# Patient Record
Sex: FEMALE | Race: WHITE | NOT HISPANIC OR LATINO | Employment: FULL TIME | URBAN - METROPOLITAN AREA
[De-identification: names, ages, dates, MRNs, and addresses within clinical notes are randomized per-mention and may not be internally consistent; named-entity substitution may affect disease eponyms.]

---

## 2017-04-18 ENCOUNTER — TELEPHONE (OUTPATIENT)
Dept: OTHER | Facility: CLINIC | Age: 37
End: 2017-04-18

## 2017-04-18 DIAGNOSIS — Z30.41 ENCOUNTER FOR SURVEILLANCE OF CONTRACEPTIVE PILLS: ICD-10-CM

## 2017-04-18 NOTE — TELEPHONE ENCOUNTER
norgestim-eth estrad triphasic (ORTHO TRI-CYCLEN, 28,) 0.18/0.215/0.25 MG-35 MCG per tablet 90 tablet 3 4/19/2016          Last Written Prescription Date: 04/19/2016  Last Fill Quantity: 90, # refills: 3  Last Office Visit with [facility], P or [facility] prescribing provider: 04/19/2016  Future OV:04/28/2017  Next 5 appointments (look out 90 days)     Apr 28, 2017  7:30 AM CDT   [facility]    [address] [facility] [place] [address]   [phone]                   BP Readings from Last 3 Encounters:   04/19/16 96/64   04/13/15 112/66   04/04/14 102/80     Date of last Breast Exam: Unknown    Lab Results   Component Value Date    PAP NIL 04/13/2015    PAP NIL 04/06/2012    PAP NIL 03/30/2010

## 2017-04-19 RX ORDER — NORGESTIMATE AND ETHINYL ESTRADIOL 7DAYSX3 28
KIT ORAL
Qty: 84 TABLET | Refills: 0 | Status: SHIPPED | OUTPATIENT
Start: 2017-04-19 | End: 2017-04-28

## 2017-04-19 NOTE — TELEPHONE ENCOUNTER
Prescription approved per [facility] Refill Protocol.  Tiana Hurtado RN

## 2017-04-20 DIAGNOSIS — G47.00 INSOMNIA: ICD-10-CM

## 2017-04-20 RX ORDER — TRAZODONE HYDROCHLORIDE 50 MG/1
TABLET, FILM COATED ORAL
Qty: 30 TABLET | Refills: 0 | Status: SHIPPED | OUTPATIENT
Start: 2017-04-20 | End: 2017-04-28

## 2017-04-20 NOTE — TELEPHONE ENCOUNTER
traZODone (DESYREL) 50 MG tablet         Last Written Prescription Date: 4/19/2016  Last Fill Quantity: 90; # refills: 3  Last Office Visit with [facility], [facility] or [facility] prescribing provider:  4/19/2016   Next 5 appointments (look out 90 days)     Apr 28, 2017  7:30 AM CDT   PHYSICAL with Rosetta Agudelo,    [facility]    [address] [facility] [address]   [phone]                   Last PHQ-9 score on record=   PHQ-9 SCORE 4/4/2014   Total Score 9       Lab Results   Component Value Date    AST 31 04/19/2016     Lab Results   Component Value Date    ALT 47 04/19/2016

## 2017-04-28 ENCOUNTER — OFFICE VISIT (OUTPATIENT)
Dept: FAMILY MEDICINE | Facility: CLINIC | Age: 37
End: 2017-04-28
Payer: COMMERCIAL

## 2017-04-28 VITALS
TEMPERATURE: 97.4 F | DIASTOLIC BLOOD PRESSURE: 64 MMHG | HEART RATE: 79 BPM | OXYGEN SATURATION: 95 % | WEIGHT: 149 LBS | HEIGHT: 65 IN | SYSTOLIC BLOOD PRESSURE: 97 MMHG | BODY MASS INDEX: 24.83 KG/M2

## 2017-04-28 DIAGNOSIS — Z00.01 ENCOUNTER FOR PREVENTATIVE ADULT HEALTH CARE EXAM WITH ABNORMAL FINDINGS: Primary | ICD-10-CM

## 2017-04-28 DIAGNOSIS — R94.6 ABNORMAL FINDING ON THYROID FUNCTION TEST: ICD-10-CM

## 2017-04-28 DIAGNOSIS — R79.89 ELEVATED CORTISOL LEVEL: ICD-10-CM

## 2017-04-28 DIAGNOSIS — F51.01 PRIMARY INSOMNIA: ICD-10-CM

## 2017-04-28 DIAGNOSIS — Z80.3 FAMILY HISTORY OF MALIGNANT NEOPLASM OF BREAST: Chronic | ICD-10-CM

## 2017-04-28 DIAGNOSIS — Z30.41 ENCOUNTER FOR SURVEILLANCE OF CONTRACEPTIVE PILLS: ICD-10-CM

## 2017-04-28 LAB
ALBUMIN SERPL-MCNC: 3.7 G/DL (ref 3.4–5)
ALP SERPL-CCNC: 43 U/L (ref 40–150)
ALT SERPL W P-5'-P-CCNC: 35 U/L (ref 0–50)
ANION GAP SERPL CALCULATED.3IONS-SCNC: 11 MMOL/L (ref 3–14)
AST SERPL W P-5'-P-CCNC: 24 U/L (ref 0–45)
BILIRUB SERPL-MCNC: 0.3 MG/DL (ref 0.2–1.3)
BUN SERPL-MCNC: 15 MG/DL (ref 7–30)
CALCIUM SERPL-MCNC: 9 MG/DL (ref 8.5–10.1)
CHLORIDE SERPL-SCNC: 107 MMOL/L (ref 94–109)
CHOLEST SERPL-MCNC: 196 MG/DL
CO2 SERPL-SCNC: 24 MMOL/L (ref 20–32)
CORTIS SERPL-MCNC: 15.1 UG/DL (ref 4–22)
CREAT SERPL-MCNC: 0.69 MG/DL (ref 0.52–1.04)
ERYTHROCYTE [DISTWIDTH] IN BLOOD BY AUTOMATED COUNT: 12.6 % (ref 10–15)
GFR SERPL CREATININE-BSD FRML MDRD: NORMAL ML/MIN/1.7M2
GLUCOSE SERPL-MCNC: 90 MG/DL (ref 70–99)
HCT VFR BLD AUTO: 42 % (ref 35–47)
HDLC SERPL-MCNC: 95 MG/DL
HGB BLD-MCNC: 13.8 G/DL (ref 11.7–15.7)
LDLC SERPL CALC-MCNC: 75 MG/DL
MCH RBC QN AUTO: 31.4 PG (ref 26.5–33)
MCHC RBC AUTO-ENTMCNC: 32.9 G/DL (ref 31.5–36.5)
MCV RBC AUTO: 96 FL (ref 78–100)
NONHDLC SERPL-MCNC: 101 MG/DL
PLATELET # BLD AUTO: 198 10E9/L (ref 150–450)
POTASSIUM SERPL-SCNC: 4.2 MMOL/L (ref 3.4–5.3)
PROT SERPL-MCNC: 7.2 G/DL (ref 6.8–8.8)
RBC # BLD AUTO: 4.39 10E12/L (ref 3.8–5.2)
SODIUM SERPL-SCNC: 142 MMOL/L (ref 133–144)
T4 FREE SERPL-MCNC: 0.96 NG/DL (ref 0.76–1.46)
TRIGL SERPL-MCNC: 132 MG/DL
TSH SERPL DL<=0.05 MIU/L-ACNC: 2.69 MU/L (ref 0.4–4)
WBC # BLD AUTO: 5.8 10E9/L (ref 4–11)

## 2017-04-28 PROCEDURE — 99000 SPECIMEN HANDLING OFFICE-LAB: CPT | Performed by: INTERNAL MEDICINE

## 2017-04-28 PROCEDURE — 80053 COMPREHEN METABOLIC PANEL: CPT | Performed by: INTERNAL MEDICINE

## 2017-04-28 PROCEDURE — 84439 ASSAY OF FREE THYROXINE: CPT | Performed by: INTERNAL MEDICINE

## 2017-04-28 PROCEDURE — 85027 COMPLETE CBC AUTOMATED: CPT | Performed by: INTERNAL MEDICINE

## 2017-04-28 PROCEDURE — 36415 COLL VENOUS BLD VENIPUNCTURE: CPT | Performed by: INTERNAL MEDICINE

## 2017-04-28 PROCEDURE — 99395 PREV VISIT EST AGE 18-39: CPT | Performed by: INTERNAL MEDICINE

## 2017-04-28 PROCEDURE — 80061 LIPID PANEL: CPT | Performed by: INTERNAL MEDICINE

## 2017-04-28 PROCEDURE — 84443 ASSAY THYROID STIM HORMONE: CPT | Performed by: INTERNAL MEDICINE

## 2017-04-28 PROCEDURE — 84479 ASSAY OF THYROID (T3 OR T4): CPT | Mod: 59 | Performed by: INTERNAL MEDICINE

## 2017-04-28 PROCEDURE — 82533 TOTAL CORTISOL: CPT | Performed by: INTERNAL MEDICINE

## 2017-04-28 PROCEDURE — 86376 MICROSOMAL ANTIBODY EACH: CPT | Performed by: INTERNAL MEDICINE

## 2017-04-28 RX ORDER — TRAZODONE HYDROCHLORIDE 50 MG/1
TABLET, FILM COATED ORAL
Qty: 90 TABLET | Refills: 3 | Status: SHIPPED | OUTPATIENT
Start: 2017-04-28 | End: 2018-04-15

## 2017-04-28 RX ORDER — NORGESTIMATE AND ETHINYL ESTRADIOL 7DAYSX3 28
1 KIT ORAL DAILY
Qty: 84 TABLET | Refills: 3 | Status: SHIPPED | OUTPATIENT
Start: 2017-04-28 | End: 2018-04-30

## 2017-04-28 NOTE — PROGRESS NOTES
SUBJECTIVE:     CC: Cortney Willoughby is an 37 year old woman who presents for preventive health visit.     Healthy Habits:    Do you get at least three servings of calcium containing foods daily (dairy, green leafy vegetables, etc.)? yes    Amount of exercise or daily activities, outside of work: 5-6 day(s) per week    Problems taking medications regularly No    Medication side effects: No    Have you had an eye exam in the past two years? No     Do you see a dentist twice per year? Yes     Do you have sleep apnea, excessive snoring or daytime drowsiness? yes, sometimes daytime drowsiness but overall better than she was years ago.    Cortney presents to the clinic today for an annual physical. She is fasting today.    She is no longer working at [employer]. She started a new position in the area which she is happy about.    Drowsiness- She is not drowsy all the time. She recently started acupuncture and noted that's when her extreme drowsiness happened initially. She almost fell asleep at work a few times. She correlates it to the acupuncture and possible body detoxification. She is not experiencing daytime drowsiness at this time.  She notes periodic sweating at night, but sleeps well with Trazodone which she has been on for awhile since her divorce. Mood stable.     Thyroid/Hormone Imbalance- Cortney is still unhappy with weight gain/distribution. She notes weight is around her abdomen, hips and thighs. She is upset since she works out 6 days a week. Notes that she is always hungry as well. She feels despite having the same diet and exercise plan she will suddenly go up and down in weight - reporting as much as 10 pounds. Note, weight is pretty stable on our scale over the past several years. Her mother had breast cancer so Cortney went to have labs done at an outside facility b/c she is worried that weight gain is all estrogen driven and will increase her risk of developing breast cancer as well.    She takes daily vitamins, supplements, and magnesium at night.  She started acupuncture after the results of her labs came back, since acupuncture can help with hormone imbalances. She is still undergoing treatment.   She is adamant about finding a solution.   She requested another mammogram referral d/t her family history. We discussed the option of breast MRI for her to get a better image d/t her age and to alternate with the mammogram she had last year. Her friend also recently passed d/t breast cancer at 31 y/o. Cortney's mom was dx with breast cancer at the age of 44 - negative genetic testing but was HER-2 positive. Mom is still living.  She would like to have children in the future; currently  and not dating/sexually active.   Notes stress related temporal headaches. She will treat with green tea. Denies photophobia or migraines.     Admits to minor dysphagia with post nasal drainage, but denies food getting stuck.     She had labs done through [facility]:  Thyroid - mildly elevated TSH at 2.49; slightly low T3 uptake  Testosterone - 27     Progesterone - 0.2   Leptin - 12.3  Cortisol- 24.9       Today's PHQ-2 Score:   PHQ-2 ( 1999 [employer]) 4/28/2017 4/13/2015   Q1: Little interest or pleasure in doing things 0 1   Q2: Feeling down, depressed or hopeless 0 1   PHQ-2 Score 0 2       Abuse: Current or Past(Physical, Sexual or Emotional)- No  Do you feel safe in your environment - Yes    Social History   Substance Use Topics     Smoking status: Never Smoker     Smokeless tobacco: Never Used     Alcohol use Yes      Comment: rare     The patient does not drink >3 drinks per day nor >7 drinks per week.    Recent Labs   Lab Test  04/08/16   0714  04/06/12   0732   CHOL  183  158   HDL  97  64   LDL  65  66   TRIG  106  144   CHOLHDLRATIO   --   2.5   NHDL  86   --        Reviewed orders with patient.  Reviewed health maintenance and updated orders accordingly - Yes    Mammo Decision Support:  Mammogram per  "health maintenance  History of abnormal Pap smear: NO - age 30-65 PAP every 5 years with negative HPV co-testing recommended    ROS:  Comprehensive ROS negative unless as stated above in HPI.    This document serves as a record of the services and decisions personally performed and made by Rosetta Agudelo DO. It was created on his/her behalf by Monisha Bowles, a trained medical scribe. The creation of this document is based the provider's statements to the medical scribe.  Scribe Monisha Bowles 7:36 AM, April 28, 2017   OBJECTIVE:     BP 97/64 (BP Location: Right arm, Cuff Size: Adult Regular)  Pulse 79  Temp 97.4  F (36.3  C) (Oral)  Ht 5' 4.9\" (1.648 m)  Wt 149 lb (67.6 kg)  LMP 04/11/2017 (Exact Date)  SpO2 95%  BMI 24.87 kg/m2  EXAM:  GENERAL: healthy, alert and no distress  EYES: Eyes grossly normal to inspection, PERRL and conjunctivae and sclerae normal  HENT: ear canals and TM's normal, nose and mouth without ulcers or lesions  NECK: no adenopathy, no asymmetry, masses, or scars and thyroid normal to palpation  RESP: lungs clear to auscultation - no rales, rhonchi or wheezes  BREAST: no nipple discharge and no palpable axillary masses or adenopathy, left breast probable fibrocystic tissue at 6 o'clock position, but no discrete mass; does have h/o fibrocystic changes per exam last year  CV: regular rate and rhythm, normal S1 S2, no S3 or S4, no murmur, click or rub, no peripheral edema and peripheral pulses strong  ABDOMEN: soft, nontender, no hepatosplenomegaly, no masses and bowel sounds normal  MS: no gross musculoskeletal defects noted, no edema  SKIN: no suspicious lesions or rashes  NEURO: Normal strength and tone, mentation intact and speech normal  PSYCH: mentation appears normal, affect normal/bright  Right temple slightly hyperpigmented, round and non raised area    ASSESSMENT/PLAN:     1. Encounter for preventative adult health care exam with abnormal findings  Overall healthy " "gal but she has concerns mostly about weight management  - Comprehensive metabolic panel  - CBC with platelets  - Lipid panel reflex to direct LDL    2. Abnormal finding on thyroid function test  Will recheck labs  - TSH  - T4, free  - T3 uptake  - Thyroid peroxidase antibody    3. Elevated cortisol level (H)  Per outside labs and she reports it was first thing in AM when had lab draw; was quite outside of their lab's reference range  Will repeat lab here  - Cortisol    4. Primary insomnia  Stable with medication  - traZODone (DESYREL) 50 MG tablet; TAKE ONE TABLET BY MOUTH NIGHTLY AT BEDTIME AS NEEDED FOR SLEEP  Dispense: 90 tablet; Refill: 3    5. Encounter for surveillance of contraceptive pills  Tolerating well without s/e  - norgestim-eth estrad triphasic (ORTHO TRI-CYCLEN, TRI-SPRINTEC) 0.18/0.215/0.25 MG-35 MCG per tablet; Take 1 tablet by mouth daily  Dispense: 84 tablet; Refill: 3    6. Family history of malignant neoplasm of breast  Breast MRI ordered; had normal mammo last year      Patient Instructions   Labs today- we will work on a plan when the results come back   -Consider signing up for [facility]   Referral for breast MRI - [facility] [phone]   Follow up with me annually or as needed    COUNSELING:   Reviewed preventive health counseling, as reflected in patient instructions       Regular exercise       Healthy diet/nutrition   reports that she has never smoked. She has never used smokeless tobacco.  Estimated body mass index is 24.87 kg/(m^2) as calculated from the following:    Height as of this encounter: 5' 4.9\" (1.648 m).    Weight as of this encounter: 149 lb (67.6 kg).       The information in this document, created by the medical scribe for me, accurately reflects the services I personally performed and the decisions made by me. I have reviewed and approved this document for accuracy prior to leaving the patient care area.  Rosetta" Sofia Agudelo DO  7:35 AM, 04/28/17    Rosetta Agudelo DO  [facility]

## 2017-04-28 NOTE — LETTER
[facility]  [address] [facility]  [address]  [place]  [address]  Tel: [phone]    May 3, 2017    Cortney Willoughby  [address] [address]        Cortney,    By the time you receive these labs in the mail, you will have received a call hopefully from our clinic to discuss in more detail. I'm glad your labs done here were all within normal limits - including the thyroid and cortisol levels. So, I suggest following the trend over time.    If you have any further questions or problems, please contact our office.      Sincerely,    Rosetta Agudelo DO/LISA DOE          Enclosure: Lab Results

## 2017-04-28 NOTE — PATIENT INSTRUCTIONS
Labs today- we will work on a plan when the results come back   -Consider signing up for [facility]   Referral for breast MRI - Please call to schedule your radiology study at [facility]: [phone]   Follow up with me annually or as needed    Preventive Health Recommendations  Female Ages 26 - 39  Yearly exam:   See your health care provider every year in order to    Review health changes.     Discuss preventive care.      Review your medicines if you your doctor has prescribed any.    Until age 30: Get a Pap test every three years (more often if you have had an abnormal result).    After age 30: Talk to your doctor about whether you should have a Pap test every 3 years or have a Pap test with HPV screening every 5 years.   You do not need a Pap test if your uterus was removed (hysterectomy) and you have not had cancer.  You should be tested each year for STDs (sexually transmitted diseases), if you're at risk.   Talk to your provider about how often to have your cholesterol checked.  If you are at risk for diabetes, you should have a diabetes test (fasting glucose).  Shots: Get a flu shot each year. Get a tetanus shot every 10 years.   Nutrition:     Eat at least 5 servings of fruits and vegetables each day.    Eat whole-grain bread, whole-wheat pasta and brown rice instead of white grains and rice.    Talk to your provider about Calcium and Vitamin D.     Lifestyle    Exercise at least 150 minutes a week (30 minutes a day, 5 days of the week). This will help you control your weight and prevent disease.    Limit alcohol to one drink per day.    No smoking.     Wear sunscreen to prevent skin cancer.    See your dentist every six months for an exam and cleaning.

## 2017-04-28 NOTE — NURSING NOTE
"Chief Complaint   Patient presents with     Physical     fasting       Initial BP 97/64 (BP Location: Right arm, Cuff Size: Adult Regular)  Pulse 79  Temp 97.4  F (36.3  C) (Oral)  Ht 5' 4.9\" (1.648 m)  Wt 149 lb (67.6 kg)  LMP 04/11/2017 (Exact Date)  SpO2 95%  BMI 24.87 kg/m2 Estimated body mass index is 24.87 kg/(m^2) as calculated from the following:    Height as of this encounter: 5' 4.9\" (1.648 m).    Weight as of this encounter: 149 lb (67.6 kg).  Medication Reconciliation: complete.      Tasha Moreno CMA        "

## 2017-04-28 NOTE — MR AVS SNAPSHOT
After Visit Summary   4/28/2017    Cortney Willoughby    MRN: [id number]           Patient Information     Date Of Birth          1980        Visit Information        Provider Department      4/28/2017 7:30 AM Rosetta Agudelo,  [facility]        Today's Diagnoses     Encounter for preventative adult health care exam with abnormal findings    -  1    Abnormal finding on thyroid function test        Elevated cortisol level (H)        Primary insomnia        Encounter for surveillance of contraceptive pills        Family history of malignant neoplasm of breast          Care Instructions    Labs today- we will work on a plan when the results come back   -Consider signing up for [facility]   Referral for breast MRI - Please call to schedule your radiology study at [facility]: [phone]   Follow up with me annually or as needed    Preventive Health Recommendations  Female Ages 26 - 39  Yearly exam:   See your health care provider every year in order to    Review health changes.     Discuss preventive care.      Review your medicines if you your doctor has prescribed any.    Until age 30: Get a Pap test every three years (more often if you have had an abnormal result).    After age 30: Talk to your doctor about whether you should have a Pap test every 3 years or have a Pap test with HPV screening every 5 years.   You do not need a Pap test if your uterus was removed (hysterectomy) and you have not had cancer.  You should be tested each year for STDs (sexually transmitted diseases), if you're at risk.   Talk to your provider about how often to have your cholesterol checked.  If you are at risk for diabetes, you should have a diabetes test (fasting glucose).  Shots: Get a flu shot each year. Get a tetanus shot every 10 years.   Nutrition:     Eat at least 5 servings of fruits and vegetables each day.    Eat whole-grain bread, whole-wheat pasta and brown rice instead of white grains and  "rice.    Talk to your provider about Calcium and Vitamin D.     Lifestyle    Exercise at least 150 minutes a week (30 minutes a day, 5 days of the week). This will help you control your weight and prevent disease.    Limit alcohol to one drink per day.    No smoking.     Wear sunscreen to prevent skin cancer.    See your dentist every six months for an exam and cleaning.          Follow-ups after your visit        Who to contact     If you have questions or need follow up information about today's clinic visit or your schedule please contact [facility] directly at [phone].  Normal or non-critical lab and imaging results will be communicated to you by MyChart, letter or phone within 4 business days after the clinic has received the results. If you do not hear from us within 7 days, please contact the clinic through MyChart or phone. If you have a critical or abnormal lab result, we will notify you by phone as soon as possible.  Submit refill requests through [employer] or call your pharmacy and they will forward the refill request to us. Please allow 3 business days for your refill to be completed.          Additional Information About Your Visit        Care EveryWhere ID     This is your Care EveryWhere ID. This could be used by other organizations to access your [facility] medical records  [id number]        Your Vitals Were     Pulse Temperature Height Last Period Pulse Oximetry BMI (Body Mass Index)    79 97.4  F (36.3  C) (Oral) 5' 4.9\" (1.648 m) 04/11/2017 (Exact Date) 95% 24.87 kg/m2       Blood Pressure from Last 3 Encounters:   04/28/17 97/64   04/19/16 96/64   04/13/15 112/66    Weight from Last 3 Encounters:   04/28/17 149 lb (67.6 kg)   04/19/16 147 lb (66.7 kg)   04/13/15 141 lb (64 kg)              We Performed the Following     CBC with platelets     Comprehensive metabolic panel     Cortisol     Lipid panel reflex to direct LDL     T3 uptake     T4, free     Thyroid peroxidase antibody  "    TSH          Today's Medication Changes          These changes are accurate as of: 4/28/17  8:19 AM.  If you have any questions, ask your nurse or doctor.               These medicines have changed or have updated prescriptions.        Dose/Directions    norgestim-eth estrad triphasic 0.18/0.215/0.25 MG-35 MCG per tablet   Commonly known as:  ORTHO TRI-CYCLEN, TRI-SPRINTEC   This may have changed:  See the new instructions.   Used for:  Encounter for surveillance of contraceptive pills   Changed by:  Rosetta Agudelo DO        Dose:  1 tablet   Take 1 tablet by mouth daily   Quantity:  84 tablet   Refills:  3       traZODone 50 MG tablet   Commonly known as:  DESYREL   This may have changed:  See the new instructions.   Used for:  Primary insomnia   Changed by:  Rosetta Agudelo DO        TAKE ONE TABLET BY MOUTH NIGHTLY AT BEDTIME AS NEEDED FOR SLEEP   Quantity:  90 tablet   Refills:  3            Where to get your medicines      These medications were sent to [place] [address] [place] [address] [address]     Phone:  [phone]     norgestim-eth estrad triphasic 0.18/0.215/0.25 MG-35 MCG per tablet    traZODone 50 MG tablet                Primary Care Provider Office Phone # Fax #    Rosetta Agudelo [phone]       [facility] [address] [facility] [address]  [facility] [address]        Thank you!     Thank you for choosing [facility]  for your care. Our goal is always to provide you with excellent care. Hearing back from our patients is one way we can continue to improve our services. Please take a few minutes to complete the written survey that you may receive in the mail after your visit with us. Thank you!             Your Updated Medication List - Protect others around you: Learn how to safely use, store and throw away your medicines at www.disposemymeds.org.          This list is accurate as of: 4/28/17  8:19 AM.   Always use your most recent med list.                   Brand Name Dispense Instructions for use    norgestim-eth estrad triphasic 0.18/0.215/0.25 MG-35 MCG per tablet    ORTHO TRI-CYCLEN, TRI-SPRINTEC    84 tablet    Take 1 tablet by mouth daily       traZODone 50 MG tablet    DESYREL    90 tablet    TAKE ONE TABLET BY MOUTH NIGHTLY AT BEDTIME AS NEEDED FOR SLEEP

## 2017-05-01 LAB — THYROPEROXIDASE AB SERPL-ACNC: <10 IU/ML

## 2017-05-02 ENCOUNTER — TELEPHONE (OUTPATIENT)
Dept: FAMILY MEDICINE | Facility: CLINIC | Age: 37
End: 2017-05-02

## 2017-05-02 DIAGNOSIS — R94.6 ABNORMAL FINDING ON THYROID FUNCTION TEST: Primary | ICD-10-CM

## 2017-05-02 LAB — T3RU NFR SERPL: 31 %

## 2017-05-02 NOTE — LETTER
[place] [address] [address] [facility] [address]  [phone]        November 9, 2017    Cortney Willoughby  [address] [address]              Dear Cortney Willoughby    This is to remind you that your non-fasting labs are due.    You may call our office at [phone] to schedule an appointment.    Please disregard this notice if you have already had your labs drawn or made an appointment.        Sincerely,        Rosetta Agudelo DO

## 2017-05-03 NOTE — TELEPHONE ENCOUNTER
Please call Cortney. Really nice gal that had some concerns about labs. She had been having fluctuating weight and went to an independent clinic where she had slightly abnormal thyroid labs and cortisol level. However, all labs here in our clinic were within normal limits on Friday. While this is truly good news, I wonder if she will be concerned b/c she was worried why she was gaining weight. I'm not sure why she is gaining weight. She is very stable on our scale over the past several years but she feels she fluctuates up and down without pattern or change in her diet / exercise.  My suggestion is to recheck thyroid labs in a couple month for the trend.   I had told her in the office if her numbers were way off, she could see an endocrinologist - I don't think that is necessary at this time.  Please let me know if she has further questions. Thanks!

## 2017-05-03 NOTE — TELEPHONE ENCOUNTER
Reason for call: Results   Name of test or procedure: All labs   Date of test or procedure: 4/28/17  Location of test or procedure: [facility]    Additional comments: n/a    Phone Number Pt can be reached at: Home number on file [phone] (home)  Best Time: today  Can we leave a detailed message on this number? YES

## 2017-05-03 NOTE — TELEPHONE ENCOUNTER
Spoke with pt and let her know that labs within normal limits and letter was sent. Patient is requesting a call from . She would like to know why she is having sx if labs results are normal.    Tasha Moreno CMA

## 2017-05-03 NOTE — TELEPHONE ENCOUNTER
Tried to call Cortney but her voice mailbox was full  Will try tomorrow  I'll discuss following TSH trend as outside labs were definitely slightly abnormal even though labs here were all within normal limits   May also discuss her adjusting her diet and exercise routine b/c if she changes things then maybe that will help (aka - may be caught in a rut)  Also note her weight has been stable in our clinic despite her concerns of weight gain

## 2017-05-04 NOTE — TELEPHONE ENCOUNTER
Spoke to Cortney who was appreciative of the discussion  She is thankful labs are within normal limits b/c doesn't want to have to take a medication if she doesn't have to  She does think spring is usually the time of the year where she is heaviest and tends to be 135-140 the rest of the year  She does think acupuncture is helping some and has a few more sessions of that  She will schedule future repeat thyroid labs and/or OV with me later this summer to follow the trend  She also has MRI scheduled  No further questions at this time

## 2017-05-20 DIAGNOSIS — G47.00 INSOMNIA: ICD-10-CM

## 2017-05-22 RX ORDER — TRAZODONE HYDROCHLORIDE 50 MG/1
TABLET, FILM COATED ORAL
Qty: 30 TABLET | Refills: 0 | OUTPATIENT
Start: 2017-05-22

## 2017-06-15 ENCOUNTER — HOSPITAL ENCOUNTER (OUTPATIENT)
Dept: MRI IMAGING | Facility: CLINIC | Age: 37
Discharge: HOME | End: 2017-06-15
Attending: INTERNAL MEDICINE | Admitting: INTERNAL MEDICINE
Payer: COMMERCIAL

## 2017-06-15 DIAGNOSIS — Z80.3 FAMILY HISTORY OF MALIGNANT NEOPLASM OF BREAST: Chronic | ICD-10-CM

## 2017-06-15 PROCEDURE — 0159T MR BREAST BILATERAL W/O & W CONTRAST: CPT

## 2017-06-15 PROCEDURE — 27210995 ZZH RX 272: Performed by: INTERNAL MEDICINE

## 2017-06-15 PROCEDURE — A9585 GADOBUTROL INJECTION: HCPCS | Performed by: INTERNAL MEDICINE

## 2017-06-15 PROCEDURE — 25000128 H RX IP 250 OP 636: Performed by: INTERNAL MEDICINE

## 2017-06-15 RX ORDER — ACYCLOVIR 200 MG/1
30 CAPSULE ORAL ONCE
Status: COMPLETED | OUTPATIENT
Start: 2017-06-15 | End: 2017-06-15

## 2017-06-15 RX ORDER — GADOBUTROL 604.72 MG/ML
10 INJECTION INTRAVENOUS ONCE
Status: COMPLETED | OUTPATIENT
Start: 2017-06-15 | End: 2017-06-15

## 2017-06-15 RX ADMIN — GADOBUTROL 10 ML: 604.72 INJECTION INTRAVENOUS at 19:13

## 2017-06-15 RX ADMIN — SODIUM CHLORIDE 30 ML: 9 INJECTION, SOLUTION INTRAMUSCULAR; INTRAVENOUS; SUBCUTANEOUS at 19:13

## 2018-01-10 DIAGNOSIS — E65 LOCALIZED ADIPOSITY: Primary | ICD-10-CM

## 2018-01-10 LAB — HGB BLD-MCNC: 13.4 G/DL (ref 11.7–15.7)

## 2018-01-10 PROCEDURE — 36415 COLL VENOUS BLD VENIPUNCTURE: CPT

## 2018-01-10 PROCEDURE — 85018 HEMOGLOBIN: CPT

## 2018-03-13 ENCOUNTER — TELEPHONE (OUTPATIENT)
Dept: FAMILY MEDICINE | Facility: CLINIC | Age: 38
End: 2018-03-13

## 2018-03-13 DIAGNOSIS — Z00.00 PREVENTATIVE HEALTH CARE: Primary | ICD-10-CM

## 2018-03-13 NOTE — TELEPHONE ENCOUNTER
Reason for Call: Request for an order or referral:    Order or referral being requested: fasting labs    Date needed: before my next appointment    Has the patient been seen by the PCP for this problem? YES    Additional comments: Please place fasting lab orders before 4/20    Phone number Patient can be reached at:  Home number on file [phone] (home)    Best Time:  anytime    Can we leave a detailed message on this number?  YES    Call taken on 3/13/2018 at 2:44 PM by Sanjuana Kerr

## 2018-04-15 DIAGNOSIS — F51.01 PRIMARY INSOMNIA: ICD-10-CM

## 2018-04-16 NOTE — TELEPHONE ENCOUNTER
"traZODone (DESYREL) 50 MG tablet   Last Written Prescription Date:  4/28/2017  Last Fill Quantity: 90,  # refills: 3   Last office visit: 4/28/2017 with prescribing provider:  Dr. Agudelo   Future Office Visit:   Next 5 appointments (look out 90 days)     Apr 30, 2018  9:30 AM CDT   PHYSICAL with Rosetta Agudelo, DO   [facility]    [address] [facility] [id number] [phone]                 Requested Prescriptions   Pending Prescriptions Disp Refills     traZODone (DESYREL) 50 MG tablet [Pharmacy Med Name: TRAZODONE 50 MG TABLET] 90 tablet 3     Sig: TAKE ONE TABLET BY MOUTH NIGHTLY AT BEDTIME AS NEEDED FOR SLEEP    Serotonin Modulators Passed    4/15/2018  3:14 AM       Passed - Recent (12 mo) or future (30 days) visit within the authorizing provider's specialty    Patient had office visit in the last 12 months or has a visit in the next 30 days with authorizing provider or within the authorizing provider's specialty.  See \"Patient Info\" tab in inbasket, or \"Choose Columns\" in Meds & Orders section of the refill encounter.           Passed - Patient is age 18 or older       Passed - No active pregnancy on record       Passed - No positive pregnancy test in past 12 months          "

## 2018-04-17 RX ORDER — TRAZODONE HYDROCHLORIDE 50 MG/1
TABLET, FILM COATED ORAL
Qty: 30 TABLET | Refills: 0 | Status: SHIPPED | OUTPATIENT
Start: 2018-04-17 | End: 2018-04-30

## 2018-04-17 NOTE — TELEPHONE ENCOUNTER
Medication is being filled for 1 time refill only due to:  pt has anual exam scheduled this month. Rola Mireles RN

## 2018-04-18 DIAGNOSIS — Z00.00 PREVENTATIVE HEALTH CARE: ICD-10-CM

## 2018-04-18 LAB
ERYTHROCYTE [DISTWIDTH] IN BLOOD BY AUTOMATED COUNT: 12.3 % (ref 10–15)
HCT VFR BLD AUTO: 44.9 % (ref 35–47)
HGB BLD-MCNC: 15 G/DL (ref 11.7–15.7)
MCH RBC QN AUTO: 31.3 PG (ref 26.5–33)
MCHC RBC AUTO-ENTMCNC: 33.4 G/DL (ref 31.5–36.5)
MCV RBC AUTO: 94 FL (ref 78–100)
PLATELET # BLD AUTO: 208 10E9/L (ref 150–450)
RBC # BLD AUTO: 4.79 10E12/L (ref 3.8–5.2)
WBC # BLD AUTO: 4.5 10E9/L (ref 4–11)

## 2018-04-18 PROCEDURE — 36415 COLL VENOUS BLD VENIPUNCTURE: CPT | Performed by: INTERNAL MEDICINE

## 2018-04-18 PROCEDURE — 80053 COMPREHEN METABOLIC PANEL: CPT | Performed by: INTERNAL MEDICINE

## 2018-04-18 PROCEDURE — 85027 COMPLETE CBC AUTOMATED: CPT | Performed by: INTERNAL MEDICINE

## 2018-04-18 PROCEDURE — 80061 LIPID PANEL: CPT | Performed by: INTERNAL MEDICINE

## 2018-04-19 LAB
ALBUMIN SERPL-MCNC: 3.8 G/DL (ref 3.4–5)
ALP SERPL-CCNC: 49 U/L (ref 40–150)
ALT SERPL W P-5'-P-CCNC: 64 U/L (ref 0–50)
ANION GAP SERPL CALCULATED.3IONS-SCNC: 5 MMOL/L (ref 3–14)
AST SERPL W P-5'-P-CCNC: 35 U/L (ref 0–45)
BILIRUB SERPL-MCNC: 0.4 MG/DL (ref 0.2–1.3)
BUN SERPL-MCNC: 20 MG/DL (ref 7–30)
CALCIUM SERPL-MCNC: 8.8 MG/DL (ref 8.5–10.1)
CHLORIDE SERPL-SCNC: 110 MMOL/L (ref 94–109)
CHOLEST SERPL-MCNC: 213 MG/DL
CO2 SERPL-SCNC: 25 MMOL/L (ref 20–32)
CREAT SERPL-MCNC: 0.7 MG/DL (ref 0.52–1.04)
GFR SERPL CREATININE-BSD FRML MDRD: >90 ML/MIN/1.7M2
GLUCOSE SERPL-MCNC: 84 MG/DL (ref 70–99)
HDLC SERPL-MCNC: 76 MG/DL
LDLC SERPL CALC-MCNC: 125 MG/DL
NONHDLC SERPL-MCNC: 137 MG/DL
POTASSIUM SERPL-SCNC: 4.2 MMOL/L (ref 3.4–5.3)
PROT SERPL-MCNC: 7 G/DL (ref 6.8–8.8)
SODIUM SERPL-SCNC: 140 MMOL/L (ref 133–144)
TRIGL SERPL-MCNC: 62 MG/DL

## 2018-04-30 ENCOUNTER — OFFICE VISIT (OUTPATIENT)
Dept: FAMILY MEDICINE | Facility: CLINIC | Age: 38
End: 2018-04-30
Payer: COMMERCIAL

## 2018-04-30 VITALS
WEIGHT: 156.7 LBS | TEMPERATURE: 98 F | HEIGHT: 65 IN | HEART RATE: 79 BPM | SYSTOLIC BLOOD PRESSURE: 111 MMHG | DIASTOLIC BLOOD PRESSURE: 72 MMHG | OXYGEN SATURATION: 97 % | BODY MASS INDEX: 26.11 KG/M2

## 2018-04-30 DIAGNOSIS — Z00.01 ENCOUNTER FOR PREVENTATIVE ADULT HEALTH CARE EXAM WITH ABNORMAL FINDINGS: Primary | ICD-10-CM

## 2018-04-30 DIAGNOSIS — F51.01 PRIMARY INSOMNIA: ICD-10-CM

## 2018-04-30 DIAGNOSIS — Z12.31 VISIT FOR SCREENING MAMMOGRAM: ICD-10-CM

## 2018-04-30 DIAGNOSIS — E66.3 OVERWEIGHT (BMI 25.0-29.9): ICD-10-CM

## 2018-04-30 DIAGNOSIS — L20.82 FLEXURAL ECZEMA: ICD-10-CM

## 2018-04-30 DIAGNOSIS — Z30.41 ENCOUNTER FOR SURVEILLANCE OF CONTRACEPTIVE PILLS: ICD-10-CM

## 2018-04-30 PROCEDURE — 99395 PREV VISIT EST AGE 18-39: CPT | Performed by: INTERNAL MEDICINE

## 2018-04-30 RX ORDER — NORGESTIMATE AND ETHINYL ESTRADIOL 7DAYSX3 28
1 KIT ORAL DAILY
Qty: 84 TABLET | Refills: 3 | Status: SHIPPED | OUTPATIENT
Start: 2018-04-30 | End: 2019-05-02

## 2018-04-30 RX ORDER — TRAZODONE HYDROCHLORIDE 50 MG/1
TABLET, FILM COATED ORAL
Qty: 90 TABLET | Refills: 3 | Status: SHIPPED | OUTPATIENT
Start: 2018-04-30 | End: 2019-03-02

## 2018-04-30 RX ORDER — LIOTHYRONINE SODIUM 5 UG/1
5 TABLET ORAL DAILY
COMMUNITY
End: 2019-07-23

## 2018-04-30 NOTE — MR AVS SNAPSHOT
After Visit Summary   4/30/2018    Cortney Willoughby    MRN: [id number]           Patient Information     Date Of Birth          1980        Visit Information        Provider Department      4/30/2018 9:30 AM Rosetta Agudelo,  [facility]        Today's Diagnoses     Encounter for preventative adult health care exam with abnormal findings    -  1    Encounter for surveillance of contraceptive pills        Primary insomnia        Visit for screening mammogram          Care Instructions    Try to get a little more sleep    Preventive Health Recommendations  Female Ages 26 - 39  Yearly exam:   See your health care provider every year in order to    Review health changes.     Discuss preventive care.      Review your medicines if you your doctor has prescribed any.    Until age 30: Get a Pap test every three years (more often if you have had an abnormal result).    After age 30: Talk to your doctor about whether you should have a Pap test every 3 years or have a Pap test with HPV screening every 5 years.   You do not need a Pap test if your uterus was removed (hysterectomy) and you have not had cancer.  You should be tested each year for STDs (sexually transmitted diseases), if you're at risk.   Talk to your provider about how often to have your cholesterol checked.  If you are at risk for diabetes, you should have a diabetes test (fasting glucose).  Shots: Get a flu shot each year. Get a tetanus shot every 10 years.   Nutrition:     Eat at least 5 servings of fruits and vegetables each day.    Eat whole-grain bread, whole-wheat pasta and brown rice instead of white grains and rice.    Talk to your provider about Calcium and Vitamin D.     Lifestyle    Exercise at least 150 minutes a week (30 minutes a day, 5 days of the week). This will help you control your weight and prevent disease.    Limit alcohol to one drink per day.    No smoking.     Wear sunscreen to prevent skin cancer.    See  "your dentist every six months for an exam and cleaning.    Controlling Your Cholesterol  Cholesterol is a waxy substance. It travels in your blood through the blood vessels. When you have high cholesterol, it can build up along the walls of the blood vessels. This makes the vessels narrower and decreases blood flow. You are then at greater risk of having a heart attack or a stroke.  Good and bad cholesterol  Lipids are fats, and blood is mostly water. Fat and water don't mix. So our bodies need lipoproteins (lipids inside a protein shell) to carry the lipids. The protein shell carries its lipids through the bloodstream. There are two main kinds of lipoproteins:    LDL (low-density lipoprotein) is known as \"bad cholesterol.\" It mainly carries cholesterol. It delivers this cholesterol to body cells. Excess LDL cholesterol will build up in artery walls. This increases your risk for heart disease and stroke.    HDL (high-density lipoprotein) is known as \"good cholesterol.\" This protein shell collects excess cholesterol that LDLs have left behind on blood vessel walls. That's why high levels of HDL cholesterol can decrease your risk of heart disease and stroke.  Controlling cholesterol levels  Total cholesterol includes LDL and HDL cholesterol, as well as other fats in the bloodstream. If your total cholesterol is high, follow the steps below to help lower your total cholesterol level:  Eat less unhealthy fat    Cut back on saturated fats and trans fats (also called hydrogenated) by selecting lean cuts of meat, low-fat dairy, and using oils instead of solid fats. Limit baked goods, processed meats, and fried foods. A diet that s high in these fats increases your bad cholesterol. It's not enough to just cut back on foods containing cholesterol.    Eat about 2 servings of fish per week. Most fish contain omega-3 fatty acids. These help lower blood cholesterol.    Eat more whole grains and soluble fiber (such as oat bran). " These lower overall cholesterol.  Be active    Choose an activity you enjoy. Walking, swimming, and riding a bike are some good ways to be active.    Start at a level where you feel comfortable. Increase your time and pace a little each week.    Work up to 30 to 40 minutes of moderate to high intensity physical activity at least 3 to 4 days per week.    Remember, some activity is better than none.    If you haven't been exercising regularly, start slowly. Check with your healthcare provider to make sure the exercise plan is right for you.  Quit smoking  Quitting smoking can improve your lipid levels. It also lowers your risk for heart disease and stroke.  Manage your weight  If you are overweight or obese, your healthcare provider will work with you to lose weight and lower your BMI (body mass index) to a normal or near-normal level. Making diet changes and increasing physical activity can help.  Take medicine as directed  Many people need medicine to get their LDL levels to a safe level. Medicine to lower cholesterol levels is effective and safe. Taking medicine is not a substitute for exercise or watching your diet! Your healthcare provider can tell you whether you might benefit from a cholesterol-lowering medicine.  Date Last Reviewed: 6/1/2017 2000-2017 [employer]. [address] [place] [address]. All rights reserved. This information is not intended as a substitute for professional medical care. Always follow your healthcare professional's instructions.        Lifestyle Changes to Control Cholesterol  You can control your cholesterol through diet, exercise, weight management, quitting smoking, stress management, and taking your medicines right. These things can also lower your risk for cardiovascular disease.    Eating healthy  Your healthcare provider will give you information on diet changes you may need to make. Your provider may recommend that you see a registered dietitian for  help with diet changes. Changes may include:    Cutting back on the amount of fat and cholesterol in your meals    Eating less salt (sodium). This is especially important if you have high blood pressure.    Eating more fresh vegetables and fruits    Eating lean proteins such as fish, poultry, beans, and peas    Eating less red meat and processed meats    Using low-fat dairy products    Using vegetable and nut oils in limited amounts    Limiting how many sweets and processed foods like chips, cookies, and baked goods that you eat     Limiting how many sugar-sweetened beverages you drink    Limiting how often you eat out  Getting exercise  Regular exercise is a good way to help your body control cholesterol. Regular exercise can help in many ways. It can:    Raise your good cholesterol    Help lower your bad cholesterol    Let blood flow better through your body    Give more oxygen to your muscles and tissues    Help you manage your weight    Help your heart pump better    Lower your blood pressure  Your healthcare provider may recommend that you get more physical activity if you haven't been active. Your provider may recommend that you get moderate to vigorous physical activity for at least 40 minutes each day. You should do this for at least 3 to 4 days each week. A few examples of moderate to vigorous activity are:    Walking at a brisk pace. This is about 3 to 4 miles per hour.    Jogging or running    Swimming or water aerobics    Hiking    Dancing    Martial arts    Tennis    Riding a bicycle or stationary bike    Dancing  Managing your weight  If you are overweight or obese, your healthcare provider will work with you to help you lose weight and lower your BMI (body mass index). Making diet changes and getting more physical activity can help. Changing your diet will help you lose weight more easily than adding exercise.  Quitting smoking  Smoking and other tobacco use can raise cholesterol and make it harder to  control. Quitting is tough. But millions of people have given up tobacco for good. You can quit, too! Think about some of the reasons below to quit smoking. Do any of them make you think twice about your smoking habit?  Stop smoking because it:    Keeps your cholesterol high, even if you make all the other changes you re supposed to    Damages your body. It especially harms your heart, lungs, skin, and blood vessels.    Makes you more likely to have a heart attack (acute myocardial infarction), stroke, or cancer    Stains your teeth    Makes your skin, clothes, and breath smell bad    Costs a lot of money  Controlling stress   Learn ways to control stress. This will help you deal with stress in your home and work life. Controlling stress can greatly lower your risk of getting cardiovascular disease.  Making the most of medicines  Healthy eating and exercise are a good start to keeping your cholesterol down. But you may need some extra help from medicine. If your doctor prescribes medicine, be sure to take it exactly as directed. Remember:    Tell your healthcare provider about all other medicines you take. This includes vitamins and herbs.    Tell your healthcare provider if you have any side effects after starting to take a medicine. Examples of side effects to watch for include muscle aches, weakness, blurred vision, rust-colored urine, yellowing of eyes or skin (jaundice), and headache.    Don t skip a dose or stop taking your medicine because you feel better or because your cholesterol numbers go down. Never stop taking your medicine unless your healthcare provider has told you it s OK.    Ask your healthcare provider if you have any questions about your medicines.  High risk groups  Some people may need to take medicines called statins to control their cholesterol. This is in addition to eating a healthy diet and getting regular exercise.  Statins can help you stay healthy. They can also help prevent a heart  attack or stroke. You may need to take a statin if you are in one of these groups:    Adults who have had a heart attack or stroke. Or adults who have had peripheral vascular disease, a ministroke (transient ischemic attack), or stable or unstable angina. This group also includes people who have had a procedure to restore blood flow through a blocked artery. These procedures include percutaneous coronary intervention, angioplasty, stent, and open-heart bypass surgery.    Adults who have diabetes. Or adults who are at higher risk of having a heart attack or stroke and have an LDL cholesterol level of 70 to 189 mg/dL    Adults who are 21 years old or older and have an LDL cholesterol level of 190 mg/dL or higher.  If you are in a high-risk group, talk with your healthcare provider about your treatment goals. Make sure you understand why these goals are important, based on your own health history and your family history of heart disease or high cholesterol.  Make a plan to have regular cholesterol checks. You may need to fast before getting this test. Also ask your provider about any side effects your medicines may cause. Let your provider know about any side effects you have. You may need to take more than one medicine to reach the cholesterol goals that you and your provider decide on.  Date Last Reviewed: 10/1/2016    4733-8132 [employer]. [address] [place] [address], [place] [address]. All rights reserved. This information is not intended as a substitute for professional medical care. Always follow your healthcare professional's instructions.                Follow-ups after your visit        Future tests that were ordered for you today     Open Future Orders        Priority Expected Expires Ordered    *MA Screening Digital Bilateral Routine  4/30/2019 4/30/2018            Who to contact     If you have questions or need follow up information about today's clinic visit or your schedule please contact  "[facility] directly at [phone].  Normal or non-critical lab and imaging results will be communicated to you by MyChart, letter or phone within 4 business days after the clinic has received the results. If you do not hear from us within 7 days, please contact the clinic through [employer] or phone. If you have a critical or abnormal lab result, we will notify you by phone as soon as possible.  Submit refill requests through [employer] or call your pharmacy and they will forward the refill request to us. Please allow 3 business days for your refill to be completed.          Additional Information About Your Visit        [employer] Information     [employer] gives you secure access to your electronic health record. If you see a primary care provider, you can also send messages to your care team and make appointments. If you have questions, please call your primary care clinic.  If you do not have a primary care provider, please call [phone] and they will assist you.        Care EveryWhere ID     This is your Care EveryWhere ID. This could be used by other organizations to access your [facility] medical records  [id number]        Your Vitals Were     Pulse Temperature Height Last Period Pulse Oximetry BMI (Body Mass Index)    79 98  F (36.7  C) (Oral) 5' 4.75\" (1.645 m) 04/09/2018 97% 26.28 kg/m2       Blood Pressure from Last 3 Encounters:   04/30/18 111/72   04/28/17 97/64   04/19/16 96/64    Weight from Last 3 Encounters:   04/30/18 156 lb 11.2 oz (71.1 kg)   04/28/17 149 lb (67.6 kg)   04/19/16 147 lb (66.7 kg)                 Today's Medication Changes          These changes are accurate as of 4/30/18 10:22 AM.  If you have any questions, ask your nurse or doctor.               These medicines have changed or have updated prescriptions.        Dose/Directions    traZODone 50 MG tablet   Commonly known as:  DESYREL   This may have changed:  See the new instructions.   Used for:  Primary insomnia   Changed " by:  Jammie Rosetta Black,         TAKE ONE TABLET BY MOUTH NIGHTLY AT BEDTIME AS NEEDED FOR SLEEP   Quantity:  90 tablet   Refills:  3            Where to get your medicines      These medications were sent to [place] [id number] [place] [facility] [place] [place] [facility] [address] [facility]  [address] [address], [facility] [address]     Phone:  [phone]     norgestim-eth estrad triphasic 0.18/0.215/0.25 MG-35 MCG per tablet    traZODone 50 MG tablet                Primary Care Provider Office Phone # Fax #    Rosetta Agudelo,  [phone] [address] [address]        Equal Access to Services     KARLA ROSE : Hadii vanessa vences hadasho Sogénesis, waaxda luqadaha, qaybta kaalmada adeegyada, dominic schofield . So [facility] [phone].    ATENCIÓN: Si habla español, tiene a lara disposición servicios gratuitos de asistencia lingüística. [facility] [phone].    We comply with applicable federal civil rights laws and [place] laws. We do not discriminate on the basis of race, color, national origin, age, disability, sex, sexual orientation, or gender identity.            Thank you!     Thank you for choosing [facility]  for your care. Our goal is always to provide you with excellent care. Hearing back from our patients is one way we can continue to improve our services. Please take a few minutes to complete the written survey that you may receive in the mail after your visit with us. Thank you!             Your Updated Medication List - Protect others around you: Learn how to safely use, store and throw away your medicines at www.disposemymeds.org.          This list is accurate as of 4/30/18 10:22 AM.  Always use your most recent med list.                   Brand Name Dispense Instructions for use Diagnosis    liothyronine 5 MCG tablet    CYTOMEL     Take 5 mcg by mouth daily        norgestim-eth estrad triphasic 0.18/0.215/0.25 MG-35 MCG per tablet    ORTHO TRI-CYCLEN,  TRI-SPRINTEC    84 tablet    Take 1 tablet by mouth daily    Encounter for surveillance of contraceptive pills       traZODone 50 MG tablet    DESYREL    90 tablet    TAKE ONE TABLET BY MOUTH NIGHTLY AT BEDTIME AS NEEDED FOR SLEEP    Primary insomnia       UNABLE TO FIND      MEDICATION NAME: Testosterone cream 2.75 mg, 2 clicks per day

## 2018-04-30 NOTE — PATIENT INSTRUCTIONS
Try to get a little more sleep  1% Hydrocortisone cream for eczema  Schedule mammogram  Follow up annually or as needed and feel free to update me on the hormonal specialist results    Preventive Health Recommendations  Female Ages 26 - 39  Yearly exam:   See your health care provider every year in order to    Review health changes.     Discuss preventive care.      Review your medicines if you your doctor has prescribed any.    Until age 30: Get a Pap test every three years (more often if you have had an abnormal result).    After age 30: Talk to your doctor about whether you should have a Pap test every 3 years or have a Pap test with HPV screening every 5 years.   You do not need a Pap test if your uterus was removed (hysterectomy) and you have not had cancer.  You should be tested each year for STDs (sexually transmitted diseases), if you're at risk.   Talk to your provider about how often to have your cholesterol checked.  If you are at risk for diabetes, you should have a diabetes test (fasting glucose).  Shots: Get a flu shot each year. Get a tetanus shot every 10 years.   Nutrition:     Eat at least 5 servings of fruits and vegetables each day.    Eat whole-grain bread, whole-wheat pasta and brown rice instead of white grains and rice.    Talk to your provider about Calcium and Vitamin D.     Lifestyle    Exercise at least 150 minutes a week (30 minutes a day, 5 days of the week). This will help you control your weight and prevent disease.    Limit alcohol to one drink per day.    No smoking.     Wear sunscreen to prevent skin cancer.    See your dentist every six months for an exam and cleaning.    Controlling Your Cholesterol  Cholesterol is a waxy substance. It travels in your blood through the blood vessels. When you have high cholesterol, it can build up along the walls of the blood vessels. This makes the vessels narrower and decreases blood flow. You are then at greater risk of having a heart attack  "or a stroke.  Good and bad cholesterol  Lipids are fats, and blood is mostly water. Fat and water don't mix. So our bodies need lipoproteins (lipids inside a protein shell) to carry the lipids. The protein shell carries its lipids through the bloodstream. There are two main kinds of lipoproteins:    LDL (low-density lipoprotein) is known as \"bad cholesterol.\" It mainly carries cholesterol. It delivers this cholesterol to body cells. Excess LDL cholesterol will build up in artery walls. This increases your risk for heart disease and stroke.    HDL (high-density lipoprotein) is known as \"good cholesterol.\" This protein shell collects excess cholesterol that LDLs have left behind on blood vessel walls. That's why high levels of HDL cholesterol can decrease your risk of heart disease and stroke.  Controlling cholesterol levels  Total cholesterol includes LDL and HDL cholesterol, as well as other fats in the bloodstream. If your total cholesterol is high, follow the steps below to help lower your total cholesterol level:  Eat less unhealthy fat    Cut back on saturated fats and trans fats (also called hydrogenated) by selecting lean cuts of meat, low-fat dairy, and using oils instead of solid fats. Limit baked goods, processed meats, and fried foods. A diet that s high in these fats increases your bad cholesterol. It's not enough to just cut back on foods containing cholesterol.    Eat about 2 servings of fish per week. Most fish contain omega-3 fatty acids. These help lower blood cholesterol.    Eat more whole grains and soluble fiber (such as oat bran). These lower overall cholesterol.  Be active    Choose an activity you enjoy. Walking, swimming, and riding a bike are some good ways to be active.    Start at a level where you feel comfortable. Increase your time and pace a little each week.    Work up to 30 to 40 minutes of moderate to high intensity physical activity at least 3 to 4 days per week.    Remember, some " activity is better than none.    If you haven't been exercising regularly, start slowly. Check with your healthcare provider to make sure the exercise plan is right for you.  Quit smoking  Quitting smoking can improve your lipid levels. It also lowers your risk for heart disease and stroke.  Manage your weight  If you are overweight or obese, your healthcare provider will work with you to lose weight and lower your BMI (body mass index) to a normal or near-normal level. Making diet changes and increasing physical activity can help.  Take medicine as directed  Many people need medicine to get their LDL levels to a safe level. Medicine to lower cholesterol levels is effective and safe. Taking medicine is not a substitute for exercise or watching your diet! Your healthcare provider can tell you whether you might benefit from a cholesterol-lowering medicine.  Date Last Reviewed: 6/1/2017 2000-2017 [employer]. [address] [place] [id number]. All rights reserved. This information is not intended as a substitute for professional medical care. Always follow your healthcare professional's instructions.        Lifestyle Changes to Control Cholesterol  You can control your cholesterol through diet, exercise, weight management, quitting smoking, stress management, and taking your medicines right. These things can also lower your risk for cardiovascular disease.    Eating healthy  Your healthcare provider will give you information on diet changes you may need to make. Your provider may recommend that you see a registered dietitian for help with diet changes. Changes may include:    Cutting back on the amount of fat and cholesterol in your meals    Eating less salt (sodium). This is especially important if you have high blood pressure.    Eating more fresh vegetables and fruits    Eating lean proteins such as fish, poultry, beans, and peas    Eating less red meat and processed meats    Using low-fat  dairy products    Using vegetable and nut oils in limited amounts    Limiting how many sweets and processed foods like chips, cookies, and baked goods that you eat     Limiting how many sugar-sweetened beverages you drink    Limiting how often you eat out  Getting exercise  Regular exercise is a good way to help your body control cholesterol. Regular exercise can help in many ways. It can:    Raise your good cholesterol    Help lower your bad cholesterol    Let blood flow better through your body    Give more oxygen to your muscles and tissues    Help you manage your weight    Help your heart pump better    Lower your blood pressure  Your healthcare provider may recommend that you get more physical activity if you haven't been active. Your provider may recommend that you get moderate to vigorous physical activity for at least 40 minutes each day. You should do this for at least 3 to 4 days each week. A few examples of moderate to vigorous activity are:    Walking at a brisk pace. This is about 3 to 4 miles per hour.    Jogging or running    Swimming or water aerobics    Hiking    Dancing    Martial arts    Tennis    Riding a bicycle or stationary bike    Dancing  Managing your weight  If you are overweight or obese, your healthcare provider will work with you to help you lose weight and lower your BMI (body mass index). Making diet changes and getting more physical activity can help. Changing your diet will help you lose weight more easily than adding exercise.  Quitting smoking  Smoking and other tobacco use can raise cholesterol and make it harder to control. Quitting is tough. But millions of people have given up tobacco for good. You can quit, too! Think about some of the reasons below to quit smoking. Do any of them make you think twice about your smoking habit?  Stop smoking because it:    Keeps your cholesterol high, even if you make all the other changes you re supposed to    Damages your body. It especially  harms your heart, lungs, skin, and blood vessels.    Makes you more likely to have a heart attack (acute myocardial infarction), stroke, or cancer    Stains your teeth    Makes your skin, clothes, and breath smell bad    Costs a lot of money  Controlling stress   Learn ways to control stress. This will help you deal with stress in your home and work life. Controlling stress can greatly lower your risk of getting cardiovascular disease.  Making the most of medicines  Healthy eating and exercise are a good start to keeping your cholesterol down. But you may need some extra help from medicine. If your doctor prescribes medicine, be sure to take it exactly as directed. Remember:    Tell your healthcare provider about all other medicines you take. This includes vitamins and herbs.    Tell your healthcare provider if you have any side effects after starting to take a medicine. Examples of side effects to watch for include muscle aches, weakness, blurred vision, rust-colored urine, yellowing of eyes or skin (jaundice), and headache.    Don t skip a dose or stop taking your medicine because you feel better or because your cholesterol numbers go down. Never stop taking your medicine unless your healthcare provider has told you it s OK.    Ask your healthcare provider if you have any questions about your medicines.  High risk groups  Some people may need to take medicines called statins to control their cholesterol. This is in addition to eating a healthy diet and getting regular exercise.  Statins can help you stay healthy. They can also help prevent a heart attack or stroke. You may need to take a statin if you are in one of these groups:    Adults who have had a heart attack or stroke. Or adults who have had peripheral vascular disease, a ministroke (transient ischemic attack), or stable or unstable angina. This group also includes people who have had a procedure to restore blood flow through a blocked artery. These  procedures include percutaneous coronary intervention, angioplasty, stent, and open-heart bypass surgery.    Adults who have diabetes. Or adults who are at higher risk of having a heart attack or stroke and have an LDL cholesterol level of 70 to 189 mg/dL    Adults who are 21 years old or older and have an LDL cholesterol level of 190 mg/dL or higher.  If you are in a high-risk group, talk with your healthcare provider about your treatment goals. Make sure you understand why these goals are important, based on your own health history and your family history of heart disease or high cholesterol.  Make a plan to have regular cholesterol checks. You may need to fast before getting this test. Also ask your provider about any side effects your medicines may cause. Let your provider know about any side effects you have. You may need to take more than one medicine to reach the cholesterol goals that you and your provider decide on.  Date Last Reviewed: 10/1/2016    0894-5551 [employer]. [address] [place] [address], [place] [address]. All rights reserved. This information is not intended as a substitute for professional medical care. Always follow your healthcare professional's instructions.

## 2018-04-30 NOTE — NURSING NOTE
"  Chief Complaint   Patient presents with     Physical       Initial /72 (BP Location: Right arm, Cuff Size: Adult Regular)  Pulse 79  Temp 98  F (36.7  C) (Oral)  Ht 5' 4.75\" (1.645 m)  Wt 156 lb 11.2 oz (71.1 kg)  LMP 04/09/2018  SpO2 97%  BMI 26.28 kg/m2 Estimated body mass index is 26.28 kg/(m^2) as calculated from the following:    Height as of this encounter: 5' 4.75\" (1.645 m).    Weight as of this encounter: 156 lb 11.2 oz (71.1 kg).  Medication Reconciliation: complete   Yenifer Ramirez MA  "
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
## 2018-05-08 ENCOUNTER — HOSPITAL ENCOUNTER (OUTPATIENT)
Dept: MAMMOGRAPHY | Facility: CLINIC | Age: 38
Discharge: HOME | End: 2018-05-08
Attending: INTERNAL MEDICINE | Admitting: INTERNAL MEDICINE
Payer: COMMERCIAL

## 2018-05-08 DIAGNOSIS — Z12.31 VISIT FOR SCREENING MAMMOGRAM: ICD-10-CM

## 2018-05-08 PROCEDURE — 77067 SCR MAMMO BI INCL CAD: CPT

## 2018-06-11 DIAGNOSIS — Z30.41 ENCOUNTER FOR SURVEILLANCE OF CONTRACEPTIVE PILLS: ICD-10-CM

## 2018-06-11 NOTE — TELEPHONE ENCOUNTER
"norgestim-eth estrad triphasic (ORTHO TRI-CYCLEN, TRI-SPRINTEC) 0.18/0.215/0.25 MG-35 MCG per tablet  Last Written Prescription Date:  4/30/18  Last Fill Quantity: 84,  # refills: 3   Last office visit: 4/30/2018 with prescribing provider:  Jammie   Future Office Visit:    Requested Prescriptions   Pending Prescriptions Disp Refills     TRI-ESTARYLLA 0.18/0.215/0.25 MG-35 MCG per tablet [Pharmacy Med Name: TRI-ESTARYLLA TABLET] 84 tablet 3     Sig: TAKE 1 TABLET BY MOUTH DAILY    Contraceptives Protocol Passed    6/11/2018  1:26 AM       Passed - Patient is not a current smoker if age is 35 or older       Passed - Recent (12 mo) or future (30 days) visit within the authorizing provider's specialty    Patient had office visit in the last 12 months or has a visit in the next 30 days with authorizing provider or within the authorizing provider's specialty.  See \"Patient Info\" tab in inbasket, or \"Choose Columns\" in Meds & Orders section of the refill encounter.           Passed - No active pregnancy on record       Passed - No positive pregnancy test in past 12 months          "

## 2018-06-12 RX ORDER — NORGESTIMATE AND ETHINYL ESTRADIOL 7DAYSX3 28
KIT ORAL
Start: 2018-06-12

## 2018-08-01 DIAGNOSIS — E65 LOCALIZED ADIPOSITY: Primary | ICD-10-CM

## 2018-08-01 LAB — HGB BLD-MCNC: 14.4 G/DL (ref 11.7–15.7)

## 2018-08-01 PROCEDURE — 36415 COLL VENOUS BLD VENIPUNCTURE: CPT

## 2018-08-01 PROCEDURE — 85018 HEMOGLOBIN: CPT

## 2018-12-19 ENCOUNTER — TELEPHONE (OUTPATIENT)
Dept: FAMILY MEDICINE | Facility: CLINIC | Age: 38
End: 2018-12-19

## 2019-03-19 ENCOUNTER — TELEPHONE (OUTPATIENT)
Dept: FAMILY MEDICINE | Facility: CLINIC | Age: 39
End: 2019-03-19

## 2019-03-19 DIAGNOSIS — Z13.29 SCREENING FOR THYROID DISORDER: ICD-10-CM

## 2019-03-19 DIAGNOSIS — Z79.899 MEDICATION MANAGEMENT: ICD-10-CM

## 2019-03-19 DIAGNOSIS — Z13.0 SCREENING FOR DEFICIENCY ANEMIA: ICD-10-CM

## 2019-03-19 DIAGNOSIS — E78.2 MIXED HYPERLIPIDEMIA: Primary | ICD-10-CM

## 2019-03-19 DIAGNOSIS — Z11.4 SCREENING FOR HIV (HUMAN IMMUNODEFICIENCY VIRUS): ICD-10-CM

## 2019-03-19 NOTE — TELEPHONE ENCOUNTER
Reason for Call: Request for an order or referral:    Order or referral being requested: Lab orders     Date needed: as soon as possible    Has the patient been seen by the PCP for this problem? YES    Additional comments:   Pt is coming in on 05/02/2019 for a physical.  She would like to complete labs a week prior on 04/19/2019.  Please enter fasting physical labs and any other labs suggested.        Call taken on 3/19/2019 at 11:06 AM by Carmen Boggs

## 2019-04-19 DIAGNOSIS — Z13.29 SCREENING FOR THYROID DISORDER: ICD-10-CM

## 2019-04-19 DIAGNOSIS — Z79.899 MEDICATION MANAGEMENT: ICD-10-CM

## 2019-04-19 DIAGNOSIS — E78.2 MIXED HYPERLIPIDEMIA: ICD-10-CM

## 2019-04-19 DIAGNOSIS — Z13.0 SCREENING FOR DEFICIENCY ANEMIA: ICD-10-CM

## 2019-04-19 DIAGNOSIS — Z11.4 SCREENING FOR HIV (HUMAN IMMUNODEFICIENCY VIRUS): ICD-10-CM

## 2019-04-19 LAB
ANION GAP SERPL CALCULATED.3IONS-SCNC: 9 MMOL/L (ref 3–14)
BUN SERPL-MCNC: 16 MG/DL (ref 7–30)
CALCIUM SERPL-MCNC: 8.5 MG/DL (ref 8.5–10.1)
CHLORIDE SERPL-SCNC: 109 MMOL/L (ref 94–109)
CHOLEST SERPL-MCNC: 170 MG/DL
CO2 SERPL-SCNC: 21 MMOL/L (ref 20–32)
CREAT SERPL-MCNC: 0.77 MG/DL (ref 0.52–1.04)
ERYTHROCYTE [DISTWIDTH] IN BLOOD BY AUTOMATED COUNT: 13.2 % (ref 10–15)
GFR SERPL CREATININE-BSD FRML MDRD: >90 ML/MIN/{1.73_M2}
GLUCOSE SERPL-MCNC: 93 MG/DL (ref 70–99)
HCT VFR BLD AUTO: 40.5 % (ref 35–47)
HDLC SERPL-MCNC: 102 MG/DL
HGB BLD-MCNC: 13.7 G/DL (ref 11.7–15.7)
HIV 1+2 AB+HIV1 P24 AG SERPL QL IA: NONREACTIVE
LDLC SERPL CALC-MCNC: 53 MG/DL
MCH RBC QN AUTO: 31.2 PG (ref 26.5–33)
MCHC RBC AUTO-ENTMCNC: 33.8 G/DL (ref 31.5–36.5)
MCV RBC AUTO: 92 FL (ref 78–100)
NONHDLC SERPL-MCNC: 68 MG/DL
PLATELET # BLD AUTO: 211 10E9/L (ref 150–450)
POTASSIUM SERPL-SCNC: 3.9 MMOL/L (ref 3.4–5.3)
RBC # BLD AUTO: 4.39 10E12/L (ref 3.8–5.2)
SODIUM SERPL-SCNC: 139 MMOL/L (ref 133–144)
T4 FREE SERPL-MCNC: 1.01 NG/DL (ref 0.76–1.46)
TRIGL SERPL-MCNC: 73 MG/DL
TSH SERPL DL<=0.005 MIU/L-ACNC: <0.01 MU/L (ref 0.4–4)
WBC # BLD AUTO: 5 10E9/L (ref 4–11)

## 2019-04-19 PROCEDURE — 85027 COMPLETE CBC AUTOMATED: CPT | Performed by: INTERNAL MEDICINE

## 2019-04-19 PROCEDURE — 80061 LIPID PANEL: CPT | Performed by: INTERNAL MEDICINE

## 2019-04-19 PROCEDURE — 84443 ASSAY THYROID STIM HORMONE: CPT | Performed by: INTERNAL MEDICINE

## 2019-04-19 PROCEDURE — 36415 COLL VENOUS BLD VENIPUNCTURE: CPT | Performed by: INTERNAL MEDICINE

## 2019-04-19 PROCEDURE — 80048 BASIC METABOLIC PNL TOTAL CA: CPT | Performed by: INTERNAL MEDICINE

## 2019-04-19 PROCEDURE — 84439 ASSAY OF FREE THYROXINE: CPT | Performed by: INTERNAL MEDICINE

## 2019-04-19 PROCEDURE — 87389 HIV-1 AG W/HIV-1&-2 AB AG IA: CPT | Performed by: INTERNAL MEDICINE

## 2019-04-22 NOTE — RESULT ENCOUNTER NOTE
Carlie Barr,    This is to inform you regarding your test result.    I spoke to you on phone but sending you a result note also.  CBC result which includes white count Hemoglobin and  Platelet Counts is normal.   Basic metabolic panel which includes electrolytes and kidney fucntion is normal  Your total cholesterol is normal.  HDL which is called good cholesterol is normal.  Your LDL cholesterol is normal.  This is often call bad cholesterol and high levels increase the risk for heart attacks and strokes.  Your triglycerides are normal.  HIV 1&2 Antibody is negative.  TSH which is thyroid hormone is very low  You mentioned that you are followed by independent natural medicine doctor for your thyroid.  So make sure you send those results to her.    Sincerely,      Dr.Nasima Chacha MD,FACP

## 2019-05-02 ENCOUNTER — OFFICE VISIT (OUTPATIENT)
Dept: FAMILY MEDICINE | Facility: CLINIC | Age: 39
End: 2019-05-02
Payer: COMMERCIAL

## 2019-05-02 VITALS
HEART RATE: 89 BPM | TEMPERATURE: 98.1 F | HEIGHT: 65 IN | OXYGEN SATURATION: 96 % | BODY MASS INDEX: 22.73 KG/M2 | SYSTOLIC BLOOD PRESSURE: 103 MMHG | DIASTOLIC BLOOD PRESSURE: 67 MMHG | WEIGHT: 136.4 LBS

## 2019-05-02 DIAGNOSIS — Z00.00 ROUTINE HISTORY AND PHYSICAL EXAMINATION OF ADULT: Primary | ICD-10-CM

## 2019-05-02 DIAGNOSIS — E78.2 MIXED HYPERLIPIDEMIA: ICD-10-CM

## 2019-05-02 DIAGNOSIS — R79.89 ELEVATED CORTISOL LEVEL: ICD-10-CM

## 2019-05-02 DIAGNOSIS — E03.9 HYPOTHYROIDISM, UNSPECIFIED TYPE: ICD-10-CM

## 2019-05-02 DIAGNOSIS — Z30.41 ENCOUNTER FOR SURVEILLANCE OF CONTRACEPTIVE PILLS: ICD-10-CM

## 2019-05-02 DIAGNOSIS — Z12.31 VISIT FOR SCREENING MAMMOGRAM: ICD-10-CM

## 2019-05-02 DIAGNOSIS — F51.01 PRIMARY INSOMNIA: ICD-10-CM

## 2019-05-02 DIAGNOSIS — Z80.3 FAMILY HISTORY OF MALIGNANT NEOPLASM OF BREAST: ICD-10-CM

## 2019-05-02 PROCEDURE — 99395 PREV VISIT EST AGE 18-39: CPT | Performed by: INTERNAL MEDICINE

## 2019-05-02 PROCEDURE — 99212 OFFICE O/P EST SF 10 MIN: CPT | Mod: 25 | Performed by: INTERNAL MEDICINE

## 2019-05-02 RX ORDER — FLUCONAZOLE 150 MG/1
150 TABLET ORAL WEEKLY
Qty: 1 TABLET | Refills: 0 | COMMUNITY
Start: 2019-05-02 | End: 2020-06-11

## 2019-05-02 RX ORDER — LEVOTHYROXINE SODIUM 75 UG/1
TABLET ORAL
Refills: 3 | COMMUNITY
Start: 2019-04-13 | End: 2021-06-18

## 2019-05-02 RX ORDER — NORGESTIMATE AND ETHINYL ESTRADIOL 7DAYSX3 28
1 KIT ORAL DAILY
Qty: 84 TABLET | Refills: 3 | Status: SHIPPED | OUTPATIENT
Start: 2019-05-02 | End: 2020-04-07

## 2019-05-02 RX ORDER — TRAZODONE HYDROCHLORIDE 50 MG/1
50 TABLET, FILM COATED ORAL
Qty: 90 TABLET | Refills: 3 | Status: SHIPPED | OUTPATIENT
Start: 2019-05-02 | End: 2020-05-14

## 2019-05-02 ASSESSMENT — MIFFLIN-ST. JEOR: SCORE: 1296.65

## 2019-05-02 NOTE — PATIENT INSTRUCTIONS
Preventive Health Recommendations  Female Ages 26 - 39  Yearly exam:   See your health care provider every year in order to    Review health changes.     Discuss preventive care.      Review your medicines if you your doctor has prescribed any.    Until age 30: Get a Pap test every three years (more often if you have had an abnormal result).    After age 30: Talk to your doctor about whether you should have a Pap test every 3 years or have a Pap test with HPV screening every 5 years.   You do not need a Pap test if your uterus was removed (hysterectomy) and you have not had cancer.  You should be tested each year for STDs (sexually transmitted diseases), if you're at risk.   Talk to your provider about how often to have your cholesterol checked.  If you are at risk for diabetes, you should have a diabetes test (fasting glucose).  Shots: Get a flu shot each year. Get a tetanus shot every 10 years.   Nutrition:     Eat at least 5 servings of fruits and vegetables each day.    Eat whole-grain bread, whole-wheat pasta and brown rice instead of white grains and rice.    Get adequate Calcium and Vitamin D.     Lifestyle    Exercise at least 150 minutes a week (30 minutes a day, 5 days of the week). This will help you control your weight and prevent disease.    Limit alcohol to one drink per day.    No smoking.     Wear sunscreen to prevent skin cancer.    See your dentist every six months for an exam and cleaning.      You are due for mammogram.  Please call the following number to make appointment :  [phone]  It is located in [address]    Follow up in one year for physical   Seek sooner medical attention if there is any worsening of symptoms or problems.

## 2019-05-02 NOTE — PROGRESS NOTES
SUBJECTIVE:   CC: oCrtney Willoughby is an 39 year old woman who presents for preventive health visit.     Healthy Habits:    Getting at least 3 servings of Calcium per day:  Yes    Bi-annual eye exam:  NO    Dental care twice a year:  Yes    Sleep apnea or symptoms of sleep apnea:  None    Diet:  Gluten-free/reduced    Frequency of exercise:  6-7 days/week    Duration of exercise:  Greater than 60 minutes    Taking medications regularly:  No    Barriers to taking medications:  None    Medication side effects:  Other    PHQ-2 Total Score:    Additional concerns today:  No    Natural medicine doctor manages her thyroid    Today's PHQ-2 Score:   PHQ-2 ( 1999 [employer]) 4/30/2018   Q1: Little interest or pleasure in doing things 0   Q2: Feeling down, depressed or hopeless 0   PHQ-2 Score 0   Q1: Little interest or pleasure in doing things Not at all   Q2: Feeling down, depressed or hopeless Not at all   PHQ-2 Score 0     Abuse: Current or Past(Physical, Sexual or Emotional)- No  Do you feel safe in your environment? Yes    Social History     Tobacco Use     Smoking status: Never Smoker     Smokeless tobacco: Never Used   Substance Use Topics     Alcohol use: Yes     Comment: 2 drinks per week     If you drink alcohol do you typically have >3 drinks per day or >7 drinks per week? No    No flowsheet data found.    Reviewed orders with patient.  Reviewed health maintenance and updated orders accordingly - Yes  Labs reviewed in EPIC        Pertinent mammograms are reviewed under the imaging tab.  History of abnormal Pap smear: NO - age 30-65 PAP every 5 years with negative HPV co-testing recommended  PAP / HPV Latest Ref Rng & Units 4/13/2015 4/6/2012 3/30/2010   PAP - NIL NIL NIL   HPV 16 DNA NEG Negative - -   HPV 18 DNA NEG Negative - -   OTHER HR HPV NEG Negative - -     Reviewed and updated as needed this visit by clinical staff  Tobacco  Allergies  Meds         Reviewed and updated as needed this visit by  "Provider            Review of Systems  CONSTITUTIONAL: NEGATIVE for fever, chills, change in weight  INTEGUMENTARU/SKIN: NEGATIVE for worrisome rashes, moles or lesions  EYES: NEGATIVE for vision changes or irritation  ENT: NEGATIVE for ear, mouth and throat problems  RESP: NEGATIVE for significant cough or SOB  BREAST: NEGATIVE for masses, tenderness or discharge  CV: NEGATIVE for chest pain, palpitations or peripheral edema  GI: NEGATIVE for nausea, abdominal pain, heartburn, or change in bowel habits  : NEGATIVE for unusual urinary or vaginal symptoms. Periods are regular.  MUSCULOSKELETAL: NEGATIVE for significant arthralgias or myalgia  NEURO: NEGATIVE for weakness, dizziness or paresthesias  PSYCHIATRIC: NEGATIVE for changes in mood or affect  And has chronic history of insomnia and takes medication which works  She has been taking birth control for long time but then she went off of it and it started 2 months ago  Patient has seen podiatry and they have prescribed fluconazole once a week for her fungal infection  She is followed by natural medicine doctor regarding elevated cortisol and her thyroid they manages her medications    OBJECTIVE:   /67   Pulse 89   Temp 98.1  F (36.7  C) (Oral)   Ht 1.638 m (5' 4.5\")   Wt 61.9 kg (136 lb 6.4 oz)   LMP 04/23/2019 (Exact Date)   SpO2 96%   BMI 23.05 kg/m    Physical Exam  GENERAL: healthy, alert and no distress  EYES: Eyes grossly normal to inspection, PERRL and conjunctivae and sclerae normal  HENT: ear canals and TM's normal, nose and mouth without ulcers or lesions  NECK: no adenopathy, no asymmetry, masses, or scars and thyroid normal to palpation  RESP: lungs clear to auscultation - no rales, rhonchi or wheezes  BREAST: normal without masses, tenderness or nipple discharge and no palpable axillary masses or adenopathy  Right breast is larger than the left breast  CV: regular rate and rhythm, normal S1 S2, no S3 or S4, no murmur, click or rub, no " peripheral edema and peripheral pulses strong  ABDOMEN: soft, nontender, no hepatosplenomegaly, no masses and bowel sounds normal  MS: no gross musculoskeletal defects noted, no edema  SKIN: no suspicious lesions or rashes  NEURO: Normal strength and tone, mentation intact and speech normal  PSYCH: mentation appears normal, affect normal/bright        ASSESSMENT/PLAN:   Cortney was seen today for physical.    Diagnoses and all orders for this visit:    Routine history and physical examination of adult  Performed  Her last tetanus shot was in 2010  Her last Pap smear was April 13, 2015 with HPV negative  She gets mammogram every year due to family history of breast cancer in her mother  Her last mammogram was on May 8, 2018  Will continue with yearly mammogram and self breast exam  Went over all her results  Her TSH is low  She says her natural medicine doctor is aware of that and she is sent those results to her    Visit for screening mammogram  -     MA Screen Bilateral w/Anders; Future    Primary insomnia  -     traZODone (DESYREL) 50 MG tablet; Take 1 tablet (50 mg) by mouth nightly as needed for sleep  Patient says this works well for her  She wants to continue    Encounter for surveillance of contraceptive pills  -     norgestim-eth estrad triphasic (ORTHO TRI-CYCLEN/TRI-SPRINTEC) 0.18/0.215/0.25 MG-35 MCG tablet; Take 1 tablet by mouth daily  Patient has been taking this medication without any side effects    Mixed hyperlipidemia  The numbers have improved    Family history of malignant neoplasm of breast  -     MA Screen Bilateral w/Anders; Future  Yearly mammogram due to family history of breast cancer in her mother    Elevated cortisol level (H) and hypothyroidism  Comments:  managed by natural medicine physician    Patient has hypothyroidism and her TSH was low  We discussed those results with her  Per patient her natural medicine doctor has been monitoring that  They change her dose recently  She is also  "following her regarding her elevated cortisol level  She wants to continue to follow with them      COUNSELING:  Reviewed preventive health counseling, as reflected in patient instructions       Regular exercise       Healthy diet/nutrition    BP Readings from Last 1 Encounters:   05/02/19 103/67     Estimated body mass index is 23.05 kg/m  as calculated from the following:    Height as of this encounter: 1.638 m (5' 4.5\").    Weight as of this encounter: 61.9 kg (136 lb 6.4 oz).      Disclaimer: This note consists of symbols derived from keyboarding, dictation and/or voice recognition software. As a result, there may be errors in the script that have gone undetected. Please consider this when interpreting information found in this chart.       reports that she has never smoked. She has never used smokeless tobacco.      Counseling Resources:  ATP IV Guidelines  Pooled Cohorts Equation Calculator  Breast Cancer Risk Calculator  FRAX Risk Assessment  ICSI Preventive Guidelines  Dietary Guidelines for Americans, 2010  USDA's MyPlate  ASA Prophylaxis  Lung CA Screening    Zeinab Burnham MD  [facility]  "

## 2019-05-09 ENCOUNTER — HOSPITAL ENCOUNTER (OUTPATIENT)
Dept: MAMMOGRAPHY | Facility: CLINIC | Age: 39
Discharge: HOME | End: 2019-05-09
Attending: INTERNAL MEDICINE | Admitting: INTERNAL MEDICINE
Payer: COMMERCIAL

## 2019-05-09 DIAGNOSIS — Z12.31 VISIT FOR SCREENING MAMMOGRAM: ICD-10-CM

## 2019-05-09 DIAGNOSIS — Z80.3 FAMILY HISTORY OF MALIGNANT NEOPLASM OF BREAST: ICD-10-CM

## 2019-05-09 PROCEDURE — 77063 BREAST TOMOSYNTHESIS BI: CPT

## 2019-05-10 NOTE — RESULT ENCOUNTER NOTE
Carlie Barr,    This is to inform you regarding your test result.    Mammogram result is satisfactory .  Recommendation: annual mammography      Sincerely,      Dr.Nasima Chacha MD,FACP

## 2019-09-29 ENCOUNTER — HEALTH MAINTENANCE LETTER (OUTPATIENT)
Age: 39
End: 2019-09-29

## 2020-02-21 ENCOUNTER — NURSE TRIAGE (OUTPATIENT)
Dept: FAMILY MEDICINE | Facility: CLINIC | Age: 40
End: 2020-02-21

## 2020-02-21 NOTE — TELEPHONE ENCOUNTER
Triaged per Epic Triage Protocol, gave care advice which patient plans to follow.  See Care advice tab for more information.  Patent to call back if further questions or concerns.    Additional Information    Negative: SEVERE vaginal bleeding (i.e., soaking 2 pads or tampons per hour and present 2 or more hours; 1 menstrual cup every 2 hours)    Negative: MODERATE vaginal bleeding (i.e., soaking pad or tampon per hour and present > 6 hours; 1 menstrual cup every 6 hours)    Negative: Pale skin (pallor) of new onset or worsening    Negative: Constant abdominal pain lasting > 2 hours    Negative: Patient sounds very sick or weak to the triager    Negative: Pregnant > 20 weeks (5 months or more)    Negative: Pregnant < 20 weeks (less than 5 months)    Negative: Postpartum < 1 month since delivery ('Did you recently give birth?')    Negative: Vaginal discharge is the main symptom and bleeding is slight    Negative: SEVERE abdominal pain (e.g., excruciating)    Negative: SEVERE dizziness (e.g., unable to stand, requires support to walk, feels like passing out now)    Negative: Passed tissue (e.g., gray-white)    Negative: Taking Coumadin (warfarin) or other strong blood thinner, or known bleeding disorder (e.g., thrombocytopenia)    Negative: Skin bruises or nosebleed and not caused by an injury    Negative: Bleeding/spotting after procedure (e.g., biopsy) or pelvic examination (e.g., pap smear) that persists > 3 days    Negative: Patient wants to be seen    Negative: Periods with > 6 soaked pads or tampons per day    Negative: Periods last > 7 days    Negative: Uses menstrual cups and more than 80 ml blood per menstrual period    Negative: Missed period has occurred 2 or more times in the last year and the cause is not known    Negative: Menstrual cycle < 21 days OR > 35 days, and occurs more than two cycles (2 months) this past year    Negative: Bleeding or spotting between regular periods occurs more than two cycles  "(2 months),    Negative: Bleeding or spotting between regular periods occurs more than two cycles (2 months), and using birth control medicine (e.g., pills, patch, Depo-Provera, Implanon, vaginal ring, Mirena IUD)    Negative: Bleeding or spotting occurs after hysterectomy    Negative: Age > 39 years with irregular or excessive bleeding    Negative: Bleeding or spotting occurs after sex (Exception: first intercourse)    Taking birth control pills and hasn't missed taking any pills    Answer Assessment - Initial Assessment Questions  1. AMOUNT: \"Describe the bleeding that you are having.\"     - SPOTTING: spotting, or pinkish / brownish mucous discharge; does not fill panti-liner or pad     - MILD:  less than 1 pad / hour; less than patient's usual menstrual bleeding    - MODERATE: 1-2 pads / hour; 1 menstrual cup every 6 hours; small-medium blood clots (e.g., pea, grape, small coin)    - SEVERE: soaking 2 or more pads/hour for 2 or more hours; 1 menstrual cup every 2 hours; bleeding not contained by pads or continuous red blood from vagina; large blood clots (e.g., golf ball, large coin)       Was on Bc for 20 years, hypothyroid  Dx by a private doctor, has been on pill for 10 months,   Tuesday spotting, wed spotting, red in toilet,   2. ONSET: \"When did the bleeding begin?\" \"Is it continuing now?\"        3. MENSTRUAL PERIOD: \"When was the last normal menstrual period?\" \"How is this different than your period?\"        4. REGULARITY: \"How regular are your periods?\"        5. ABDOMINAL PAIN: \"Do you have any pain?\" \"How bad is the pain?\"  (e.g., Scale 1-10; mild, moderate, or severe)    - MILD (1-3): doesn't interfere with normal activities, abdomen soft and not tender to touch     - MODERATE (4-7): interferes with normal activities or awakens from sleep, tender to touch     - SEVERE (8-10): excruciating pain, doubled over, unable to do any normal activities         6. PREGNANCY: \"Could you be pregnant?\" \"Are you " "sexually active?\" \"Did you recently give birth?\"       7. BREASTFEEDING: \"Are you breastfeeding?\"      8. HORMONES: \"Are you taking any hormone medications, prescription or OTC?\" (e.g., birth control pills, estrogen)        9. BLOOD THINNERS: \"Do you take any blood thinners?\" (e.g., Coumadin/warfarin, Pradaxa/dabigatran, aspirin)        10. CAUSE: \"What do you think is causing the bleeding?\" (e.g., recent gyn surgery, recent gyn procedure; known bleeding disorder, cervical cancer, polycystic ovarian disease, fibroids)            11. HEMODYNAMIC STATUS: \"Are you weak or feeling lightheaded?\" If so, ask: \"Can you stand and walk normally?\"           12. OTHER SYMPTOMS: \"What other symptoms are you having with the bleeding?\" (e.g., passed tissue, vaginal discharge, fever, menstrual-type cramps)    Protocols used: VAGINAL BLEEDING - [id number]    "

## 2020-02-21 NOTE — TELEPHONE ENCOUNTER
Reason for call:  Patient reporting a symptom    Symptom or request: patient got her period 1 week early    Duration (how long have symptoms been present):     Have you been treated for this before? No    Additional comments: pt has additional symptoms not willing to share with phone staff. Pt would like to speak with a nurse as this is very abnormal for her    Phone Number patient can be reached at:  Cell number on file:    Telephone Information:   Mobile [phone]       Best Time:  any    Can we leave a detailed message on this number:  YES    Call taken on 2/21/2020 at 3:27 PM by Michele Blanco

## 2020-04-05 DIAGNOSIS — Z30.41 ENCOUNTER FOR SURVEILLANCE OF CONTRACEPTIVE PILLS: ICD-10-CM

## 2020-04-06 NOTE — TELEPHONE ENCOUNTER
"norgestim-eth estrad triphasic (ORTHO TRI-CYCLEN/TRI-SPRINTEC) 0.18/0.215/0.25 MG-35 MCG tablet  84 tablet  3  5/2/2019      Last Written Prescription Date:  5/2/2019  Last Fill Quantity: 84,  # refills: 3   Last office visit: 5/2/2019 with prescribing provider: ZAKI Burnham    Future Office Visit:   Next 5 appointments (look out 90 days)    May 04, 2020  8:00 AM CDT  Office Visit with Zeinab Burnahm MD  [facility] [address] [address]  [phone]         Requested Prescriptions   Pending Prescriptions Disp Refills     TRI FEMYNOR 0.18/0.215/0.25 MG-35 MCG tablet [Pharmacy Med Name: TRI FEMYNOR 28 TABLET] 84 tablet 3     Sig: TAKE 1 TABLET BY MOUTH EVERY DAY       Contraceptives Protocol Passed - 4/5/2020  9:14 AM        Passed - Patient is not a current smoker if age is 35 or older        Passed - Recent (12 mo) or future (30 days) visit within the authorizing provider's specialty     Patient has had an office visit with the authorizing provider or a provider within the authorizing providers department within the previous 12 mos or has a future within next 30 days. See \"Patient Info\" tab in inbasket, or \"Choose Columns\" in Meds & Orders section of the refill encounter.              Passed - Medication is active on med list        Passed - No active pregnancy on record        Passed - No positive pregnancy test in past 12 months             "

## 2020-04-07 RX ORDER — NORGESTIMATE AND ETHINYL ESTRADIOL 7DAYSX3 28
KIT ORAL
Qty: 84 TABLET | Refills: 0 | Status: SHIPPED | OUTPATIENT
Start: 2020-04-07 | End: 2020-06-11

## 2020-04-07 NOTE — TELEPHONE ENCOUNTER
Filled per [facility] protocol. Patient has appointment scheduled for 4/4/2020 with provider.    LARISSA McgrathN, RN  [employer] [facility]

## 2020-05-14 ENCOUNTER — MYC MEDICAL ADVICE (OUTPATIENT)
Dept: FAMILY MEDICINE | Facility: CLINIC | Age: 40
End: 2020-05-14

## 2020-05-14 ENCOUNTER — TELEPHONE (OUTPATIENT)
Dept: FAMILY MEDICINE | Facility: CLINIC | Age: 40
End: 2020-05-14

## 2020-05-14 NOTE — TELEPHONE ENCOUNTER
Dr Burnham,     Please see 7write message and reply to patient with advise, or route advise back to triage for follow up with patient.      Patient is scheduled for labs 7-3-2020 and Annual physical 7-.     Do you want patient to schedule a Virtual Visit to review the request for RX birth control---melisa with question about possibly changing the med?      PHARMACY PENDED.      Thank you,  She ROD RN,BSN

## 2020-05-14 NOTE — TELEPHONE ENCOUNTER
Reason for Call:  Other appointment    Detailed comments:   Pt is calling in to ask if she can have her physical before June 30th.  She is scheduled in July and is due for the whole physical with pap, mammo, breast exam.  She is being furloughed from her job and her insurance will be ending June 30th.      Pt does have family hx of breast cancer.    Please return call to pt if having a F2F visit is possible.          Phone Number Patient can be reached at: Home number on file [phone] (home)    Best Time: Any     Can we leave a detailed message on this number? YES    Call taken on 5/14/2020 at 1:41 PM by Carmen Boggs

## 2020-05-18 NOTE — TELEPHONE ENCOUNTER
Dr Burnham the patient said she is very Grateful and also wants to see about getting her Mammogram done since she has a Hx of Breast CA in her family

## 2020-05-18 NOTE — TELEPHONE ENCOUNTER
Yes , schedule on the day when I am in clinic for month of June .  I will be in clinic on 6/11,15 and 6/16  Dr.Nasima Chacha MD

## 2020-05-19 NOTE — TELEPHONE ENCOUNTER
Have her contact our   Breast center to set that up.  [phone]  It is located in [address]  Dr.Nasima Chacha MD

## 2020-05-29 ENCOUNTER — TELEPHONE (OUTPATIENT)
Dept: FAMILY MEDICINE | Facility: CLINIC | Age: 40
End: 2020-05-29

## 2020-05-29 DIAGNOSIS — Z80.3 FAMILY HISTORY OF MALIGNANT NEOPLASM OF BREAST: ICD-10-CM

## 2020-05-29 DIAGNOSIS — Z12.31 VISIT FOR SCREENING MAMMOGRAM: Primary | ICD-10-CM

## 2020-05-29 NOTE — TELEPHONE ENCOUNTER
Reason for Call: Request for an order or referral:    Order or referral being requested: mammogram    Date needed: at your convenience    Has the patient been seen by the PCP for this problem? YES    Additional comments: Pt would like to be seen at [facility]    Phone number Patient can be reached at:  [phone]    Best Time:  any    Can we leave a detailed message on this number?  YES    Call taken on 5/29/2020 at 8:45 AM by Paulette Chery

## 2020-05-29 NOTE — TELEPHONE ENCOUNTER
Patient was notified with information mammogram has been placed by provider today.    LARISSA McgrathN, RN  [employer] [facility]

## 2020-06-11 ENCOUNTER — OFFICE VISIT (OUTPATIENT)
Dept: FAMILY MEDICINE | Facility: CLINIC | Age: 40
End: 2020-06-11
Payer: COMMERCIAL

## 2020-06-11 VITALS
SYSTOLIC BLOOD PRESSURE: 99 MMHG | OXYGEN SATURATION: 97 % | DIASTOLIC BLOOD PRESSURE: 65 MMHG | HEART RATE: 77 BPM | TEMPERATURE: 98.2 F | BODY MASS INDEX: 23.82 KG/M2 | HEIGHT: 65 IN | WEIGHT: 143 LBS

## 2020-06-11 DIAGNOSIS — Z30.41 ENCOUNTER FOR SURVEILLANCE OF CONTRACEPTIVE PILLS: ICD-10-CM

## 2020-06-11 DIAGNOSIS — Z23 NEED FOR VACCINATION: ICD-10-CM

## 2020-06-11 DIAGNOSIS — F51.01 PRIMARY INSOMNIA: ICD-10-CM

## 2020-06-11 DIAGNOSIS — Z12.4 SCREENING FOR MALIGNANT NEOPLASM OF CERVIX: ICD-10-CM

## 2020-06-11 DIAGNOSIS — Z13.0 SCREENING FOR DEFICIENCY ANEMIA: ICD-10-CM

## 2020-06-11 DIAGNOSIS — Z13.220 LIPID SCREENING: ICD-10-CM

## 2020-06-11 DIAGNOSIS — Z00.00 ROUTINE GENERAL MEDICAL EXAMINATION AT A HEALTH CARE FACILITY: Primary | ICD-10-CM

## 2020-06-11 DIAGNOSIS — Z79.899 MEDICATION MANAGEMENT: ICD-10-CM

## 2020-06-11 DIAGNOSIS — E03.9 HYPOTHYROIDISM, UNSPECIFIED TYPE: ICD-10-CM

## 2020-06-11 LAB
ERYTHROCYTE [DISTWIDTH] IN BLOOD BY AUTOMATED COUNT: 12.2 % (ref 10–15)
HCT VFR BLD AUTO: 38.9 % (ref 35–47)
HGB BLD-MCNC: 13.1 G/DL (ref 11.7–15.7)
MCH RBC QN AUTO: 30.8 PG (ref 26.5–33)
MCHC RBC AUTO-ENTMCNC: 33.7 G/DL (ref 31.5–36.5)
MCV RBC AUTO: 91 FL (ref 78–100)
PLATELET # BLD AUTO: 172 10E9/L (ref 150–450)
RBC # BLD AUTO: 4.26 10E12/L (ref 3.8–5.2)
WBC # BLD AUTO: 5.3 10E9/L (ref 4–11)

## 2020-06-11 PROCEDURE — G0145 SCR C/V CYTO,THINLAYER,RESCR: HCPCS | Performed by: INTERNAL MEDICINE

## 2020-06-11 PROCEDURE — 84443 ASSAY THYROID STIM HORMONE: CPT | Performed by: INTERNAL MEDICINE

## 2020-06-11 PROCEDURE — 90714 TD VACC NO PRESV 7 YRS+ IM: CPT | Performed by: INTERNAL MEDICINE

## 2020-06-11 PROCEDURE — 36415 COLL VENOUS BLD VENIPUNCTURE: CPT | Performed by: INTERNAL MEDICINE

## 2020-06-11 PROCEDURE — 87624 HPV HI-RISK TYP POOLED RSLT: CPT | Performed by: INTERNAL MEDICINE

## 2020-06-11 PROCEDURE — 84439 ASSAY OF FREE THYROXINE: CPT | Performed by: INTERNAL MEDICINE

## 2020-06-11 PROCEDURE — 80061 LIPID PANEL: CPT | Performed by: INTERNAL MEDICINE

## 2020-06-11 PROCEDURE — 90471 IMMUNIZATION ADMIN: CPT | Performed by: INTERNAL MEDICINE

## 2020-06-11 PROCEDURE — 80048 BASIC METABOLIC PNL TOTAL CA: CPT | Performed by: INTERNAL MEDICINE

## 2020-06-11 PROCEDURE — 99395 PREV VISIT EST AGE 18-39: CPT | Mod: 25 | Performed by: INTERNAL MEDICINE

## 2020-06-11 PROCEDURE — 85027 COMPLETE CBC AUTOMATED: CPT | Performed by: INTERNAL MEDICINE

## 2020-06-11 RX ORDER — NORGESTIMATE AND ETHINYL ESTRADIOL 7DAYSX3 28
1 KIT ORAL DAILY
Qty: 84 TABLET | Refills: 3 | Status: SHIPPED | OUTPATIENT
Start: 2020-06-11 | End: 2021-06-04

## 2020-06-11 RX ORDER — TRAZODONE HYDROCHLORIDE 50 MG/1
50 TABLET, FILM COATED ORAL
Qty: 90 TABLET | Refills: 3 | Status: SHIPPED | OUTPATIENT
Start: 2020-06-11 | End: 2021-06-04

## 2020-06-11 ASSESSMENT — MIFFLIN-ST. JEOR: SCORE: 1321.58

## 2020-06-11 NOTE — PATIENT INSTRUCTIONS
Labs today  Td today  Medication renewed   Follow up in one year for physical   Seek sooner medical attention if there is any worsening of symptoms or problems.        Preventive Health Recommendations  Female Ages 26 - 39  Yearly exam:   See your health care provider every year in order to    Review health changes.     Discuss preventive care.      Review your medicines if you your doctor has prescribed any.    Until age 30: Get a Pap test every three years (more often if you have had an abnormal result).    After age 30: Talk to your doctor about whether you should have a Pap test every 3 years or have a Pap test with HPV screening every 5 years.   You do not need a Pap test if your uterus was removed (hysterectomy) and you have not had cancer.  You should be tested each year for STDs (sexually transmitted diseases), if you're at risk.   Talk to your provider about how often to have your cholesterol checked.  If you are at risk for diabetes, you should have a diabetes test (fasting glucose).  Shots: Get a flu shot each year. Get a tetanus shot every 10 years.   Nutrition:     Eat at least 5 servings of fruits and vegetables each day.    Eat whole-grain bread, whole-wheat pasta and brown rice instead of white grains and rice.    Get adequate Calcium and Vitamin D.     Lifestyle    Exercise at least 150 minutes a week (30 minutes a day, 5 days of the week). This will help you control your weight and prevent disease.    Limit alcohol to one drink per day.    No smoking.     Wear sunscreen to prevent skin cancer.    See your dentist every six months for an exam and cleaning.

## 2020-06-11 NOTE — NURSING NOTE
Prior to immunization administration, verified patients identity using patient s name and date of birth. Please see Immunization Activity for additional information.     Screening Questionnaire for Adult Immunization    Are you sick today?   No   Do you have allergies to medications, food, a vaccine component or latex?   No   Have you ever had a serious reaction after receiving a vaccination?   No   Do you have a long-term health problem with heart, lung, kidney, or metabolic disease (e.g., diabetes), asthma, a blood disorder, no spleen, complement component deficiency, a cochlear implant, or a spinal fluid leak?  Are you on long-term aspirin therapy?   No   Do you have cancer, leukemia, HIV/AIDS, or any other immune system problem?   No   Do you have a parent, brother, or sister with an immune system problem?   No   In the past 3 months, have you taken medications that affect  your immune system, such as prednisone, other steroids, or anticancer drugs; drugs for the treatment of rheumatoid arthritis, Crohn s disease, or psoriasis; or have you had radiation treatments?   No   Have you had a seizure, or a brain or other nervous system problem?   No   During the past year, have you received a transfusion of blood or blood    products, or been given immune (gamma) globulin or antiviral drug?   No   For women: Are you pregnant or is there a chance you could become       pregnant during the next month?   No   Have you received any vaccinations in the past 4 weeks?   No     Immunization questionnaire answers were all negative.        Per orders of Dr. Burnham, injection of TD  given by Keke Lou CMA. Patient instructed to remain in clinic for 15 minutes afterwards, and to report any adverse reaction to me immediately.       Screening performed by Keke Lou CMA on 6/11/2020 at 11:28 AM.

## 2020-06-11 NOTE — PROGRESS NOTES
SUBJECTIVE:   CC: Cortney Willoughby is an 40 year old woman who presents for preventive health visit.     Healthy Habits:    Do you get at least three servings of calcium containing foods daily (dairy, green leafy vegetables, etc.)? yes    Amount of exercise or daily activities, outside of work: 5-6 days a week for one hour     Problems taking medications regularly No    Medication side effects: No    Have you had an eye exam in the past two years? no    Do you see a dentist twice per year? yes    Do you have sleep apnea, excessive snoring or daytime drowsiness?no      No history of abnormal pap    Today's PHQ-2 Score:   PHQ-2 (  [employer]) 2020   Q1: Little interest or pleasure in doing things 0 0   Q2: Feeling down, depressed or hopeless 0 0   PHQ-2 Score 0 0   Q1: Little interest or pleasure in doing things - Not at all   Q2: Feeling down, depressed or hopeless - Not at all   PHQ-2 Score - 0       Abuse: Current or Past(Physical, Sexual or Emotional)- No  Do you feel safe in your environment? Yes    No other concerns  Cycle regular   Follows natural medicine doctor   Does not want any STD screening  Her insurance will  soon so she wants to take care of all those things  She is a scheduled for mammogram next week.    Social History     Tobacco Use     Smoking status: Never Smoker     Smokeless tobacco: Never Used   Substance Use Topics     Alcohol use: Yes     Comment: 2 drinks per week     If you drink alcohol do you typically have >3 drinks per day or >7 drinks per week? No                     Reviewed orders with patient.  Reviewed health maintenance and updated orders accordingly - Yes  Labs reviewed in EPIC        Pertinent mammograms are reviewed under the imaging tab.  History of abnormal Pap smear: NO - age 30-65 PAP every 5 years with negative HPV co-testing recommended  PAP / HPV Latest Ref Rng & Units 2015 2012 3/30/2010   PAP - NIL NIL NIL   HPV 16 DNA NEG Negative - -  "  HPV 18 DNA NEG Negative - -   OTHER HR HPV NEG Negative - -     Reviewed and updated as needed this visit by clinical staff  Tobacco  Allergies  Meds  Soc Hx        Reviewed and updated as needed this visit by Provider        Past Medical History:   Diagnosis Date     Closed fracture of unspecified part of ulna (alone) 2006     Family history of malignant neoplasm of breast      Hypothyroidism, unspecified type 5/2/2019     Insomnia      Scoliosis (and kyphoscoliosis), idiopathic       Past Surgical History:   Procedure Laterality Date     COSMETIC SURGERY  June 24 2018    Liposuction     LIPOSUCTION (LOCATION)         ROS:  CONSTITUTIONAL: NEGATIVE for fever, chills, change in weight  INTEGUMENTARU/SKIN: NEGATIVE for worrisome rashes, moles or lesions  EYES: NEGATIVE for vision changes or irritation  ENT: NEGATIVE for ear, mouth and throat problems  RESP: NEGATIVE for significant cough or SOB  BREAST: NEGATIVE for masses, tenderness or discharge  CV: NEGATIVE for chest pain, palpitations or peripheral edema  GI: NEGATIVE for nausea, abdominal pain, heartburn, or change in bowel habits  : NEGATIVE for unusual urinary or vaginal symptoms. Periods are regular.  MUSCULOSKELETAL: NEGATIVE for significant arthralgias or myalgia  NEURO: NEGATIVE for weakness, dizziness or paresthesias  PSYCHIATRIC: NEGATIVE for changes in mood or affect    OBJECTIVE:   BP 99/65 (BP Location: Right arm, Patient Position: Chair, Cuff Size: Adult Regular)   Pulse 77   Temp 98.2  F (36.8  C) (Tympanic)   Ht 1.638 m (5' 4.5\")   Wt 64.9 kg (143 lb)   LMP 05/19/2020   SpO2 97%   BMI 24.17 kg/m    EXAM:  GENERAL: healthy, alert and no distress  EYES: Eyes grossly normal to inspection, PERRL and conjunctivae and sclerae normal  HENT: ear canals and TM's normal, nose and mouth without ulcers or lesions  NECK: no adenopathy, no asymmetry, masses, or scars and thyroid normal to palpation  RESP: lungs clear to auscultation - no rales, " rhonchi or wheezes  BREAST: normal without masses, tenderness or nipple discharge and no palpable axillary masses or adenopathy  CV: regular rate and rhythm, normal S1 S2, no S3 or S4, no murmur, click or rub, no peripheral edema and peripheral pulses strong  ABDOMEN: soft, nontender, no hepatosplenomegaly, no masses and bowel sounds normal   (female): normal female external genitalia, normal urethral meatus, vaginal mucosa pink, moist, well rugated, and normal cervix/adnexa/uterus without masses or discharge  Pap test was performed  MS: no gross musculoskeletal defects noted, no edema  SKIN: no suspicious lesions or rashes  NEURO: Normal strength and tone, mentation intact and speech normal  PSYCH: mentation appears normal, affect normal/bright        leelee scheduled   ASSESSMENT/PLAN:   Cortney was seen today for physical.    Diagnoses and all orders for this visit:    Routine general medical examination at a health care facility  Preventive health counseling was also done.  She will get the tetanus booster.    Screening for malignant neoplasm of cervix  -     Pap imaged thin layer screen with HPV - recommended age 30 - 65 years (select HPV order below)    Encounter for surveillance of contraceptive pills  -     norgestim-eth estrad triphasic (TRI FEMYNOR) 0.18/0.215/0.25 MG-35 MCG tablet; Take 1 tablet by mouth daily    Primary insomnia  -     traZODone (DESYREL) 50 MG tablet; Take 1 tablet (50 mg) by mouth nightly as needed for sleep  Patient states this works for her    Hypothyroidism, unspecified type  -     TSH with free T4 reflex  She is followed by natural medicine doctor but wanted me to do her TSH test  They manages her medications.    Lipid screening  -     Lipid panel reflex to direct LDL Fasting    Medication management  -     Basic metabolic panel    Screening for deficiency anemia  -     CBC with platelets    Need for vaccination  -     TD PRSERV FREE >=7 YRS ADS IM [41327]  -     1st   "Administration  [[id number]]        COUNSELING:   Reviewed preventive health counseling, as reflected in patient instructions       Regular exercise       Healthy diet/nutrition    Estimated body mass index is 24.17 kg/m  as calculated from the following:    Height as of this encounter: 1.638 m (5' 4.5\").    Weight as of this encounter: 64.9 kg (143 lb).         reports that she has never smoked. She has never used smokeless tobacco.      Counseling Resources:  ATP IV Guidelines  Pooled Cohorts Equation Calculator  Breast Cancer Risk Calculator  FRAX Risk Assessment  ICSI Preventive Guidelines  Dietary Guidelines for Americans, 2010  USDA's MyPlate  ASA Prophylaxis  Lung CA Screening    Zeinab Burnham MD  [facility]  "

## 2020-06-12 LAB
ANION GAP SERPL CALCULATED.3IONS-SCNC: 4 MMOL/L (ref 3–14)
BUN SERPL-MCNC: 9 MG/DL (ref 7–30)
CALCIUM SERPL-MCNC: 8.4 MG/DL (ref 8.5–10.1)
CHLORIDE SERPL-SCNC: 107 MMOL/L (ref 94–109)
CHOLEST SERPL-MCNC: 163 MG/DL
CO2 SERPL-SCNC: 26 MMOL/L (ref 20–32)
CREAT SERPL-MCNC: 0.59 MG/DL (ref 0.52–1.04)
GFR SERPL CREATININE-BSD FRML MDRD: >90 ML/MIN/{1.73_M2}
GLUCOSE SERPL-MCNC: 87 MG/DL (ref 70–99)
HDLC SERPL-MCNC: 75 MG/DL
LDLC SERPL CALC-MCNC: 60 MG/DL
NONHDLC SERPL-MCNC: 88 MG/DL
POTASSIUM SERPL-SCNC: 3.7 MMOL/L (ref 3.4–5.3)
SODIUM SERPL-SCNC: 137 MMOL/L (ref 133–144)
T4 FREE SERPL-MCNC: 0.8 NG/DL (ref 0.76–1.46)
TRIGL SERPL-MCNC: 141 MG/DL
TSH SERPL DL<=0.005 MIU/L-ACNC: <0.01 MU/L (ref 0.4–4)

## 2020-06-12 NOTE — RESULT ENCOUNTER NOTE
Carlie Barr,    This is to inform you regarding your test result.    I spoke to you on phone but sending you a result note also.  Your TSH level is very low .  You need adjustment of your thyroid medication   Please contact your endocrinologist and inform them about your test result   Your calcium level is slightly low .  Increase your dietary calcium intake   CBC result which includes white count Hemoglobin and  Platelet Counts is normal.   Your total cholesterol is normal.  HDL which is called good cholesterol is normal.  Your LDL cholesterol is normal.  This is often call bad cholesterol and high levels increase the risk for heart attacks and strokes.  Your triglycerides are normal.  Basic metabolic panel which includes electrolytes and kidney fucntion is satisfactory .      Sincerely,      Dr.Nasima Chacha MD,FACP

## 2020-06-15 LAB
COPATH REPORT: NORMAL
PAP: NORMAL

## 2020-06-16 LAB
FINAL DIAGNOSIS: NORMAL
HPV HR 12 DNA CVX QL NAA+PROBE: NEGATIVE
HPV16 DNA SPEC QL NAA+PROBE: NEGATIVE
HPV18 DNA SPEC QL NAA+PROBE: NEGATIVE
SPECIMEN DESCRIPTION: NORMAL
SPECIMEN SOURCE CVX/VAG CYTO: NORMAL

## 2020-06-17 ENCOUNTER — HOSPITAL ENCOUNTER (OUTPATIENT)
Dept: MAMMOGRAPHY | Facility: CLINIC | Age: 40
Discharge: HOME | End: 2020-06-17
Attending: INTERNAL MEDICINE | Admitting: INTERNAL MEDICINE
Payer: COMMERCIAL

## 2020-06-17 DIAGNOSIS — Z12.31 VISIT FOR SCREENING MAMMOGRAM: ICD-10-CM

## 2020-06-17 DIAGNOSIS — Z80.3 FAMILY HISTORY OF MALIGNANT NEOPLASM OF BREAST: ICD-10-CM

## 2020-06-17 PROCEDURE — 77063 BREAST TOMOSYNTHESIS BI: CPT

## 2021-01-14 ENCOUNTER — HEALTH MAINTENANCE LETTER (OUTPATIENT)
Age: 41
End: 2021-01-14

## 2021-06-02 ENCOUNTER — TELEPHONE (OUTPATIENT)
Dept: FAMILY MEDICINE | Facility: CLINIC | Age: 41
End: 2021-06-02

## 2021-06-02 DIAGNOSIS — Z80.3 FAMILY HISTORY OF MALIGNANT NEOPLASM OF BREAST: ICD-10-CM

## 2021-06-02 DIAGNOSIS — Z12.31 VISIT FOR SCREENING MAMMOGRAM: Primary | ICD-10-CM

## 2021-06-02 NOTE — TELEPHONE ENCOUNTER
Order is placed   Please call the following number to make appointment :  [phone]  It is located in [address]  Dr.Nasima Chacha MD

## 2021-06-02 NOTE — TELEPHONE ENCOUNTER
Pt called     Next 5 appointments (look out 90 days)    Jun 18, 2021  8:00 AM  PHYSICAL with Zeinab Burnham MD  [facility] ([facility] ) [address], [address]  [facility] [address] [phone]        Has upcoming visit     Significant family hx of breast cancer    Doing annual mammograms     Has 6/18 off and states she's needed orders due to <40 years old     Does mammograms annually    Requesting that PCP put in orders now    Call back: [phone] - detailed message is okay     *pt also asking about pre-visit labs - should she do pre-visit labs/or just wait until her visit/same day?     Leah DRAKE RN

## 2021-06-04 NOTE — TELEPHONE ENCOUNTER
Placed call.  Information given to patient from provider.  Patient has already scheduled the Mammo.   Advised patient to have labs done same day as fasting physical.  States understood and agreed with advise.  She Solitario RN

## 2021-06-18 ENCOUNTER — OFFICE VISIT (OUTPATIENT)
Dept: FAMILY MEDICINE | Facility: CLINIC | Age: 41
End: 2021-06-18
Payer: COMMERCIAL

## 2021-06-18 ENCOUNTER — HOSPITAL ENCOUNTER (OUTPATIENT)
Dept: MAMMOGRAPHY | Facility: CLINIC | Age: 41
Discharge: HOME | End: 2021-06-18
Attending: INTERNAL MEDICINE | Admitting: INTERNAL MEDICINE
Payer: COMMERCIAL

## 2021-06-18 VITALS
HEIGHT: 65 IN | TEMPERATURE: 97.7 F | OXYGEN SATURATION: 97 % | HEART RATE: 97 BPM | SYSTOLIC BLOOD PRESSURE: 109 MMHG | DIASTOLIC BLOOD PRESSURE: 78 MMHG | WEIGHT: 152 LBS | BODY MASS INDEX: 25.33 KG/M2

## 2021-06-18 DIAGNOSIS — Z11.59 NEED FOR HEPATITIS C SCREENING TEST: ICD-10-CM

## 2021-06-18 DIAGNOSIS — Z30.41 ENCOUNTER FOR SURVEILLANCE OF CONTRACEPTIVE PILLS: ICD-10-CM

## 2021-06-18 DIAGNOSIS — E56.9 VITAMIN DEFICIENCY: ICD-10-CM

## 2021-06-18 DIAGNOSIS — E27.0 OTHER ADRENOCORTICAL OVERACTIVITY (H): ICD-10-CM

## 2021-06-18 DIAGNOSIS — R73.09 ELEVATED GLUCOSE: ICD-10-CM

## 2021-06-18 DIAGNOSIS — N92.6 IRREGULAR MENSTRUAL BLEEDING: ICD-10-CM

## 2021-06-18 DIAGNOSIS — E03.9 HYPOTHYROIDISM, UNSPECIFIED TYPE: ICD-10-CM

## 2021-06-18 DIAGNOSIS — Z00.00 ROUTINE GENERAL MEDICAL EXAMINATION AT A HEALTH CARE FACILITY: Primary | ICD-10-CM

## 2021-06-18 DIAGNOSIS — Z13.220 SCREENING FOR LIPID DISORDERS: ICD-10-CM

## 2021-06-18 DIAGNOSIS — Z79.899 MEDICATION MANAGEMENT: ICD-10-CM

## 2021-06-18 DIAGNOSIS — Z12.31 VISIT FOR SCREENING MAMMOGRAM: ICD-10-CM

## 2021-06-18 DIAGNOSIS — Z80.3 FAMILY HISTORY OF MALIGNANT NEOPLASM OF BREAST: ICD-10-CM

## 2021-06-18 DIAGNOSIS — Z13.0 SCREENING FOR DEFICIENCY ANEMIA: ICD-10-CM

## 2021-06-18 LAB
ALBUMIN SERPL-MCNC: 3.2 G/DL (ref 3.4–5)
ALP SERPL-CCNC: 50 U/L (ref 40–150)
ALT SERPL W P-5'-P-CCNC: 42 U/L (ref 0–50)
ANION GAP SERPL CALCULATED.3IONS-SCNC: 3 MMOL/L (ref 3–14)
AST SERPL W P-5'-P-CCNC: 33 U/L (ref 0–45)
BILIRUB SERPL-MCNC: 0.4 MG/DL (ref 0.2–1.3)
BUN SERPL-MCNC: 12 MG/DL (ref 7–30)
CALCIUM SERPL-MCNC: 8.8 MG/DL (ref 8.5–10.1)
CHLORIDE SERPL-SCNC: 113 MMOL/L (ref 94–109)
CHOLEST SERPL-MCNC: 194 MG/DL
CO2 SERPL-SCNC: 26 MMOL/L (ref 20–32)
CREAT SERPL-MCNC: 0.69 MG/DL (ref 0.52–1.04)
DEPRECATED CALCIDIOL+CALCIFEROL SERPL-MC: 106 UG/L (ref 20–75)
ERYTHROCYTE [DISTWIDTH] IN BLOOD BY AUTOMATED COUNT: 12.5 % (ref 10–15)
FERRITIN SERPL-MCNC: 73 NG/ML (ref 12–150)
GFR SERPL CREATININE-BSD FRML MDRD: >90 ML/MIN/{1.73_M2}
GLUCOSE SERPL-MCNC: 90 MG/DL (ref 70–99)
HBA1C MFR BLD: 5 % (ref 0–5.6)
HCT VFR BLD AUTO: 41.2 % (ref 35–47)
HCV AB SERPL QL IA: NONREACTIVE
HDLC SERPL-MCNC: 75 MG/DL
HGB BLD-MCNC: 14.5 G/DL (ref 11.7–15.7)
LDLC SERPL CALC-MCNC: 95 MG/DL
MCH RBC QN AUTO: 31 PG (ref 26.5–33)
MCHC RBC AUTO-ENTMCNC: 35.2 G/DL (ref 31.5–36.5)
MCV RBC AUTO: 88 FL (ref 78–100)
NONHDLC SERPL-MCNC: 119 MG/DL
PLATELET # BLD AUTO: 227 10E9/L (ref 150–450)
POTASSIUM SERPL-SCNC: 4.2 MMOL/L (ref 3.4–5.3)
PROT SERPL-MCNC: 7.2 G/DL (ref 6.8–8.8)
RBC # BLD AUTO: 4.67 10E12/L (ref 3.8–5.2)
SODIUM SERPL-SCNC: 142 MMOL/L (ref 133–144)
T3FREE SERPL-MCNC: 12.7 PG/ML (ref 2.3–4.2)
TRIGL SERPL-MCNC: 119 MG/DL
TSH SERPL DL<=0.005 MIU/L-ACNC: <0.01 MU/L (ref 0.4–4)
WBC # BLD AUTO: 4.5 10E9/L (ref 4–11)

## 2021-06-18 PROCEDURE — 80053 COMPREHEN METABOLIC PANEL: CPT | Performed by: INTERNAL MEDICINE

## 2021-06-18 PROCEDURE — 86803 HEPATITIS C AB TEST: CPT | Performed by: INTERNAL MEDICINE

## 2021-06-18 PROCEDURE — 82306 VITAMIN D 25 HYDROXY: CPT | Performed by: INTERNAL MEDICINE

## 2021-06-18 PROCEDURE — 80061 LIPID PANEL: CPT | Performed by: INTERNAL MEDICINE

## 2021-06-18 PROCEDURE — 99214 OFFICE O/P EST MOD 30 MIN: CPT | Mod: 25 | Performed by: INTERNAL MEDICINE

## 2021-06-18 PROCEDURE — 84443 ASSAY THYROID STIM HORMONE: CPT | Performed by: INTERNAL MEDICINE

## 2021-06-18 PROCEDURE — 77063 BREAST TOMOSYNTHESIS BI: CPT

## 2021-06-18 PROCEDURE — 82728 ASSAY OF FERRITIN: CPT | Performed by: INTERNAL MEDICINE

## 2021-06-18 PROCEDURE — 84439 ASSAY OF FREE THYROXINE: CPT | Performed by: INTERNAL MEDICINE

## 2021-06-18 PROCEDURE — 83036 HEMOGLOBIN GLYCOSYLATED A1C: CPT | Performed by: INTERNAL MEDICINE

## 2021-06-18 PROCEDURE — 84481 FREE ASSAY (FT-3): CPT | Performed by: INTERNAL MEDICINE

## 2021-06-18 PROCEDURE — 85027 COMPLETE CBC AUTOMATED: CPT | Performed by: INTERNAL MEDICINE

## 2021-06-18 PROCEDURE — 36415 COLL VENOUS BLD VENIPUNCTURE: CPT | Performed by: INTERNAL MEDICINE

## 2021-06-18 PROCEDURE — 99395 PREV VISIT EST AGE 18-39: CPT | Performed by: INTERNAL MEDICINE

## 2021-06-18 RX ORDER — NORGESTIMATE AND ETHINYL ESTRADIOL 0.25-0.035
1 KIT ORAL DAILY
Qty: 84 TABLET | Refills: 3 | Status: SHIPPED | OUTPATIENT
Start: 2021-06-18 | End: 2021-12-09

## 2021-06-18 RX ORDER — LEVOTHYROXINE SODIUM 100 UG/1
100 TABLET ORAL DAILY
COMMUNITY
Start: 2021-06-18 | End: 2024-05-20

## 2021-06-18 RX ORDER — LIOTHYRONINE SODIUM 50 UG/1
TABLET ORAL
COMMUNITY
End: 2021-10-14 | Stop reason: DRUGHIGH

## 2021-06-18 ASSESSMENT — MIFFLIN-ST. JEOR: SCORE: 1357.41

## 2021-06-18 NOTE — PROGRESS NOTES
SUBJECTIVE:   CC: Cortney Willoughby is an 41 year old woman who presents for preventive health visit.       Patient has been advised of split billing requirements and indicates understanding: Yes  Healthy Habits:     Getting at least 3 servings of Calcium per day:  Yes    Bi-annual eye exam:  NO    Dental care twice a year:  Yes    Sleep apnea or symptoms of sleep apnea:  Daytime drowsiness    Diet:  Gluten-free/reduced    Frequency of exercise:  4-5 days/week    Duration of exercise:  Greater than 60 minutes    Taking medications regularly:  Yes    Medication side effects:  Not applicable and None    PHQ-2 Total Score: 0    Additional concerns today:  Yes    Thick clots every other month  For last one year  Before that it was regular   BC is for birth control and to regulate the cycle  athelet's foot history  History of recurrent yeast infection  Took fluconazole for toe fungus  Friend is doctor  She is now taking fluconazole prescribed   She is taking fluconazole 200 mg weekly              Today's PHQ-2 Score:   PHQ-2 ( 1999 [employer]) 6/15/2021   Q1: Little interest or pleasure in doing things 0   Q2: Feeling down, depressed or hopeless 0   PHQ-2 Score 0   Q1: Little interest or pleasure in doing things Not at all   Q2: Feeling down, depressed or hopeless Not at all   PHQ-2 Score 0       Abuse: Current or Past (Physical, Sexual or Emotional) - No  Do you feel safe in your environment? Yes    Have you ever done Advance Care Planning? (For example, a Health Directive, POLST, or a discussion with a medical provider or your loved ones about your wishes): No, advance care planning information given to patient to review.  Patient plans to discuss their wishes with loved ones or provider.      Social History     Tobacco Use     Smoking status: Never Smoker     Smokeless tobacco: Never Used   Substance Use Topics     Alcohol use: Yes     Comment: 2 drinks per week     If you drink alcohol do you typically have >3 drinks  per day or >7 drinks per week? No    Alcohol Use 6/18/2021   Prescreen: >3 drinks/day or >7 drinks/week? -   Prescreen: >3 drinks/day or >7 drinks/week? No       Reviewed orders with patient.  Reviewed health maintenance and updated orders accordingly - Yes  Lab work is in process    Breast Cancer Screening:    FSH-7:   Breast CA Risk Assessment (FHS-7) 6/15/2021   Did any of your first-degree relatives have breast or ovarian cancer? Yes   Did any of your relatives have bilateral breast cancer? Yes   Did any man in your family have breast cancer? No   Did any woman in your family have breast and ovarian cancer? Yes   Did any woman in your family have breast cancer before age 50 y? Yes   Do you have 2 or more relatives with breast and/or ovarian cancer? Yes   Do you have 2 or more relatives with breast and/or bowel cancer? Yes         Pertinent mammograms are reviewed under the imaging tab.    History of abnormal Pap smear: NO - age 30- 65 PAP every 3 years recommended  PAP / HPV Latest Ref Rng & Units 6/11/2020 4/13/2015 4/6/2012   PAP - NIL NIL NIL   HPV 16 DNA NEG:Negative Negative Negative -   HPV 18 DNA NEG:Negative Negative Negative -   OTHER HR HPV NEG:Negative Negative Negative -     Reviewed and updated as needed this visit by clinical staff  Tobacco  Allergies  Meds  Problems  Med Hx  Surg Hx  Fam Hx  Soc Hx          Reviewed and updated as needed this visit by Provider                    Review of Systems  CONSTITUTIONAL: NEGATIVE for fever, chills, change in weight  INTEGUMENTARU/SKIN: NEGATIVE for worrisome rashes, moles or lesions  EYES: NEGATIVE for vision changes or irritation  ENT: NEGATIVE for ear, mouth and throat problems  RESP: NEGATIVE for significant cough or SOB  BREAST: NEGATIVE for masses, tenderness or discharge  CV: NEGATIVE for chest pain, palpitations or peripheral edema  GI: NEGATIVE for nausea, abdominal pain, heartburn, or change in bowel habits  : NEGATIVE for unusual  "urinary or vaginal symptoms. Periods are irregular.  MUSCULOSKELETAL: NEGATIVE for significant arthralgias or myalgia  NEURO: NEGATIVE for weakness, dizziness or paresthesias  PSYCHIATRIC: NEGATIVE for changes in mood or affect   fungus toe nail  Recurrent yeast  OBJECTIVE:   /78 (BP Location: Right arm, Cuff Size: Adult Regular)   Pulse 97   Temp 97.7  F (36.5  C) (Oral)   Ht 1.638 m (5' 4.5\")   Wt 68.9 kg (152 lb)   SpO2 97%   BMI 25.69 kg/m    Physical Exam  GENERAL: healthy, alert and no distress  EYES: Eyes grossly normal to inspection, PERRL and conjunctivae and sclerae normal  HENT: ear canals and TM's normal, nose and mouth without ulcers or lesions  NECK: no adenopathy, no asymmetry, masses, or scars and thyroid normal to palpation  RESP: lungs clear to auscultation - no rales, rhonchi or wheezes  BREAST: normal without masses, tenderness or nipple discharge and no palpable axillary masses or adenopathy  CV: regular rate and rhythm, normal S1 S2, no S3 or S4, no murmur, click or rub, no peripheral edema and peripheral pulses strong  ABDOMEN: soft, nontender, no hepatosplenomegaly, no masses and bowel sounds normal  MS: no gross musculoskeletal defects noted, no edema  SKIN: no suspicious lesions or rashes  NEURO: Normal strength and tone, mentation intact and speech normal  PSYCH: mentation appears normal, affect normal/bright  Ganglion cyst on left foot near ankle        ASSESSMENT/PLAN:   Cortney was seen today for physical.    Diagnoses and all orders for this visit:    Routine general medical examination at a health care facility  Preventive health counseling was also done.  She had Pap smear done last year    Need for hepatitis C screening test  -     Hepatitis C Screen Reflex to HCV RNA Quant and Genotype    Screening for deficiency anemia  -     CBC with platelets  -     Ferritin    Encounter for surveillance of contraceptive pills  -     norgestimate-ethinyl estradiol (ORTHO-CYCLEN) " "0.25-35 MG-MCG tablet; Take 1 tablet by mouth daily  I switch her to Ortho-Cyclen  She was having issues with triphasic  She is having irregular bleeding with some clots    Irregular menstrual bleeding  -     US Pelvic Complete with Transvaginal; Future  For the last few months patient is having blood clots during menstrual cycle  It can happen on every other cycle we need to do work-up to rule out any other etiology like  like fibroid  Irregular bleeding could be related to thyroid    Hypothyroidism, unspecified type  -     TSH with free T4 reflex  -     T3, Free  -     T4 free  Patient would like to get the lab work done but she is following natural medicine doctor    Screening for lipid disorders  -     Lipid panel reflex to direct LDL Fasting    Medication management  -     Comprehensive metabolic panel    Vitamin deficiency  -     Vitamin D Deficiency    Elevated glucose  -     Hemoglobin A1c    Other adrenocortical overactivity (H)  Follows natural medicine doctor    Patient has onychomycosis of toenail  She is following with podiatrist who is her friend  She is taking fluconazole weekly  She would like to try Lamisil  I told her I can help her with monitoring blood levels  She would need liver enzyme testing monthly  She will get the prescription from her friend and then let me know        Other orders  -     REVIEW OF HEALTH MAINTENANCE PROTOCOL ORDERS        Patient has been advised of split billing requirements and indicates understanding: Yes  COUNSELING:  Reviewed preventive health counseling, as reflected in patient instructions       Regular exercise       Healthy diet/nutrition    Estimated body mass index is 25.69 kg/m  as calculated from the following:    Height as of this encounter: 1.638 m (5' 4.5\").    Weight as of this encounter: 68.9 kg (152 lb).        She reports that she has never smoked. She has never used smokeless tobacco.      Counseling Resources:  ATP IV Guidelines  Pooled Cohorts " Equation Calculator  Breast Cancer Risk Calculator  BRCA-Related Cancer Risk Assessment: FHS-7 Tool  FRAX Risk Assessment  ICSI Preventive Guidelines  Dietary Guidelines for Americans, 2010  USDA's MyPlate  ASA Prophylaxis  Lung CA Screening    Zeinab Burnham MD  [facility]

## 2021-06-18 NOTE — PATIENT INSTRUCTIONS
Labs today  Please call radiology by dialing  [phone] to schedule your  Pelvic ultrasound   I am changing the birth control pill to orthocyclen  Script is sent to pharmacy  Follow up in one year for physical   Seek sooner medical attention if there is any worsening of symptoms or problems.          Preventive Health Recommendations  Female Ages 26 - 39  Yearly exam:   See your health care provider every year in order to    Review health changes.     Discuss preventive care.      Review your medicines if you your doctor has prescribed any.    Until age 30: Get a Pap test every three years (more often if you have had an abnormal result).    After age 30: Talk to your doctor about whether you should have a Pap test every 3 years or have a Pap test with HPV screening every 5 years.   You do not need a Pap test if your uterus was removed (hysterectomy) and you have not had cancer.  You should be tested each year for STDs (sexually transmitted diseases), if you're at risk.   Talk to your provider about how often to have your cholesterol checked.  If you are at risk for diabetes, you should have a diabetes test (fasting glucose).  Shots: Get a flu shot each year. Get a tetanus shot every 10 years.   Nutrition:     Eat at least 5 servings of fruits and vegetables each day.    Eat whole-grain bread, whole-wheat pasta and brown rice instead of white grains and rice.    Get adequate Calcium and Vitamin D.     Lifestyle    Exercise at least 150 minutes a week (30 minutes a day, 5 days of the week). This will help you control your weight and prevent disease.    Limit alcohol to one drink per day.    No smoking.     Wear sunscreen to prevent skin cancer.    See your dentist every six months for an exam and cleaning.

## 2021-06-19 LAB — T4 FREE SERPL-MCNC: 1.09 NG/DL (ref 0.76–1.46)

## 2021-06-19 NOTE — RESULT ENCOUNTER NOTE
Carlie Barr,    This is to inform you regarding your test result.    I tried to contact you but your mail box is full so could not leave a message for you  Your TSH is very low and free T3 and free T4 are elevated   I noticed low TSH for the last 2-3 years  This is not good  Please discuss with your endocrinologist   You dose of thyroid medication needs to be lowered  If you want to see our endocrinologist then let me know  Your vit D level is higher than normal  How much vit D are you taking ?  Cut down on your vit D  Recheck in 3 months  Hepatitis C Antibody is negative.  Your total cholesterol is normal.  HDL which is called good cholesterol is normal.  Your LDL cholesterol is normal.  This is often call bad cholesterol and high levels increase the risk for heart attacks and strokes.  Your triglycerides are normal.  Liver and kidney function is fine   You protein level is low  HbA1c which is average glucose during last 3 months is normal.  CBC result which includes white count Hemoglobin and  Platelet Counts is normal.   Please respond to this message.  I will be back to clinic on Monday      Sincerely,      Dr.Nasima Chacha MD,FACP

## 2021-06-23 NOTE — RESULT ENCOUNTER NOTE
I spoke to patient   Discussed my concerns about elevated T 3 and low TSH.  TSH has been low for long time   She follows natural medicine endocrinologist   She has read message and will reach out to her endocrinology.  I told her that we have  in our [place] office   She can see him if she wants to   She can call [phone] and make the appointment

## 2021-07-02 ENCOUNTER — HOSPITAL ENCOUNTER (OUTPATIENT)
Dept: ULTRASOUND IMAGING | Facility: CLINIC | Age: 41
Discharge: HOME | End: 2021-07-02
Attending: INTERNAL MEDICINE | Admitting: INTERNAL MEDICINE
Payer: COMMERCIAL

## 2021-07-02 DIAGNOSIS — N92.6 IRREGULAR MENSTRUAL BLEEDING: ICD-10-CM

## 2021-07-02 PROCEDURE — 76830 TRANSVAGINAL US NON-OB: CPT

## 2021-07-02 PROCEDURE — 76856 US EXAM PELVIC COMPLETE: CPT

## 2021-07-05 NOTE — RESULT ENCOUNTER NOTE
Carlie Barr,    This is to inform you regarding your test result.    You have uterine fibroids which are benign.  But fibroids can cause heavy bleeding   You have simple left ovarian cyst   It is simple cyst so nothing to worry about .  Please make appointment with gynecology for uterine fibroid by dialing the following number:   [facility] [phone]       Sincerely,      Dr.Nasima Chacha MD,FACP

## 2021-08-04 NOTE — PROGRESS NOTES
SUBJECTIVE:                                                   Cortney Willoughby is a 41 year old female who presents to clinic today for the following health issue(s):  Patient presents with:  Consult: discuss fibroids and cyst found on U/S 7/2-was having excessive bleeding and cramping, +clots x 6 months prior to U/S    HPI:  New patient --seen in referral from Dr. Burnham.  Here today to discuss new diagnosis of uterine fibroids in the setting of heavy periods.  Cortney had been on OCPs from age [age]yo.  Took a 1-2yr break due to divorce and lack of need for contraception during that time.  Periods were unremarkable per patient report.  Restarted tri-femynor with new relationship and believes she has been on the pill again since ~2019.  In the last 6-8 months, has noted much heavier periods with passage of clots.  +cramping often in the week prior to her period.  Was changed to monophasic ortho cyclen per Dr. Burnham.  Had ultrasound which showed fibroids.  US (7/2/2021) uterus 11.8x8.3x6.8cm.  Several fibroids.  Largest 7.5x5.9x4.7 in right upper lateral uterus abutting endometrial lining.  Also has L intramural 1.2x1.3x1.4cm fibroid and 2.4x2.4x2.5cm subserosal fibroid at left fundus.  +simple left ovarian cyst measuring 3.4x2.2x2.3cm.    Since changing pills, has continued to have monthly menses x 5-6d.  +heavy bleeding on 2nd-4th day.  +clots.  +cramping.  Had one episode of irregular spotting after accupuncture which she tried as she read it can help shrink fibroids.  Has also started herbal supplements    +SA --no issues.  Does have urinary frequency.  Up several times per night.  Using OCPs but partner plans to have vasectomy and she plans to stop her OCP.  Has been told in the past that her OCP affects her thyroid and that she should be off OCPs.  Also concerned about weight gain with OCP.    No hx abdominal or pelvic surgeries  Does not plan/desire family; has grieved that decision in the past few  years  No fam hx uterine or ovarian cancer   No hx abnormal pap smear --last done in 6/2020.    Normal hgb at 14.5 in June 2021      Patient's last menstrual period was 07/10/2021 (exact date)..     Patient is sexually active, No obstetric history on file..  Using oral contraceptives for contraception.    reports that she has never smoked. She has never used smokeless tobacco.    STD testing offered?  Declined    Health maintenance updated:  yes    Today's PHQ-2 Score:   PHQ-2 ( 1999 [employer]) 6/15/2021   Q1: Little interest or pleasure in doing things 0   Q2: Feeling down, depressed or hopeless 0   PHQ-2 Score 0   Q1: Little interest or pleasure in doing things Not at all   Q2: Feeling down, depressed or hopeless Not at all   PHQ-2 Score 0     Problem list and histories reviewed & adjusted, as indicated.  Additional history: as documented.    Patient Active Problem List   Diagnosis     Insomnia, unspecified insomnia     Encounter for surveillance of contraceptive pills     Family history of malignant neoplasm of breast     Elevated cortisol level     Hypothyroidism, unspecified type     Mixed hyperlipidemia     Other adrenocortical overactivity (H)     Past Surgical History:   Procedure Laterality Date     COSMETIC SURGERY  June 24 2018    Liposuction     LIPOSUCTION (LOCATION)        Social History     Tobacco Use     Smoking status: Never Smoker     Smokeless tobacco: Never Used   Substance Use Topics     Alcohol use: Yes     Comment: 2 drinks per week      Problem (# of Occurrences) Relation (Name,Age of Onset)    Breast Cancer (5) Mother (Lynsey griffith): 44, negative for gene; was HER-2 positive, Maternal Grandmother (Mery Griffith): Kidney cancer, Maternal Aunt, Other (Dustin Haq): Breast Cancer, Maternal Great-Grandmother    Cancer (1) Paternal Grandmother    Depression (3) Maternal Uncle, Other (Lior Griffith): PTSD maternal uncle, Other (Rolf Griffith): Maternal Uncle    Diabetes (2) Maternal  Grandmother (Mery Arndt), Paternal Grandmother    Esophageal Cancer (1) Paternal Grandfather (Vinh Willoughby)    Hyperlipidemia (1) Paternal Grandfather (Vinh Willoughby)    Hypertension (1) Paternal Grandfather (Vinh Willoughby)    Kidney Cancer (1) Maternal Grandmother (Mery Arndt)    Lipids (1) Paternal Grandfather (Vinh Willoughby)    No Known Problems (2) Sister (half), Brother (half)    Other Cancer (2) Maternal Grandmother (Mery Arndt): Kidney cancer, Paternal Grandfather (Vinh Willoughby): Esophageal cancer    Prostate Cancer (1) Maternal Grandfather (Av Arndt)    Substance Abuse (1) Father (Jovany Willoughby): Alcohol and drugs    Thyroid Disease (2) Maternal Grandmother (Mery Arndt): On medications, Paternal Grandmother            Current Outpatient Medications   Medication Sig     fluconazole (DIFLUCAN) 200 MG tablet Take 200 mg by mouth     levothyroxine (SYNTHROID/LEVOTHROID) 100 MCG tablet Take 1 tablet (100 mcg) by mouth daily     liothyronine (CYTOMEL) 50 MCG tablet      norgestimate-ethinyl estradiol (ORTHO-CYCLEN) 0.25-35 MG-MCG tablet Take 1 tablet by mouth daily     traZODone (DESYREL) 50 MG tablet Take 1 tablet (50 mg) by mouth nightly as needed for sleep     No current facility-administered medications for this visit.     Allergies   Allergen Reactions     No Known Drug Allergies        ROS:  12 point review of systems negative other than symptoms noted below or in the HPI.  No urinary frequency or dysuria, bladder or kidney problems      OBJECTIVE:     /80   Wt 70.8 kg (156 lb)   LMP 07/10/2021 (Exact Date)   Breastfeeding No   BMI 26.36 kg/m    Body mass index is 26.36 kg/m .    Exam:  Constitutional:  Appearance: Well nourished, well developed alert, in no acute distress  Neurologic:  Mental Status:  Oriented X3.  Normal strength and tone, sensory exam grossly normal, mentation intact and speech normal.    Psychiatric:  Mentation appears normal and affect normal/bright.      In-Clinic Test Results:  No results found for this or any previous visit (from the past 24 hour(s)).    ASSESSMENT/PLAN:                                                        ICD-10-CM    1. Intramural and subserous leiomyoma of uterus  D25.1     D25.2    2. Menorrhagia with regular cycle  N92.0    3. Hypothyroidism, unspecified type  E03.9        Patient Instructions   Long discussion today about fibroids.  Discussed incidence of fibroids, locations and common symptoms of fibroid depending on size and location, treatment options, etc.  Discussed medication options both hormonal and non hormonal options, discussed procedural options with either UAE, myomectomy or hysterectomy.  Given Cortney is not interested in child bearing, I would recommend supracervical hysterectomy and bilateral salpingectomy if interested in definitive management.     Discussed that if Cortney's goal is to discontinue her hormonal medications/OCPs, that her bleeding and cramping is quite likely to worsen and would warrant more aggressive treatment/management of her fibroids.  Will discuss with her family and other providers and contact us with her decisions.  Will continue on current OCP --would expect reguarity of her cycles to improve in the next 1-2 months.  If heavy bleeding/cramping were to continue, may consider continuous OCPs to avoid menses altogehter.      50min spent in discussion as noted above on day of visit; additional 10 min spent in reviewing outside records, imaging and workup as well as documentation/plan.    Tiffanie Oliveira MD  [facility] [place]

## 2021-08-05 ENCOUNTER — OFFICE VISIT (OUTPATIENT)
Dept: OBGYN | Facility: CLINIC | Age: 41
End: 2021-08-05
Payer: COMMERCIAL

## 2021-08-05 VITALS — DIASTOLIC BLOOD PRESSURE: 80 MMHG | WEIGHT: 156 LBS | BODY MASS INDEX: 26.36 KG/M2 | SYSTOLIC BLOOD PRESSURE: 124 MMHG

## 2021-08-05 DIAGNOSIS — D25.1 INTRAMURAL AND SUBSEROUS LEIOMYOMA OF UTERUS: Primary | ICD-10-CM

## 2021-08-05 DIAGNOSIS — D25.2 INTRAMURAL AND SUBSEROUS LEIOMYOMA OF UTERUS: Primary | ICD-10-CM

## 2021-08-05 DIAGNOSIS — N92.0 MENORRHAGIA WITH REGULAR CYCLE: ICD-10-CM

## 2021-08-05 DIAGNOSIS — E03.9 HYPOTHYROIDISM, UNSPECIFIED TYPE: ICD-10-CM

## 2021-08-05 PROCEDURE — 99205 OFFICE O/P NEW HI 60 MIN: CPT | Performed by: OBSTETRICS & GYNECOLOGY

## 2021-08-05 RX ORDER — FLUCONAZOLE 200 MG/1
200 TABLET ORAL WEEKLY
COMMUNITY

## 2021-08-05 ASSESSMENT — ANXIETY QUESTIONNAIRES
2. NOT BEING ABLE TO STOP OR CONTROL WORRYING: NOT AT ALL
IF YOU CHECKED OFF ANY PROBLEMS ON THIS QUESTIONNAIRE, HOW DIFFICULT HAVE THESE PROBLEMS MADE IT FOR YOU TO DO YOUR WORK, TAKE CARE OF THINGS AT HOME, OR GET ALONG WITH OTHER PEOPLE: NOT DIFFICULT AT ALL
3. WORRYING TOO MUCH ABOUT DIFFERENT THINGS: NOT AT ALL
6. BECOMING EASILY ANNOYED OR IRRITABLE: NOT AT ALL
7. FEELING AFRAID AS IF SOMETHING AWFUL MIGHT HAPPEN: NOT AT ALL
GAD7 TOTAL SCORE: 2
1. FEELING NERVOUS, ANXIOUS, OR ON EDGE: SEVERAL DAYS
5. BEING SO RESTLESS THAT IT IS HARD TO SIT STILL: NOT AT ALL

## 2021-08-05 ASSESSMENT — PATIENT HEALTH QUESTIONNAIRE - PHQ9
5. POOR APPETITE OR OVEREATING: SEVERAL DAYS
SUM OF ALL RESPONSES TO PHQ QUESTIONS 1-9: 5

## 2021-08-05 NOTE — PATIENT INSTRUCTIONS
Long discussion today about fibroids.  Discussed incidence of fibroids, locations and common symptoms of fibroid depending on size and location, treatment options, etc.  Discussed medication options both hormonal and non hormonal options, discussed procedural options with either UAE, myomectomy or hysterectomy.  Given Cortnye is not interested in child bearing, I would recommend supracervical hysterectomy and bilateral salpingectomy if interested in definitive management.     Discussed that if Cortney's goal is to discontinue her hormonal medications/OCPs, that her bleeding and cramping is quite likely to worsen and would warrant more aggressive treatment/management of her fibroids.  Will discuss with her family and other providers and contact us with her decisions.  Will continue on current OCP --would expect reguarity of her cycles to improve in the next 1-2 months.  If heavy bleeding/cramping were to continue, may consider continuous OCPs to avoid menses altogehter.

## 2021-08-06 ASSESSMENT — ANXIETY QUESTIONNAIRES: GAD7 TOTAL SCORE: 2

## 2021-08-28 DIAGNOSIS — F51.01 PRIMARY INSOMNIA: ICD-10-CM

## 2021-08-30 RX ORDER — TRAZODONE HYDROCHLORIDE 50 MG/1
TABLET, FILM COATED ORAL
Qty: 90 TABLET | Refills: 2 | Status: SHIPPED | OUTPATIENT
Start: 2021-08-30 | End: 2022-06-13

## 2021-08-31 ENCOUNTER — TELEPHONE (OUTPATIENT)
Dept: OBGYN | Facility: CLINIC | Age: 41
End: 2021-08-31

## 2021-08-31 ENCOUNTER — PREP FOR PROCEDURE (OUTPATIENT)
Dept: OBGYN | Facility: CLINIC | Age: 41
End: 2021-08-31

## 2021-08-31 DIAGNOSIS — D25.0 SUBMUCOUS AND SUBSEROUS LEIOMYOMA OF UTERUS: Primary | ICD-10-CM

## 2021-08-31 DIAGNOSIS — D25.2 SUBMUCOUS AND SUBSEROUS LEIOMYOMA OF UTERUS: Primary | ICD-10-CM

## 2021-08-31 NOTE — TELEPHONE ENCOUNTER
ERAS BAG GIVEN No  ERAS INSTRUCTIONS EXPLAINED No    ASSIST: Kyara    H&P TO BE COMPLETED BY:   PCP  FOR H&P TO BE DONE BY SURGEON   Per PCP  SAME DAY/OBSERVATION/INPATIENT: STRAIGHT SAME DAY  EQUIPMENT: pneumoliner, Abby morcellator  VENDOR NEEDED AT CASE: NA  IF IUD WHAT BRAND NA  LABS/SPECIAL INSTRUCTIONS: NA  TIME OFF WORK: 2 weeks off; 2 additional weeks light duty if possible  ESTIMATED DOCTOR TIME NEEDED IN MINUTES 120

## 2021-09-01 PROBLEM — D25.0 SUBMUCOUS AND SUBSEROUS LEIOMYOMA OF UTERUS: Status: ACTIVE | Noted: 2021-09-01

## 2021-09-01 PROBLEM — D25.2 SUBMUCOUS AND SUBSEROUS LEIOMYOMA OF UTERUS: Status: ACTIVE | Noted: 2021-09-01

## 2021-09-01 NOTE — TELEPHONE ENCOUNTER
This patient would like to be seen in September due to some work conflicts but will take the below date if she has to.  Will call every Wednesday to find out if something is available within the blocks that release.    Type of surgery: LASH BS CYSTO  Location of surgery: [facility]  Date and time of surgery: 10/20/2021 12p  Surgeon: RIGOBERTO  Pre-Op Appt Date: PRIMARY  Post-Op Appt Date: TBD   Packet sent out: MAILED 9/8/2021  Pre-cert/Authorization completed:  TBD  Date: 9/1/2021 Erica hensley TimeFinandre Portillo  Surgery Scheduler      CPT 10923

## 2021-09-08 NOTE — TELEPHONE ENCOUNTER
Per pt request this case has been moved as follows  Confirmation from Lidia at [facility] of the date/time change    9/22/2021 11:30a arrival time is 9:30a    COVID TEST 9/19/2021 11a [facility]    Pt aware to do preop with PCP    Gloria Portillo  Surgery Scheduler

## 2021-09-09 DIAGNOSIS — Z11.59 ENCOUNTER FOR SCREENING FOR OTHER VIRAL DISEASES: ICD-10-CM

## 2021-09-16 ENCOUNTER — MYC MEDICAL ADVICE (OUTPATIENT)
Dept: FAMILY MEDICINE | Facility: CLINIC | Age: 41
End: 2021-09-16

## 2021-09-16 ENCOUNTER — OFFICE VISIT (OUTPATIENT)
Dept: FAMILY MEDICINE | Facility: CLINIC | Age: 41
End: 2021-09-16
Payer: COMMERCIAL

## 2021-09-16 VITALS
DIASTOLIC BLOOD PRESSURE: 75 MMHG | WEIGHT: 156 LBS | SYSTOLIC BLOOD PRESSURE: 121 MMHG | RESPIRATION RATE: 16 BRPM | HEIGHT: 65 IN | HEART RATE: 93 BPM | BODY MASS INDEX: 25.99 KG/M2 | OXYGEN SATURATION: 98 % | TEMPERATURE: 97 F

## 2021-09-16 DIAGNOSIS — N92.6 IRREGULAR MENSTRUAL BLEEDING: ICD-10-CM

## 2021-09-16 DIAGNOSIS — Z13.0 SCREENING FOR DEFICIENCY ANEMIA: ICD-10-CM

## 2021-09-16 DIAGNOSIS — D25.0 SUBMUCOUS AND SUBSEROUS LEIOMYOMA OF UTERUS: ICD-10-CM

## 2021-09-16 DIAGNOSIS — E03.9 HYPOTHYROIDISM, UNSPECIFIED TYPE: ICD-10-CM

## 2021-09-16 DIAGNOSIS — Z01.818 PREOP GENERAL PHYSICAL EXAM: Primary | ICD-10-CM

## 2021-09-16 DIAGNOSIS — E03.9 HYPOTHYROIDISM, UNSPECIFIED TYPE: Primary | ICD-10-CM

## 2021-09-16 DIAGNOSIS — D25.2 SUBMUCOUS AND SUBSEROUS LEIOMYOMA OF UTERUS: ICD-10-CM

## 2021-09-16 DIAGNOSIS — Z23 NEED FOR PROPHYLACTIC VACCINATION AND INOCULATION AGAINST INFLUENZA: ICD-10-CM

## 2021-09-16 DIAGNOSIS — E67.3 HYPERVITAMINOSIS D: ICD-10-CM

## 2021-09-16 LAB
ERYTHROCYTE [DISTWIDTH] IN BLOOD BY AUTOMATED COUNT: 11.9 % (ref 10–15)
FERRITIN SERPL-MCNC: 84 NG/ML (ref 12–150)
HCT VFR BLD AUTO: 40.1 % (ref 35–47)
HGB BLD-MCNC: 14 G/DL (ref 11.7–15.7)
MCH RBC QN AUTO: 30.9 PG (ref 26.5–33)
MCHC RBC AUTO-ENTMCNC: 34.9 G/DL (ref 31.5–36.5)
MCV RBC AUTO: 89 FL (ref 78–100)
PLATELET # BLD AUTO: 215 10E3/UL (ref 150–450)
RBC # BLD AUTO: 4.53 10E6/UL (ref 3.8–5.2)
T4 FREE SERPL-MCNC: 1.07 NG/DL (ref 0.76–1.46)
TSH SERPL DL<=0.005 MIU/L-ACNC: <0.01 MU/L (ref 0.4–4)
WBC # BLD AUTO: 5.5 10E3/UL (ref 4–11)

## 2021-09-16 PROCEDURE — 90686 IIV4 VACC NO PRSV 0.5 ML IM: CPT | Performed by: INTERNAL MEDICINE

## 2021-09-16 PROCEDURE — 84443 ASSAY THYROID STIM HORMONE: CPT | Performed by: INTERNAL MEDICINE

## 2021-09-16 PROCEDURE — 84439 ASSAY OF FREE THYROXINE: CPT | Performed by: INTERNAL MEDICINE

## 2021-09-16 PROCEDURE — 82728 ASSAY OF FERRITIN: CPT | Performed by: INTERNAL MEDICINE

## 2021-09-16 PROCEDURE — 85027 COMPLETE CBC AUTOMATED: CPT | Performed by: INTERNAL MEDICINE

## 2021-09-16 PROCEDURE — 93000 ELECTROCARDIOGRAM COMPLETE: CPT | Performed by: INTERNAL MEDICINE

## 2021-09-16 PROCEDURE — 36415 COLL VENOUS BLD VENIPUNCTURE: CPT | Performed by: INTERNAL MEDICINE

## 2021-09-16 PROCEDURE — 82306 VITAMIN D 25 HYDROXY: CPT | Performed by: INTERNAL MEDICINE

## 2021-09-16 PROCEDURE — 99215 OFFICE O/P EST HI 40 MIN: CPT | Mod: 25 | Performed by: INTERNAL MEDICINE

## 2021-09-16 PROCEDURE — 84481 FREE ASSAY (FT-3): CPT | Performed by: INTERNAL MEDICINE

## 2021-09-16 PROCEDURE — 90471 IMMUNIZATION ADMIN: CPT | Performed by: INTERNAL MEDICINE

## 2021-09-16 ASSESSMENT — MIFFLIN-ST. JEOR: SCORE: 1375.55

## 2021-09-16 NOTE — TELEPHONE ENCOUNTER
Dr. Burnham,    Please review MyChart message from patient.    Renetta RODRIGUEZ MA on 9/16/2021 at 1:09 PM

## 2021-09-16 NOTE — PROGRESS NOTES
[facility]  [address] [place] [address], [address]  [facility] [address]  Phone: [phone]  Primary Provider: Krysten Burnham  Pre-op Performing Provider: KRYSTEN BURNHAM      PREOPERATIVE EVALUATION:  Today's date: 9/16/2021    Cortney Willoughby is a 41 year old female who presents for a preoperative evaluation.    Surgical Information:  Surgery/Procedure: laparascopic supracervical hysterectomy & bilateral salpingectomy & diagnostic cystoscopy  Surgery Location: [facility]  Surgeon:   Tiffanie Oliveira MD Primary   &  Katherine Sparrow MD Assisting     Surgery Date: 09/22/2021  Time of Surgery: 11:30 AM  Where patient plans to recover: At home with family  Fax number for surgical facility: Note does not need to be faxed, will be available electronically in Epic.    Type of Anesthesia Anticipated: General    Assessment & Plan     The proposed surgical procedure is considered INTERMEDIATE risk.    Cortney was seen today for pre-op exam and imm/inj.    Diagnoses and all orders for this visit:    Preop general physical exam  -     CBC with platelets; Future  -     EKG 12-lead complete w/read - Clinics  -     CBC with platelets  Performed    Submucous and subserous leiomyoma of uterus  Patient has a irregular heavy vaginal bleeding  She also has a pain she is a scheduled for supracervical hysterectomy    Hypothyroidism, unspecified type but levels are in hyperthyroid range( she follows natural medicine doctor)  -     TSH with free T4 reflex  -     T4 free  -     T3, Free; Future  -     T3, Free  Patient has been following natural medicine doctor  Her TSH has been staying low low side and she is in hyperthyroid state. per patient her natural medicine doctor who is  Expert in endocrina disorder did not change her dose.  Patient she has been on thyroid medication and Cytomel for more than a year  She states that she feels so good with this medication  Previously she did  not feel good at all  As her levels are in the hyperthyroid phase I do not feel comfortable clearing her for surgery until TSH is normal  I discussed with endocrinologist   He is in agreement that it is elective surgery  We should wait until TSH is normalized  Discussed with the patient once I got the result  She is on very upset   she started crying  Stating that whom should she believe?  She states she discussed with her natural medicine doctor in the past about her blood test results and she kept her on the same dose  She is upset that she is going for these appointment and paying out-of-pocket  I explained to her that I refer my patient to  endocrinologist and not to natural medicine physician  Seeing the natural medicine physician is her own decision  At this point I am recommending what is medically appropriate from my side   There is a risk of arrhythmias due to hyperthyroid phase as last T3 was very high.  She said she spoke to her natural medicine doctor and was told to stop medication for 1 week and this will bring her level to normal value  She did take it today but now going to stop she wants to do her TSH on Monday  She does not want to postpone her surgery  I explained to her that it takes time for the level to go down but she was crying and was insistent  I will discuss with her gynecologist  She wants to do her TSH on Monday    Irregular menstrual bleeding  Due to fibroids she had heavy irregular bleeding  She is a scheduled for supracervical hysterectomy    Hypervitaminosis D  -     Vitamin D Deficiency; Future  -     Vitamin D Deficiency    Screening for deficiency anemia  -     Ferritin; Future  -     Ferritin    Need for prophylactic vaccination and inoculation against influenza  -     INFLUENZA VACCINE IM > 6 MONTHS VALENT IIV4 (AFLURIA/FLUZONE)               Risks and Recommendations:  The patient has the following additional risks and recommendations for perioperative  complications:   - hyperthyroid state    Medication Instructions:  Patient is to take all scheduled medications on the day of surgery EXCEPT for modifications listed below:   Avoid aspirin 7 days before the surgery. Avoid nonsteroidal anti-inflammatory pain medication like ibuprofen, Motrin, or Aleve 7 days before the surgery.  Tylenol is okay to use for pain.  Avoid any OTC multivitamins or herbal supplement 7 days before surgery   Resume after surgery      RECOMMENDATION:  Awaiting TSH to normalize    Addendum: 10/14/21  She is optimal for her surgery  Discussed with Diana Akins  Her TSH has gone up and she is hypothyroid state now   Ok to resume levothyroxine  As her surgery on 10/20  She will be ok to have her surgery as planned   She will have another level check in 6 weeks  Per patient she will go back to her endocrinologist after surgery for management of her thyroid.    Subjective     HPI related to upcoming procedure: Patient is a scheduled for supracervical hysterectomy due to uterine fibroids which are causing pain and heavy  irregular bleeding    Preop Questions 9/16/2021   1. Have you ever had a heart attack or stroke? No   2. Have you ever had surgery on your heart or blood vessels, such as a stent placement, a coronary artery bypass, or surgery on an artery in your head, neck, heart, or legs? No   3. Do you have chest pain with activity? No   4. Do you have a history of  heart failure? No   5. Do you currently have a cold, bronchitis or symptoms of other infection? No   6. Do you have a cough, shortness of breath, or wheezing? No   7. Do you or anyone in your family have previous history of blood clots? UNKNOWN -    8. Do you or does anyone in your family have a serious bleeding problem such as prolonged bleeding following surgeries or cuts? No   9. Have you ever had problems with anemia or been told to take iron pills? YES    10. Have you had any abnormal blood loss such as black, tarry or bloody  stools, or abnormal vaginal bleeding? UNKNOWN - heavy bleeding   11. Have you ever had a blood transfusion? No   12. Are you willing to have a blood transfusion if it is medically needed before, during, or after your surgery? Yes   13. Have you or any of your relatives ever had problems with anesthesia? No   14. Do you have sleep apnea, excessive snoring or daytime drowsiness? No   15. Do you have any artifical heart valves or other implanted medical devices like a pacemaker, defibrillator, or continuous glucose monitor? No   16. Do you have artificial joints? No   17. Are you allergic to latex? No   18. Is there any chance that you may be pregnant? No       Health Care Directive:  Patient does not have a Health Care Directive or Living Will: patient does not have advanced directives    Preoperative Review of :   reviewed - no record of controlled substances prescribed.      Status of Chronic Conditions:  See problem list for active medical problems.  Problems all longstanding and stable, except as noted/documented.  See ROS for pertinent symptoms related to these conditions.      Review of Systems  Constitutional, neuro, ENT, endocrine, pulmonary, cardiac, gastrointestinal, genitourinary, musculoskeletal, integument and psychiatric systems are negative, except as otherwise noted.    Patient Active Problem List    Diagnosis Date Noted     Submucous and subserous leiomyoma of uterus 09/01/2021     Priority: Medium     Added automatically from request for surgery [id number]       Other adrenocortical overactivity (H) 06/18/2021     Priority: Medium     Elevated cortisol level 05/02/2019     Priority: Medium     Hypothyroidism, unspecified type 05/02/2019     Priority: Medium     Managed by natural medicine specialist       Mixed hyperlipidemia 05/02/2019     Priority: Medium     Insomnia, unspecified insomnia 04/24/2016     Priority: Medium     Encounter for surveillance of contraceptive pills 04/24/2016      "Priority: Medium     Family history of malignant neoplasm of breast 04/24/2016     Priority: Medium      Past Medical History:   Diagnosis Date     Anxiety      Closed fracture of unspecified part of ulna (alone) 2006     Depression      Family history of malignant neoplasm of breast      Fibroid uterus      Hyperlipidemia      Hypothyroidism, unspecified type 05/02/2019     Insomnia      Ovarian cyst      Scoliosis (and kyphoscoliosis), idiopathic      Past Surgical History:   Procedure Laterality Date     COSMETIC SURGERY  June 24 2018    Liposuction     LIPOSUCTION (LOCATION)       Current Outpatient Medications   Medication Sig Dispense Refill     fluconazole (DIFLUCAN) 200 MG tablet Take 200 mg by mouth       levothyroxine (SYNTHROID/LEVOTHROID) 100 MCG tablet Take 1 tablet (100 mcg) by mouth daily       liothyronine (CYTOMEL) 50 MCG tablet        norgestimate-ethinyl estradiol (ORTHO-CYCLEN) 0.25-35 MG-MCG tablet Take 1 tablet by mouth daily 84 tablet 3     traZODone (DESYREL) 50 MG tablet TAKE 1 TABLET(50 MG) BY MOUTH EVERY NIGHT AS NEEDED FOR SLEEP 90 tablet 2       Allergies   Allergen Reactions     No Known Drug Allergies         Social History     Tobacco Use     Smoking status: Never Smoker     Smokeless tobacco: Never Used   Substance Use Topics     Alcohol use: Yes     Comment: 2 drinks per week     No history of any anesthesia complications   No family history of any anesthesia complications.    History   Drug Use No         Objective     /75 (BP Location: Right arm, Patient Position: Chair, Cuff Size: Adult Regular)   Pulse 93   Temp 97  F (36.1  C) (Temporal)   Resp 16   Ht 1.638 m (5' 4.5\")   Wt 70.8 kg (156 lb)   SpO2 98%   BMI 26.36 kg/m      Physical Exam    GENERAL APPEARANCE: healthy, alert and no distress     EYES: EOMI, PERRL     HENT: ear canals and TM's normal and nose and mouth without ulcers or lesions     NECK: no adenopathy, no  masses, or scars and thyroid normal to " palpation     RESP: lungs clear to auscultation - no rales, rhonchi or wheezes     CV: regular rates and rhythm, normal S1 S2, no S3 or S4 and no murmur, click or rub     ABDOMEN:  soft, nontender, no HSM or masses and bowel sounds normal     MS: extremities normal- no gross deformities noted, no evidence of inflammation in joints, FROM in all extremities.     SKIN: no suspicious lesions or rashes     NEURO: Normal strength and tone, sensory exam grossly normal, mentation intact and speech normal     PSYCH: mentation appears normal. and affect normal/bright     LYMPHATICS: No cervical adenopathy    Recent Labs   Lab Test 06/18/21  0857 06/11/20  1130   HGB 14.5 13.1    172    137   POTASSIUM 4.2 3.7   CR 0.69 0.59   A1C 5.0  --         Diagnostics:  Recent Results (from the past 24 hour(s))   TSH with free T4 reflex    Collection Time: 09/16/21  9:03 AM   Result Value Ref Range    TSH <0.01 (L) 0.40 - 4.00 mU/L   CBC with platelets    Collection Time: 09/16/21  9:03 AM   Result Value Ref Range    WBC Count 5.5 4.0 - 11.0 10e3/uL    RBC Count 4.53 3.80 - 5.20 10e6/uL    Hemoglobin 14.0 11.7 - 15.7 g/dL    Hematocrit 40.1 35.0 - 47.0 %    MCV 89 78 - 100 fL    MCH 30.9 26.5 - 33.0 pg    MCHC 34.9 31.5 - 36.5 g/dL    RDW 11.9 10.0 - 15.0 %    Platelet Count 215 150 - 450 10e3/uL   Ferritin    Collection Time: 09/16/21  9:03 AM   Result Value Ref Range    Ferritin 84 12 - 150 ng/mL   T4 free    Collection Time: 09/16/21  9:03 AM   Result Value Ref Range    Free T4 1.07 0.76 - 1.46 ng/dL      EKG: appears normal, NSR, normal axis, normal intervals, no acute ST/T changes c/w ischemia, no LVH by voltage criteria   EKG was ordered as she had hyperthyroid phase    Revised Cardiac Risk Index (RCRI):  The patient has the following serious cardiovascular risks for perioperative complications:   - No serious cardiac risks = 0 points     Disclaimer: This note consists of symbols derived from keyboarding, dictation  and/or voice recognition software. As a result, there may be errors in the script that have gone undetected. Please consider this when interpreting information found in this chart.        60 minutes spent on the date of the encounter doing chart review, history and exam, documentation and further activities as noted above      Signed Electronically by: Zeinab Burnham MD  Copy of this evaluation report is provided to requesting physician.

## 2021-09-16 NOTE — PATIENT INSTRUCTIONS
Avoid aspirin 7 days before the surgery. Avoid nonsteroidal anti-inflammatory pain medication like ibuprofen, Motrin, or Aleve 7 days before the surgery.  Tylenol is okay to use for pain.  Avoid any OTC multivitamins or herbal supplement 7 days before surgery   Resume after surgery

## 2021-09-16 NOTE — Clinical Note
Carlie Rodriguez,    This patient follows [facility] medicine doctor for her thyroid and she is taking very high dose   Her levels are in hyperthyroid phase and it is not safe for elective surgery  I discussed with patient for postponing surgery but she is freaking out and very upset   Started crying and she wants to stop her medication and check level on Monday   I would like to discuss this with you .  I will try to give you a call tomorrow.    Thanks     Dr.Nasima Chacha MD

## 2021-09-16 NOTE — RESULT ENCOUNTER NOTE
Carlie Barr,    This is to inform you regarding your test result.    CBC result which includes white count Hemoglobin and  Platelet Counts is normal.   Other test results are pending.          Sincerely,      Dr.Nasima Chacha MD,FACP

## 2021-09-17 ENCOUNTER — MYC MEDICAL ADVICE (OUTPATIENT)
Dept: FAMILY MEDICINE | Facility: CLINIC | Age: 41
End: 2021-09-17

## 2021-09-17 LAB — T3FREE SERPL-MCNC: 9.8 PG/ML (ref 2.3–4.2)

## 2021-09-17 ASSESSMENT — PATIENT HEALTH QUESTIONNAIRE - PHQ9: SUM OF ALL RESPONSES TO PHQ QUESTIONS 1-9: 0

## 2021-09-17 NOTE — TELEPHONE ENCOUNTER
I spoke to the patient  Answered her questions  She was very appreciative that we called her  As her surgery is a scheduled for October 20 so we decided to do her labs week before her surgery  In that way there will be enough time for the medication to get out of the system.  Once level is normal we will clear her for surgery  Dr.Nasima Chacha MD

## 2021-09-17 NOTE — TELEPHONE ENCOUNTER
Discussed with patient that her levels are in hyperthyroid   Discussed that I have discussed this with our endocrinologist  Due to hyperthyroid phase she is at risk of arrhythmias  T 3 level was high in 6/2021  This time it is normal  Patient got very angry and started crying as she has made all arrangements   Explained to her why this is important to postponed surgery  But she was insistent that she did not have any complications during previous surgery  She was asking why her natural medicine doctor did not lower the dose of her medication   She says that  she is stuck between two doctors    Wants to do labs on Monday  Will stop the medication tomorrow  Explained it takes time for level to improve  I will discuss this with her gynecologist tomorrow  Dr.Nasima Chacha MD

## 2021-09-17 NOTE — TELEPHONE ENCOUNTER
PCP, please advise patient's mychart message.       Daniel Murphy, JOSR on 9/17/2021 at 10:41 AM

## 2021-09-17 NOTE — TELEPHONE ENCOUNTER
Discussed with   She agreed for postponing surgery  Recheck in one week and then in 2 weeks if numbers does not go down  Order for lab is placed   Dr.Nasima Chacha MD

## 2021-09-17 NOTE — TELEPHONE ENCOUNTER
Per huddle with doctor Burnham, TSH lab only appt recheck, was rescheduled to 10/12/2021. My chart message was sent to patient with appt date. Triage tried calling pt, but voicemail was full.

## 2021-09-17 NOTE — TELEPHONE ENCOUNTER
RS as follows due to non clearance at [re-op based on thyroid levels.    10/20/2021 8:40a with a 6:40am arrival time  COVID TEST 10/17 11a [facility]    Will mail new packet to patient when I return to clinic on 9/22    Gloria Portillo  Surgery Scheduler

## 2021-09-17 NOTE — TELEPHONE ENCOUNTER
Pt huddle with doctor Burnham, pt was called with plan below. Pt reports frustration. States she has a demanding job and needs to have a date on the calendar when she would have surgery. Pt questions why she has to have two rechecks before getting clearance. She wants to avoid waiting for surgery since she doesn't want to get her menses back before surgery. Triage explained she cannot get clearance for surgery until thyroid levels are stable for her safety. Pt agreed to call doctor Tee tello to reschedule her surgery. Pt is aware this may need to be rescheduled pending on results.  Future lab appts were scheduled. PCP was informed of plan.

## 2021-09-17 NOTE — TELEPHONE ENCOUNTER
Doctor Chacha, pt has hysterectomy surgery scheduled 10/20/2021. She asks if anything else is needed for clearance of surgery. Pt only needs TSH level normalized correct?

## 2021-09-18 LAB — DEPRECATED CALCIDIOL+CALCIFEROL SERPL-MC: 79 UG/L (ref 20–75)

## 2021-09-18 NOTE — RESULT ENCOUNTER NOTE
Carlie Barr,    This is to inform you regarding your test result.    Free T3 is elevated and TSH is low  You have overactive thyroid  We discussed this on phone   Ferritin which is iron stores in the body is normal.      Sincerely,      Dr.Nasima Chacha MD,FACP

## 2021-09-19 NOTE — RESULT ENCOUNTER NOTE
Carlie Barr,    This is to inform you regarding your test result.    Vit D level has gone down compared to before.  It is still slightly higher than normal    Sincerely,      Dr.Nasima Chacha MD,FACP

## 2021-10-17 ENCOUNTER — LAB (OUTPATIENT)
Dept: URGENT CARE | Facility: URGENT CARE | Age: 41
End: 2021-10-17
Attending: OBSTETRICS & GYNECOLOGY
Payer: COMMERCIAL

## 2021-10-17 DIAGNOSIS — Z11.59 ENCOUNTER FOR SCREENING FOR OTHER VIRAL DISEASES: ICD-10-CM

## 2021-10-17 LAB — SARS-COV-2 RNA RESP QL NAA+PROBE: NEGATIVE

## 2021-10-17 PROCEDURE — U0005 INFEC AGEN DETEC AMPLI PROBE: HCPCS

## 2021-10-17 PROCEDURE — U0003 INFECTIOUS AGENT DETECTION BY NUCLEIC ACID (DNA OR RNA); SEVERE ACUTE RESPIRATORY SYNDROME CORONAVIRUS 2 (SARS-COV-2) (CORONAVIRUS DISEASE [COVID-19]), AMPLIFIED PROBE TECHNIQUE, MAKING USE OF HIGH THROUGHPUT TECHNOLOGIES AS DESCRIBED BY CMS-2020-01-R: HCPCS

## 2021-10-19 ENCOUNTER — ANESTHESIA EVENT (OUTPATIENT)
Dept: SURGERY | Facility: CLINIC | Age: 41
End: 2021-10-19
Payer: COMMERCIAL

## 2021-10-19 RX ORDER — UBIDECARENONE 200 MG
200 CAPSULE ORAL EVERY OTHER DAY
COMMUNITY
End: 2024-05-20

## 2021-10-19 RX ORDER — LIOTHYRONINE SODIUM 50 UG/1
50 TABLET ORAL DAILY
COMMUNITY
End: 2024-05-20 | Stop reason: DRUGHIGH

## 2021-10-19 RX ORDER — NIACINAMIDE 500 MG
1 TABLET, EXTENDED RELEASE ORAL 2 TIMES DAILY
COMMUNITY

## 2021-10-19 RX ORDER — AMOXICILLIN 500 MG
2 CAPSULE ORAL 2 TIMES DAILY
COMMUNITY

## 2021-10-19 RX ORDER — MULTIVIT WITH MINERALS/LUTEIN
1000 TABLET ORAL DAILY
COMMUNITY

## 2021-10-19 ASSESSMENT — LIFESTYLE VARIABLES: TOBACCO_USE: 0

## 2021-10-19 NOTE — ANESTHESIA PREPROCEDURE EVALUATION
Anesthesia Pre-Procedure Evaluation    Patient: Cortney Willoughby   MRN: [id number] : 1980        Preoperative Diagnosis: Submucous and subserous leiomyoma of uterus [D25.0, D25.2]    Procedure : Procedure(s):  laparascopic supracervical hysterectomy  bilateral salpingectomy  diagnostic cystoscopy          Past Medical History:   Diagnosis Date     Anxiety      Closed fracture of unspecified part of ulna (alone) 2006     Depression      Family history of malignant neoplasm of breast      Fibroid uterus      Hyperlipidemia      Hypothyroidism, unspecified type 2019     Insomnia      Ovarian cyst      Scoliosis (and kyphoscoliosis), idiopathic       Past Surgical History:   Procedure Laterality Date     COSMETIC SURGERY  2018    Liposuction     LIPOSUCTION (LOCATION)        Allergies   Allergen Reactions     No Known Drug Allergies       Social History     Tobacco Use     Smoking status: Never Smoker     Smokeless tobacco: Never Used   Substance Use Topics     Alcohol use: Yes     Comment: 2 drinks per week      Wt Readings from Last 1 Encounters:   21 70.8 kg (156 lb)        Anesthesia Evaluation            ROS/MED HX  ENT/Pulmonary:    (-) tobacco use and sleep apnea   Neurologic:       Cardiovascular:     (+) Dyslipidemia -----    METS/Exercise Tolerance:     Hematologic:       Musculoskeletal: Comment: Scoliosis       GI/Hepatic:       Renal/Genitourinary: Comment: Ovarian cyst, irregular bleeding/fibroid uterus      Endo:     (+) thyroid problem,     Psychiatric/Substance Use:     (+) psychiatric history depression and anxiety     Infectious Disease:       Malignancy:       Other:            Physical Exam    Airway        Mallampati: II   TM distance: > 3 FB   Neck ROM: full   Mouth opening: > 3 cm    Respiratory Devices and Support         Dental  no notable dental history         Cardiovascular   cardiovascular exam normal          Pulmonary   pulmonary exam normal                 OUTSIDE LABS:  CBC:   Lab Results   Component Value Date    WBC 5.5 09/16/2021    WBC 4.5 06/18/2021    HGB 14.0 09/16/2021    HGB 14.5 06/18/2021    HCT 40.1 09/16/2021    HCT 41.2 06/18/2021     09/16/2021     06/18/2021     BMP:   Lab Results   Component Value Date     06/18/2021     06/11/2020    POTASSIUM 4.2 06/18/2021    POTASSIUM 3.7 06/11/2020    CHLORIDE 113 (H) 06/18/2021    CHLORIDE 107 06/11/2020    CO2 26 06/18/2021    CO2 26 06/11/2020    BUN 12 06/18/2021    BUN 9 06/11/2020    CR 0.69 06/18/2021    CR 0.59 06/11/2020    GLC 90 06/18/2021    GLC 87 06/11/2020     COAGS: No results found for: PTT, INR, FIBR  POC: No results found for: BGM, HCG, HCGS  HEPATIC:   Lab Results   Component Value Date    ALBUMIN 3.2 (L) 06/18/2021    PROTTOTAL 7.2 06/18/2021    ALT 42 06/18/2021    AST 33 06/18/2021    ALKPHOS 50 06/18/2021    BILITOTAL 0.4 06/18/2021     OTHER:   Lab Results   Component Value Date    A1C 5.0 06/18/2021    BAYLEE 8.8 06/18/2021    TSH 13.27 (H) 10/12/2021    T4 0.47 (L) 10/12/2021       Anesthesia Plan    ASA Status:  2   NPO Status:  NPO Appropriate    Anesthesia Type: General.     - Airway: ETT   Induction: Intravenous.   Maintenance: TIVA.        Consents    Anesthesia Plan(s) and associated risks, benefits, and realistic alternatives discussed. Questions answered and patient/representative(s) expressed understanding.     - Discussed with:  Patient         Postoperative Care    Pain management: IV analgesics, Oral pain medications.   PONV prophylaxis: Ondansetron (or other 5HT-3), Dexamethasone or Solumedrol, Background Propofol Infusion     Comments:         H&P reviewed: Unable to attach H&P to encounter due to EHR limitations. H&P Update: appropriate H&P reviewed, patient examined. No interval changes since H&P (within 30 days).         Jame Jones MD

## 2021-10-20 ENCOUNTER — HOSPITAL ENCOUNTER (OUTPATIENT)
Facility: CLINIC | Age: 41
Discharge: HOME | End: 2021-10-20
Attending: OBSTETRICS & GYNECOLOGY | Admitting: OBSTETRICS & GYNECOLOGY
Payer: COMMERCIAL

## 2021-10-20 ENCOUNTER — ANESTHESIA (OUTPATIENT)
Dept: SURGERY | Facility: CLINIC | Age: 41
End: 2021-10-20
Payer: COMMERCIAL

## 2021-10-20 VITALS
HEART RATE: 86 BPM | TEMPERATURE: 97.7 F | WEIGHT: 164.4 LBS | DIASTOLIC BLOOD PRESSURE: 76 MMHG | HEIGHT: 64 IN | OXYGEN SATURATION: 97 % | SYSTOLIC BLOOD PRESSURE: 98 MMHG | RESPIRATION RATE: 16 BRPM | BODY MASS INDEX: 28.07 KG/M2

## 2021-10-20 DIAGNOSIS — D25.2 INTRAMURAL, SUBMUCOUS, AND SUBSEROUS LEIOMYOMA OF UTERUS: Primary | ICD-10-CM

## 2021-10-20 DIAGNOSIS — D25.1 INTRAMURAL, SUBMUCOUS, AND SUBSEROUS LEIOMYOMA OF UTERUS: Primary | ICD-10-CM

## 2021-10-20 DIAGNOSIS — N92.0 MENORRHAGIA WITH REGULAR CYCLE: ICD-10-CM

## 2021-10-20 DIAGNOSIS — D25.2 SUBMUCOUS AND SUBSEROUS LEIOMYOMA OF UTERUS: ICD-10-CM

## 2021-10-20 DIAGNOSIS — D25.0 INTRAMURAL, SUBMUCOUS, AND SUBSEROUS LEIOMYOMA OF UTERUS: Primary | ICD-10-CM

## 2021-10-20 DIAGNOSIS — D25.0 SUBMUCOUS AND SUBSEROUS LEIOMYOMA OF UTERUS: ICD-10-CM

## 2021-10-20 DIAGNOSIS — N94.6 DYSMENORRHEA: ICD-10-CM

## 2021-10-20 LAB
ABO/RH(D): NORMAL
ANTIBODY SCREEN: NEGATIVE
B-HCG SERPL-ACNC: <1 IU/L (ref 0–5)
ERYTHROCYTE [DISTWIDTH] IN BLOOD BY AUTOMATED COUNT: 13.4 % (ref 10–15)
HCT VFR BLD AUTO: 38.4 % (ref 35–47)
HGB BLD-MCNC: 12.8 G/DL (ref 11.7–15.7)
MCH RBC QN AUTO: 30.1 PG (ref 26.5–33)
MCHC RBC AUTO-ENTMCNC: 33.3 G/DL (ref 31.5–36.5)
MCV RBC AUTO: 90 FL (ref 78–100)
PLATELET # BLD AUTO: 240 10E3/UL (ref 150–450)
RBC # BLD AUTO: 4.25 10E6/UL (ref 3.8–5.2)
SPECIMEN EXPIRATION DATE: NORMAL
WBC # BLD AUTO: 6.1 10E3/UL (ref 4–11)

## 2021-10-20 PROCEDURE — 58544 LSH W/T/O UTERUS ABOVE 250 G: CPT | Mod: 80 | Performed by: OBSTETRICS & GYNECOLOGY

## 2021-10-20 PROCEDURE — 258N000003 HC RX IP 258 OP 636: Performed by: NURSE ANESTHETIST, CERTIFIED REGISTERED

## 2021-10-20 PROCEDURE — 250N000011 HC RX IP 250 OP 636: Performed by: OBSTETRICS & GYNECOLOGY

## 2021-10-20 PROCEDURE — 250N000025 HC SEVOFLURANE, PER MIN: Performed by: OBSTETRICS & GYNECOLOGY

## 2021-10-20 PROCEDURE — 36415 COLL VENOUS BLD VENIPUNCTURE: CPT | Performed by: OBSTETRICS & GYNECOLOGY

## 2021-10-20 PROCEDURE — 250N000013 HC RX MED GY IP 250 OP 250 PS 637: Performed by: ANESTHESIOLOGY

## 2021-10-20 PROCEDURE — 250N000009 HC RX 250: Performed by: OBSTETRICS & GYNECOLOGY

## 2021-10-20 PROCEDURE — 258N000003 HC RX IP 258 OP 636: Performed by: OBSTETRICS & GYNECOLOGY

## 2021-10-20 PROCEDURE — 710N000012 HC RECOVERY PHASE 2, PER MINUTE: Performed by: OBSTETRICS & GYNECOLOGY

## 2021-10-20 PROCEDURE — 370N000017 HC ANESTHESIA TECHNICAL FEE, PER MIN: Performed by: OBSTETRICS & GYNECOLOGY

## 2021-10-20 PROCEDURE — 250N000011 HC RX IP 250 OP 636: Performed by: NURSE ANESTHETIST, CERTIFIED REGISTERED

## 2021-10-20 PROCEDURE — 86900 BLOOD TYPING SEROLOGIC ABO: CPT | Performed by: OBSTETRICS & GYNECOLOGY

## 2021-10-20 PROCEDURE — 85027 COMPLETE CBC AUTOMATED: CPT | Performed by: OBSTETRICS & GYNECOLOGY

## 2021-10-20 PROCEDURE — 250N000011 HC RX IP 250 OP 636: Performed by: ANESTHESIOLOGY

## 2021-10-20 PROCEDURE — 84702 CHORIONIC GONADOTROPIN TEST: CPT | Performed by: OBSTETRICS & GYNECOLOGY

## 2021-10-20 PROCEDURE — 999N000141 HC STATISTIC PRE-PROCEDURE NURSING ASSESSMENT: Performed by: OBSTETRICS & GYNECOLOGY

## 2021-10-20 PROCEDURE — 360N000077 HC SURGERY LEVEL 4, PER MIN: Performed by: OBSTETRICS & GYNECOLOGY

## 2021-10-20 PROCEDURE — 250N000009 HC RX 250: Performed by: ANESTHESIOLOGY

## 2021-10-20 PROCEDURE — 250N000013 HC RX MED GY IP 250 OP 250 PS 637: Performed by: OBSTETRICS & GYNECOLOGY

## 2021-10-20 PROCEDURE — 88307 TISSUE EXAM BY PATHOLOGIST: CPT | Mod: TC | Performed by: OBSTETRICS & GYNECOLOGY

## 2021-10-20 PROCEDURE — 272N000001 HC OR GENERAL SUPPLY STERILE: Performed by: OBSTETRICS & GYNECOLOGY

## 2021-10-20 PROCEDURE — 58544 LSH W/T/O UTERUS ABOVE 250 G: CPT | Performed by: OBSTETRICS & GYNECOLOGY

## 2021-10-20 PROCEDURE — 250N000009 HC RX 250: Performed by: NURSE ANESTHETIST, CERTIFIED REGISTERED

## 2021-10-20 PROCEDURE — 710N000009 HC RECOVERY PHASE 1, LEVEL 1, PER MIN: Performed by: OBSTETRICS & GYNECOLOGY

## 2021-10-20 RX ORDER — LIDOCAINE HYDROCHLORIDE 20 MG/ML
INJECTION, SOLUTION INFILTRATION; PERINEURAL PRN
Status: DISCONTINUED | OUTPATIENT
Start: 2021-10-20 | End: 2021-10-20

## 2021-10-20 RX ORDER — ACETAMINOPHEN 325 MG/1
975 TABLET ORAL ONCE
Status: DISCONTINUED | OUTPATIENT
Start: 2021-10-20 | End: 2021-10-20 | Stop reason: HOSPADM

## 2021-10-20 RX ORDER — ACETAMINOPHEN 325 MG/1
975 TABLET ORAL EVERY 6 HOURS PRN
Qty: 50 TABLET | Refills: 0 | COMMUNITY
Start: 2021-10-20 | End: 2024-05-20

## 2021-10-20 RX ORDER — ONDANSETRON 2 MG/ML
4 INJECTION INTRAMUSCULAR; INTRAVENOUS EVERY 30 MIN PRN
Status: DISCONTINUED | OUTPATIENT
Start: 2021-10-20 | End: 2021-10-20 | Stop reason: HOSPADM

## 2021-10-20 RX ORDER — ONDANSETRON 2 MG/ML
INJECTION INTRAMUSCULAR; INTRAVENOUS PRN
Status: DISCONTINUED | OUTPATIENT
Start: 2021-10-20 | End: 2021-10-20

## 2021-10-20 RX ORDER — OXYCODONE HYDROCHLORIDE 5 MG/1
5 TABLET ORAL EVERY 4 HOURS PRN
Status: DISCONTINUED | OUTPATIENT
Start: 2021-10-20 | End: 2021-10-20 | Stop reason: HOSPADM

## 2021-10-20 RX ORDER — DEXAMETHASONE SODIUM PHOSPHATE 4 MG/ML
INJECTION, SOLUTION INTRA-ARTICULAR; INTRALESIONAL; INTRAMUSCULAR; INTRAVENOUS; SOFT TISSUE PRN
Status: DISCONTINUED | OUTPATIENT
Start: 2021-10-20 | End: 2021-10-20

## 2021-10-20 RX ORDER — PROPOFOL 10 MG/ML
INJECTION, EMULSION INTRAVENOUS PRN
Status: DISCONTINUED | OUTPATIENT
Start: 2021-10-20 | End: 2021-10-20

## 2021-10-20 RX ORDER — SCOLOPAMINE TRANSDERMAL SYSTEM 1 MG/1
1 PATCH, EXTENDED RELEASE TRANSDERMAL
Status: DISCONTINUED | OUTPATIENT
Start: 2021-10-20 | End: 2021-10-20 | Stop reason: HOSPADM

## 2021-10-20 RX ORDER — SODIUM CHLORIDE, SODIUM LACTATE, POTASSIUM CHLORIDE, CALCIUM CHLORIDE 600; 310; 30; 20 MG/100ML; MG/100ML; MG/100ML; MG/100ML
INJECTION, SOLUTION INTRAVENOUS CONTINUOUS
Status: DISCONTINUED | OUTPATIENT
Start: 2021-10-20 | End: 2021-10-20 | Stop reason: HOSPADM

## 2021-10-20 RX ORDER — FENTANYL CITRATE 50 UG/ML
INJECTION, SOLUTION INTRAMUSCULAR; INTRAVENOUS PRN
Status: DISCONTINUED | OUTPATIENT
Start: 2021-10-20 | End: 2021-10-20

## 2021-10-20 RX ORDER — GLYCOPYRROLATE 0.2 MG/ML
INJECTION, SOLUTION INTRAMUSCULAR; INTRAVENOUS PRN
Status: DISCONTINUED | OUTPATIENT
Start: 2021-10-20 | End: 2021-10-20

## 2021-10-20 RX ORDER — CEFAZOLIN SODIUM 2 G/100ML
2 INJECTION, SOLUTION INTRAVENOUS SEE ADMIN INSTRUCTIONS
Status: DISCONTINUED | OUTPATIENT
Start: 2021-10-20 | End: 2021-10-20 | Stop reason: HOSPADM

## 2021-10-20 RX ORDER — PHENAZOPYRIDINE HYDROCHLORIDE 200 MG/1
200 TABLET, FILM COATED ORAL ONCE
Status: COMPLETED | OUTPATIENT
Start: 2021-10-20 | End: 2021-10-20

## 2021-10-20 RX ORDER — CEFAZOLIN SODIUM 2 G/100ML
2 INJECTION, SOLUTION INTRAVENOUS
Status: COMPLETED | OUTPATIENT
Start: 2021-10-20 | End: 2021-10-20

## 2021-10-20 RX ORDER — IBUPROFEN 800 MG/1
800 TABLET, FILM COATED ORAL EVERY 6 HOURS PRN
Qty: 30 TABLET | Refills: 0 | COMMUNITY
Start: 2021-10-20 | End: 2023-01-20

## 2021-10-20 RX ORDER — SODIUM CHLORIDE, SODIUM LACTATE, POTASSIUM CHLORIDE, CALCIUM CHLORIDE 600; 310; 30; 20 MG/100ML; MG/100ML; MG/100ML; MG/100ML
INJECTION, SOLUTION INTRAVENOUS CONTINUOUS PRN
Status: DISCONTINUED | OUTPATIENT
Start: 2021-10-20 | End: 2021-10-20

## 2021-10-20 RX ORDER — NEOSTIGMINE METHYLSULFATE 1 MG/ML
VIAL (ML) INJECTION PRN
Status: DISCONTINUED | OUTPATIENT
Start: 2021-10-20 | End: 2021-10-20

## 2021-10-20 RX ORDER — PROPOFOL 10 MG/ML
INJECTION, EMULSION INTRAVENOUS CONTINUOUS PRN
Status: DISCONTINUED | OUTPATIENT
Start: 2021-10-20 | End: 2021-10-20

## 2021-10-20 RX ORDER — KETOROLAC TROMETHAMINE 30 MG/ML
INJECTION, SOLUTION INTRAMUSCULAR; INTRAVENOUS PRN
Status: DISCONTINUED | OUTPATIENT
Start: 2021-10-20 | End: 2021-10-20

## 2021-10-20 RX ORDER — IBUPROFEN 400 MG/1
800 TABLET, FILM COATED ORAL ONCE
Status: DISCONTINUED | OUTPATIENT
Start: 2021-10-20 | End: 2021-10-20 | Stop reason: HOSPADM

## 2021-10-20 RX ORDER — HYDROMORPHONE HCL IN WATER/PF 6 MG/30 ML
0.2 PATIENT CONTROLLED ANALGESIA SYRINGE INTRAVENOUS EVERY 5 MIN PRN
Status: DISCONTINUED | OUTPATIENT
Start: 2021-10-20 | End: 2021-10-20 | Stop reason: HOSPADM

## 2021-10-20 RX ORDER — ONDANSETRON 4 MG/1
4 TABLET, ORALLY DISINTEGRATING ORAL EVERY 30 MIN PRN
Status: DISCONTINUED | OUTPATIENT
Start: 2021-10-20 | End: 2021-10-20 | Stop reason: HOSPADM

## 2021-10-20 RX ORDER — FENTANYL CITRATE 0.05 MG/ML
25 INJECTION, SOLUTION INTRAMUSCULAR; INTRAVENOUS EVERY 5 MIN PRN
Status: DISCONTINUED | OUTPATIENT
Start: 2021-10-20 | End: 2021-10-20 | Stop reason: HOSPADM

## 2021-10-20 RX ORDER — ACETAMINOPHEN 325 MG/1
975 TABLET ORAL ONCE
Status: COMPLETED | OUTPATIENT
Start: 2021-10-20 | End: 2021-10-20

## 2021-10-20 RX ORDER — OXYCODONE HYDROCHLORIDE 5 MG/1
5-10 TABLET ORAL EVERY 4 HOURS PRN
Qty: 12 TABLET | Refills: 0 | Status: SHIPPED | OUTPATIENT
Start: 2021-10-20 | End: 2021-12-09

## 2021-10-20 RX ADMIN — PROPOFOL 100 MCG/KG/MIN: 10 INJECTION, EMULSION INTRAVENOUS at 09:01

## 2021-10-20 RX ADMIN — FENTANYL CITRATE 50 MCG: 50 INJECTION, SOLUTION INTRAMUSCULAR; INTRAVENOUS at 09:02

## 2021-10-20 RX ADMIN — KETOROLAC TROMETHAMINE 30 MG: 30 INJECTION, SOLUTION INTRAMUSCULAR at 10:49

## 2021-10-20 RX ADMIN — OXYCODONE HYDROCHLORIDE 5 MG: 5 TABLET ORAL at 12:08

## 2021-10-20 RX ADMIN — SCOPALAMINE 1 PATCH: 1 PATCH, EXTENDED RELEASE TRANSDERMAL at 07:41

## 2021-10-20 RX ADMIN — CEFAZOLIN SODIUM 2 G: 2 INJECTION, SOLUTION INTRAVENOUS at 08:55

## 2021-10-20 RX ADMIN — FENTANYL CITRATE 25 MCG: 50 INJECTION, SOLUTION INTRAMUSCULAR; INTRAVENOUS at 11:30

## 2021-10-20 RX ADMIN — LIDOCAINE HYDROCHLORIDE 100 MG: 20 INJECTION, SOLUTION INFILTRATION; PERINEURAL at 09:02

## 2021-10-20 RX ADMIN — FENTANYL CITRATE 25 MCG: 50 INJECTION, SOLUTION INTRAMUSCULAR; INTRAVENOUS at 11:20

## 2021-10-20 RX ADMIN — DEXAMETHASONE SODIUM PHOSPHATE 4 MG: 4 INJECTION, SOLUTION INTRA-ARTICULAR; INTRALESIONAL; INTRAMUSCULAR; INTRAVENOUS; SOFT TISSUE at 09:08

## 2021-10-20 RX ADMIN — ROCURONIUM BROMIDE 10 MG: 50 INJECTION, SOLUTION INTRAVENOUS at 10:05

## 2021-10-20 RX ADMIN — HYDROMORPHONE HYDROCHLORIDE 0.5 MG: 1 INJECTION, SOLUTION INTRAMUSCULAR; INTRAVENOUS; SUBCUTANEOUS at 11:00

## 2021-10-20 RX ADMIN — SODIUM CHLORIDE, POTASSIUM CHLORIDE, SODIUM LACTATE AND CALCIUM CHLORIDE: 600; 310; 30; 20 INJECTION, SOLUTION INTRAVENOUS at 08:55

## 2021-10-20 RX ADMIN — FENTANYL CITRATE 25 MCG: 50 INJECTION, SOLUTION INTRAMUSCULAR; INTRAVENOUS at 12:14

## 2021-10-20 RX ADMIN — ROCURONIUM BROMIDE 40 MG: 50 INJECTION, SOLUTION INTRAVENOUS at 09:02

## 2021-10-20 RX ADMIN — PHENAZOPYRIDINE HYDROCHLORIDE 200 MG: 200 TABLET ORAL at 07:39

## 2021-10-20 RX ADMIN — ONDANSETRON 4 MG: 2 INJECTION INTRAMUSCULAR; INTRAVENOUS at 10:36

## 2021-10-20 RX ADMIN — FENTANYL CITRATE 50 MCG: 50 INJECTION, SOLUTION INTRAMUSCULAR; INTRAVENOUS at 08:59

## 2021-10-20 RX ADMIN — NEOSTIGMINE METHYLSULFATE 5 MG: 1 INJECTION, SOLUTION INTRAVENOUS at 10:50

## 2021-10-20 RX ADMIN — GLYCOPYRROLATE 0.8 MG: 0.2 INJECTION, SOLUTION INTRAMUSCULAR; INTRAVENOUS at 10:50

## 2021-10-20 RX ADMIN — ACETAMINOPHEN 975 MG: 325 TABLET, FILM COATED ORAL at 07:39

## 2021-10-20 RX ADMIN — PROPOFOL 180 MG: 10 INJECTION, EMULSION INTRAVENOUS at 09:02

## 2021-10-20 RX ADMIN — MIDAZOLAM 2 MG: 1 INJECTION INTRAMUSCULAR; INTRAVENOUS at 08:55

## 2021-10-20 ASSESSMENT — MIFFLIN-ST. JEOR: SCORE: 1405.71

## 2021-10-20 NOTE — ANESTHESIA CARE TRANSFER NOTE
Patient: Cortney Willoughby    Procedure: Procedure(s):  laparascopic supracervical hysterectomy  bilateral salpingectomy  diagnostic cystoscopy       Diagnosis: Submucous and subserous leiomyoma of uterus [D25.0, D25.2]  Diagnosis Additional Information: No value filed.    Anesthesia Type:   General     Note:    Oropharynx: oropharynx clear of all foreign objects  Level of Consciousness: awake  Oxygen Supplementation: face mask    Independent Airway: airway patency satisfactory and stable    Vital Signs Stable: post-procedure vital signs reviewed and stable  Report to RN Given: handoff report given  Patient transferred to: PACU    Handoff Report: Identifed the Patient, Identified the Reponsible Provider, Reviewed the pertinent medical history, Discussed the surgical course, Reviewed Intra-OP anesthesia mangement and issues during anesthesia, Set expectations for post-procedure period and Allowed opportunity for questions and acknowledgement of understanding      Vitals:  Vitals Value Taken Time   /68    Temp     Pulse 106 10/20/21 1105   Resp 10 10/20/21 1105   SpO2 99 % 10/20/21 1105   Vitals shown include unvalidated device data.    Electronically Signed By: KEYSHA Larose CRNA  October 20, 2021  11:05 AM

## 2021-10-20 NOTE — ANESTHESIA POSTPROCEDURE EVALUATION
Patient: Cortney Willoughby    Procedure: Procedure(s):  laparascopic supracervical hysterectomy  bilateral salpingectomy  diagnostic cystoscopy       Diagnosis:Submucous and subserous leiomyoma of uterus [D25.0, D25.2]  Diagnosis Additional Information: No value filed.    Anesthesia Type:  General    Note:  Disposition: Outpatient   Postop Pain Control: Uneventful            Sign Out: Well controlled pain   PONV: No   Neuro/Psych: Uneventful            Sign Out: Acceptable/Baseline neuro status   Airway/Respiratory: Uneventful            Sign Out: Acceptable/Baseline resp. status   CV/Hemodynamics: Uneventful            Sign Out: Acceptable CV status   Other NRE: NONE   DID A NON-ROUTINE EVENT OCCUR? No           Last vitals:  Vitals Value Taken Time   /71 10/20/21 1215   Temp 36.3  C (97.4  F) 10/20/21 1130   Pulse 74 10/20/21 1216   Resp 16 10/20/21 1214   SpO2 99 % 10/20/21 1216   Vitals shown include unvalidated device data.    Electronically Signed By: Jame Jones MD  October 20, 2021  12:17 PM

## 2021-10-20 NOTE — ANESTHESIA PROCEDURE NOTES
Airway       Patient location during procedure: OR       Procedure Start/Stop Times: 10/20/2021 9:04 AM  Staff -        CRNA: Reynold Ash APRN CRNA       Performed By: CRNA  Consent for Airway        Urgency: elective  Indications and Patient Condition       Indications for airway management: jonas-procedural       Induction type:intravenous       Mask difficulty assessment: 1 - vent by mask    Final Airway Details       Final airway type: endotracheal airway       Successful airway: ETT - single  Endotracheal Airway Details        ETT size (mm): 7.0       Cuffed: yes       Successful intubation technique: direct laryngoscopy       DL Blade Type: MAC 3       Grade View of Cords: 1       Adjucts: stylet       Position: Right       Bite block used: None    Post intubation assessment        Placement verified by: capnometry, equal breath sounds and chest rise        Number of attempts at approach: 1       Secured with: pink tape       Ease of procedure: easy       Dentition: Intact and Unchanged

## 2021-10-20 NOTE — BRIEF OP NOTE
[facility]    Brief Operative Note    Pre-operative diagnosis: Submucous and subserous leiomyoma of uterus [D25.0, D25.2]  Post-operative diagnosis uterine fibroid    Procedure: Procedure(s):  laparascopic supracervical hysterectomy  bilateral salpingectomy  diagnostic cystoscopy  Surgeon: Surgeon(s) and Role:     * Tiffanie Oliveira MD - Primary     * Katherine Sparrow MD - Assisting  Anesthesia: General   Estimated Blood Loss: 20mL    Drains: None  Specimens:   ID Type Source Tests Collected by Time Destination   1 : uterus, bilateral fallopian tubes Tissue Uterus, Bilateral Fallopian Tubes SURGICAL PATHOLOGY EXAM Tiffanie Oliveira MD 10/20/2021 10:39 AM      Findings:   Enlarged globular myomatous uterus with bulk of fibroid in right fundus.  Smaller subserosal 4cm fibroid noted from right lower uterine segment.  Normal bilateral fallopian tubes and right ovary.  2-3cm simple left ovarian cyst noted with otherwise normal ovary.  Normal appendix..  Complications: None.  Implants: * No implants in log *

## 2021-10-20 NOTE — OP NOTE
Procedure Date: 10/20/2021    PREOPERATIVE DIAGNOSES:  1.  Intramural and subserosal fibroids.  2.  Menorrhagia.  3.  Dysmenorrhea.    POSTOPERATIVE DIAGNOSES:    1.  Intramural and subserosal fibroids.  2.  Menorrhagia.  3.  Dysmenorrhea.    PROCEDURES:    1.  Laparoscopic-assisted supracervical hysterectomy with bilateral salpingectomy.  2.  Diagnostic cystoscopy.    SURGEON:  Tiffanie Oliveira MD    ASSISTANT:  Dr. Katherine Sparrow's assistance was needed for visualization and retraction, given the complicated nature of this case.    ANESTHESIA:  General.    COMPLICATIONS:  None.    ESTIMATED BLOOD LOSS:  20 mL.    FINDINGS:  Enlarged globular uterus with majority of the fibroid noted at the right uterine fundus.  Uterus approximately 12-week size with a width of approximately 9 cm.  A smaller, approximately 4 cm, subserosal fibroid noted in the right lower uterine segment.  Normal-appearing uterus, tubes, and bilateral ovaries.  Simple left ovarian cyst.    INDICATIONS:  Cortney is a 41-year-old woman who was seen in our office in consultation by Dr. Burnham for newly diagnosed fibroids based on July ultrasound.  Ultrasound was done due to gradually worsening heavy periods, as well as dysmenorrhea.  Cortney is not interested in preserving fertility and opted for definitive surgical management with supracervical hysterectomy and bilateral salpingectomy.  Cortney has no history of abnormal Pap smears and is up to date with last Pap smear done in 06/2020.  Risks, benefits and alternatives were discussed in detail.    DESCRIPTION OF PROCEDURE:  Cortney was taken to the operating room where general anesthesia was administered without difficulty.  She was then prepared and draped in normal sterile fashion in the dorsal lithotomy position.  Timeout was performed to identify correct patient and procedure.  Open-sided speculum was placed into the vagina and anterior lip of the cervix was grasped using a  single-tooth tenaculum.  Dilute Marcaine with epinephrine, as well as dilute vasopressin, was injected at 12, 4, and 8 o'clock for a total of 10 mL of each.  Hulka uterine manipulator was then placed, as well as Hernandez catheter.  I then turned my attention to the abdomen where the infraumbilical incision was injected with local anesthetic.  A 1.5 cm incision was made using the scalpel and using an open technique, we were able to work through fascia and peritoneum for open entry without complication.  Due to a delay in our usual port, a 10 mm trocar was placed and the abdomen was insufflated with an opening pressure of 4 mmHg.  Upper abdomen was inspected and found to be within normal limits.  Trendelenburg position was then assumed with findings as noted above.  5 mm bilateral lower quadrant ports were then placed after local anesthetic under direct visualization.  The uterus was then elevated out of the pelvis with some difficulty due to heaviness of the uterus at the fundus.  The uterus was retracted to the patient's right, and the left fimbriated end of the fallopian tube was elevated.  Mesosalpinx was cauterized and cut to the level of the cornua.  Utero-ovarian ligament was then doubly cauterized and cut.  Round ligament was doubly cauterized and cut.  Anterior leaf was opened to the level of the bladder flap.  Bladder flap was undermined easily as I dissected to the patient's right side.  The posterior leaf of the left broad ligament was also skeletonized to the level of the cardinal ligament and uterine artery.  Uterine artery was doubly cauterized.     At this time, attention was turned to the patient's right side.  Uterus was retracted to the left and the right fallopian tube was undermined at the level of the mesosalpinx to the cornua.  Right fallopian tube was transected and removed through our accessory port to allow better visualization.  Right utero-ovarian ligament and then round ligament were doubly  cauterized and cut.  Anterior leaf was opened to the level of the previously dissected bladder flap created.  Posterior sheath was then also skeletonized to the level of the uterine artery.  Uterine artery was doubly cauterized and cut with excellent hemostasis obtained.  One small incision was made at the intended site for uterine amputation at the level of the cervical uterine isthmus.  I then came back to the left side where the uterine manipulator and single-tooth tenaculum were removed.  Uterus was then amputated using the drill feature of the Thunderbeat handpiece.  The uterus was easily transected and placed into the right upper quadrant.  Remaining cervical canal was cauterized and pedicles were all inspected and found to be dry.  At this time, the 10 mm infraumbilical trocar was removed and our PneumoLiner port was placed without difficulty.  The PneumoLiner bag was then inserted and deployed at the level of the cervix.  The bag was gently opened to allow placement of our amputated uterus.  Uterus was placed into the PneumoLiner bag.  The bag was brought to the umbilical port and opened.  Insufflation was reintroduced with uterus now contained within the bag.  The camera was placed as well as the Fermín morcellator and uterus was removed in piecemeal fashion without any injury to the bag.  Upon completion of morcellation, the bag was removed and found to be intact.    One final look was used in the pelvis with excellent hemostasis noted.  Ports were then removed without complication.  Fascia was reapproximated using an 0 Vicryl on a UR needle.  Skin incisions were reapproximated with 4-0 Monocryl in subcuticular stitches.  Bandages were placed.  Procedure was completed using this as diagnostic cystoscopy.  The Hernandez catheter was removed and cystoscope introduced into the bladder with orange urine noted from previously taken Pyridium.  Bladder dome and sidewalls were inspected and found to be within normal  limits.  Bilateral ureteral orifices were then identified and strong jets of Pyridium-stained urine were noted from both.  Procedure was complete and Hernandez catheter was replaced.  Cortney was taken to the recovery room in stable condition.    Tiffanie Oliveira MD        D: 10/20/2021   T: 10/20/2021   MT: SYDNEY    Name:     CORTNEY LUGO  MRN:      [id number]        Account:        [id number]   :      1980           Procedure Date: 10/20/2021     Document: [id number]    cc:  Tiffanie Oliveira MD

## 2021-10-20 NOTE — DISCHARGE INSTRUCTIONS
Today you received Toradol, an antiinflammatory medication similar to Ibuprofen.  You should not take other antiinflammatory medication, such as Ibuprofen, Motrin, Advil, Aleve, Naprosyn, etc until 4:45 pm today.       Today you were given 975 mg of Tylenol at 0745. The recommended daily maximum dose is 4000 mg.     While you were at the hospital today you were given a medication called Pyridium.  This is used to treat pain, burning, increased urination, and increased urge to urinate.  Pyridium will most likely darken the color of your urine to an orange or red color. Darkened urine may also cause stains to your underwear, which may or may not be removed by laundering.      Discharge Instructions for Laparoscopic or Davinci Hysterectomy    Incisional Care  A small amount of drainage from your incisions is normal. You may wear Band Aids until the drainage stops. The day after surgery, if your incisions are dry, remove the Band Aids. Leave the Steri-Strips (small pieces of tape) in place. Let them fall off on their own, or gently remove them after 7 days.  Once your have removed your Band Aids, you may shower as usual. Wash your incisions with mild soap and water. Pat dry.  Don't use oils, powders, or lotions on your incisions.  General Care  Take your medications exactly as directed by your doctor.    You may have vaginal bleeding that can last for several weeks. Use pads only. Don't put anything in your vagina (tampons, douches or have sexual intercourse) until your doctor says it's safe to do so.  Avoid constipation.  Eat fruits, vegetables, and whole grains.  Drink 6-8 glasses of water a day, unless told to do otherwise.  Use a laxative or a mild stool softener if your doctor says it's okay.  Activity  Don't drive while you are still taking narcotic pain medications.  Don t do strenuous activities until the doctor says it's okay.  Walk as often as you feel able.    Pain  Your incisions may be tender or sore. You  may also have pain in your upper back or shoulders. This is from the gas used to enlarge your abdomen to allow your doctor to see inside your pelvis and perform the procedure. This pain usually goes away in a day or two.   When to Call Your Doctor  Call your doctor right away if you have any of the following:  Fever above 100.4 F (38 C) or chills  Vaginal bleeding that soaks more than one sanitary pad per hour  A foul smelling discharge from the vagina  Difficulty urinating  Severe pain   Redness, swelling, or drainage at your incision sites  Follow-Up  Make a follow-up appointment as directed by your surgeon.    Cystoscopy Discharge Instructions      Diet:    Return to the diet that you were on before the procedure, unless you are given specific diet instructions.    It is important to drink 6-8 glasses of fluids per day at home - at least 3-4 glasses should be water.    Activity:    Walk short distances and increase as your strength allows.    You may climb stairs.    Do not do strenuous exercise or heavy lifting until approved by surgeon.    Do not drive while taking narcotic pain medications.    Bathing:    You may take a shower.    Call your physician if these signs/symptoms are present:    Pain that is not relieved by a short rest or ordered pain medications.    Temperature at or above 101.0 F or chills.    Inability or difficulty urinating.  Excessive blood in urine.    Any questions or concerns.                 Same Day Surgery Discharge Instructions for  Sedation and General Anesthesia       It's not unusual to feel dizzy, light-headed or faint for up to 24 hours after surgery or while taking pain medication.  If you have these symptoms: sit for a few minutes before standing and have someone assist you when you get up to walk or use the bathroom.      You should rest and relax for the next 24 hours. We recommend you make arrangements to have an adult stay with you for at least 24 hours after your discharge.   Avoid hazardous and strenuous activity.      DO NOT DRIVE any vehicle or operate mechanical equipment for 24 hours following the end of your surgery.  Even though you may feel normal, your reactions may be affected by the medication you have received.      Do not drink alcoholic beverages for 24 hours following surgery.       Slowly progress to your regular diet as you feel able. It's not unusual to feel nauseated and/or vomit after receiving anesthesia.  If you develop these symptoms, drink clear liquids (apple juice, ginger ale, broth, 7-up, etc. ) until you feel better.  If your nausea and vomiting persists for 24 hours, please notify your surgeon.        All narcotic pain medications, along with inactivity and anesthesia, can cause constipation. Drinking plenty of liquids and increasing fiber intake will help.      For any questions of a medical nature, call your surgeon.      Do not make important decisions for 24 hours.      If you had general anesthesia, you may have a sore throat for a couple of days related to the breathing tube used during surgery.  You may use Cepacol lozenges to help with this discomfort.  If it worsens or if you develop a fever, contact your surgeon.       If you feel your pain is not well managed with the pain medications prescribed by your surgeon, please contact your surgeon's office to let them know so they can address your concerns.       CoVid 19 Information    We want to give you information regarding Covid. Please consult your primary care provider with any questions you might have.     Patient who have symptoms (cough, fever, or shortness of breath), need to isolate for 7 days from when symptoms started OR 72 hours after fever resolves (without fever reducing medications) AND improvement of respiratory symptoms (whichever is longer).      Isolate yourself at home (in own room/own bathroom if possible)    Do Not allow any visitors    Do Not go to work or school    Do Not go to  Spiritism,  centers, shopping, or other public places.    Do Not shake hands.    Avoid close and intimate contact with others (hugging, kissing).    Follow CDC recommendations for household cleaning of frequently touched services.     After the initial 7 days, continue to isolate yourself from household members as much as possible. To continue decrease the risk of community spread and exposure, you and any members of your household should limit activities in public for 14 days after starting home isolation.     You can reference the following CDC link for helpful home isolation/care tips:  https://www.cdc.gov/coronavirus/2019-ncov/downloads/10Things.pdf    Protect Others:    Cover Your Mouth and Nose with a mask, disposable tissue or wash cloth to avoid spreading germs to others.    Wash your hands and face frequently with soap and water    Call Your Primary Doctor If: Breathing difficulty develops or you become worse.    For more information about COVID19 and options for caring for yourself at home, please visit the CDC website at https://www.cdc.gov/coronavirus/2019-ncov/about/steps-when-sick.html  For more options for care at [facility], please visit our website at [email or web address]        Information for Patients Discharging with a Transderm Scopolamine Patch       Dry mouth is a common side effect.    Drowsiness is another common side effect especially when combined with pain medication.  Please avoid activities that require mental alertness such as driving a car or making important legal decisions.    Since Scopolamine can cause temporary dilation of the pupils and blurred vision if it comes in contact with the eyes; be sure to wash your hands thoroughly with soap and water immediately after handling the patch.   When you remove your patch, please stick it to a tissue or paper towel for disposal.      Remove the patch immediately and contact a physician in the  unlikely event that you experience symptoms of acute glaucoma (pain and reddening of the eyes, accompanied by dilated pupils).    Remove the patch if you develop any difficulties urinating.  If you cannot urinate after removing your patch, please notify your surgeon.    Remove the patch 24 hours after surgery.          ** If you have questions or concerns about your procedure,   call Dr. Oliveira at [phone] **

## 2021-10-21 LAB
PATH REPORT.COMMENTS IMP SPEC: NORMAL
PATH REPORT.COMMENTS IMP SPEC: NORMAL
PATH REPORT.FINAL DX SPEC: NORMAL
PATH REPORT.GROSS SPEC: NORMAL
PATH REPORT.MICROSCOPIC SPEC OTHER STN: NORMAL
PATH REPORT.RELEVANT HX SPEC: NORMAL
PHOTO IMAGE: NORMAL

## 2021-10-21 PROCEDURE — 88307 TISSUE EXAM BY PATHOLOGIST: CPT | Mod: 26 | Performed by: PATHOLOGY

## 2021-12-09 ENCOUNTER — OFFICE VISIT (OUTPATIENT)
Dept: OBGYN | Facility: CLINIC | Age: 41
End: 2021-12-09
Payer: COMMERCIAL

## 2021-12-09 VITALS — WEIGHT: 160 LBS | BODY MASS INDEX: 27.46 KG/M2 | SYSTOLIC BLOOD PRESSURE: 116 MMHG | DIASTOLIC BLOOD PRESSURE: 68 MMHG

## 2021-12-09 DIAGNOSIS — D25.2 SUBMUCOUS AND SUBSEROUS LEIOMYOMA OF UTERUS: ICD-10-CM

## 2021-12-09 DIAGNOSIS — D25.0 SUBMUCOUS AND SUBSEROUS LEIOMYOMA OF UTERUS: ICD-10-CM

## 2021-12-09 DIAGNOSIS — Z48.89 POSTOPERATIVE VISIT: Primary | ICD-10-CM

## 2021-12-09 PROCEDURE — 99024 POSTOP FOLLOW-UP VISIT: CPT | Performed by: OBSTETRICS & GYNECOLOGY

## 2021-12-09 NOTE — PROGRESS NOTES
SUBJECTIVE:                                                   Cortney Willoughby is a 41 year old female who presents to clinic today for the following health issue(s):  Patient presents with:  Post-op Visit: 10/20 laparascopic supracervical hysterectomy      HPI:  Here today for postoperative visit --s/p LASH and bilateral salpingectomy with me on 10/20 for uterine fibroids and heavy menses.  Recovery has gone well.  Was back to most of her normal activities by the next week.  No pain issues.  Incisions healed well.  Did have 2d with very light spotting last week which is when she likely would have had a period.  Still so incredibly much better than with her periods.  +SA --no issues.  No bowel/bladder issues  Has been noticing some tailbone discomfort and is wondering if it is surgery related?  Inconsistent and intermittent.  No triggers    So happy she did the surgery.  Feels like her mind is clearer and just generally feels much better    No LMP recorded..     Patient is sexually active, .  Using Hysterectomy for contraception.    reports that she has never smoked. She has never used smokeless tobacco.    STD testing offered?  Declined    Health maintenance updated:  yes    Today's PHQ-2 Score:   PHQ-2 (  [employer]) 2021   Q1: Little interest or pleasure in doing things 0   Q2: Feeling down, depressed or hopeless 0   PHQ-2 Score 0   PHQ-2 Total Score (12-17 Years)- Positive if 3 or more points; Administer PHQ-A if positive 0   Q1: Little interest or pleasure in doing things -   Q2: Feeling down, depressed or hopeless -   PHQ-2 Score -     Problem list and histories reviewed & adjusted, as indicated.  Additional history: as documented.    Patient Active Problem List   Diagnosis     Insomnia, unspecified insomnia     Encounter for surveillance of contraceptive pills     Family history of malignant neoplasm of breast     Elevated cortisol level     Hypothyroidism, unspecified type     Mixed  hyperlipidemia     Other adrenocortical overactivity (H)     Submucous and subserous leiomyoma of uterus     Past Surgical History:   Procedure Laterality Date     COSMETIC SURGERY  June 24 2018    Liposuction     CYSTOSCOPY N/A 10/20/2021    Procedure: diagnostic cystoscopy;  Surgeon: Tiffanie Oliveira MD;  Location: [facility] OR     LAPAROSCOPIC HYSTERECTOMY SUPRACERVICAL N/A 10/20/2021    Procedure: laparascopic supracervical hysterectomy;  Surgeon: Tiffanie Oliveira MD;  Location: [facility] OR     LAPAROSCOPIC SALPINGECTOMY Bilateral 10/20/2021    Procedure: bilateral salpingectomy;  Surgeon: Tiffanie Oliveira MD;  Location: [facility] OR     LIPOSUCTION (LOCATION)        Social History     Tobacco Use     Smoking status: Never Smoker     Smokeless tobacco: Never Used   Substance Use Topics     Alcohol use: Yes     Comment: 2 drinks per week      Problem (# of Occurrences) Relation (Name,Age of Onset)    Breast Cancer (5) Mother (Lynsey griffith): 44, negative for gene; was HER-2 positive, Maternal Grandmother (Mery Griffith): Kidney cancer, Maternal Aunt, Other (Dustin Haq): Breast Cancer, Maternal Great-Grandmother    Cancer (1) Paternal Grandmother    Depression (3) Maternal Uncle, Other (Lior Griffith): PTSD maternal uncle, Other (Rolf Swensonerson): Maternal Uncle    Diabetes (2) Maternal Grandmother (Mery Griffith), Paternal Grandmother    Esophageal Cancer (1) Paternal Grandfather (Vinh Willoughby)    Hyperlipidemia (1) Paternal Grandfather (Vinh Long)    Hypertension (1) Paternal Grandfather (Vinh Willoughby)    Kidney Cancer (1) Maternal Grandmother (Mery Griffith)    Lipids (1) Paternal Grandfather (Vinh Long)    No Known Problems (2) Sister (half), Brother (half)    Other Cancer (2) Maternal Grandmother (Mery Griffith): Kidney cancer, Paternal Grandfather (Vinh Long): Esophageal cancer    Prostate Cancer (1) Maternal Grandfather ([place])    Substance Abuse (1) Father (Jovany Willoughby): Alcohol and  "drugs    Thyroid Disease (2) Maternal Grandmother (Mery Arndt): On medications, Paternal Grandmother            Current Outpatient Medications   Medication Sig     acetaminophen (TYLENOL) 325 MG tablet Take 3 tablets (975 mg) by mouth every 6 hours as needed for mild pain     coenzyme Q-10 200 MG CAPS capsule Take 200 mg by mouth every other day     DHEA 10 MG CAPS Take 1 capsule by mouth daily     Digestive Enzymes CAPS Take 1 capsule by mouth 2 times daily     ferrous fumarate 65 mg, [place]. FE,-Vitamin C 125 mg (VITRON C)  MG TABS tablet Take 1 tablet by mouth every other day     fluconazole (DIFLUCAN) 200 MG tablet Take 200 mg by mouth once a week (Sundays)     ibuprofen (ADVIL/MOTRIN) 800 MG tablet Take 1 tablet (800 mg) by mouth every 6 hours as needed for other (mild and/or inflammatory pain)     levothyroxine (SYNTHROID/LEVOTHROID) 100 MCG tablet Take 1 tablet (100 mcg) by mouth daily     liothyronine (CYTOMEL) 50 MCG tablet Take 50 mcg by mouth daily     MAGNESIUM PO Take 4 capsules by mouth every evening     Multiple Vitamin (DAILY-VITAMIN PO) Take 1 tablet by mouth daily     Omega-3 Fatty Acids (FISH OIL) 1200 MG capsule Take 2 capsules by mouth 2 times daily     OVER-THE-COUNTER Take 1 capsule by mouth daily \"EstroDIM\"     Pregnenolone Micronized (PREGNENOLONE PO) Take 1 capsule by mouth daily     Probiotic Product (PROBIOTIC PO) Take 1 tablet by mouth daily     traZODone (DESYREL) 50 MG tablet TAKE 1 TABLET(50 MG) BY MOUTH EVERY NIGHT AS NEEDED FOR SLEEP     vitamin C (ASCORBIC ACID) 1000 MG TABS Take 1,000 mg by mouth daily     Vitamin D3 (CHOLECALCIFEROL) 125 MCG (5000 UT) tablet Take 1 tablet by mouth every other day     No current facility-administered medications for this visit.     Allergies   Allergen Reactions     No Known Drug Allergies        ROS:  12 point review of systems negative other than symptoms noted below or in the HPI.  No urinary frequency or dysuria, bladder or kidney " problems      OBJECTIVE:     /68   Wt 72.6 kg (160 lb)   Breastfeeding No   BMI 27.46 kg/m    Body mass index is 27.46 kg/m .    Exam:  Constitutional:  Appearance: Well nourished, well developed alert, in no acute distress  Gastrointestinal:  Abdominal Examination:  Abdomen nontender to palpation, tone normal without rigidity or guarding, no masses present, umbilicus without lesions; Liver/Spleen:  No hepatomegaly present, liver nontender to palpation; Hernias:  No hernias present  Lymphatic: Lymph Nodes:  No other lymphadenopathy present  Skin: General Inspection:  No rashes present, no lesions present, no areas of discoloration.  INCISIONS X 3 WELL HEALED  Neurologic:  Mental Status:  Oriented X3.  Normal strength and tone, sensory exam grossly normal, mentation intact and speech normal.    Psychiatric:  Mentation appears normal and affect normal/bright.  Pelvic Exam:  External Genitalia:     Normal appearance for age, no discharge present, no tenderness present, no inflammatory lesions present, color normal  Vagina:     Normal vaginal vault without central or paravaginal defects, no discharge present, no inflammatory lesions present, no masses present  Bladder:     Nontender to palpation  Urethra:   Urethral Body:  Urethra palpation normal, urethra structural support normal   Urethral Meatus:  No erythema or lesions present  Cervix:     Appearance healthy, no lesions present, nontender to palpation, no bleeding present  Uterus:     Surgically absent  Adnexa:     No adnexal tenderness present, no adnexal masses present  Perineum:     Perineum within normal limits, no evidence of trauma, no rashes or skin lesions present  Anus:     Anus within normal limits, no hemorrhoids present  Inguinal Lymph Nodes:     No lymphadenopathy present  Pubic Hair:     Normal pubic hair distribution for age  Genitalia and Groin:     No rashes present, no lesions present, no areas of discoloration, no masses present        In-Clinic Test Results:  No results found for this or any previous visit (from the past 24 hour(s)).    ASSESSMENT/PLAN:                                                        ICD-10-CM    1. Postoperative visit  Z48.89    2. Submucous and subserous leiomyoma of uterus  D25.0     D25.2        Patient Instructions   May resume all normal activities.   Return for yearly exam when due.      Tiffanie Oliveira MD  [facility] [place]

## 2022-02-15 ENCOUNTER — ANCILLARY PROCEDURE (OUTPATIENT)
Dept: GENERAL RADIOLOGY | Facility: CLINIC | Age: 42
End: 2022-02-15
Attending: FAMILY MEDICINE
Payer: COMMERCIAL

## 2022-02-15 ENCOUNTER — OFFICE VISIT (OUTPATIENT)
Dept: FAMILY MEDICINE | Facility: CLINIC | Age: 42
End: 2022-02-15
Payer: COMMERCIAL

## 2022-02-15 VITALS
DIASTOLIC BLOOD PRESSURE: 80 MMHG | TEMPERATURE: 98.1 F | HEART RATE: 88 BPM | BODY MASS INDEX: 26.98 KG/M2 | RESPIRATION RATE: 16 BRPM | SYSTOLIC BLOOD PRESSURE: 124 MMHG | WEIGHT: 158 LBS | HEIGHT: 64 IN | OXYGEN SATURATION: 96 %

## 2022-02-15 DIAGNOSIS — M53.3 PAIN IN THE COCCYX: Primary | ICD-10-CM

## 2022-02-15 DIAGNOSIS — M99.04 SOMATIC DYSFUNCTION OF SPINE, SACRAL: ICD-10-CM

## 2022-02-15 DIAGNOSIS — M53.3 PAIN IN THE COCCYX: ICD-10-CM

## 2022-02-15 PROCEDURE — 98925 OSTEOPATH MANJ 1-2 REGIONS: CPT | Performed by: FAMILY MEDICINE

## 2022-02-15 PROCEDURE — 72220 X-RAY EXAM SACRUM TAILBONE: CPT | Performed by: RADIOLOGY

## 2022-02-15 PROCEDURE — 99213 OFFICE O/P EST LOW 20 MIN: CPT | Mod: 25 | Performed by: FAMILY MEDICINE

## 2022-02-15 ASSESSMENT — MIFFLIN-ST. JEOR: SCORE: 1376.93

## 2022-02-15 NOTE — PROGRESS NOTES
"  Assessment & Plan     Pain in the coccyx  - XR SACRUM AND COCCYX 2 VW; Future  Point tenderness over distal sacrum/coccyx. Minimal improvement with OMT. Will check XR.    Somatic dysfunction of spine, sacral  Given that this has been interfering with the patient's quality of life and ADLs, after discussion, informed consent, and medical assessment for safety, we have together decided to address this concern with Osteopathic Manipulative Treatment.    Please see OMT Procedure Note below for the specifics of treatment.    No follow-ups on file.    Inge Brody DO  [facility] PRINCESS Barr is a 41 year old who presents for the following health issues:    HPI     Pain since hysterectomy in Oct  Sees chiropractor weekly for scoliosis and has had some treatment to sacrum there, which has intermittently helped  Pain located in R buttock near midline, worse with movement, bad at night, intermittent sharp pain, no radiation down leg  Was treated by a  over christmas with some improvement  Had cranio-sacral massage with chiropractor on Friday, pain didn't go away but shifted  Had treatment with chiropractor yesterday, pain was worse after.   Pain is worse with sitting for long periods, makes walking/transferring painful    Review of Systems   Constitutional, HEENT, cardiovascular, pulmonary, gi and gu systems are negative, except as otherwise noted.      Objective    /80   Pulse 88   Temp 98.1  F (36.7  C) (Oral)   Resp 16   Ht 1.626 m (5' 4.02\")   Wt 71.7 kg (158 lb)   SpO2 96%   BMI 27.11 kg/m    Body mass index is 27.11 kg/m .  Physical Exam   See OMT procedure note       OMT PROCEDURE NOTE    Body Region:  L-spine, Sacrum, and Pelvis/ Innominates  Somatic Dysfunction: R/R sacral torsion, tender point over R side of coccyx  Treatment: Counterstrain and Articulation Techniques.   Outcome: Stable    The patient actively participated in OMT and was able to communicate " both positive and negative feedback throughout. OMT completed without incident. Patient tolerated treatment well. Patient reported that ROM, function, and/or pain level were improved. Advised that pain is occasionally worse during the first 24 hours after treatment and that drinking more water and taking Tylenol or Ibuprofen often help. Patient to return in 2 week/s or as needed for repeat osteopathic evaluation.       Inge Brody, DO

## 2022-03-07 ENCOUNTER — OFFICE VISIT (OUTPATIENT)
Dept: FAMILY MEDICINE | Facility: CLINIC | Age: 42
End: 2022-03-07
Payer: COMMERCIAL

## 2022-03-07 VITALS
RESPIRATION RATE: 16 BRPM | BODY MASS INDEX: 26.98 KG/M2 | OXYGEN SATURATION: 98 % | DIASTOLIC BLOOD PRESSURE: 77 MMHG | TEMPERATURE: 98.2 F | WEIGHT: 158 LBS | HEIGHT: 64 IN | SYSTOLIC BLOOD PRESSURE: 113 MMHG | HEART RATE: 79 BPM

## 2022-03-07 DIAGNOSIS — M53.3 PAIN IN THE COCCYX: Primary | ICD-10-CM

## 2022-03-07 PROCEDURE — 99213 OFFICE O/P EST LOW 20 MIN: CPT | Performed by: FAMILY MEDICINE

## 2022-03-07 NOTE — PROGRESS NOTES
"  Assessment & Plan     1. Pain in the coccyx  Sacral/coccygeal pain improved since OMT session a few weeks ago  XR was negative for fracture  Structural exam today without concerning findings  Pt to continue with chiropractic treatment and cranio-sacral massage, will return for OMT with me as needed      No follow-ups on file.    DO ILYA Melendez [facility] PRINCESS Barr is a 41 year old who presents for the following health issues:    HPI     Sacrum pain  - after last OMT session, pain improved  - has been going to chiropractor regularly  - slight pain after chiro, which improved quickly  - today not feeling any pain in sacrum    Review of Systems   Constitutional, HEENT, cardiovascular, pulmonary, gi and gu systems are negative, except as otherwise noted.      Objective    /77   Pulse 79   Temp 98.2  F (36.8  C) (Oral)   Resp 16   Ht 1.626 m (5' 4.02\")   Wt 71.7 kg (158 lb)   SpO2 98%   BMI 27.11 kg/m    Body mass index is 27.11 kg/m .  Physical Exam   GENERAL: healthy, alert and no distress  MS: no gross musculoskeletal defects noted, no edema    Osteopathic Structural Exam:  Bilateral sacral sulcus even, good movement around both oblique sacral axis  No sacral tender points  Innominates even    "

## 2022-04-11 ENCOUNTER — E-VISIT (OUTPATIENT)
Dept: FAMILY MEDICINE | Facility: CLINIC | Age: 42
End: 2022-04-11
Payer: COMMERCIAL

## 2022-04-11 DIAGNOSIS — J20.9 BRONCHITIS WITH BRONCHOSPASM: ICD-10-CM

## 2022-04-11 DIAGNOSIS — J06.9 UPPER RESPIRATORY TRACT INFECTION, UNSPECIFIED TYPE: Primary | ICD-10-CM

## 2022-04-11 PROCEDURE — 99421 OL DIG E/M SVC 5-10 MIN: CPT | Performed by: INTERNAL MEDICINE

## 2022-04-11 RX ORDER — ALBUTEROL SULFATE 90 UG/1
2 AEROSOL, METERED RESPIRATORY (INHALATION) EVERY 4 HOURS PRN
Qty: 18 G | Refills: 0 | Status: SHIPPED | OUTPATIENT
Start: 2022-04-11 | End: 2023-01-20

## 2022-04-11 NOTE — PATIENT INSTRUCTIONS
"    Deafrance Willoughby    After reviewing your responses, I've been able to diagnose you with \"Bronchitis\" which is a common infection of your lungs. While this is most commonly caused by a virus, the symptoms you have given suggest you should be treated with antibiotics.     I have sent augmentin to your pharmacy to treat this infection.     It is important that you take all of your prescribed medication even if your symptoms are improving after a few doses. Taking all of your medicine helps prevent the symptoms from returning.     If your symptoms worsen, you develop chest pain or shortness of breath, fevers over 101, or are not improving in 5 days, please contact your primary care provider for an appointment or visit any of our convenient Walk-in Care or Urgent Care Centers to be seen which can be found on our website here.    Thanks again for choosing us as your health care partner,    Zeinab Burnham MD    "

## 2022-04-11 NOTE — TELEPHONE ENCOUNTER
Provider E-Visit time total (minutes): 9 minutes    I spoke to the patient  Her partner had COVID  She has symptoms going on for few weeks  She tested negative for Covid  She tested more than 1 time  At this point she has some chest tightness and wheezing also  I prescribed Augmentin  Wanted to prescribe Z-Sergey but it has interaction with Diflucan  It appears that she has upper respiratory infection  Complicated with bronchitis and sinusitis  I have prescribed Augmentin and albuterol inhaler  Answered her questions  Drink plenty of fluids.  Take extra rest.  Use saline nasal spray.  Take Tylenol as needed for pain  If symptoms does not improve or get worse then seek medical attention.  Dr.Nasima Chacha MD

## 2022-06-16 ENCOUNTER — MYC MEDICAL ADVICE (OUTPATIENT)
Dept: FAMILY MEDICINE | Facility: CLINIC | Age: 42
End: 2022-06-16
Payer: COMMERCIAL

## 2022-06-16 DIAGNOSIS — Z12.31 VISIT FOR SCREENING MAMMOGRAM: Primary | ICD-10-CM

## 2022-06-30 ENCOUNTER — ANCILLARY PROCEDURE (OUTPATIENT)
Dept: MAMMOGRAPHY | Facility: CLINIC | Age: 42
End: 2022-06-30
Attending: INTERNAL MEDICINE
Payer: COMMERCIAL

## 2022-06-30 DIAGNOSIS — Z12.31 VISIT FOR SCREENING MAMMOGRAM: ICD-10-CM

## 2022-06-30 PROCEDURE — 77067 SCR MAMMO BI INCL CAD: CPT | Mod: TC | Performed by: RADIOLOGY

## 2022-06-30 PROCEDURE — 77063 BREAST TOMOSYNTHESIS BI: CPT | Mod: TC | Performed by: RADIOLOGY

## 2022-07-30 ENCOUNTER — HEALTH MAINTENANCE LETTER (OUTPATIENT)
Age: 42
End: 2022-07-30

## 2022-09-09 DIAGNOSIS — F51.01 PRIMARY INSOMNIA: ICD-10-CM

## 2022-09-12 RX ORDER — TRAZODONE HYDROCHLORIDE 50 MG/1
TABLET, FILM COATED ORAL
Qty: 90 TABLET | Refills: 0 | Status: SHIPPED | OUTPATIENT
Start: 2022-09-12 | End: 2022-11-22

## 2022-10-10 ENCOUNTER — HEALTH MAINTENANCE LETTER (OUTPATIENT)
Age: 42
End: 2022-10-10

## 2022-12-10 DIAGNOSIS — F51.01 PRIMARY INSOMNIA: ICD-10-CM

## 2022-12-12 RX ORDER — TRAZODONE HYDROCHLORIDE 50 MG/1
TABLET, FILM COATED ORAL
Qty: 90 TABLET | Refills: 0 | Status: SHIPPED | OUTPATIENT
Start: 2022-12-12 | End: 2023-01-20

## 2022-12-12 NOTE — TELEPHONE ENCOUNTER
Prescription approved per [facility] Refill Protocol.  Sylwia Da Silva RN

## 2023-01-19 ASSESSMENT — ENCOUNTER SYMPTOMS
DIARRHEA: 0
MYALGIAS: 0
HEADACHES: 1
HEMATOCHEZIA: 0
COUGH: 0
NAUSEA: 0
BREAST MASS: 0
PALPITATIONS: 0
NERVOUS/ANXIOUS: 1
FEVER: 0
ARTHRALGIAS: 0
HEARTBURN: 0
PARESTHESIAS: 0
CHILLS: 0
DIZZINESS: 0
CONSTIPATION: 0
HEMATURIA: 0
SORE THROAT: 0
WEAKNESS: 0
SHORTNESS OF BREATH: 0
ABDOMINAL PAIN: 0
FREQUENCY: 1
EYE PAIN: 0
DYSURIA: 0
JOINT SWELLING: 0

## 2023-01-20 ENCOUNTER — OFFICE VISIT (OUTPATIENT)
Dept: FAMILY MEDICINE | Facility: CLINIC | Age: 43
End: 2023-01-20
Payer: COMMERCIAL

## 2023-01-20 VITALS
DIASTOLIC BLOOD PRESSURE: 76 MMHG | WEIGHT: 160.8 LBS | HEIGHT: 65 IN | SYSTOLIC BLOOD PRESSURE: 116 MMHG | HEART RATE: 94 BPM | BODY MASS INDEX: 26.79 KG/M2 | RESPIRATION RATE: 18 BRPM | OXYGEN SATURATION: 98 % | TEMPERATURE: 98.3 F

## 2023-01-20 DIAGNOSIS — E67.3 HYPERVITAMINOSIS D: ICD-10-CM

## 2023-01-20 DIAGNOSIS — E03.9 HYPOTHYROIDISM, UNSPECIFIED TYPE: ICD-10-CM

## 2023-01-20 DIAGNOSIS — Z90.711 STATUS POST LAPAROSCOPIC SUPRACERVICAL HYSTERECTOMY: ICD-10-CM

## 2023-01-20 DIAGNOSIS — Z23 NEED FOR HEPATITIS A AND B VACCINATION: ICD-10-CM

## 2023-01-20 DIAGNOSIS — Z00.00 ROUTINE GENERAL MEDICAL EXAMINATION AT A HEALTH CARE FACILITY: Primary | ICD-10-CM

## 2023-01-20 DIAGNOSIS — F51.01 PRIMARY INSOMNIA: ICD-10-CM

## 2023-01-20 DIAGNOSIS — Z79.899 MEDICATION MANAGEMENT: ICD-10-CM

## 2023-01-20 DIAGNOSIS — E27.0 OTHER ADRENOCORTICAL OVERACTIVITY (H): ICD-10-CM

## 2023-01-20 DIAGNOSIS — R61 NIGHT SWEATS: ICD-10-CM

## 2023-01-20 DIAGNOSIS — Z13.220 SCREENING FOR LIPID DISORDERS: ICD-10-CM

## 2023-01-20 LAB
ALBUMIN SERPL BCG-MCNC: 4.4 G/DL (ref 3.5–5.2)
ALP SERPL-CCNC: 66 U/L (ref 35–104)
ALT SERPL W P-5'-P-CCNC: 25 U/L (ref 10–35)
ANION GAP SERPL CALCULATED.3IONS-SCNC: 14 MMOL/L (ref 7–15)
AST SERPL W P-5'-P-CCNC: 25 U/L (ref 10–35)
BILIRUB SERPL-MCNC: 0.3 MG/DL
BUN SERPL-MCNC: 13.4 MG/DL (ref 6–20)
CALCIUM SERPL-MCNC: 9.8 MG/DL (ref 8.6–10)
CHLORIDE SERPL-SCNC: 104 MMOL/L (ref 98–107)
CHOLEST SERPL-MCNC: 200 MG/DL
CREAT SERPL-MCNC: 0.75 MG/DL (ref 0.51–0.95)
DEPRECATED CALCIDIOL+CALCIFEROL SERPL-MC: 65 UG/L (ref 20–75)
DEPRECATED HCO3 PLAS-SCNC: 19 MMOL/L (ref 22–29)
ESTRADIOL SERPL-MCNC: 294 PG/ML
FSH SERPL IRP2-ACNC: 2.8 MIU/ML
GFR SERPL CREATININE-BSD FRML MDRD: >90 ML/MIN/1.73M2
GLUCOSE SERPL-MCNC: 90 MG/DL (ref 70–99)
HDLC SERPL-MCNC: 66 MG/DL
LDLC SERPL CALC-MCNC: 119 MG/DL
LH SERPL-ACNC: 6 MIU/ML
NONHDLC SERPL-MCNC: 134 MG/DL
POTASSIUM SERPL-SCNC: 4.5 MMOL/L (ref 3.4–5.3)
PROGEST SERPL-MCNC: 24.7 NG/ML
PROT SERPL-MCNC: 7.3 G/DL (ref 6.4–8.3)
SODIUM SERPL-SCNC: 137 MMOL/L (ref 136–145)
TRIGL SERPL-MCNC: 73 MG/DL

## 2023-01-20 PROCEDURE — 80061 LIPID PANEL: CPT | Performed by: INTERNAL MEDICINE

## 2023-01-20 PROCEDURE — 84144 ASSAY OF PROGESTERONE: CPT | Performed by: INTERNAL MEDICINE

## 2023-01-20 PROCEDURE — 83002 ASSAY OF GONADOTROPIN (LH): CPT | Performed by: INTERNAL MEDICINE

## 2023-01-20 PROCEDURE — 36415 COLL VENOUS BLD VENIPUNCTURE: CPT | Performed by: INTERNAL MEDICINE

## 2023-01-20 PROCEDURE — 99396 PREV VISIT EST AGE 40-64: CPT | Mod: 25 | Performed by: INTERNAL MEDICINE

## 2023-01-20 PROCEDURE — 80053 COMPREHEN METABOLIC PANEL: CPT | Performed by: INTERNAL MEDICINE

## 2023-01-20 PROCEDURE — 90636 HEP A/HEP B VACC ADULT IM: CPT | Performed by: INTERNAL MEDICINE

## 2023-01-20 PROCEDURE — 82306 VITAMIN D 25 HYDROXY: CPT | Performed by: INTERNAL MEDICINE

## 2023-01-20 PROCEDURE — 82670 ASSAY OF TOTAL ESTRADIOL: CPT | Performed by: INTERNAL MEDICINE

## 2023-01-20 PROCEDURE — 99214 OFFICE O/P EST MOD 30 MIN: CPT | Mod: 25 | Performed by: INTERNAL MEDICINE

## 2023-01-20 PROCEDURE — 90471 IMMUNIZATION ADMIN: CPT | Performed by: INTERNAL MEDICINE

## 2023-01-20 PROCEDURE — 83001 ASSAY OF GONADOTROPIN (FSH): CPT | Performed by: INTERNAL MEDICINE

## 2023-01-20 RX ORDER — TRAZODONE HYDROCHLORIDE 50 MG/1
50 TABLET, FILM COATED ORAL
Qty: 90 TABLET | Refills: 3 | Status: SHIPPED | OUTPATIENT
Start: 2023-01-20 | End: 2024-01-08

## 2023-01-20 ASSESSMENT — ENCOUNTER SYMPTOMS
COUGH: 0
NAUSEA: 0
JOINT SWELLING: 0
DIARRHEA: 0
CHILLS: 0
ARTHRALGIAS: 0
WEAKNESS: 0
FREQUENCY: 1
HEMATURIA: 0
BREAST MASS: 0
ABDOMINAL PAIN: 0
HEADACHES: 1
CONSTIPATION: 0
PARESTHESIAS: 0
SORE THROAT: 0
HEMATOCHEZIA: 0
HEARTBURN: 0
DYSURIA: 0
MYALGIAS: 0
SHORTNESS OF BREATH: 0
PALPITATIONS: 0
EYE PAIN: 0
DIZZINESS: 0
NERVOUS/ANXIOUS: 1
FEVER: 0

## 2023-01-20 ASSESSMENT — PAIN SCALES - GENERAL: PAINLEVEL: NO PAIN (0)

## 2023-01-20 NOTE — NURSING NOTE
Prior to immunization administration, verified patients identity using patient s name and date of birth. Please see Immunization Activity for additional information.     Screening Questionnaire for Adult Immunization    Are you sick today?   No   Do you have allergies to medications, food, a vaccine component or latex?   No   Have you ever had a serious reaction after receiving a vaccination?   No   Do you have a long-term health problem with heart, lung, kidney, or metabolic disease (e.g., diabetes), asthma, a blood disorder, no spleen, complement component deficiency, a cochlear implant, or a spinal fluid leak?  Are you on long-term aspirin therapy?   No   Do you have cancer, leukemia, HIV/AIDS, or any other immune system problem?   No   Do you have a parent, brother, or sister with an immune system problem?   No   In the past 3 months, have you taken medications that affect  your immune system, such as prednisone, other steroids, or anticancer drugs; drugs for the treatment of rheumatoid arthritis, Crohn s disease, or psoriasis; or have you had radiation treatments?   No   Have you had a seizure, or a brain or other nervous system problem?   No   During the past year, have you received a transfusion of blood or blood    products, or been given immune (gamma) globulin or antiviral drug?   No   For women: Are you pregnant or is there a chance you could become       pregnant during the next month?   No   Have you received any vaccinations in the past 4 weeks?   No     Immunization questionnaire answers were all negative.        Per orders of Dr. Burnham, injection of TWINRIX (HEP A/ B)  given by Maribell Amaya MA. Patient instructed to remain in clinic for 15 minutes afterwards, and to report any adverse reaction to me immediately.       Screening performed by Maribell Amaya MA on 1/20/2023 at 9:25 AM.

## 2023-01-20 NOTE — PROGRESS NOTES
SUBJECTIVE:   CC: Cortney is an 42 year old who presents for preventive health visit.   Patient has been advised of split billing requirements and indicates understanding: Yes  Healthy Habits:     Getting at least 3 servings of Calcium per day:  Yes    Bi-annual eye exam:  NO    Dental care twice a year:  Yes    Sleep apnea or symptoms of sleep apnea:  Daytime drowsiness    Diet:  Carbohydrate counting    Frequency of exercise:  4-5 days/week    Duration of exercise:  45-60 minutes    Taking medications regularly:  No    Barriers to taking medications:  None and Not applicable    Medication side effects:  None    PHQ-2 Total Score: 0    Additional concerns today:  No              Today's PHQ-2 Score:   PHQ-2 ( 1999 [employer]) 1/19/2023   Q1: Little interest or pleasure in doing things 0   Q2: Feeling down, depressed or hopeless 0   PHQ-2 Score 0   PHQ-2 Total Score (12-17 Years)- Positive if 3 or more points; Administer PHQ-A if positive -   Q1: Little interest or pleasure in doing things Not at all   Q2: Feeling down, depressed or hopeless Not at all   PHQ-2 Score 0           Social History     Tobacco Use     Smoking status: Never     Smokeless tobacco: Never   Substance Use Topics     Alcohol use: Yes     Comment: 2 drinks per week     If you drink alcohol do you typically have >3 drinks per day or >7 drinks per week? No    Alcohol Use 1/20/2023   Prescreen: >3 drinks/day or >7 drinks/week? -   Prescreen: >3 drinks/day or >7 drinks/week? No       Reviewed orders with patient.  Reviewed health maintenance and updated orders accordingly - Yes  Lab work is in process    Breast Cancer Screening:    FHS-7:   Breast CA Risk Assessment (FHS-7) 6/15/2021 6/30/2022 1/19/2023   Did any of your first-degree relatives have breast or ovarian cancer? Yes Yes Yes   Did any of your relatives have bilateral breast cancer? Yes No Yes   Did any man in your family have breast cancer? No No No   Did any woman in your family have  breast and ovarian cancer? Yes No Yes   Did any woman in your family have breast cancer before age 50 y? Yes Yes Yes   Do you have 2 or more relatives with breast and/or ovarian cancer? Yes Yes Yes   Do you have 2 or more relatives with breast and/or bowel cancer? Yes No Yes         Pertinent mammograms are reviewed under the imaging tab.    History of abnormal Pap smear: NO - age 30-65 PAP every 5 years with negative HPV co-testing recommended  PAP / HPV Latest Ref Rng & Units 6/11/2020 4/13/2015 4/6/2012   PAP (Historical) - NIL NIL NIL   HPV16 NEG:Negative Negative Negative -   HPV18 NEG:Negative Negative Negative -   HRHPV NEG:Negative Negative Negative -     Reviewed and updated as needed this visit by clinical staff   Tobacco  Allergies  Meds              Reviewed and updated as needed this visit by Provider                     Review of Systems   Constitutional: Negative for chills and fever.   HENT: Negative for congestion, ear pain, hearing loss and sore throat.    Eyes: Negative for pain and visual disturbance.   Respiratory: Negative for cough and shortness of breath.    Cardiovascular: Negative for chest pain, palpitations and peripheral edema.   Gastrointestinal: Negative for abdominal pain, constipation, diarrhea, heartburn, hematochezia and nausea.   Breasts:  Positive for tenderness. Negative for breast mass and discharge.   Genitourinary: Positive for frequency. Negative for dysuria, genital sores, hematuria, pelvic pain, urgency, vaginal bleeding and vaginal discharge.   Musculoskeletal: Negative for arthralgias, joint swelling and myalgias.   Skin: Negative for rash.   Neurological: Positive for headaches. Negative for dizziness, weakness and paresthesias.   Psychiatric/Behavioral: Negative for mood changes. The patient is nervous/anxious.    no major complaints   She is doing well having headaches but will see the eye doctor  Slight anxiousness  and nervousness due to stress of work  She  "continue to follow her natural medicine doctor who manages her thyroid she has freckles and moles   estradiol ,progesterone FSH, LH were requested by patient 's natural medicine doctor  OBJECTIVE:   /76 (BP Location: Right arm, Patient Position: Sitting, Cuff Size: Adult Regular)   Pulse 94   Temp 98.3  F (36.8  C) (Oral)   Resp 18   Ht 1.64 m (5' 4.57\")   Wt 72.9 kg (160 lb 12.8 oz)   LMP 07/10/2021 (Exact Date)   SpO2 98%   BMI 27.12 kg/m    Physical Exam  GENERAL: healthy, alert and no distress  EYES: Eyes grossly normal to inspection, PERRL and conjunctivae and sclerae normal  HENT: ear canals and TM's normal, nose and mouth without ulcers or lesions  NECK: no adenopathy, no asymmetry, masses, or scars and thyroid normal to palpation  RESP: lungs clear to auscultation - no rales, rhonchi or wheezes  BREAST: normal without masses, tenderness or nipple discharge and no palpable axillary masses or adenopathy  CV: regular rate and rhythm, normal S1 S2, no S3 or S4, no murmur, click or rub, no peripheral edema and peripheral pulses strong  ABDOMEN: soft, nontender, no hepatosplenomegaly, no masses and bowel sounds normal  MS: no gross musculoskeletal defects noted, no edema  SKIN: no suspicious lesions or rashes  NEURO: Normal strength and tone, mentation intact and speech normal  PSYCH: mentation appears normal, affect normal/bright        ASSESSMENT/PLAN:   Cortney was seen today for physical.    Diagnoses and all orders for this visit:    Routine general medical examination at a health care facility  Preventive health counseling was also done.  Discussed the importance of yearly mammogram especially due to family history.  Her last mamogram was on 6/2022  uptodate on pap emil t  Will get twinrix as going to [place]    Status post laparoscopic supracervical hysterectomy with bilateral salpingectomy on 10/20/2022 for fibroids  Due to uterine fibroids   Doing well      Hypothyroidism, unspecified " "type  Comments:  follows natural medicine doctor     Hypervitaminosis D  -     Vitamin D Deficiency; Future  -     Vitamin D Deficiency  She is followed by natural medicine doctor    Night sweats  -     Follicle stimulating hormone; Future  -     Luteinizing Hormone; Future  -     Estradiol; Future  -     Progesterone; Future  -     Follicle stimulating hormone  -     Luteinizing Hormone  -     Estradiol  -     Progesterone  Patient requested these labs as her natural medicine doctor would like those to be done   She will manage the results     Medication management  -     Comprehensive metabolic panel; Future  -     Comprehensive metabolic panel    Other adrenocortical overactivity (H)  Follows natural medicine doctor    Screening for lipid disorders  -     Lipid panel reflex to direct LDL Fasting; Future  -     Lipid panel reflex to direct LDL Fasting    Primary insomnia  -     traZODone (DESYREL) 50 MG tablet; Take 1 tablet (50 mg) by mouth every evening as needed for sleep  Working well    Other orders  -     REVIEW OF HEALTH MAINTENANCE PROTOCOL ORDERS        Patient has been advised of split billing requirements and indicates understanding: Yes      COUNSELING:  Reviewed preventive health counseling, as reflected in patient instructions       Regular exercise       Healthy diet/nutrition      BMI:   Estimated body mass index is 27.12 kg/m  as calculated from the following:    Height as of this encounter: 1.64 m (5' 4.57\").    Weight as of this encounter: 72.9 kg (160 lb 12.8 oz).   Weight management plan: Discussed healthy diet and exercise guidelines      She reports that she has never smoked. She has never used smokeless tobacco.      Zeinab Burnham MD  [facility]  "

## 2023-01-20 NOTE — RESULT ENCOUNTER NOTE
Carlie Barr    This is to inform you regarding your test result.    Vitamin D level is normal.  Your total cholesterol is elevated  HDL which is called good cholesterol is normal.  Your LDL cholesterol is elevated  Your triglycerides are normal.  Eat low cholesterol low fat  diet and do regular physical activity.  The testing of your kidney function, liver function and electrolytes was satisfactory   Slightly low carbon dioxide is due to wearing a mask  FSH, LH, progesterone level and estradiol level were done as requested by your natural medicine doctor  Please discuss those results with her  Looks satisfactory to me    Sincerely,      Dr.Nasima Chacha MD,FACP

## 2023-02-20 ENCOUNTER — ALLIED HEALTH/NURSE VISIT (OUTPATIENT)
Dept: FAMILY MEDICINE | Facility: CLINIC | Age: 43
End: 2023-02-20
Payer: COMMERCIAL

## 2023-02-20 DIAGNOSIS — Z23 NEED FOR VACCINATION: Primary | ICD-10-CM

## 2023-02-20 PROCEDURE — 90471 IMMUNIZATION ADMIN: CPT

## 2023-02-20 PROCEDURE — 90636 HEP A/HEP B VACC ADULT IM: CPT

## 2023-02-20 NOTE — PROGRESS NOTES
Prior to immunization administration, verified patients identity using patient s name and date of birth. Please see Immunization Activity for additional information.     Screening Questionnaire for Adult Immunization    Are you sick today?   No   Do you have allergies to medications, food, a vaccine component or latex?   No   Have you ever had a serious reaction after receiving a vaccination?   No   Do you have a long-term health problem with heart, lung, kidney, or metabolic disease (e.g., diabetes), asthma, a blood disorder, no spleen, complement component deficiency, a cochlear implant, or a spinal fluid leak?  Are you on long-term aspirin therapy?   No   Do you have cancer, leukemia, HIV/AIDS, or any other immune system problem?   No   Do you have a parent, brother, or sister with an immune system problem?   No   In the past 3 months, have you taken medications that affect  your immune system, such as prednisone, other steroids, or anticancer drugs; drugs for the treatment of rheumatoid arthritis, Crohn s disease, or psoriasis; or have you had radiation treatments?   No   Have you had a seizure, or a brain or other nervous system problem?   No   During the past year, have you received a transfusion of blood or blood    products, or been given immune (gamma) globulin or antiviral drug?   No   For women: Are you pregnant or is there a chance you could become       pregnant during the next month?   No   Have you received any vaccinations in the past 4 weeks?   No     Immunization questionnaire answers were all negative.        Per orders of Dr. Burnham, injection of Twinrix given by Yenifer Ramirez MA. Patient instructed to remain in clinic for 15 minutes afterwards, and to report any adverse reaction to me immediately.  Patient verified     Screening performed by Yenifer Ramirez MA on 2/20/2023 at 9:54 AM.

## 2023-02-28 ENCOUNTER — E-VISIT (OUTPATIENT)
Dept: FAMILY MEDICINE | Facility: CLINIC | Age: 43
End: 2023-02-28
Payer: COMMERCIAL

## 2023-02-28 DIAGNOSIS — B96.89 ACUTE BACTERIAL SINUSITIS: ICD-10-CM

## 2023-02-28 DIAGNOSIS — J01.91 ACUTE RECURRENT SINUSITIS, UNSPECIFIED LOCATION: Primary | ICD-10-CM

## 2023-02-28 DIAGNOSIS — J01.90 ACUTE BACTERIAL SINUSITIS: ICD-10-CM

## 2023-02-28 PROCEDURE — 99421 OL DIG E/M SVC 5-10 MIN: CPT | Performed by: INTERNAL MEDICINE

## 2023-02-28 NOTE — PATIENT INSTRUCTIONS
Sinusitis (Antibiotic Treatment)    The sinuses are air-filled spaces within the bones of the face. They connect to the inside of the nose. Sinusitis is an inflammation of the tissue that lines the sinuses. Sinusitis can occur during a cold. It can also happen due to allergies to pollens and other particles in the air. Sinusitis can cause symptoms of sinus congestion and a feeling of fullness. A sinus infection causes fever, headache, and facial pain. There is often green or yellow fluid draining from the nose or into the back of the throat (post-nasal drip). You have been given antibiotics to treat this condition.   Home care    Take the full course of antibiotics as instructed. Don't stop taking them, even when you feel better.    Drink plenty of water, hot tea, and other liquids as directed by the healthcare provider. This may help thin nasal mucus. It also may help your sinuses drain fluids.    Heat may help soothe painful areas of your face. Use a towel soaked in hot water. Or,  the shower and direct the warm spray onto your face. Using a vaporizer along with a menthol rub at night may also help soothe symptoms.     An expectorant with guaifenesin may help thin nasal mucus and help your sinuses drain fluids. Talk with your provider or pharmacists before taking an over-the-counter (OTC) medicine if you have any questions about it or its side effects..    You can use an OTC decongestant, unless a similar medicine was prescribed to you. Nasal sprays work the fastest. Use one that contains phenylephrine or oxymetazoline. First blow your nose gently. Then use the spray. Don't use these medicines more often than directed on the label. If you do, your symptoms may get worse. You may also take pills that contain pseudoephedrine. Don t use products that combine multiple medicines. This is because side effects may be increased. Read labels. You can also ask the pharmacist for help. (People with high blood  pressure should not use decongestants. They can raise blood pressure.) Talk with your provider or pharmacist if you have any questions about the medicine..    OTC antihistamines may help if allergies contributed to your sinusitis. Talk with your provider or pharmacist if you have any questions about the medicine..    Don't use nasal rinses or irrigation during an acute sinus infection, unless your healthcare provider tells you to. Rinsing may spread the infection to other areas in your sinuses.    Use acetaminophen or ibuprofen to control pain, unless another pain medicine was prescribed to you. If you have chronic liver or kidney disease or ever had a stomach ulcer, talk with your healthcare provider before using these medicines. Never give aspirin to anyone under age 18 who is ill with a fever. It may cause severe liver damage.    Don't smoke. This can make symptoms worse.    Follow-up care  Follow up with your healthcare provider, or as advised.   When to seek medical advice  Call your healthcare provider if any of these occur:     Facial pain or headache that gets worse    Stiff neck    Unusual drowsiness or confusion    Swelling of your forehead or eyelids    Symptoms don't go away in 10 days    Vision problems, such as blurred or double vision    Fever of 100.4 F (38 C) or higher, or as directed by your healthcare provider  Call 911  Call 911 if any of these occur:     Seizure    Trouble breathing    Feeling dizzy or faint    Fingernails, skin or lips look blue, purple , or gray  Prevention  Here are steps you can take to help prevent an infection:     Keep good hand washing habits.    Don t have close contact with people who have sore throats, colds, or other upper respiratory infections.    Don t smoke, and stay away from secondhand smoke.    Stay up to date with of your vaccines.  [employer] last reviewed this educational content on 12/1/2019 2000-2021 [employer]. All rights reserved. This  information is not intended as a substitute for professional medical care. Always follow your healthcare professional's instructions.        Dear Cortney Willoughby    After reviewing your responses, I've been able to diagnose you with    Acute recurrent sinusitis, unspecified location  Acute bacterial sinusitis.      Based on your responses and diagnosis, I have prescribed   Orders Placed This Encounter     amoxicillin-clavulanate (AUGMENTIN) 875-125 MG tablet    to treat your symptoms. I have sent this to your pharmacy.?     It is also important to stay well hydrated, get lots of rest and take over-the-counter decongestants,?tylenol?or ibuprofen if you?are able to?take those medications per your primary care provider to help relieve discomfort.?     It is important that you take?all of?your prescribed medication even if your symptoms are improving after a few doses.? Taking?all of?your medicine helps prevent the symptoms from returning.?     If your symptoms worsen, you develop severe headache, vomiting, high fever (>102), or are not improving in 7 days, please contact your primary care provider for an appointment or visit any of our convenient Walk-in Care or Urgent Care Centers to be seen which can be found on our website?here.?     Thanks again for choosing?us?as your health care partner,?   ?  Zeinab Burnham MD?

## 2023-03-24 ENCOUNTER — OFFICE VISIT (OUTPATIENT)
Dept: PEDIATRICS | Facility: CLINIC | Age: 43
End: 2023-03-24
Payer: COMMERCIAL

## 2023-03-24 ENCOUNTER — TRANSFERRED RECORDS (OUTPATIENT)
Dept: HEALTH INFORMATION MANAGEMENT | Facility: CLINIC | Age: 43
End: 2023-03-24

## 2023-03-24 VITALS
DIASTOLIC BLOOD PRESSURE: 70 MMHG | HEART RATE: 90 BPM | BODY MASS INDEX: 26.22 KG/M2 | TEMPERATURE: 97.5 F | WEIGHT: 155.5 LBS | SYSTOLIC BLOOD PRESSURE: 92 MMHG | OXYGEN SATURATION: 100 % | RESPIRATION RATE: 16 BRPM

## 2023-03-24 DIAGNOSIS — L98.9 SKIN LESION: Primary | ICD-10-CM

## 2023-03-24 DIAGNOSIS — Z80.3 FAMILY HISTORY OF MALIGNANT NEOPLASM OF BREAST: ICD-10-CM

## 2023-03-24 DIAGNOSIS — B35.3 TINEA PEDIS OF BOTH FEET: ICD-10-CM

## 2023-03-24 PROCEDURE — 99213 OFFICE O/P EST LOW 20 MIN: CPT | Performed by: NURSE PRACTITIONER

## 2023-03-24 RX ORDER — LIOTHYRONINE SODIUM 50 UG/1
TABLET ORAL
COMMUNITY
End: 2024-05-20 | Stop reason: DRUGHIGH

## 2023-03-24 ASSESSMENT — PAIN SCALES - GENERAL: PAINLEVEL: NO PAIN (0)

## 2023-03-24 NOTE — PROGRESS NOTES
Assessment & Plan     Skin lesion  Family history of malignant neoplasm of breast  Pt with one month history of skin lesion to right breast. Clinical breast exam is unremarkable bilaterally without dominant masses or nipple drainage. Mammogram is up to date. Recommend referral to dermatology. Should the lesion change in any way or should she develop additional symptoms such as breast mass, she is to follow-up sooner at which time we will consider a diagnostic mammogram/US.   - Adult Dermatology Referral; Future    Tinea pedis of both feet  Recommend keeping feet open to air as much as possible. Apply clotrimazole 1% cream twice daily for 14 days. Follow-up over mychart if no improvement.     20 minutes spent on the date of the encounter doing chart review, patient visit and documentation        CONSULTATION/REFERRAL to Dermatology    KEYSHA Kwan CNP [facility] DIAMOND Barr is a 43 year old, presenting for the following health issues:  Derm Problem (/)      Presents for concern about skin lesion.     Reports a one month history of skin lesion to right breast. Was taking a shower one day and scratched the area and then noticed it was bleeding. Found a localized singular lesion that was red and raised. Over the past couple of weeks it has changed to more of a light pink flesh color and is flat. There is no pain, dry skin, drainage, scabbing, redness, or swelling to the lesion. She went on vacation to [place] last week and noticed the lesion did not change. She does have a family history of breast cancer (mother), which motivated her to schedule the appt today.      Additionally, she thinks she may have athlete's food. Moist cracking in between toes, burning.     Patient Active Problem List   Diagnosis     Insomnia, unspecified insomnia     Encounter for surveillance of contraceptive pills     Family history of malignant neoplasm of breast     Elevated cortisol level      Hypothyroidism, unspecified type     Mixed hyperlipidemia     Other adrenocortical overactivity (H)     Submucous and subserous leiomyoma of uterus     Status post laparoscopic supracervical hysterectomy with bilateral salpingectomy on 10/20/2022 for fibroids     Past Medical History:   Diagnosis Date     Anxiety      Closed fracture of unspecified part of ulna (alone) 2006     Depression      Family history of malignant neoplasm of breast      Fibroid uterus      Hyperlipidemia      Hypothyroidism, unspecified type 05/02/2019     Insomnia      Ovarian cyst      Scoliosis (and kyphoscoliosis), idiopathic      Current Outpatient Medications   Medication     coenzyme Q-10 200 MG CAPS capsule     DHEA 10 MG CAPS     Digestive Enzymes CAPS     ferrous fumarate 65 mg, [place]. FE,-Vitamin C 125 mg (VITRON C)  MG TABS tablet     fluconazole (DIFLUCAN) 200 MG tablet     levothyroxine (SYNTHROID/LEVOTHROID) 100 MCG tablet     liothyronine (CYTOMEL) 50 MCG tablet     liothyronine (CYTOMEL) 50 MCG tablet     MAGNESIUM PO     Multiple Vitamin (DAILY-VITAMIN PO)     Omega-3 Fatty Acids (FISH OIL) 1200 MG capsule     OVER-THE-COUNTER     Pregnenolone Micronized (PREGNENOLONE PO)     Probiotic Product (PROBIOTIC PO)     traZODone (DESYREL) 50 MG tablet     vitamin C (ASCORBIC ACID) 1000 MG TABS     Vitamin D3 (CHOLECALCIFEROL) 125 MCG (5000 UT) tablet     acetaminophen (TYLENOL) 325 MG tablet     amoxicillin-clavulanate (AUGMENTIN) 875-125 MG tablet     No current facility-administered medications for this visit.        Allergies   Allergen Reactions     No Known Drug Allergies        Review of Systems    ROS: 10 point ROS neg other than the symptoms noted above in the HPI.        Objective    BP 92/70 (BP Location: Right arm, Patient Position: Sitting, Cuff Size: Adult Regular)   Pulse 90   Temp 97.5  F (36.4  C) (Tympanic)   Resp 16   Wt 70.5 kg (155 lb 8 oz)   LMP 07/10/2021 (Exact Date)   SpO2 100%   BMI 26.22 kg/m     Body mass index is 26.22 kg/m .  Physical Exam   Constitutional: appears to be in no acute distress, comfortable, pleasant.   Eyes: anicteric, conjunctiva clear without drainage or erythema.  Breast: No nipple abnormality or discharge, no dominant masses, tenderness to palpation, axillary, supraclavicular, or infraclavicular adenopathy. Small circular flat light pink lesion to 7 o'clock position on right breast as pictured below.  Cardiovascular: regular rate.  Respiratory: Breathing pattern unlabored without the use of accessory muscles.   Skin: pink, turgor smooth and elastic. Negative for lesions or dryness.  Neurological: normal gait, no tremor.   Psychological: appropriate mood and affect.

## 2023-07-05 ENCOUNTER — HOSPITAL ENCOUNTER (OUTPATIENT)
Dept: MAMMOGRAPHY | Facility: CLINIC | Age: 43
Discharge: HOME | End: 2023-07-05
Attending: INTERNAL MEDICINE | Admitting: INTERNAL MEDICINE
Payer: COMMERCIAL

## 2023-07-05 DIAGNOSIS — Z12.31 VISIT FOR SCREENING MAMMOGRAM: ICD-10-CM

## 2023-07-05 PROCEDURE — 77067 SCR MAMMO BI INCL CAD: CPT

## 2023-08-21 ENCOUNTER — ALLIED HEALTH/NURSE VISIT (OUTPATIENT)
Dept: FAMILY MEDICINE | Facility: CLINIC | Age: 43
End: 2023-08-21
Payer: COMMERCIAL

## 2023-08-21 DIAGNOSIS — Z23 NEED FOR VACCINATION: Primary | ICD-10-CM

## 2023-08-21 PROCEDURE — 90471 IMMUNIZATION ADMIN: CPT

## 2023-08-21 PROCEDURE — 90636 HEP A/HEP B VACC ADULT IM: CPT

## 2023-08-21 PROCEDURE — 99207 PR NO CHARGE NURSE ONLY: CPT

## 2023-08-21 NOTE — PROGRESS NOTES
Prior to immunization administration, verified patients identity using patient s name and date of birth. Please see Immunization Activity for additional information.     Screening Questionnaire for Adult Immunization    Are you sick today?   No   Do you have allergies to medications, food, a vaccine component or latex?   No   Have you ever had a serious reaction after receiving a vaccination?   No   Do you have a long-term health problem with heart, lung, kidney, or metabolic disease (e.g., diabetes), asthma, a blood disorder, no spleen, complement component deficiency, a cochlear implant, or a spinal fluid leak?  Are you on long-term aspirin therapy?   No   Do you have cancer, leukemia, HIV/AIDS, or any other immune system problem?   No   Do you have a parent, brother, or sister with an immune system problem?   No   In the past 3 months, have you taken medications that affect  your immune system, such as prednisone, other steroids, or anticancer drugs; drugs for the treatment of rheumatoid arthritis, Crohn s disease, or psoriasis; or have you had radiation treatments?   No   Have you had a seizure, or a brain or other nervous system problem?   No   During the past year, have you received a transfusion of blood or blood    products, or been given immune (gamma) globulin or antiviral drug?   No   For women: Are you pregnant or is there a chance you could become       pregnant during the next month?   No   Have you received any vaccinations in the past 4 weeks?   No     Immunization questionnaire answers were all negative.    I have reviewed the following standing orders:     This patient is due and qualifies for the Hepatitis A & B vaccine.    Click here for HEP A & B STANDING ORDER    I have reviewed the vaccines inclusion and exclusion criteria; No concerns regarding eligibility.     Patient instructed to remain in clinic for 15 minutes afterwards, and to report any adverse reactions.     Screening performed by  Jez Sequeira, CMA on 8/21/2023 at 9:30 AM.

## 2024-01-08 ENCOUNTER — MYC REFILL (OUTPATIENT)
Dept: FAMILY MEDICINE | Facility: CLINIC | Age: 44
End: 2024-01-08
Payer: COMMERCIAL

## 2024-01-08 DIAGNOSIS — F51.01 PRIMARY INSOMNIA: ICD-10-CM

## 2024-01-09 RX ORDER — TRAZODONE HYDROCHLORIDE 50 MG/1
50 TABLET, FILM COATED ORAL
Qty: 90 TABLET | Refills: 0 | Status: SHIPPED | OUTPATIENT
Start: 2024-01-09 | End: 2024-05-20

## 2024-05-13 SDOH — HEALTH STABILITY: PHYSICAL HEALTH: ON AVERAGE, HOW MANY MINUTES DO YOU ENGAGE IN EXERCISE AT THIS LEVEL?: 50 MIN

## 2024-05-13 SDOH — HEALTH STABILITY: PHYSICAL HEALTH: ON AVERAGE, HOW MANY DAYS PER WEEK DO YOU ENGAGE IN MODERATE TO STRENUOUS EXERCISE (LIKE A BRISK WALK)?: 3 DAYS

## 2024-05-13 ASSESSMENT — SOCIAL DETERMINANTS OF HEALTH (SDOH): HOW OFTEN DO YOU GET TOGETHER WITH FRIENDS OR RELATIVES?: THREE TIMES A WEEK

## 2024-05-20 ENCOUNTER — OFFICE VISIT (OUTPATIENT)
Dept: FAMILY MEDICINE | Facility: CLINIC | Age: 44
End: 2024-05-20
Payer: COMMERCIAL

## 2024-05-20 VITALS
OXYGEN SATURATION: 97 % | SYSTOLIC BLOOD PRESSURE: 104 MMHG | DIASTOLIC BLOOD PRESSURE: 73 MMHG | BODY MASS INDEX: 22.67 KG/M2 | HEART RATE: 86 BPM | HEIGHT: 65 IN | TEMPERATURE: 97.2 F | WEIGHT: 136.1 LBS

## 2024-05-20 DIAGNOSIS — Z79.899 MEDICATION MANAGEMENT: ICD-10-CM

## 2024-05-20 DIAGNOSIS — Z00.00 ROUTINE GENERAL MEDICAL EXAMINATION AT A HEALTH CARE FACILITY: Primary | ICD-10-CM

## 2024-05-20 DIAGNOSIS — Z13.0 SCREENING FOR DEFICIENCY ANEMIA: ICD-10-CM

## 2024-05-20 DIAGNOSIS — Z90.711 STATUS POST LAPAROSCOPIC SUPRACERVICAL HYSTERECTOMY: ICD-10-CM

## 2024-05-20 DIAGNOSIS — E03.9 HYPOTHYROIDISM, UNSPECIFIED TYPE: ICD-10-CM

## 2024-05-20 DIAGNOSIS — F51.01 PRIMARY INSOMNIA: ICD-10-CM

## 2024-05-20 DIAGNOSIS — E78.5 HYPERLIPIDEMIA, UNSPECIFIED HYPERLIPIDEMIA TYPE: ICD-10-CM

## 2024-05-20 DIAGNOSIS — Z80.3 FAMILY HISTORY OF MALIGNANT NEOPLASM OF BREAST: ICD-10-CM

## 2024-05-20 LAB
ANION GAP SERPL CALCULATED.3IONS-SCNC: 9 MMOL/L (ref 7–15)
BUN SERPL-MCNC: 11.2 MG/DL (ref 6–20)
CALCIUM SERPL-MCNC: 8.7 MG/DL (ref 8.6–10)
CHLORIDE SERPL-SCNC: 107 MMOL/L (ref 98–107)
CHOLEST SERPL-MCNC: 177 MG/DL
CREAT SERPL-MCNC: 0.85 MG/DL (ref 0.51–0.95)
DEPRECATED HCO3 PLAS-SCNC: 23 MMOL/L (ref 22–29)
EGFRCR SERPLBLD CKD-EPI 2021: 87 ML/MIN/1.73M2
ERYTHROCYTE [DISTWIDTH] IN BLOOD BY AUTOMATED COUNT: 11.5 % (ref 10–15)
FASTING STATUS PATIENT QL REPORTED: YES
FASTING STATUS PATIENT QL REPORTED: YES
GLUCOSE SERPL-MCNC: 80 MG/DL (ref 70–99)
HCT VFR BLD AUTO: 41.2 % (ref 35–47)
HDLC SERPL-MCNC: 71 MG/DL
HGB BLD-MCNC: 13.5 G/DL (ref 11.7–15.7)
LDLC SERPL CALC-MCNC: 91 MG/DL
MCH RBC QN AUTO: 30.5 PG (ref 26.5–33)
MCHC RBC AUTO-ENTMCNC: 32.8 G/DL (ref 31.5–36.5)
MCV RBC AUTO: 93 FL (ref 78–100)
NONHDLC SERPL-MCNC: 106 MG/DL
PLATELET # BLD AUTO: 184 10E3/UL (ref 150–450)
POTASSIUM SERPL-SCNC: 4.1 MMOL/L (ref 3.4–5.3)
RBC # BLD AUTO: 4.43 10E6/UL (ref 3.8–5.2)
SODIUM SERPL-SCNC: 139 MMOL/L (ref 135–145)
TRIGL SERPL-MCNC: 73 MG/DL
WBC # BLD AUTO: 4.9 10E3/UL (ref 4–11)

## 2024-05-20 PROCEDURE — 80061 LIPID PANEL: CPT | Performed by: INTERNAL MEDICINE

## 2024-05-20 PROCEDURE — 36415 COLL VENOUS BLD VENIPUNCTURE: CPT | Performed by: INTERNAL MEDICINE

## 2024-05-20 PROCEDURE — 85027 COMPLETE CBC AUTOMATED: CPT | Performed by: INTERNAL MEDICINE

## 2024-05-20 PROCEDURE — 99396 PREV VISIT EST AGE 40-64: CPT | Performed by: INTERNAL MEDICINE

## 2024-05-20 PROCEDURE — 80048 BASIC METABOLIC PNL TOTAL CA: CPT | Performed by: INTERNAL MEDICINE

## 2024-05-20 PROCEDURE — 99213 OFFICE O/P EST LOW 20 MIN: CPT | Mod: 25 | Performed by: INTERNAL MEDICINE

## 2024-05-20 RX ORDER — TRAZODONE HYDROCHLORIDE 50 MG/1
50 TABLET, FILM COATED ORAL
Qty: 90 TABLET | Refills: 3 | Status: SHIPPED | OUTPATIENT
Start: 2024-05-20

## 2024-05-20 RX ORDER — LIOTHYRONINE SODIUM 25 UG/1
12.5 TABLET ORAL DAILY
COMMUNITY
Start: 2024-05-20

## 2024-05-20 RX ORDER — LEVOTHYROXINE SODIUM 75 UG/1
75 TABLET ORAL DAILY
COMMUNITY
Start: 2024-05-20

## 2024-05-20 ASSESSMENT — PAIN SCALES - GENERAL: PAINLEVEL: NO PAIN (0)

## 2024-05-20 NOTE — RESULT ENCOUNTER NOTE
Carlie Barr    This is to inform you regarding your test result.    CBC result which includes white count Hemoglobin and  Platelet Counts is normal.   Other test results are pending.          Sincerely,      Dr.Nasima Chacha MD,FACP

## 2024-05-20 NOTE — PATIENT INSTRUCTIONS
"  Preventive Care Advice   This is general advice we often give to help people stay healthy. Your care team may have specific advice just for you. Please talk to your care team about your own preventive care needs.  Lifestyle  Exercise at least 150 minutes each week (30 minutes a day, 5 days a week).  Do muscle strengthening activities 2 days a week. These help control your weight and prevent disease.  No smoking.  Wear sunscreen to prevent skin cancer.  Have your home tested for radon every 2 to 5 years. Radon is a colorless, odorless gas that can harm your lungs. To learn more, go to www.health.[facility].mn. and search for \"Radon in Homes.\"  Keep guns unloaded and locked up in a safe place like a safe or gun vault, or, use a gun lock and hide the keys. Always lock away bullets separately. To learn more, visit [employer].mn.gov and search for \"safe gun storage.\"  Nutrition  Eat 5 or more servings of fruits and vegetables each day.  Try wheat bread, brown rice and whole grain pasta (instead of white bread, rice, and pasta).  Get enough calcium and vitamin D. Check the label on foods and aim for 100% of the RDA (recommended daily allowance).  Regular exams  Have a dental exam and cleaning every 6 months.  See your health care team every year to talk about:  Any changes in your health.  Any medicines your care team has prescribed.  Preventive care, family planning, and ways to prevent chronic diseases.  Shots (vaccines)   HPV shots (up to age 26), if you've never had them before.  Hepatitis B shots (up to age 59), if you've never had them before.  COVID-19 shot: Get this shot when it's due.  Flu shot: Get a flu shot every year.  Tetanus shot: Get a tetanus shot every 10 years.  Pneumococcal, hepatitis A, and RSV shots: Ask your care team if you need these based on your risk.  Shingles shot (for age 50 and up).  General health tests  Diabetes screening:  Starting at age 35, Get screened for diabetes at least every 3 years.  If " you are younger than age 35, ask your care team if you should be screened for diabetes.  Cholesterol test: At age 39, start having a cholesterol test every 5 years, or more often if advised.  Bone density scan (DEXA): At age 50, ask your care team if you should have this scan for osteoporosis (brittle bones).  Hepatitis C: Get tested at least once in your life.  Abdominal aortic aneurysm screening: Talk to your doctor about having this screening if you:  Have ever smoked; and  Are biologically male; and  Are between the ages of 65 and 75.  STIs (sexually transmitted infections)  Before age 24: Ask your care team if you should be screened for STIs.  After age 24: Get screened for STIs if you're at risk. You are at risk for STIs (including HIV) if:  You are sexually active with more than one person.  You don't use condoms every time.  You or a partner was diagnosed with a sexually transmitted infection.  If you are at risk for HIV, ask about PrEP medicine to prevent HIV.  Get tested for HIV at least once in your life, whether you are at risk for HIV or not.  Cancer screening tests  Cervical cancer screening: If you have a cervix, begin getting regular cervical cancer screening tests at age 21. Most people who have regular screenings with normal results can stop after age 65. Talk about this with your provider.  Breast cancer scan (mammogram): If you've ever had breasts, begin having regular mammograms starting at age 40. This is a scan to check for breast cancer.  Colon cancer screening: It is important to start screening for colon cancer at age 45.  Have a colonoscopy test every 10 years (or more often if you're at risk) Or, ask your provider about stool tests like a FIT test every year or Cologuard test every 3 years.  To learn more about your testing options, visit: www.[employer]/935308.pdf.  For help making a decision, visit: alex/ow34519.  Prostate cancer screening test: If you have a prostate and are age 55  to 69, ask your provider if you would benefit from a yearly prostate cancer screening test.  Lung cancer screening: If you are a current or former smoker age 50 to 80, ask your care team if ongoing lung cancer screenings are right for you.  For informational purposes only. Not to replace the advice of your health care provider. Copyright   2023 [place] [employer]. All rights reserved. Clinically reviewed by the [facility]. [employer] [id number] - REV 04/24.    Learning About Stress  What is stress?     Stress is your body's response to a hard situation. Your body can have a physical, emotional, or mental response. Stress is a fact of life for most people, and it affects everyone differently. What causes stress for you may not be stressful for someone else.  A lot of things can cause stress. You may feel stress when you go on a job interview, take a test, or run a race. This kind of short-term stress is normal and even useful. It can help you if you need to work hard or react quickly. For example, stress can help you finish an important job on time.  Long-term stress is caused by ongoing stressful situations or events. Examples of long-term stress include long-term health problems, ongoing problems at work, or conflicts in your family. Long-term stress can harm your health.  How does stress affect your health?  When you are stressed, your body responds as though you are in danger. It makes hormones that speed up your heart, make you breathe faster, and give you a burst of energy. This is called the fight-or-flight stress response. If the stress is over quickly, your body goes back to normal and no harm is done.  But if stress happens too often or lasts too long, it can have bad effects. Long-term stress can make you more likely to get sick, and it can make symptoms of some diseases worse. If you tense up when you are stressed, you may develop neck, shoulder, or low back pain. Stress is  linked to high blood pressure and heart disease.  Stress also harms your emotional health. It can make you omalley, tense, or depressed. Your relationships may suffer, and you may not do well at work or school.  What can you do to manage stress?  You can try these things to help manage stress:   Do something active. Exercise or activity can help reduce stress. Walking is a great way to get started. Even everyday activities such as housecleaning or yard work can help.  Try yoga or jordan chi. These techniques combine exercise and meditation. You may need some training at first to learn them.  Do something you enjoy. For example, listen to music or go to a movie. Practice your hobby or do volunteer work.  Meditate. This can help you relax, because you are not worrying about what happened before or what may happen in the future.  Do guided imagery. Imagine yourself in any setting that helps you feel calm. You can use online videos, books, or a teacher to guide you.  Do breathing exercises. For example:  From a standing position, bend forward from the waist with your knees slightly bent. Let your arms dangle close to the floor.  Breathe in slowly and deeply as you return to a standing position. Roll up slowly and lift your head last.  Hold your breath for just a few seconds in the standing position.  Breathe out slowly and bend forward from the waist.  Let your feelings out. Talk, laugh, cry, and express anger when you need to. Talking with supportive friends or family, a counselor, or a lisa leader about your feelings is a healthy way to relieve stress. Avoid discussing your feelings with people who make you feel worse.  Write. It may help to write about things that are bothering you. This helps you find out how much stress you feel and what is causing it. When you know this, you can find better ways to cope.  What can you do to prevent stress?  You might try some of these things to help prevent stress:  Manage your time.  "This helps you find time to do the things you want and need to do.  Get enough sleep. Your body recovers from the stresses of the day while you are sleeping.  Get support. Your family, friends, and community can make a difference in how you experience stress.  Limit your news feed. Avoid or limit time on social media or news that may make you feel stressed.  Do something active. Exercise or activity can help reduce stress. Walking is a great way to get started.  Where can you learn more?  Go to https://www.[employer].net/patiented  Enter N032 in the search box to learn more about \"Learning About Stress.\"  Current as of: October 24, 2023               Content Version: 14.0    9403-5650 [employer].   Care instructions adapted under license by your healthcare professional. If you have questions about a medical condition or this instruction, always ask your healthcare professional. [employer] disclaims any warranty or liability for your use of this information.      "

## 2024-05-20 NOTE — PROGRESS NOTES
Preventive Care Visit  [facility]  Zeinab Burnham MD, Internal Medicine  May 20, 2024      Cortney was seen today for physical.    Diagnoses and all orders for this visit:    Routine general medical examination at a health care facility  Preventive health counseling was also done.  Mammogram scheduled on 07/09/2024   Last Pap on 06/11/2020     Hypothyroidism, unspecified type  On levothyroxine 75 mcg and liothyronine 12.5 mcg. She continues to follow natural medicine doctor and gets labs through them.    Mixed hyperlipidemia  -     Lipid panel reflex to direct LDL Fasting; Future    Screening for deficiency anemia  -     CBC with platelets; Future    Medication management  -     Basic metabolic panel; Future    Status post laparoscopic supracervical hysterectomy with bilateral salpingectomy on 10/20/2021 for fibroids  She is not on any medication and is doing well.     Primary insomnia  -     traZODone (DESYREL) 50 MG tablet; Take 1 tablet (50 mg) by mouth every evening as needed for sleep  Working well     Family history of malignant neoplasm of breast  Comments:  mother and aunt great grand mother  Mother was BRCA negative.   Discussed genetic counseling and Galleri testing - patient plans to do it next year.     Other orders  -     REVIEW OF HEALTH MAINTENANCE PROTOCOL ORDERS  -     PRIMARY CARE FOLLOW-UP SCHEDULING; Future    Other  She is on compounded tirzepatide and has lost 24 lbs within past 1.5 year. She is now tapering off the medication but c/o slight weight gain since then.     Patient c/o recent irregularity in her BMs. She usually works three days per week and during those days, she develops constipation. She tried increasing fiber intakes, epsom salts, but her symptom continues to persist. I explained her that tirzepatide can cause constipation. Suggested metamucil/MiraLax.       Subjective   Cortney is a 44 year old, presenting for the following:  Physical         No data to  display                 Health Care Directive  Patient does not have a Health Care Directive or Living Will: Discussed advance care planning with patient; however, patient declined at this time.    DON Barr is a 44 year old, presenting for the following:  Physical        5/13/2024   General Health   How would you rate your overall physical health? Excellent   Feel stress (tense, anxious, or unable to sleep) To some extent   (!) STRESS CONCERN      5/13/2024   Nutrition   Three or more servings of calcium each day? Yes   Diet: Regular (no restrictions)   How many servings of fruit and vegetables per day? (!) 2-3   How many sweetened beverages each day? 0-1         5/13/2024   Exercise   Days per week of moderate/strenous exercise 3 days   Average minutes spent exercising at this level 50 min         5/13/2024   Social Factors   Frequency of gathering with friends or relatives Three times a week   Worry food won't last until get money to buy more No   Food not last or not have enough money for food? No   Do you have housing?  Yes   Are you worried about losing your housing? No   Lack of transportation? No   Unable to get utilities (heat,electricity)? No         5/13/2024   Dental   Dentist two times every year? Yes         5/13/2024   TB Screening   Were you born outside of the [place]? No         Today's PHQ-2 Score:       5/19/2024     3:59 PM   PHQ-2 ( 1999 [employer])   Q1: Little interest or pleasure in doing things 0   Q2: Feeling down, depressed or hopeless 0   PHQ-2 Score 0   Q1: Little interest or pleasure in doing things Not at all   Q2: Feeling down, depressed or hopeless Not at all   PHQ-2 Score 0           5/13/2024   Substance Use   Alcohol more than 3/day or more than 7/wk No   Do you use any other substances recreationally? No     Social History     Tobacco Use    Smoking status: Never    Smokeless tobacco: Never   Vaping Use    Vaping status: Never Used   Substance Use Topics    Alcohol use: Yes      "Comment: 2 drinks per week    Drug use: No           7/5/2023   LAST FHS-7 RESULTS   1st degree relative breast or ovarian cancer Yes   Any relative bilateral breast cancer No   Any male have breast cancer No   Any ONE woman have BOTH breast AND ovarian cancer No   Any woman with breast cancer before 50yrs Yes   2 or more relatives with breast AND/OR ovarian cancer Yes   2 or more relatives with breast AND/OR bowel cancer No        Mammogram Screening - Mammogram every 1-2 years updated in Health Maintenance based on mutual decision making        5/13/2024   STI Screening   New sexual partner(s) since last STI/HIV test? No     History of abnormal Pap smear: No - age 30- 64 PAP with HPV every 5 years recommended        Latest Ref Rng & Units 6/11/2020    11:33 AM 6/11/2020    10:56 AM 4/13/2015     9:58 AM   PAP / HPV   PAP (Historical)   NIL     HPV 16 DNA NEG^Negative Negative   Negative    HPV 18 DNA NEG^Negative Negative   Negative    Other HR HPV NEG^Negative Negative   Negative      ASCVD Risk   The 10-year ASCVD risk score (Rocco PEREZ, et al., 2019) is: 0.3%    Values used to calculate the score:      Age: 44 years      Sex: Female      Is Non- : No      Diabetic: No      Tobacco smoker: No      Systolic Blood Pressure: 104 mmHg      Is BP treated: No      HDL Cholesterol: 66 mg/dL      Total Cholesterol: 200 mg/dL       Reviewed and updated as needed this visit by Provider                          Review of Systems  Constitutional, HEENT, cardiovascular, pulmonary, GI, , musculoskeletal, neuro, skin, endocrine and psych systems are negative, except as otherwise noted.     Objective    Exam  /73 (BP Location: Right arm, Patient Position: Sitting, Cuff Size: Adult Regular)   Pulse 86   Temp 97.2  F (36.2  C) (Temporal)   Ht 1.645 m (5' 4.76\")   Wt 61.7 kg (136 lb 1.6 oz)   LMP 07/10/2021 (Exact Date)   SpO2 97%   BMI 22.81 kg/m     Estimated body mass index is " "22.81 kg/m  as calculated from the following:    Height as of this encounter: 1.645 m (5' 4.76\").    Weight as of this encounter: 61.7 kg (136 lb 1.6 oz).    Physical Exam  GENERAL: alert and no distress  EYES: Eyes grossly normal to inspection, PERRL and conjunctivae and sclerae normal  HENT: ear canals and TM's normal, nose and mouth without ulcers or lesions  NECK: no adenopathy, no asymmetry, masses, or scars  RESP: lungs clear to auscultation - no rales, rhonchi or wheezes  BREAST: normal without masses, tenderness or nipple discharge and no palpable axillary masses or adenopathy  CV: regular rate and rhythm, normal S1 S2, no S3 or S4, no murmur, click or rub, no peripheral edema  ABDOMEN: soft, nontender, no hepatosplenomegaly, no masses and bowel sounds normal  MS: no gross musculoskeletal defects noted, no edema  SKIN: no suspicious lesions or rashes  NEURO: Normal strength and tone, mentation intact and speech normal  PSYCH: mentation appears normal, affect normal/bright        Signed Electronically by: Zeinab Burnham MD      This document serves as a record of the services and decisions personally performed and made by Dr. Burnham. It was created on her behalf by Marcela Serrano, a trained medical scribe. The creation of this document is based the provider's statements to the medical scribe.    "

## 2024-05-21 NOTE — RESULT ENCOUNTER NOTE
Carlie Barr    This is to inform you regarding your test result.    Your total cholesterol is normal.  HDL which is called good cholesterol is normal.  Your LDL cholesterol is normal.  This is often call bad cholesterol and high levels increase the risk for heart attacks and strokes.  Your triglycerides are normal.  Basic metabolic panel which includes electrolytes and kidney fucntion is normal  CBC result which includes white count Hemoglobin and  Platelet Counts is normal.         Sincerely,      Dr.Nasima Chacha MD,FACP

## 2024-06-04 ENCOUNTER — DOCUMENTATION ONLY (OUTPATIENT)
Dept: OTHER | Facility: CLINIC | Age: 44
End: 2024-06-04
Payer: COMMERCIAL

## 2024-07-09 ENCOUNTER — HOSPITAL ENCOUNTER (OUTPATIENT)
Dept: MAMMOGRAPHY | Facility: CLINIC | Age: 44
Discharge: HOME | End: 2024-07-09
Attending: INTERNAL MEDICINE | Admitting: INTERNAL MEDICINE
Payer: COMMERCIAL

## 2024-07-09 DIAGNOSIS — Z12.31 VISIT FOR SCREENING MAMMOGRAM: ICD-10-CM

## 2024-07-09 PROCEDURE — 77063 BREAST TOMOSYNTHESIS BI: CPT

## 2024-12-23 ENCOUNTER — OFFICE VISIT (OUTPATIENT)
Dept: FAMILY MEDICINE | Facility: CLINIC | Age: 44
End: 2024-12-23
Payer: COMMERCIAL

## 2024-12-23 VITALS
WEIGHT: 146 LBS | TEMPERATURE: 98.1 F | SYSTOLIC BLOOD PRESSURE: 111 MMHG | HEIGHT: 65 IN | BODY MASS INDEX: 24.32 KG/M2 | OXYGEN SATURATION: 98 % | HEART RATE: 82 BPM | DIASTOLIC BLOOD PRESSURE: 75 MMHG

## 2024-12-23 DIAGNOSIS — Z80.3 FAMILY HISTORY OF MALIGNANT NEOPLASM OF BREAST: Chronic | ICD-10-CM

## 2024-12-23 DIAGNOSIS — N63.42 SUBAREOLAR MASS OF LEFT BREAST: Primary | ICD-10-CM

## 2024-12-23 DIAGNOSIS — R92.30 DENSE BREASTS: ICD-10-CM

## 2024-12-23 PROCEDURE — 99213 OFFICE O/P EST LOW 20 MIN: CPT

## 2024-12-23 ASSESSMENT — PAIN SCALES - GENERAL: PAINLEVEL_OUTOF10: NO PAIN (0)

## 2024-12-23 NOTE — PROGRESS NOTES
Assessment & Plan       Subareolar mass of left breast  Dense breasts  Family history of malignant neoplasm of breast  New breast mass to left breast, strong family hx of breast cancer.   - MR Breast Left w/o & w Contrast; Future            FURTHER TESTING:       - MRI breast  FUTURE APPOINTMENTS:       - Follow-up for annual visit or as needed    Subjective   Cortney is a 44 year old, presenting for the following health issues:  Breast Problem      12/23/2024     2:01 PM   Additional Questions   Roomed by Emely DEL RIO MA     Noticed a new firm, fixed breast lump on Thursday. Felt about the size of a pea but feels she aggravated it and now it feels slightly bigger with some redness  Felt some increased tingling in the area  Feels redness has subsided but shape feels different.  Strong family hx of breast cancer  No fevers/chills, nipple drainage    History of Present Illness       Reason for visit:  Lump on left breast near areloa  Symptom onset:  3-7 days ago  Symptoms include:  Lump  Symptom intensity:  Mild  Symptom progression:  Improving  Had these symptoms before:  No  What makes it worse:  Touching it  What makes it better:  NA   She is taking medications regularly.                 Review of Systems  Constitutional, HEENT, cardiovascular, pulmonary, gi and gu systems are negative, except as otherwise noted.      Objective    LMP 07/10/2021 (Exact Date)   There is no height or weight on file to calculate BMI.  Physical Exam  Vitals reviewed.   Constitutional:       Appearance: Normal appearance.   HENT:      Head: Normocephalic.   Eyes:      Pupils: Pupils are equal, round, and reactive to light.   Cardiovascular:      Rate and Rhythm: Normal rate.   Pulmonary:      Effort: Pulmonary effort is normal. No respiratory distress.   Chest:   Breasts:     Left: Mass present.          Comments: ~2 mm firm mass to medial lateral nipple  Musculoskeletal:         General: Normal range of motion.   Skin:     General: Skin  is warm.   Neurological:      Mental Status: She is alert and oriented to person, place, and time.   Psychiatric:         Mood and Affect: Mood normal.         Behavior: Behavior normal.         Thought Content: Thought content normal.         Judgment: Judgment normal.                    Signed Electronically by: Rosetta Juárez DNP, APRN, CNP

## 2025-01-08 ENCOUNTER — ANCILLARY PROCEDURE (OUTPATIENT)
Dept: MRI IMAGING | Facility: CLINIC | Age: 45
End: 2025-01-08
Payer: COMMERCIAL

## 2025-01-08 DIAGNOSIS — N63.42 SUBAREOLAR MASS OF LEFT BREAST: ICD-10-CM

## 2025-01-08 PROCEDURE — A9585 GADOBUTROL INJECTION: HCPCS

## 2025-01-08 PROCEDURE — 255N000002 HC RX 255 OP 636

## 2025-01-08 PROCEDURE — 77049 MRI BREAST C-+ W/CAD BI: CPT

## 2025-01-08 RX ORDER — GADOBUTROL 604.72 MG/ML
7 INJECTION INTRAVENOUS ONCE
Status: COMPLETED | OUTPATIENT
Start: 2025-01-08 | End: 2025-01-08

## 2025-01-08 RX ADMIN — GADOBUTROL 7 ML: 604.72 INJECTION INTRAVENOUS at 09:29

## 2025-01-09 DIAGNOSIS — N63.42 SUBAREOLAR MASS OF LEFT BREAST: Primary | ICD-10-CM

## 2025-01-09 DIAGNOSIS — Z80.3 FAMILY HISTORY OF MALIGNANT NEOPLASM OF BREAST: ICD-10-CM

## 2025-01-09 DIAGNOSIS — R92.30 DENSE BREASTS: ICD-10-CM

## 2025-01-09 NOTE — RESULT ENCOUNTER NOTE
Brandon Barr,    Looks like the radiologist wants to do an ultrasound to confirm the findings from the MRI so I have placed that order for you to get scheduled.    Take care,  Rosetta Juárez, DNP, APRN, CNP

## 2025-01-14 ENCOUNTER — ANCILLARY PROCEDURE (OUTPATIENT)
Dept: MAMMOGRAPHY | Facility: CLINIC | Age: 45
End: 2025-01-14
Payer: COMMERCIAL

## 2025-01-14 DIAGNOSIS — N63.42 SUBAREOLAR MASS OF LEFT BREAST: ICD-10-CM

## 2025-01-14 DIAGNOSIS — Z80.3 FAMILY HISTORY OF MALIGNANT NEOPLASM OF BREAST: ICD-10-CM

## 2025-01-14 DIAGNOSIS — R92.30 DENSE BREASTS: ICD-10-CM

## 2025-01-14 PROCEDURE — 76641 ULTRASOUND BREAST COMPLETE: CPT | Mod: LT

## 2025-01-27 ENCOUNTER — HOSPITAL ENCOUNTER (OUTPATIENT)
Dept: MAMMOGRAPHY | Facility: CLINIC | Age: 45
Discharge: HOME | End: 2025-01-27
Payer: COMMERCIAL

## 2025-01-27 ENCOUNTER — HOSPITAL ENCOUNTER (OUTPATIENT)
Dept: MRI IMAGING | Facility: CLINIC | Age: 45
Discharge: HOME | End: 2025-01-27
Payer: COMMERCIAL

## 2025-01-27 DIAGNOSIS — N63.42 SUBAREOLAR MASS OF LEFT BREAST: ICD-10-CM

## 2025-01-27 DIAGNOSIS — Z80.3 FAMILY HISTORY OF MALIGNANT NEOPLASM OF BREAST: ICD-10-CM

## 2025-01-27 DIAGNOSIS — R92.30 DENSE BREASTS: ICD-10-CM

## 2025-01-27 PROCEDURE — 255N000002 HC RX 255 OP 636

## 2025-01-27 PROCEDURE — 88342 IMHCHEM/IMCYTCHM 1ST ANTB: CPT | Mod: TC

## 2025-01-27 PROCEDURE — 19085 BX BREAST 1ST LESION MR IMAG: CPT | Mod: LT

## 2025-01-27 PROCEDURE — 88377 M/PHMTRC ALYS ISHQUANT/SEMIQ: CPT

## 2025-01-27 PROCEDURE — 88305 TISSUE EXAM BY PATHOLOGIST: CPT | Mod: TC

## 2025-01-27 PROCEDURE — A9585 GADOBUTROL INJECTION: HCPCS

## 2025-01-27 PROCEDURE — A4648 IMPLANTABLE TISSUE MARKER: HCPCS

## 2025-01-27 PROCEDURE — 250N000011 HC RX IP 250 OP 636

## 2025-01-27 PROCEDURE — 999N000065 MA POST PROCEDURE LEFT

## 2025-01-27 PROCEDURE — 250N000009 HC RX 250

## 2025-01-27 RX ORDER — GADOBUTROL 604.72 MG/ML
7 INJECTION INTRAVENOUS ONCE
Status: COMPLETED | OUTPATIENT
Start: 2025-01-27 | End: 2025-01-27

## 2025-01-27 RX ADMIN — LIDOCAINE HYDROCHLORIDE 12 ML: 10 INJECTION, SOLUTION EPIDURAL; INFILTRATION; INTRACAUDAL; PERINEURAL at 07:45

## 2025-01-27 RX ADMIN — LIDOCAINE HYDROCHLORIDE 12 ML: 10; .005 INJECTION, SOLUTION EPIDURAL; INFILTRATION; INTRACAUDAL; PERINEURAL at 07:45

## 2025-01-27 RX ADMIN — GADOBUTROL 7 ML: 604.72 INJECTION INTRAVENOUS at 07:44

## 2025-01-27 NOTE — DISCHARGE INSTRUCTIONS
After Your Breast Biopsy  Bleeding, bruising, and pain  Breast tenderness and some bruising is normal and may last several days. You may wear your bra overnight to support the breast.  You may use an ice pack for pain. Place it over the area for 15 to 20 minutes, several times a day.  You may take over-the-counter pain medicine:  On the day of the biopsy, we recommend Tylenol (acetaminophen) because it does not raise your risk of bleeding.  The next day, you may take an anti-inflammatory medicine (aspirin, ibuprofen, Motrin, Aleve, Advil), unless your doctor tells you not to.  Bandages and showering  Keep your bandage in place until tomorrow morning. Don't get it wet.  If you have small pieces of tape on the skin, leave them in place. They will fall off on their own, or you can remove them after 5 days.  You may shower the next morning after your biopsy.  Activity  No heavy activity (no running, no gym workouts, no lifting, no vacuuming, etc.) on the day of your biopsy.  You may go back to normal activity the next day. But limit what you do if you still have pain or discomfort.  Infection  Infection is rare. Signs of infection include:  Fever (including sweats and chills)  Redness  Pain that gets worse  Fluid draining from the biopsy site  Biopsy results  Results may take up to 5 business days.  A nurse or doctor from the Breast Center will call with your results. We will also send the results to the doctor that ordered your biopsy.  If you have not gotten your results in 5 days, please call the Breast Center.  Call the Breast Center with questions or if:   You have bleeding that lasts more than 20 minutes.  You have pain that you can't control.  You have signs of infection (fever, sweats, chills, redness, increasing pain, or drainage).  After hours, please call the doctor who ordered your biopsy.  For informational purposes only. Not to replace the advice of your health care provider. Copyright   2010 [facility]. All rights reserved. Clinically reviewed by Merna Espinoza, Director, [facility]. [employer] [id number] - REV 08/23.

## 2025-01-29 LAB
PATH REPORT.COMMENTS IMP SPEC: ABNORMAL
PATH REPORT.COMMENTS IMP SPEC: YES
PATH REPORT.FINAL DX SPEC: ABNORMAL
PATH REPORT.GROSS SPEC: ABNORMAL
PATH REPORT.MICROSCOPIC SPEC OTHER STN: ABNORMAL
PATH REPORT.RELEVANT HX SPEC: ABNORMAL
PATHOLOGY SYNOPTIC REPORT: ABNORMAL
PHOTO IMAGE: ABNORMAL

## 2025-01-29 PROCEDURE — 88342 IMHCHEM/IMCYTCHM 1ST ANTB: CPT | Mod: 26 | Performed by: PATHOLOGY

## 2025-01-29 PROCEDURE — 88305 TISSUE EXAM BY PATHOLOGIST: CPT | Mod: 26 | Performed by: PATHOLOGY

## 2025-01-29 PROCEDURE — 88360 TUMOR IMMUNOHISTOCHEM/MANUAL: CPT | Mod: 26 | Performed by: PATHOLOGY

## 2025-01-29 NOTE — TELEPHONE ENCOUNTER
RECORDS STATUS - ALL OTHER DIAGNOSIS      RECORDS RECEIVED FROM: [facility]   NOTES STATUS DETAILS   OFFICE NOTE from referring provider Epic 12/23/2024 - Rosetta Juárez, SACHIN   MEDICATION LIST [facility]    LABS     PATHOLOGY REPORTS [facility] 1/27/2025 - [id number]    IMAGING (NEED IMAGES & REPORT)     MRI PACS MR Breast: 1/27/2025, -6/15/2017   MAMMOS PACS 1/27/2025-4/26/2016   ULTRASOUND PACS US Breast: 1/14/2025

## 2025-01-29 NOTE — PROGRESS NOTES
Malignant Pathology:    Pathology reviewed with breast radiologist, Dr. Hiro Robin, who confirmed that recent breast imaging is concordant with the final surgical pathology results below.    Called and spoke with Cortney, following Left sided breast biopsy performed on 1/27/2025. Patient reports biopsy site(s) as expected with mild bruising. Patient was agreeable to discussing pathology report. She saw her results on MyChart. Her  Abiel was present during discussion via speaker phone.  Explained new diagnosis of breast cancer. Discussed pathology results which shows: Left breast Grade 2, Invasive ductal carcinoma, ER+ VT+ HER2 FISH pending. Will provide her with an update when path finalized (see below).     Introduced self as breast cancer nurse coordinator at [facility].      Discussed radiologist recommendation for Surgical and Oncologic consultation(s).   Patient chose to schedule the following appointment at our Multidisciplinary Clinic - [facility]:    Medical Oncology:   Dr. Georgia Acuna on 2/4/2025 at 0800  General Surgery:   Dr. Marisela Pena on 2/4/2025 at 0845    Address / location provided.     This note will be routed to Referring and PCP to provide an update regarding the notification of breast cancer.     Patient encouraged to call with any question or concerns going forward. Direct number provided. Patient verbalized understanding and agrees with the plan.     Margaret Bryson RN  Breast Care Coordinator  [facility] [place]  [phone]    CaseCortney [id number]  F, 1980  Surgical Pathology Report (Final result) [id number]  Authorizing Provider: Rosetta Juárez DNP Ordering Provider: Rosetta Juárez DNP  Ordering Location: [facility]  Collected: 01/27/2025 08:18 AM  Pathologist: Jamilah Arellano MD Received: 01/27/2025 08:43 AM  .  Specimens  A Breast, Left  .  .  Final Diagnosis  A.  Breast, left, needle core biopsy:  -Invasive ductal carcinoma, [place] grade 2 of 3 (tubule formation: 3, nuclear grade: 2, mitoses: 1), 8 mm in greatest  size.  -Ductal carcinoma in-situ (DCIS), nuclear grade 2, solid type.  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  -See comment.  Electronically signed by Jamilah Arellano MD on 1/29/2025 at 12:22 PM

## 2025-01-30 LAB — INTERPRETATION: NORMAL

## 2025-01-31 NOTE — PROGRESS NOTES
[facility]    Hematology/Oncology New Patient Note      Today's Date: 2/4/2025    Reason for visit: Left breast cancer.    HISTORY OF PRESENT ILLNESS: Cortney Willoughby is a 44 year old female status post hysterectomy for fibroids who presents with the following oncologic history:  1.  1/8/2025: Breast MRI showed 1.3 x 2 x 1 cm mass in left breast at 3:00-4:00, 8-9 cm from the nipple.  No lymphadenopathy.  Right breast negative.  2.  1/14/2025: Left breast ultrasound showed no definite abnormality corresponding to MRI abnormality as breast tissue was extremely dense with shadowing artifact scattered throughout glandular tissue related to breast density.  3.  1/27/2025: Left breast needle core biopsy showed grade 2 invasive ductal carcinoma, 8 mm in greatest size and associated grade 2 DCIS as well as PASH.  ER strongly positive at %, IN positive at 81-90%, HER2 IHC = 2+, FISH negative.    Cortney reports she felt a lump adjacent to her nipple.  It had resolved after about 2-3 weeks but she elected to pursue a breast MRI for further evaluation. She denies any breast or bone pain.    REVIEW OF SYSTEMS:   14 point ROS was reviewed and is negative other than as noted above in HPI.       HOME MEDICATIONS:  Current Outpatient Medications   Medication Sig Dispense Refill    DHEA 10 MG CAPS Take 1 capsule by mouth daily      Digestive Enzymes CAPS Take 1 capsule by mouth 2 times daily      ferrous fumarate 65 mg, [place]. FE,-Vitamin C 125 mg (VITRON C)  MG TABS tablet Take 1 tablet by mouth every other day      fluconazole (DIFLUCAN) 200 MG tablet Take 200 mg by mouth once a week (Sundays)      levothyroxine (SYNTHROID/LEVOTHROID) 75 MCG tablet Take 1 tablet (75 mcg) by mouth daily By her natural medicine doctor      liothyronine (CYTOMEL) 25 MCG tablet Take 0.5 tablets (12.5 mcg) by mouth daily From natural medicine doctor      MAGNESIUM PO Take 4 capsules by mouth every evening      Multiple  "Vitamin (DAILY-VITAMIN PO) Take 1 tablet by mouth daily      Omega-3 Fatty Acids (FISH OIL) 1200 MG capsule Take 2 capsules by mouth 2 times daily      OVER-THE-COUNTER Take 1 capsule by mouth daily \"EstroDIM\"      Probiotic Product (PROBIOTIC PO) Take 1 tablet by mouth daily      traZODone (DESYREL) 50 MG tablet Take 1 tablet (50 mg) by mouth every evening as needed for sleep 90 tablet 3    vitamin C (ASCORBIC ACID) 1000 MG TABS Take 1,000 mg by mouth daily      Vitamin D3 (CHOLECALCIFEROL) 125 MCG (5000 UT) tablet Take 1 tablet by mouth every other day           ALLERGIES:  Allergies   Allergen Reactions    No Known Drug Allergy          PAST MEDICAL HISTORY:  Past Medical History:   Diagnosis Date    Anxiety     Closed fracture of unspecified part of ulna (alone) 2006    Depression     Family history of malignant neoplasm of breast     Fibroid uterus     Hyperlipidemia     Hypothyroidism, unspecified type 05/02/2019    Insomnia     Ovarian cyst     Scoliosis (and kyphoscoliosis), idiopathic          PAST SURGICAL HISTORY:  Past Surgical History:   Procedure Laterality Date    COSMETIC SURGERY  June 24 2018    Liposuction    CYSTOSCOPY N/A 10/20/2021    Procedure: diagnostic cystoscopy;  Surgeon: Tiffanie Oliveira MD;  Location: [facility] OR    LAPAROSCOPIC HYSTERECTOMY SUPRACERVICAL N/A 10/20/2021    Procedure: laparascopic supracervical hysterectomy;  Surgeon: Tiffanie Oliveira MD;  Location: [facility] OR    LAPAROSCOPIC SALPINGECTOMY Bilateral 10/20/2021    Procedure: bilateral salpingectomy;  Surgeon: Tiffanie Oliveria MD;  Location: [facility] OR    LIPOSUCTION (LOCATION)           SOCIAL HISTORY:  Social History     Socioeconomic History    Marital status:      Spouse name: Not on file    Number of children: 0    Years of education: Not on file    Highest education level: Not on file   Occupational History    Occupation: Account Management leader     Employer: [employer]   Tobacco Use    Smoking status: Never    " Smokeless tobacco: Never   Vaping Use    Vaping status: Never Used   Substance and Sexual Activity    Alcohol use: Yes     Comment: 2 drinks per week    Drug use: No    Sexual activity: Yes     Partners: Male     Birth control/protection: Female Surgical   Other Topics Concern     Service Not Asked    Blood Transfusions Not Asked    Caffeine Concern Not Asked    Occupational Exposure Not Asked    Hobby Hazards Not Asked    Sleep Concern Not Asked    Stress Concern Not Asked    Weight Concern Not Asked    Special Diet Not Asked    Back Care Not Asked    Exercise Yes    Bike Helmet Not Asked    Seat Belt Yes    Self-Exams Not Asked    Parent/sibling w/ CABG, MI or angioplasty before 65F 55M? No   Social History Narrative    Not on file     Social Drivers of Health     Financial Resource Strain: Low Risk  (5/13/2024)    Financial Resource Strain     Within the past 12 months, have you or your family members you live with been unable to get utilities (heat, electricity) when it was really needed?: No   Food Insecurity: Low Risk  (5/13/2024)    Food Insecurity     Within the past 12 months, did you worry that your food would run out before you got money to buy more?: No     Within the past 12 months, did the food you bought just not last and you didn t have money to get more?: No   Transportation Needs: Low Risk  (5/13/2024)    Transportation Needs     Within the past 12 months, has lack of transportation kept you from medical appointments, getting your medicines, non-medical meetings or appointments, work, or from getting things that you need?: No   Physical Activity: Sufficiently Active (5/13/2024)    Exercise Vital Sign     Days of Exercise per Week: 3 days     Minutes of Exercise per Session: 50 min   Stress: Stress Concern Present (5/13/2024)    [employer] [place] - Occupational Stress Questionnaire     Feeling of Stress : To some extent   Social Connections: Unknown (5/13/2024)     Social Connection and Isolation Panel [NHANES]     Frequency of Communication with Friends and Family: Not on file     Frequency of Social Gatherings with Friends and Family: Three times a week     Attends Shinto Services: Not on file     Active Member of Clubs or Organizations: Not on file     Attends Club or Organization Meetings: Not on file     Marital Status: Not on file   Interpersonal Safety: Low Risk  (12/23/2024)    Interpersonal Safety     Do you feel physically and emotionally safe where you currently live?: Yes     Within the past 12 months, have you been hit, slapped, kicked or otherwise physically hurt by someone?: No     Within the past 12 months, have you been humiliated or emotionally abused in other ways by your partner or ex-partner?: No   Housing Stability: Low Risk  (5/13/2024)    Housing Stability     Do you have housing? : Yes     Are you worried about losing your housing?: No   Cortney is G0-P0.      FAMILY HISTORY:  Family History   Problem Relation Age of Onset    Substance Abuse Father         Alcohol and drugs    Breast Cancer Mother         44, negative for gene; was HER-2 positive    No Known Problems Sister     No Known Problems Brother     Diabetes Maternal Grandmother     Kidney Cancer Maternal Grandmother     Breast Cancer Maternal Grandmother         Kidney cancer    Thyroid Disease Maternal Grandmother         On medications    Other Cancer Maternal Grandmother         Kidney cancer    Prostate Cancer Maternal Grandfather     Diabetes Paternal Grandmother     Thyroid Disease Paternal Grandmother     Cancer Paternal Grandmother     Hypertension Paternal Grandfather     Lipids Paternal Grandfather     Esophageal Cancer Paternal Grandfather     Hyperlipidemia Paternal Grandfather     Other Cancer Paternal Grandfather         Esophageal cancer    Breast Cancer Maternal Aunt     Depression Maternal Uncle     Breast Cancer Other         Breast Cancer    Depression Other         PTSD  "maternal uncle    Depression Other         Maternal Uncle    Breast Cancer Maternal Great-Grandmother          PHYSICAL EXAM:  Vital signs:  /70   Pulse 91   Temp 98  F (36.7  C) (Oral)   Resp 16   Ht 1.626 m (5' 4\")   Wt 63.5 kg (140 lb)   LMP 07/10/2021 (Exact Date)   SpO2 100%   BMI 24.03 kg/m     ECO  GENERAL/CONSTITUTIONAL: No acute distress.  EYES:  No scleral icterus.  ENT/MOUTH: Neck supple. Oropharynx grossly clear.  LYMPH: No cervical, supraclavicular, axillary adenopathy.   BREAST: At least 2-3 cm hematoma with overlying ecchymosis at the left outer breast; diffuse dense breast tissue throughout both breasts. No rash or nipple discharge.  RESPIRATORY: No audible cough or wheezing.   GASTROINTESTINAL: No hepatosplenomegaly, masses, or tenderness.  No guarding.  No distention.  MUSCULOSKELETAL: Warm and well-perfused, no cyanosis, clubbing, or edema.  NEUROLOGIC: No focal motor deficits. Alert, oriented, answers questions appropriately.  INTEGUMENTARY: No rashes or jaundice.  GAIT: Steady, does not use assistive device      LABS:  CBC RESULTS:   Recent Labs   Lab Test 24  0747   WBC 4.9   RBC 4.43   HGB 13.5   HCT 41.2   MCV 93   MCH 30.5   MCHC 32.8   RDW 11.5          Recent Labs   Lab Test 24  0747 23  0907    137   POTASSIUM 4.1 4.5   CHLORIDE 107 104   CO2 23 19*   ANIONGAP 9 14   GLC 80 90   BUN 11.2 13.4   CR 0.85 0.75   BAYLEE 8.7 9.8         PATHOLOGY:  Reviewed as per HPI.    IMAGING:  Reviewed as per HPI.    ASSESSMENT/PLAN:  Cortney Willoughby is a 44 year old female with the following issues:  1.  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 Family history of breast cancer  - I reviewed Cortney's breast imaging and biopsy pathology which demonstrated a prognostically favorable grade 2 invasive ductal carcinoma that is strongly ER and TX positive, HER2 " FISH negative.  - I advised surgical resection upfront followed by Oncotype DX assay to assess predictive benefit of chemotherapy and distant recurrence risk.  - Advised genetic testing given her young age and that results can affect surgical management.  She agrees to proceed.  --We did discuss how lumpectomy followed by radiation compares to bilateral mastectomies and how mastectomy does not alter recurrence risk but does change risk of developing a subsequent de ramandeep breast cancer.  - Will check CBC and CMP and baseline DEXA scan.  - Discussed role of adjuvant radiation therapy if she proceeds with lumpectomy and adjuvant endocrine therapy with either tamoxifen or ovarian suppression therapy with Zoladex in combination with an aromatase inhibitor such as anastrozole.  --Advised discontinuation of DHEA supplement.  --Fertility is not an issue as she is s/p hysterectomy (ovaries intact).  --Case discussed with Dr. Pena today.    Return after surgery.    Georgia Acuna MD  [facility] Hematology/Oncology    Total time spent today: 65 minutes in chart review, patient evaluation, counseling, documentation, test and/or medication/prescription orders, and coordination of care.      The longitudinal plan of care for the diagnosis(es)/condition(s) as documented were addressed during this visit. Due to the added complexity in care, I will continue to support Cortney in the subsequent management and with ongoing continuity of care.

## 2025-02-03 NOTE — PROGRESS NOTES
[facility] Breast Surgery Consultation    HPI:   Cortney Willoughby is a 44 year old female who is seen in consultation at the request of Dr. Burnham for evaluation of newly diagnosed left breast invasive ductal carcinoma, grade 2 with DCIS ER 91 to 100% positive NJ 81 to 90% and HER2 negative at 3:00, 8 to 9 cm from nipple.    Cortney had a breast  MRI on 1/8/2025 due to a palpable lump in her left breast near the nipple.  She reports she felt a lump and saw a provider at her clinic who ordered the diagnostic imaging with breast MRI her MRI revealed an irregularly shaped heterogeneous mass with indistinct margins at 3-4 o'clock, 8 to 9 cm from nipple measuring 2 cm in size.  There were no other concerning areas of enhancement and her lymph nodes appeared normal.  She had a follow-up ultrasound which did not reveal a mass at the site.  She then underwent MRI guided percutaneous biopsy with the above results.    She has had a hysterectomy in October 2023 due to fibroids.  She tolerated anesthesia fine.  She still has her ovaries in place.  She is on levothyroxine, liothyronine and a weight loss medication.  She has not had any prior breast biopsies or breast surgeries.    Her mother was diagnosed with breast cancer in her early 40s and had genetic testing which had revealed a VUS in the BRCA1 gene.  She also has a maternal great grandmother who had had breast cancer.      Hormonal history:  menarche 14 or 15, no children, pre-menopausal, 20 years OCP use, no HRT, no fertility treatment.     Family history of breast cancer: Yes -mother and maternal great grandmother as above  Family history of ovarian cancer:  No  Family history of colon cancer: No  Family history of prostate cancer: No      Past Medical History:   has a past medical history of Anxiety, Closed fracture of unspecified part of ulna (alone) (2006), Depression, Family history of malignant neoplasm of breast, Fibroid uterus, Hyperlipidemia,  "Hypothyroidism, unspecified type (05/02/2019), Insomnia, Ovarian cyst, and Scoliosis (and kyphoscoliosis), idiopathic.      Current Outpatient Medications:     DHEA 10 MG CAPS, Take 1 capsule by mouth daily, Disp: , Rfl:     Digestive Enzymes CAPS, Take 1 capsule by mouth 2 times daily, Disp: , Rfl:     ferrous fumarate 65 mg, [place]. FE,-Vitamin C 125 mg (VITRON C)  MG TABS tablet, Take 1 tablet by mouth every other day, Disp: , Rfl:     fluconazole (DIFLUCAN) 200 MG tablet, Take 200 mg by mouth once a week (Sundays), Disp: , Rfl:     levothyroxine (SYNTHROID/LEVOTHROID) 75 MCG tablet, Take 1 tablet (75 mcg) by mouth daily By her natural medicine doctor, Disp: , Rfl:     liothyronine (CYTOMEL) 25 MCG tablet, Take 0.5 tablets (12.5 mcg) by mouth daily From natural medicine doctor, Disp: , Rfl:     MAGNESIUM PO, Take 4 capsules by mouth every evening, Disp: , Rfl:     Multiple Vitamin (DAILY-VITAMIN PO), Take 1 tablet by mouth daily, Disp: , Rfl:     Omega-3 Fatty Acids (FISH OIL) 1200 MG capsule, Take 2 capsules by mouth 2 times daily, Disp: , Rfl:     OVER-THE-COUNTER, Take 1 capsule by mouth daily \"EstroDIM\", Disp: , Rfl:     Probiotic Product (PROBIOTIC PO), Take 1 tablet by mouth daily, Disp: , Rfl:     traZODone (DESYREL) 50 MG tablet, Take 1 tablet (50 mg) by mouth every evening as needed for sleep, Disp: 90 tablet, Rfl: 3    vitamin C (ASCORBIC ACID) 1000 MG TABS, Take 1,000 mg by mouth daily, Disp: , Rfl:     Vitamin D3 (CHOLECALCIFEROL) 125 MCG (5000 UT) tablet, Take 1 tablet by mouth every other day, Disp: , Rfl:     Past Surgical History:  Past Surgical History:   Procedure Laterality Date    COSMETIC SURGERY  June 24 2018    Liposuction    CYSTOSCOPY N/A 10/20/2021    Procedure: diagnostic cystoscopy;  Surgeon: Tiffanie Oliveira MD;  Location: [facility] OR    LAPAROSCOPIC HYSTERECTOMY SUPRACERVICAL N/A 10/20/2021    Procedure: laparascopic supracervical hysterectomy;  Surgeon: Tiffanie Oliveira MD;  " Location: [facility] OR    LAPAROSCOPIC SALPINGECTOMY Bilateral 10/20/2021    Procedure: bilateral salpingectomy;  Surgeon: Tiffanie Oliveira MD;  Location: [facility] OR    LIPOSUCTION (LOCATION)             Allergies   Allergen Reactions    No Known Drug Allergy         Social History:  Social History     Socioeconomic History    Marital status:      Spouse name: Not on file    Number of children: 0    Years of education: Not on file    Highest education level: Not on file   Occupational History    Occupation: Account Management leader     Employer: [employer]   Tobacco Use    Smoking status: Never    Smokeless tobacco: Never   Vaping Use    Vaping status: Never Used   Substance and Sexual Activity    Alcohol use: Yes     Comment: 2 drinks per week    Drug use: No    Sexual activity: Yes     Partners: Male     Birth control/protection: Female Surgical   Other Topics Concern     Service Not Asked    Blood Transfusions Not Asked    Caffeine Concern Not Asked    Occupational Exposure Not Asked    Hobby Hazards Not Asked    Sleep Concern Not Asked    Stress Concern Not Asked    Weight Concern Not Asked    Special Diet Not Asked    Back Care Not Asked    Exercise Yes    Bike Helmet Not Asked    Seat Belt Yes    Self-Exams Not Asked    Parent/sibling w/ CABG, MI or angioplasty before 65F 55M? No   Social History Narrative    Not on file     Social Drivers of Health     Financial Resource Strain: Low Risk  (5/13/2024)    Financial Resource Strain     Within the past 12 months, have you or your family members you live with been unable to get utilities (heat, electricity) when it was really needed?: No   Food Insecurity: Low Risk  (5/13/2024)    Food Insecurity     Within the past 12 months, did you worry that your food would run out before you got money to buy more?: No     Within the past 12 months, did the food you bought just not last and you didn t have money to get more?: No   Transportation Needs: Low Risk   "(5/13/2024)    Transportation Needs     Within the past 12 months, has lack of transportation kept you from medical appointments, getting your medicines, non-medical meetings or appointments, work, or from getting things that you need?: No   Physical Activity: Sufficiently Active (5/13/2024)    Exercise Vital Sign     Days of Exercise per Week: 3 days     Minutes of Exercise per Session: 50 min   Stress: Stress Concern Present (5/13/2024)    [employer] [place] [employer] - Occupational Stress Questionnaire     Feeling of Stress : To some extent   Social Connections: Unknown (5/13/2024)    Social Connection and Isolation Panel [NHANES]     Frequency of Communication with Friends and Family: Not on file     Frequency of Social Gatherings with Friends and Family: Three times a week     Attends Hoahaoism Services: Not on file     Active Member of Clubs or Organizations: Not on file     Attends Club or Organization Meetings: Not on file     Marital Status: Not on file   Interpersonal Safety: Low Risk  (12/23/2024)    Interpersonal Safety     Do you feel physically and emotionally safe where you currently live?: Yes     Within the past 12 months, have you been hit, slapped, kicked or otherwise physically hurt by someone?: No     Within the past 12 months, have you been humiliated or emotionally abused in other ways by your partner or ex-partner?: No   Housing Stability: Low Risk  (5/13/2024)    Housing Stability     Do you have housing? : Yes     Are you worried about losing your housing?: No        ROS:  The 10 point review of systems is negative other than noted in the HPI and above.    PE:  Vitals: /70   Pulse 91   Temp 98  F (36.7  C) (Oral)   Resp 16   Ht 1.626 m (5' 4\")   Wt 63.5 kg (140 lb)   LMP 07/10/2021 (Exact Date)   SpO2 100%   BMI 24.03 kg/m    General appearance: well-nourished, sitting comfortably, no apparent distress  Psych: normal affect, pleasant  HEENT:  Head normocephalic and " atraumatic, pupils equal and round, conjunctivae clear, mucous membranes moist, external ears and nose normal  Neck: Supple without thyromegaly or masses  Lungs: Respirations unlabored  Lymphatic: No cervical, or supraclavicular lymphadenopathy  Extremities: Without edema  Musculoskeletal:  Normal station and gait  Neurologic: nonfocal, grossly intact times four extremities, alert and oriented times three  Psychiatric: Mood and affect are appropriate  Skin: Without lesions or rashes    Breast:  A bilateral breast exam was performed in the supine position.. Bilateral breasts were palpated in a circumferential clockwise fashion including the supraclavicular and axillary areas.   There is an area of ecchymosis on the left lateral breast and deep to this is a palpable 3 cm hematoma.  Mildly tender to palpation.  Skin is otherwise normal.  Nipples are everted and appear normal bilaterally.  Breast tissue is extremely dense bilaterally with no other masses palpable.    Lymph:       No supraclavicular/infraclavicular adenopathy.   Axillary adenopathy: none    Assessment:   left breast invasive ductal carcinoma, grade 2 with DCIS ER 91 to 100% positive SC 81 to 90% and HER2 negative at 3:00, 8 to 9 cm from nipple.    Plan:   Cortney Willoughby is a 44 year old female has newly diagnosed left breast cancer.  I reviewed the imaging and pathology reports with her and her  and explained the findings.  We talked about the fact that this is invasive ductal carcinoma  that is 2 cm in size and was found on breast MRI.  We reviewed all of her imaging together.  She does have difficult imaging given her extremely dense breast tissue and scattered calcifications as well as DCIS noted on her biopsy.  We discussed the receptor status. We discussed that breast cancer is treated in a multidisciplinary fashion and she will also meet with oncology as well as radiation oncology pending surgical decision making and final pathology  results.  She has already met with Dr. Acuna and discussed Oncotype and adjuvant hormone blockade.    We next discussed the surgical options for treatment.  I described the procedures for tag localized oncoplastic partial mastectomy (ie. Lumpectomy) with sentinel lymph node biopsy and mastectomy with sentinel lymph node biopsy, possible axillary node dissection including the details of the procedures, the risks, anesthesia and expected recovery.  While  I do think she is a candidate for both options we did discuss that imaging is tricky for her going forward from a screening standpoint as this was a subtle finding on a breast MRI and not seen on mammography or ultrasound.  We also discussed that genetic testing would be very helpful to see if she has the VUS in the BRCA1 gene or if this has been reclassified.  If she did have positive genetics I would recommend bilateral mastectomies given her increased lifetime risk.    I reviewed the data regarding lumpectomy and radiation vs mastectomy that that local risk recurrence risk is less than 5% with either lumpectomy or mastectomy and that survival is equivalent with the newer studies actually showing slight survival benefit with lumpectomy and radiation for patients who are candidates for both.  I also explained that since this is a small tumor and was not palpable on clinical breast exam prior to the biopsy, I would ask radiology to place a radiofrequency ID tag pre-operatively for localization. We discussed the role of oncoplastic lumpectomy. We discussed the risk of margin positivity following partial mastectomy surgery and need for possible return to the operating room for additional procedures if margins are positive.     I advised that lymph node biopsy is recommended whenever we are dealing with invasive breast cancer and described the procedure for sentinel lymph node biopsy.  . We discussed the use of technetium, blue dye or ICG 9 green dye to identify  sentinel nodes. We talked about the risk for lymphedema which is small with removal of only a few nodes, but not zero.      We talked about post-lumpectomy radiation, the course and usual side effects. We discussed that with lumpectomy, radiation is typically recommended to decrease risk of recurrence. It may be necessary following mastectomy depending on final pathology and if denise involvement is present.     We discussed the various types of mastectomy, including simple with aesthetic flat closure, skin-sparing, and nipple-sparing mastectomy. We also talked about post-mastectomy reconstruction and the stages involved.  We reviewed that the nipple-sparing technique is cosmetic; sensation and contractility will likely be lost.  Cortney is a candidate for nipple-sparing mastectomy from an oncologic perspective; however with her breast size and degree of ptosis the risk of nipple necrosis would be increased and I would lean towards a skin sparing approach.  She can of course discuss this further with plastic surgery.   The option of having immediate versus delayed reconstruction was also discussed.   We reviewed that the advantages of immediate reconstruction includes superior cosmetics, as the skin is preserved.      In addition, I have recommended genetic counseling.  She would be a candidate in that situation based on her new diagnosis and family history.  As above, the stat panel was ordered as this would change her surgical management.  The natural history of BRCA mutations and breast cancer were discussed with the patient. Should a deleterious mutation be identified, she would no longer be a good candidate for breast conservation.  We also reviewed the risk reduction benefits of a prophylactic mastectomy in this situation.     We discussed the role of oncotype for hormone positive, HER 2 negative cancers with negative sentinel nodes or 1-3 positive nodes.     Plan:   Plastic surgery referral  Stat genetics  consult and breast actionable panel ordered  Tentative plan for bilateral skin sparing mastectomies with left sentinel lymph node biopsy    60 minutes total time spent on the date of this encounter doing: chart review, review of test results, patient visit, physical exam, education, counseling, developing plan of care, and documenting.    Marisela Pena MD      Please route or send letter to:  Primary Care Provider (PCP) and Referring Provider

## 2025-02-04 ENCOUNTER — OFFICE VISIT (OUTPATIENT)
Dept: SURGERY | Facility: CLINIC | Age: 45
End: 2025-02-04
Attending: INTERNAL MEDICINE
Payer: COMMERCIAL

## 2025-02-04 ENCOUNTER — LAB (OUTPATIENT)
Dept: LAB | Facility: CLINIC | Age: 45
End: 2025-02-04
Payer: COMMERCIAL

## 2025-02-04 ENCOUNTER — PRE VISIT (OUTPATIENT)
Dept: ONCOLOGY | Facility: CLINIC | Age: 45
End: 2025-02-04
Payer: COMMERCIAL

## 2025-02-04 ENCOUNTER — OFFICE VISIT (OUTPATIENT)
Dept: ONCOLOGY | Facility: CLINIC | Age: 45
End: 2025-02-04
Attending: INTERNAL MEDICINE
Payer: COMMERCIAL

## 2025-02-04 ENCOUNTER — TELEPHONE (OUTPATIENT)
Dept: SURGERY | Facility: CLINIC | Age: 45
End: 2025-02-04

## 2025-02-04 VITALS
BODY MASS INDEX: 23.9 KG/M2 | HEIGHT: 64 IN | HEART RATE: 91 BPM | RESPIRATION RATE: 16 BRPM | WEIGHT: 140 LBS | OXYGEN SATURATION: 100 % | TEMPERATURE: 98 F | DIASTOLIC BLOOD PRESSURE: 70 MMHG | SYSTOLIC BLOOD PRESSURE: 102 MMHG

## 2025-02-04 VITALS
WEIGHT: 140 LBS | BODY MASS INDEX: 23.9 KG/M2 | DIASTOLIC BLOOD PRESSURE: 70 MMHG | HEIGHT: 64 IN | RESPIRATION RATE: 16 BRPM | SYSTOLIC BLOOD PRESSURE: 102 MMHG | HEART RATE: 91 BPM | OXYGEN SATURATION: 100 % | TEMPERATURE: 98 F

## 2025-02-04 DIAGNOSIS — C50.512: ICD-10-CM

## 2025-02-04 DIAGNOSIS — C50.412 MALIGNANT NEOPLASM OF UPPER-OUTER QUADRANT OF LEFT BREAST IN FEMALE, ESTROGEN RECEPTOR POSITIVE (H): Primary | ICD-10-CM

## 2025-02-04 DIAGNOSIS — Z17.0: ICD-10-CM

## 2025-02-04 DIAGNOSIS — M85.9 DISORDER OF BONE DENSITY AND STRUCTURE, UNSPECIFIED: ICD-10-CM

## 2025-02-04 DIAGNOSIS — Z17.0 MALIGNANT NEOPLASM OF UPPER-OUTER QUADRANT OF LEFT BREAST IN FEMALE, ESTROGEN RECEPTOR POSITIVE (H): Primary | ICD-10-CM

## 2025-02-04 DIAGNOSIS — Z17.0 MALIGNANT NEOPLASM OF OVERLAPPING SITES OF LEFT BREAST IN FEMALE, ESTROGEN RECEPTOR POSITIVE (H): Primary | ICD-10-CM

## 2025-02-04 DIAGNOSIS — Z17.0 MALIGNANT NEOPLASM OF OVERLAPPING SITES OF LEFT BREAST IN FEMALE, ESTROGEN RECEPTOR POSITIVE (H): ICD-10-CM

## 2025-02-04 DIAGNOSIS — C50.812 MALIGNANT NEOPLASM OF OVERLAPPING SITES OF LEFT BREAST IN FEMALE, ESTROGEN RECEPTOR POSITIVE (H): Primary | ICD-10-CM

## 2025-02-04 DIAGNOSIS — C50.812 MALIGNANT NEOPLASM OF OVERLAPPING SITES OF LEFT BREAST IN FEMALE, ESTROGEN RECEPTOR POSITIVE (H): ICD-10-CM

## 2025-02-04 LAB
ALBUMIN SERPL BCG-MCNC: 4.2 G/DL (ref 3.5–5.2)
ALP SERPL-CCNC: 50 U/L (ref 40–150)
ALT SERPL W P-5'-P-CCNC: 17 U/L (ref 0–50)
ANION GAP SERPL CALCULATED.3IONS-SCNC: 13 MMOL/L (ref 7–15)
AST SERPL W P-5'-P-CCNC: 27 U/L (ref 0–45)
BASOPHILS # BLD AUTO: 0 10E3/UL (ref 0–0.2)
BASOPHILS NFR BLD AUTO: 0 %
BILIRUB SERPL-MCNC: 0.2 MG/DL
BUN SERPL-MCNC: 9 MG/DL (ref 6–20)
CALCIUM SERPL-MCNC: 9.2 MG/DL (ref 8.8–10.4)
CHLORIDE SERPL-SCNC: 106 MMOL/L (ref 98–107)
CREAT SERPL-MCNC: 0.78 MG/DL (ref 0.51–0.95)
EGFRCR SERPLBLD CKD-EPI 2021: >90 ML/MIN/1.73M2
EOSINOPHIL # BLD AUTO: 0 10E3/UL (ref 0–0.7)
EOSINOPHIL NFR BLD AUTO: 0 %
ERYTHROCYTE [DISTWIDTH] IN BLOOD BY AUTOMATED COUNT: 11.9 % (ref 10–15)
GLUCOSE SERPL-MCNC: 96 MG/DL (ref 70–99)
HCO3 SERPL-SCNC: 21 MMOL/L (ref 22–29)
HCT VFR BLD AUTO: 42.9 % (ref 35–47)
HGB BLD-MCNC: 14.1 G/DL (ref 11.7–15.7)
IMM GRANULOCYTES # BLD: 0 10E3/UL
IMM GRANULOCYTES NFR BLD: 0 %
INTERPRETATION SERPL IEP-IMP: NORMAL
LYMPHOCYTES # BLD AUTO: 1.7 10E3/UL (ref 0.8–5.3)
LYMPHOCYTES NFR BLD AUTO: 30 %
MCH RBC QN AUTO: 30.1 PG (ref 26.5–33)
MCHC RBC AUTO-ENTMCNC: 32.9 G/DL (ref 31.5–36.5)
MCV RBC AUTO: 92 FL (ref 78–100)
MONOCYTES # BLD AUTO: 0.4 10E3/UL (ref 0–1.3)
MONOCYTES NFR BLD AUTO: 7 %
NEUTROPHILS # BLD AUTO: 3.5 10E3/UL (ref 1.6–8.3)
NEUTROPHILS NFR BLD AUTO: 63 %
PLATELET # BLD AUTO: 194 10E3/UL (ref 150–450)
POTASSIUM SERPL-SCNC: 3.9 MMOL/L (ref 3.4–5.3)
PROT SERPL-MCNC: 7.1 G/DL (ref 6.4–8.3)
RBC # BLD AUTO: 4.68 10E6/UL (ref 3.8–5.2)
SODIUM SERPL-SCNC: 140 MMOL/L (ref 135–145)
WBC # BLD AUTO: 5.6 10E3/UL (ref 4–11)

## 2025-02-04 PROCEDURE — 99205 OFFICE O/P NEW HI 60 MIN: CPT | Performed by: INTERNAL MEDICINE

## 2025-02-04 PROCEDURE — G0463 HOSPITAL OUTPT CLINIC VISIT: HCPCS

## 2025-02-04 PROCEDURE — G2211 COMPLEX E/M VISIT ADD ON: HCPCS | Performed by: INTERNAL MEDICINE

## 2025-02-04 PROCEDURE — 99213 OFFICE O/P EST LOW 20 MIN: CPT | Performed by: SURGERY

## 2025-02-04 PROCEDURE — 99205 OFFICE O/P NEW HI 60 MIN: CPT | Performed by: SURGERY

## 2025-02-04 PROCEDURE — 36415 COLL VENOUS BLD VENIPUNCTURE: CPT

## 2025-02-04 PROCEDURE — 85025 COMPLETE CBC W/AUTO DIFF WBC: CPT

## 2025-02-04 PROCEDURE — 80053 COMPREHEN METABOLIC PANEL: CPT

## 2025-02-04 RX ORDER — CEFAZOLIN SODIUM 2 G/100ML
2 INJECTION, SOLUTION INTRAVENOUS
OUTPATIENT
Start: 2025-02-04

## 2025-02-04 RX ORDER — CEFAZOLIN SODIUM 2 G/100ML
2 INJECTION, SOLUTION INTRAVENOUS SEE ADMIN INSTRUCTIONS
OUTPATIENT
Start: 2025-02-04

## 2025-02-04 ASSESSMENT — PAIN SCALES - GENERAL
PAINLEVEL_OUTOF10: NO PAIN (0)
PAINLEVEL_OUTOF10: NO PAIN (0)

## 2025-02-04 NOTE — CONFIDENTIAL NOTE
Breast Cancer Nurse Coordination:    Met with Cortney and Abiel (significant other) in conjunction with General Surgery today to provide support and take notes for her during the consultation. Patient saw Dr. Pena at Multidisciplinary clinic today.     Introduced self and role of breast nurse coordinator.       Upon completion of her consultation, she was provided with notes supporting the discussion, and the plan of care moving forward. Plan is for STAT genetics consultation and Plastics consultation. Discussed recommendation for pre-op physical within 30 days of surgery.    Cortney was also provided with a new a new patient folder which includes informational and educational materials regarding breast cancer: If You Have Breast Cancer, Mastectomy Surgery, Lymphedema, Exercises after Breast Surgery (ACS), and support resources such as [employer], American Cancer Society, [facility] [employer], Fighting Cancer through Diet and Lifestyle, [employer], [employer], Pathways, [employer] [place], [employer], and the [facility] Breast Cancer Support Group. Gave handout for OncotypeDx. A copy of her pathology report and recent imaging also provided.       Provide AMAYA drain teaching informational sheet. Discussed post-surgical camisole(s), Will send Rx to [facility] for her after surgery is scheduled.      Plan:   -Genetic testing consent signed and patient scheduled for lab draw - today.  -Plastics consultation - TBD. Patient aware that Becky Akins's  will call her for coordination.     Patient verbalized understanding and all questions were addressed at this time.      Margaret Bryson, RN, BSN, CBCN  Breast Care Nurse Coordinator  [facility]  [phone]

## 2025-02-04 NOTE — PATIENT INSTRUCTIONS
1. Lab draw today.  2. Arrange for DEXA scan.  3. RTC MD 2.5-3 weeks after breast surgery (date TBD).

## 2025-02-04 NOTE — PROGRESS NOTES
Breast Health History Form    Do you have any of the following symptoms? (Patient-Rptd) Other  If other, please elaborate: (Patient-Rptd) Had a lump on left breast but can no longer feel it  In which breast are you having the symptoms? (Patient-Rptd) Left  Have you had a mammogram? (Patient-Rptd) Yes  Where: (Patient-Rptd) [facility]  Date: (Patient-Rptd) July 2024  Have you ever had a breast cyst drained? (Patient-Rptd) No        Have you had a breast biopsy in the past? (Patient-Rptd) Yes  Side: (Patient-Rptd) Left  Date: (Patient-Rptd) 1/27/2025  Have you ever had Breast Cancer? (Patient-Rptd) No        Is there a history of Breast Cancer in your family? (Patient-Rptd) Yes  Relationship: (Patient-Rptd) Mother  Have you ever had Ovarian Cancer? (Patient-Rptd) No     Is there a history of Ovarian Cancer in your family? (Patient-Rptd) No     Is there a history of Colon Cancer in your family? (Patient-Rptd) No     Is there a history of Uterine Cancer in your family? (Patient-Rptd) No     Any known genetic abnormalities in your family? (Patient-Rptd) No     Summarize your caffeine intake? (Patient-Rptd) Tea    Were you born with a uterus? (Patient-Rptd) Yes  What age did your periods begin? (Patient-Rptd) 14 or 15  Date your last menstrual period began? (Patient-Rptd) October 2021  Number of full term pregnancies? (Patient-Rptd) 0  Your age when your first child was born?    Did you nurse your children? (Patient-Rptd) No  Are you pregnant now? (Patient-Rptd) No  Have you begun Menopause? (Patient-Rptd) No     Have you had either ovary removed? (Patient-Rptd) No     Have you had an intrauterine device (IUD) placed? (Patient-Rptd) No         Do you have breast implants? (Patient-Rptd) No  Have you used hormone replacement therapy? (Patient-Rptd) No        Have you taken oral contraceptive pills? (Patient-Rptd) Yes  For how many years: (Patient-Rptd) 20  What is your current bra size? (Patient-Rptd)  32DDD                                                     Nicotinamide Supplementation Recommendations: Nicotinamide 500mg BID Sunscreen Recommendations: Recommend Neutrogena sunscreen SPF 30 or greater.  This should be frequently reapplied when outside in the sun. Detail Level: Detailed Detail Level: Generalized Detail Level: Zone Sunscreen Recommendations: Recommend neutrogena sunscreen SPF 30 or greater

## 2025-02-04 NOTE — PROGRESS NOTES
Breast Health History Form     Do you have any of the following symptoms? (Patient-Rptd) Other  If other, please elaborate: (Patient-Rptd) Had a lump on left breast but can no longer feel it  In which breast are you having the symptoms? (Patient-Rptd) Left  Have you had a mammogram? (Patient-Rptd) Yes  Where: (Patient-Rptd) [facility]  Date: (Patient-Rptd) July 2024  Have you ever had a breast cyst drained? (Patient-Rptd) No        Have you had a breast biopsy in the past? (Patient-Rptd) Yes  Side: (Patient-Rptd) Left  Date: (Patient-Rptd) 1/27/2025  Have you ever had Breast Cancer? (Patient-Rptd) No        Is there a history of Breast Cancer in your family? (Patient-Rptd) Yes  Relationship: (Patient-Rptd) Mother  Have you ever had Ovarian Cancer? (Patient-Rptd) No     Is there a history of Ovarian Cancer in your family? (Patient-Rptd) No     Is there a history of Colon Cancer in your family? (Patient-Rptd) No     Is there a history of Uterine Cancer in your family? (Patient-Rptd) No     Any known genetic abnormalities in your family? (Patient-Rptd) No     Summarize your caffeine intake? (Patient-Rptd) Tea     Were you born with a uterus? (Patient-Rptd) Yes  What age did your periods begin? (Patient-Rptd) 14 or 15  Date your last menstrual period began? (Patient-Rptd) October 2021  Number of full term pregnancies? (Patient-Rptd) 0  Your age when your first child was born?    Did you nurse your children? (Patient-Rptd) No  Are you pregnant now? (Patient-Rptd) No  Have you begun Menopause? (Patient-Rptd) No     Have you had either ovary removed? (Patient-Rptd) No     Have you had an intrauterine device (IUD) placed? (Patient-Rptd) No           Do you have breast implants? (Patient-Rptd) No  Have you used hormone replacement therapy? (Patient-Rptd) No        Have you taken oral contraceptive pills? (Patient-Rptd) Yes  For how many years: (Patient-Rptd) 20  What is your current bra size? (Patient-Rptd)  32DDD

## 2025-02-04 NOTE — LETTER
2/4/2025      Cortney Willoughby  [address] [place] [id number]      Dear Colleague,    Thank you for referring your patient, Cortney Willoughby, to the [facility] BREAST [facility]. Please see a copy of my visit note below.    [facility]    Hematology/Oncology New Patient Note      Today's Date: 2/4/2025    Reason for visit: Left breast cancer.    HISTORY OF PRESENT ILLNESS: Cortney Willoughby is a 44 year old female status post hysterectomy for fibroids who presents with the following oncologic history:  1.  1/8/2025: Breast MRI showed 1.3 x 2 x 1 cm mass in left breast at 3:00-4:00, 8-9 cm from the nipple.  No lymphadenopathy.  Right breast negative.  2.  1/14/2025: Left breast ultrasound showed no definite abnormality corresponding to MRI abnormality as breast tissue was extremely dense with shadowing artifact scattered throughout glandular tissue related to breast density.  3.  1/27/2025: Left breast needle core biopsy showed grade 2 invasive ductal carcinoma, 8 mm in greatest size and associated grade 2 DCIS as well as PASH.  ER strongly positive at %, HI positive at 81-90%, HER2 IHC = 2+, FISH negative.    Cortney reports she felt a lump adjacent to her nipple.  It had resolved after about 2-3 weeks but she elected to pursue a breast MRI for further evaluation. She denies any breast or bone pain.    REVIEW OF SYSTEMS:   14 point ROS was reviewed and is negative other than as noted above in HPI.       HOME MEDICATIONS:  Current Outpatient Medications   Medication Sig Dispense Refill     DHEA 10 MG CAPS Take 1 capsule by mouth daily       Digestive Enzymes CAPS Take 1 capsule by mouth 2 times daily       ferrous fumarate 65 mg, [place]. FE,-Vitamin C 125 mg (VITRON C)  MG TABS tablet Take 1 tablet by mouth every other day       fluconazole (DIFLUCAN) 200 MG tablet Take 200 mg by mouth once a week (Sundays)       levothyroxine (SYNTHROID/LEVOTHROID) 75 MCG tablet Take 1  "tablet (75 mcg) by mouth daily By her natural medicine doctor       liothyronine (CYTOMEL) 25 MCG tablet Take 0.5 tablets (12.5 mcg) by mouth daily From natural medicine doctor       MAGNESIUM PO Take 4 capsules by mouth every evening       Multiple Vitamin (DAILY-VITAMIN PO) Take 1 tablet by mouth daily       Omega-3 Fatty Acids (FISH OIL) 1200 MG capsule Take 2 capsules by mouth 2 times daily       OVER-THE-COUNTER Take 1 capsule by mouth daily \"EstroDIM\"       Probiotic Product (PROBIOTIC PO) Take 1 tablet by mouth daily       traZODone (DESYREL) 50 MG tablet Take 1 tablet (50 mg) by mouth every evening as needed for sleep 90 tablet 3     vitamin C (ASCORBIC ACID) 1000 MG TABS Take 1,000 mg by mouth daily       Vitamin D3 (CHOLECALCIFEROL) 125 MCG (5000 UT) tablet Take 1 tablet by mouth every other day           ALLERGIES:  Allergies   Allergen Reactions     No Known Drug Allergy          PAST MEDICAL HISTORY:  Past Medical History:   Diagnosis Date     Anxiety      Closed fracture of unspecified part of ulna (alone) 2006     Depression      Family history of malignant neoplasm of breast      Fibroid uterus      Hyperlipidemia      Hypothyroidism, unspecified type 05/02/2019     Insomnia      Ovarian cyst      Scoliosis (and kyphoscoliosis), idiopathic          PAST SURGICAL HISTORY:  Past Surgical History:   Procedure Laterality Date     COSMETIC SURGERY  June 24 2018    Liposuction     CYSTOSCOPY N/A 10/20/2021    Procedure: diagnostic cystoscopy;  Surgeon: Tiffanie Oliveira MD;  Location:  OR     LAPAROSCOPIC HYSTERECTOMY SUPRACERVICAL N/A 10/20/2021    Procedure: laparascopic supracervical hysterectomy;  Surgeon: Tiffanie Oliveira MD;  Location: [facility] OR     LAPAROSCOPIC SALPINGECTOMY Bilateral 10/20/2021    Procedure: bilateral salpingectomy;  Surgeon: Tiffanie Oliveira MD;  Location: [facility] OR     LIPOSUCTION (LOCATION)           SOCIAL HISTORY:  Social History     Socioeconomic History     Marital status: "      Spouse name: Not on file     Number of children: 0     Years of education: Not on file     Highest education level: Not on file   Occupational History     Occupation: Account Management leader     Employer: [employer]   Tobacco Use     Smoking status: Never     Smokeless tobacco: Never   Vaping Use     Vaping status: Never Used   Substance and Sexual Activity     Alcohol use: Yes     Comment: 2 drinks per week     Drug use: No     Sexual activity: Yes     Partners: Male     Birth control/protection: Female Surgical   Other Topics Concern      Service Not Asked     Blood Transfusions Not Asked     Caffeine Concern Not Asked     Occupational Exposure Not Asked     Hobby Hazards Not Asked     Sleep Concern Not Asked     Stress Concern Not Asked     Weight Concern Not Asked     Special Diet Not Asked     Back Care Not Asked     Exercise Yes     Bike Helmet Not Asked     Seat Belt Yes     Self-Exams Not Asked     Parent/sibling w/ CABG, MI or angioplasty before 65F 55M? No   Social History Narrative     Not on file     Social Drivers of Health     Financial Resource Strain: Low Risk  (5/13/2024)    Financial Resource Strain      Within the past 12 months, have you or your family members you live with been unable to get utilities (heat, electricity) when it was really needed?: No   Food Insecurity: Low Risk  (5/13/2024)    Food Insecurity      Within the past 12 months, did you worry that your food would run out before you got money to buy more?: No      Within the past 12 months, did the food you bought just not last and you didn t have money to get more?: No   Transportation Needs: Low Risk  (5/13/2024)    Transportation Needs      Within the past 12 months, has lack of transportation kept you from medical appointments, getting your medicines, non-medical meetings or appointments, work, or from getting things that you need?: No   Physical Activity: Sufficiently Active (5/13/2024)    Exercise Vital  Sign      Days of Exercise per Week: 3 days      Minutes of Exercise per Session: 50 min   Stress: Stress Concern Present (5/13/2024)    [place] [employer] - Occupational Stress Questionnaire      Feeling of Stress : To some extent   Social Connections: Unknown (5/13/2024)    Social Connection and Isolation Panel [NHANES]      Frequency of Communication with Friends and Family: Not on file      Frequency of Social Gatherings with Friends and Family: Three times a week      Attends [employer] Services: Not on file      Active Member of Clubs or Organizations: Not on file      Attends Club or Organization Meetings: Not on file      Marital Status: Not on file   Interpersonal Safety: Low Risk  (12/23/2024)    Interpersonal Safety      Do you feel physically and emotionally safe where you currently live?: Yes      Within the past 12 months, have you been hit, slapped, kicked or otherwise physically hurt by someone?: No      Within the past 12 months, have you been humiliated or emotionally abused in other ways by your partner or ex-partner?: No   Housing Stability: Low Risk  (5/13/2024)    Housing Stability      Do you have housing? : Yes      Are you worried about losing your housing?: No   Cortney is G0-P0.      FAMILY HISTORY:  Family History   Problem Relation Age of Onset     Substance Abuse Father         Alcohol and drugs     Breast Cancer Mother         44, negative for gene; was HER-2 positive     No Known Problems Sister      No Known Problems Brother      Diabetes Maternal Grandmother      Kidney Cancer Maternal Grandmother      Breast Cancer Maternal Grandmother         Kidney cancer     Thyroid Disease Maternal Grandmother         On medications     Other Cancer Maternal Grandmother         Kidney cancer     Prostate Cancer Maternal Grandfather      Diabetes Paternal Grandmother      Thyroid Disease Paternal Grandmother      Cancer Paternal Grandmother      Hypertension Paternal  "Grandfather      Lipids Paternal Grandfather      Esophageal Cancer Paternal Grandfather      Hyperlipidemia Paternal Grandfather      Other Cancer Paternal Grandfather         Esophageal cancer     Breast Cancer Maternal Aunt      Depression Maternal Uncle      Breast Cancer Other         Breast Cancer     Depression Other         PTSD maternal uncle     Depression Other         Maternal Uncle     Breast Cancer Maternal Great-Grandmother          PHYSICAL EXAM:  Vital signs:  /70   Pulse 91   Temp 98  F (36.7  C) (Oral)   Resp 16   Ht 1.626 m (5' 4\")   Wt 63.5 kg (140 lb)   LMP 07/10/2021 (Exact Date)   SpO2 100%   BMI 24.03 kg/m     ECO  GENERAL/CONSTITUTIONAL: No acute distress.  EYES:  No scleral icterus.  ENT/MOUTH: Neck supple. Oropharynx grossly clear.  LYMPH: No cervical, supraclavicular, axillary adenopathy.   BREAST: At least 2-3 cm hematoma with overlying ecchymosis at the left outer breast; diffuse dense breast tissue throughout both breasts. No rash or nipple discharge.  RESPIRATORY: No audible cough or wheezing.   GASTROINTESTINAL: No hepatosplenomegaly, masses, or tenderness.  No guarding.  No distention.  MUSCULOSKELETAL: Warm and well-perfused, no cyanosis, clubbing, or edema.  NEUROLOGIC: No focal motor deficits. Alert, oriented, answers questions appropriately.  INTEGUMENTARY: No rashes or jaundice.  GAIT: Steady, does not use assistive device      LABS:  CBC RESULTS:   Recent Labs   Lab Test 24  0747   WBC 4.9   RBC 4.43   HGB 13.5   HCT 41.2   MCV 93   MCH 30.5   MCHC 32.8   RDW 11.5          Recent Labs   Lab Test 24  0747 23  0907    137   POTASSIUM 4.1 4.5   CHLORIDE 107 104   CO2 23 19*   ANIONGAP 9 14   GLC 80 90   BUN 11.2 13.4   CR 0.85 0.75   BAYLEE 8.7 9.8         PATHOLOGY:  Reviewed as per HPI.    IMAGING:  Reviewed as per HPI.    ASSESSMENT/PLAN:  Cortney Willoughby is a 44 year old female with the following issues:  1.  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 Family history of breast cancer  - I reviewed Cortney's breast imaging and biopsy pathology which demonstrated a prognostically favorable grade 2 invasive ductal carcinoma that is strongly ER and MD positive, HER2 FISH negative.  - I advised surgical resection upfront followed by Oncotype DX assay to assess predictive benefit of chemotherapy and distant recurrence risk.  - Advised genetic testing given her young age and that results can affect surgical management.  She agrees to proceed.  --We did discuss how lumpectomy followed by radiation compares to bilateral mastectomies and how mastectomy does not alter recurrence risk but does change risk of developing a subsequent de ramandeep breast cancer.  - Will check CBC and CMP and baseline DEXA scan.  - Discussed role of adjuvant radiation therapy if she proceeds with lumpectomy and adjuvant endocrine therapy with either tamoxifen or ovarian suppression therapy with Zoladex in combination with an aromatase inhibitor such as anastrozole.  --Advised discontinuation of DHEA supplement.  --Fertility is not an issue as she is s/p hysterectomy (ovaries intact).  --Case discussed with Dr. Pena today.    Return after surgery.    Georgia Acuna MD  [facility] Hematology/Oncology    Total time spent today: 65 minutes in chart review, patient evaluation, counseling, documentation, test and/or medication/prescription orders, and coordination of care.      The longitudinal plan of care for the diagnosis(es)/condition(s) as documented were addressed during this visit. Due to the added complexity in care, I will continue to support Cortney in the subsequent management and with ongoing continuity of care.    Oncology Rooming Note    February 4, 2025 7:59 AM   Cortney Willoughby is a 44 year old female who presents for:    Chief Complaint   Patient  "presents with     Oncology Clinic Visit     Initial Vitals: LMP 07/10/2021 (Exact Date)  Estimated body mass index is 24.48 kg/m  as calculated from the following:    Height as of 12/23/24: 1.645 m (5' 4.76\").    Weight as of 12/23/24: 66.2 kg (146 lb). There is no height or weight on file to calculate BSA.  Data Unavailable Comment: Data Unavailable   Patient's last menstrual period was 07/10/2021 (exact date).  Allergies reviewed: Yes  Medications reviewed: Yes    Medications: Medication refills not needed today.  Pharmacy name entered into [employer]: "[facility] #[id number] [address] [facility]      Shari J. Schoenberger, [facility]                Breast Health History Form    Do you have any of the following symptoms? (Patient-Rptd) Other  If other, please elaborate: (Patient-Rptd) Had a lump on left breast but can no longer feel it  In which breast are you having the symptoms? (Patient-Rptd) Left  Have you had a mammogram? (Patient-Rptd) Yes  Where: (Patient-Rptd) [facility]  Date: (Patient-Rptd) July 2024  Have you ever had a breast cyst drained? (Patient-Rptd) No        Have you had a breast biopsy in the past? (Patient-Rptd) Yes  Side: (Patient-Rptd) Left  Date: (Patient-Rptd) 1/27/2025  Have you ever had Breast Cancer? (Patient-Rptd) No        Is there a history of Breast Cancer in your family? (Patient-Rptd) Yes  Relationship: (Patient-Rptd) Mother  Have you ever had Ovarian Cancer? (Patient-Rptd) No     Is there a history of Ovarian Cancer in your family? (Patient-Rptd) No     Is there a history of Colon Cancer in your family? (Patient-Rptd) No     Is there a history of Uterine Cancer in your family? (Patient-Rptd) No     Any known genetic abnormalities in your family? (Patient-Rptd) No     Summarize your caffeine intake? (Patient-Rptd) Tea    Were you born with a uterus? (Patient-Rptd) Yes  What age did your periods begin? (Patient-Rptd) 14 or 15  Date " your last menstrual period began? (Patient-Rptd) October 2021  Number of full term pregnancies? (Patient-Rptd) 0  Your age when your first child was born?    Did you nurse your children? (Patient-Rptd) No  Are you pregnant now? (Patient-Rptd) No  Have you begun Menopause? (Patient-Rptd) No     Have you had either ovary removed? (Patient-Rptd) No     Have you had an intrauterine device (IUD) placed? (Patient-Rptd) No         Do you have breast implants? (Patient-Rptd) No  Have you used hormone replacement therapy? (Patient-Rptd) No        Have you taken oral contraceptive pills? (Patient-Rptd) Yes  For how many years: (Patient-Rptd) 20  What is your current bra size? (Patient-Rptd) 32DDD                                                      Again, thank you for allowing me to participate in the care of your patient.        Sincerely,        Georgia Acuna MD    Electronically signed

## 2025-02-04 NOTE — PROGRESS NOTES
"Oncology Rooming Note    February 4, 2025 7:59 AM   Cortney Willoughby is a 44 year old female who presents for:    Chief Complaint   Patient presents with    Oncology Clinic Visit     Initial Vitals: LMP 07/10/2021 (Exact Date)  Estimated body mass index is 24.48 kg/m  as calculated from the following:    Height as of 12/23/24: 1.645 m (5' 4.76\").    Weight as of 12/23/24: 66.2 kg (146 lb). There is no height or weight on file to calculate BSA.  Data Unavailable Comment: Data Unavailable   Patient's last menstrual period was 07/10/2021 (exact date).  Allergies reviewed: Yes  Medications reviewed: Yes    Medications: Medication refills not needed today.  Pharmacy name entered into [facility]: [facility] #[id number] [place], [place] - [address] AT [facility]      Shari J. Schoenberger, CMA              "

## 2025-02-07 ENCOUNTER — LAB (OUTPATIENT)
Dept: LAB | Facility: CLINIC | Age: 45
End: 2025-02-07
Payer: COMMERCIAL

## 2025-02-07 DIAGNOSIS — C50.512: Primary | ICD-10-CM

## 2025-02-07 DIAGNOSIS — Z17.0: Primary | ICD-10-CM

## 2025-02-07 PROCEDURE — G0452 MOLECULAR PATHOLOGY INTERPR: HCPCS | Mod: 26 | Performed by: PATHOLOGY

## 2025-02-07 PROCEDURE — 81162 BRCA1&2 GEN FULL SEQ DUP/DEL: CPT | Performed by: SURGERY

## 2025-02-13 PROBLEM — Z17.0 MALIGNANT NEOPLASM OF OVERLAPPING SITES OF LEFT BREAST IN FEMALE, ESTROGEN RECEPTOR POSITIVE (H): Status: ACTIVE | Noted: 2025-02-13

## 2025-02-13 PROBLEM — C50.812 MALIGNANT NEOPLASM OF OVERLAPPING SITES OF LEFT BREAST IN FEMALE, ESTROGEN RECEPTOR POSITIVE (H): Status: ACTIVE | Noted: 2025-02-13

## 2025-02-14 ENCOUNTER — OFFICE VISIT (OUTPATIENT)
Dept: FAMILY MEDICINE | Facility: CLINIC | Age: 45
End: 2025-02-14
Payer: COMMERCIAL

## 2025-02-14 VITALS
TEMPERATURE: 98 F | SYSTOLIC BLOOD PRESSURE: 113 MMHG | BODY MASS INDEX: 24.59 KG/M2 | WEIGHT: 144 LBS | OXYGEN SATURATION: 98 % | HEIGHT: 64 IN | DIASTOLIC BLOOD PRESSURE: 75 MMHG | HEART RATE: 77 BPM | RESPIRATION RATE: 16 BRPM

## 2025-02-14 DIAGNOSIS — T88.7XXA MEDICATION SIDE EFFECTS: ICD-10-CM

## 2025-02-14 DIAGNOSIS — Z01.818 PRE-OPERATIVE GENERAL PHYSICAL EXAMINATION: Primary | ICD-10-CM

## 2025-02-14 DIAGNOSIS — E87.20 METABOLIC ACIDOSIS: ICD-10-CM

## 2025-02-14 DIAGNOSIS — C50.812 MALIGNANT NEOPLASM OF OVERLAPPING SITES OF LEFT BREAST IN FEMALE, ESTROGEN RECEPTOR POSITIVE (H): ICD-10-CM

## 2025-02-14 DIAGNOSIS — Z17.0 MALIGNANT NEOPLASM OF OVERLAPPING SITES OF LEFT BREAST IN FEMALE, ESTROGEN RECEPTOR POSITIVE (H): ICD-10-CM

## 2025-02-14 LAB — HCO3 SERPL-SCNC: 23 MMOL/L (ref 22–29)

## 2025-02-14 PROCEDURE — 36415 COLL VENOUS BLD VENIPUNCTURE: CPT | Performed by: INTERNAL MEDICINE

## 2025-02-14 PROCEDURE — 82374 ASSAY BLOOD CARBON DIOXIDE: CPT | Performed by: INTERNAL MEDICINE

## 2025-02-14 PROCEDURE — 93000 ELECTROCARDIOGRAM COMPLETE: CPT | Performed by: INTERNAL MEDICINE

## 2025-02-14 PROCEDURE — 99214 OFFICE O/P EST MOD 30 MIN: CPT | Performed by: INTERNAL MEDICINE

## 2025-02-14 ASSESSMENT — PAIN SCALES - GENERAL: PAINLEVEL_OUTOF10: NO PAIN (0)

## 2025-02-14 NOTE — PROGRESS NOTES
Preoperative Evaluation  [facility]  [address] [facility], [address]  [facility] [address]  Phone: [phone]  Primary Provider: Zeinab Burnham MD  Pre-op Performing Provider: Bonnie Cardenas MD  Feb 14, 2025 2/13/2025   Surgical Information   What procedure is being done? Double mastectomy   Facility or Hospital where procedure/surgery will be performed: [facility]   Who is doing the procedure / surgery? Becky   Date of surgery / procedure: 03/05/2025   Time of surgery / procedure: 11:00am   Where do you plan to recover after surgery? at home with family     Fax number for surgical facility: Note does not need to be faxed, will be available electronically in Epic.    Assessment & Plan     The proposed surgical procedure is considered INTERMEDIATE risk.    Problem List Items Addressed This Visit       Malignant neoplasm of overlapping sites of left breast in female, estrogen receptor positive (H)     Other Visit Diagnoses       Pre-operative general physical examination    -  Primary    Relevant Orders    EKG 12-lead complete w/read - Clinics (Completed)    Medication side effects        Relevant Orders    EKG 12-lead complete w/read - Clinics (Completed)    Metabolic acidosis        Relevant Orders    CO2, Total (Completed)            Risks and Recommendations  The patient has the following additional risks and recommendations for perioperative complications:   - No identified additional risk factors other than previously addressed    Antiplatelet or Anticoagulation Medication Instructions   - We reviewed the medication list and the patient is not on an antiplatelet or anticoagulation medications.    Additional Medication Instructions  We reviewed the medication list and there are no chronic medications that need to be adjusted for this procedure.    Recommendation  Approval given to proceed with proposed procedure, without further diagnostic evaluation.  Patient  can continue trazodone in the perioperative period.  Patient on thyroid hormone replacement TSH within normal ~0.5 [dose was recently decreased].  Labs CO2 repeat was normal, EKG is normal.  Normal sinus rhythm normal QT interval.    Patient is cleared for surgery and general anesthesia.      Rich Barr is a 44 year old, presenting for the following:  Pre-Op Exam          12/23/2024     2:01 PM   Additional Questions   Roomed by Emely DEL RIO MA     HPI related to upcoming procedure: Preop clearance for bilateral mastectomy.  Patient has good exercise tolerance.  Denies any cough shortness breath chest pain dyspnea palpitations presyncope or syncope.  No GI or  symptoms.  No history of leg swelling.  No history of blood clots or VTE.  No history of bleeding disorder.  No family history of such.  Patient had tolerated anesthesia without any complications in the past.  Denies any history of snoring or sleep apnea.        2/13/2025   Pre-Op Questionnaire   Have you ever had a heart attack or stroke? No   Have you ever had surgery on your heart or blood vessels, such as a stent placement, a coronary artery bypass, or surgery on an artery in your head, neck, heart, or legs? No   Do you have chest pain with activity? No   Do you have a history of heart failure? No   Do you currently have a cold, bronchitis or symptoms of other infection? No   Do you have a cough, shortness of breath, or wheezing? No   Do you or anyone in your family have previous history of blood clots? No   Do you or does anyone in your family have a serious bleeding problem such as prolonged bleeding following surgeries or cuts? No   Have you ever had problems with anemia or been told to take iron pills? No   Have you had any abnormal blood loss such as black, tarry or bloody stools, or abnormal vaginal bleeding? No   Have you ever had a blood transfusion? No   Are you willing to have a blood transfusion if it is medically needed before, during,  or after your surgery? Yes   Have you or any of your relatives ever had problems with anesthesia? No   Do you have sleep apnea, excessive snoring or daytime drowsiness? No   Do you have any artifical heart valves or other implanted medical devices like a pacemaker, defibrillator, or continuous glucose monitor? No   Do you have artificial joints? No   Are you allergic to latex? No     Health Care Directive  Patient has a Health Care Directive on file      Preoperative Review of    reviewed - no record of controlled substances prescribed.      Status of Chronic Conditions:  See problem list for active medical problems.  Problems all longstanding and stable, except as noted/documented.  See ROS for pertinent symptoms related to these conditions.    Patient Active Problem List    Diagnosis Date Noted    Malignant neoplasm of overlapping sites of left breast in female, estrogen receptor positive (H) 02/13/2025     Priority: Medium    Status post laparoscopic supracervical hysterectomy with bilateral salpingectomy on 10/20/2022 for fibroids 01/20/2023     Priority: Medium    Submucous and subserous leiomyoma of uterus 09/01/2021     Priority: Medium     Added automatically from request for surgery [id number]      Other adrenocortical overactivity 06/18/2021     Priority: Medium    Elevated cortisol level 05/02/2019     Priority: Medium    Hypothyroidism, unspecified type 05/02/2019     Priority: Medium     Managed by natural medicine specialist      Mixed hyperlipidemia 05/02/2019     Priority: Medium    Insomnia, unspecified insomnia 04/24/2016     Priority: Medium    Encounter for surveillance of contraceptive pills 04/24/2016     Priority: Medium    Family history of malignant neoplasm of breast 04/24/2016     Priority: Medium      Past Medical History:   Diagnosis Date    Anxiety     Closed fracture of unspecified part of ulna (alone) 2006    Depression     Family history of malignant neoplasm of breast     Fibroid  "uterus     Hyperlipidemia     Hypothyroidism, unspecified type 05/02/2019    Insomnia     Ovarian cyst     Scoliosis (and kyphoscoliosis), idiopathic      Past Surgical History:   Procedure Laterality Date    COSMETIC SURGERY  June 24 2018    Liposuction    CYSTOSCOPY N/A 10/20/2021    Procedure: diagnostic cystoscopy;  Surgeon: Tiffanie Oliveira MD;  Location: [facility] OR    LAPAROSCOPIC HYSTERECTOMY SUPRACERVICAL N/A 10/20/2021    Procedure: laparascopic supracervical hysterectomy;  Surgeon: Tiffanie Olievira MD;  Location: [facility] OR    LAPAROSCOPIC SALPINGECTOMY Bilateral 10/20/2021    Procedure: bilateral salpingectomy;  Surgeon: Tiffanie Oliveira MD;  Location: [facility] OR    LIPOSUCTION (LOCATION)       Current Outpatient Medications   Medication Sig Dispense Refill    DHEA 10 MG CAPS Take 1 capsule by mouth daily      Digestive Enzymes CAPS Take 1 capsule by mouth 2 times daily      ferrous fumarate 65 mg, [place]. FE,-Vitamin C 125 mg (VITRON C)  MG TABS tablet Take 1 tablet by mouth every other day      fluconazole (DIFLUCAN) 200 MG tablet Take 200 mg by mouth once a week (Sundays)      levothyroxine (SYNTHROID/LEVOTHROID) 75 MCG tablet Take 1 tablet (75 mcg) by mouth daily By her natural medicine doctor      liothyronine (CYTOMEL) 25 MCG tablet Take 0.5 tablets (12.5 mcg) by mouth daily From natural medicine doctor      MAGNESIUM PO Take 4 capsules by mouth every evening      Multiple Vitamin (DAILY-VITAMIN PO) Take 1 tablet by mouth daily      Omega-3 Fatty Acids (FISH OIL) 1200 MG capsule Take 2 capsules by mouth 2 times daily      OVER-THE-COUNTER Take 1 capsule by mouth daily \"EstroDIM\"      Probiotic Product (PROBIOTIC PO) Take 1 tablet by mouth daily      traZODone (DESYREL) 50 MG tablet Take 1 tablet (50 mg) by mouth every evening as needed for sleep 90 tablet 3    vitamin C (ASCORBIC ACID) 1000 MG TABS Take 1,000 mg by mouth daily      Vitamin D3 (CHOLECALCIFEROL) 125 MCG (5000 UT) tablet Take 1 tablet by " "mouth every other day         Allergies   Allergen Reactions    No Known Drug Allergy         Social History     Tobacco Use    Smoking status: Never    Smokeless tobacco: Never   Substance Use Topics    Alcohol use: Yes     Comment: 2 drinks per week     Family History   Problem Relation Age of Onset    Substance Abuse Father         Alcohol and drugs    Breast Cancer Mother         44, negative for gene; was HER-2 positive    No Known Problems Sister     No Known Problems Brother     Diabetes Maternal Grandmother     Kidney Cancer Maternal Grandmother     Breast Cancer Maternal Grandmother         Kidney cancer    Thyroid Disease Maternal Grandmother         On medications    Other Cancer Maternal Grandmother         Kidney cancer    Prostate Cancer Maternal Grandfather     Diabetes Paternal Grandmother     Thyroid Disease Paternal Grandmother     Cancer Paternal Grandmother     Hypertension Paternal Grandfather     Lipids Paternal Grandfather     Esophageal Cancer Paternal Grandfather     Hyperlipidemia Paternal Grandfather     Other Cancer Paternal Grandfather         Esophageal cancer    Breast Cancer Maternal Aunt     Depression Maternal Uncle     Breast Cancer Other         Breast Cancer    Depression Other         PTSD maternal uncle    Depression Other         Maternal Uncle    Breast Cancer Maternal Great-Grandmother      History   Drug Use No             Review of Systems  Constitutional, HEENT, cardiovascular, pulmonary, gi and gu systems are negative, except as otherwise noted.    Objective    /75 (BP Location: Left arm, Patient Position: Sitting, Cuff Size: Adult Regular)   Pulse 77   Temp 98  F (36.7  C) (Temporal)   Resp 16   Ht 1.626 m (5' 4\")   Wt 65.3 kg (144 lb)   LMP 07/10/2021 (Exact Date)   SpO2 98%   BMI 24.72 kg/m     Estimated body mass index is 24.72 kg/m  as calculated from the following:    Height as of this encounter: 1.626 m (5' 4\").    Weight as of this encounter: 65.3 " kg (144 lb).  Physical Exam  GENERAL: alert and no distress  EYES: Eyes grossly normal to inspection, PERRL and conjunctivae and sclerae normal  HENT: ear canals and TM's normal, nose and mouth without ulcers or lesions  NECK: no adenopathy, no asymmetry, masses, or scars  RESP: lungs clear to auscultation - no rales, rhonchi or wheezes  CV: regular rate and rhythm, normal S1 S2, no S3 or S4, no murmur, click or rub, no peripheral edema  ABDOMEN: soft, nontender, no hepatosplenomegaly, no masses and bowel sounds normal  MS: no gross musculoskeletal defects noted, no edema  SKIN: no suspicious lesions or rashes  NEURO: Normal strength and tone, mentation intact and speech normal  PSYCH: mentation appears normal, affect normal/bright    Recent Labs   Lab Test 02/04/25  1018 05/20/24  0747   HGB 14.1 13.5    184    139   POTASSIUM 3.9 4.1   CR 0.78 0.85        Diagnostics  No labs were ordered during this visit.   No EKG required for low risk surgery (cataract, skin procedure, breast biopsy, etc).    Revised Cardiac Risk Index (RCRI)  The patient has the following serious cardiovascular risks for perioperative complications:   - No serious cardiac risks = 0 points     RCRI Interpretation: 0 points: Class I (very low risk - 0.4% complication rate)         Signed Electronically by: Bonnie Cardenas MD  A copy of this evaluation report is provided to the requesting physician.

## 2025-02-17 ENCOUNTER — TELEPHONE (OUTPATIENT)
Dept: SURGERY | Facility: CLINIC | Age: 45
End: 2025-02-17
Payer: COMMERCIAL

## 2025-02-17 DIAGNOSIS — C50.412 MALIGNANT NEOPLASM OF UPPER-OUTER QUADRANT OF LEFT BREAST IN FEMALE, ESTROGEN RECEPTOR POSITIVE (H): Primary | ICD-10-CM

## 2025-02-17 DIAGNOSIS — Z17.0 MALIGNANT NEOPLASM OF UPPER-OUTER QUADRANT OF LEFT BREAST IN FEMALE, ESTROGEN RECEPTOR POSITIVE (H): Primary | ICD-10-CM

## 2025-02-17 NOTE — TELEPHONE ENCOUNTER
Type of surgery: Bilateral skin sparing mastectomy with left sentinel lymph node biopsy  Location of surgery: [facility]  Date and time of surgery: 3/5/25 at 11am  Surgeon: Dr. Marisela Pena   Pre-Op Appt Date: patient to schedule  Post-Op Appt Date: patient to schedule   Packet sent out: Yes  Pre-cert/Authorization completed:  Not Applicable  Date: 2/17/25

## 2025-02-17 NOTE — TELEPHONE ENCOUNTER
Type of surgery: Bilateral skin sparing mastectomy with left sentinel lymph node biopsy  Location of surgery: [facility]  Date and time of surgery: 3/5/25 at 11am  Surgeon: Dr. Marisela Pena  Pre-Op Appt Date: patient to schedule  Post-Op Appt Date: patient to schedule   Packet sent out: Yes  Pre-cert/Authorization completed:  Not Applicable  Date: 2/17/25

## 2025-02-20 ENCOUNTER — HOSPITAL ENCOUNTER (OUTPATIENT)
Dept: BONE DENSITY | Facility: CLINIC | Age: 45
Discharge: HOME | End: 2025-02-20
Attending: INTERNAL MEDICINE
Payer: COMMERCIAL

## 2025-02-20 DIAGNOSIS — M85.9 DISORDER OF BONE DENSITY AND STRUCTURE, UNSPECIFIED: ICD-10-CM

## 2025-02-20 PROCEDURE — 77080 DXA BONE DENSITY AXIAL: CPT

## 2025-02-27 ENCOUNTER — TELEPHONE (OUTPATIENT)
Dept: MAMMOGRAPHY | Facility: CLINIC | Age: 45
End: 2025-02-27
Payer: COMMERCIAL

## 2025-02-27 ENCOUNTER — PATIENT OUTREACH (OUTPATIENT)
Dept: ONCOLOGY | Facility: CLINIC | Age: 45
End: 2025-02-27
Payer: COMMERCIAL

## 2025-02-27 DIAGNOSIS — C50.812 MALIGNANT NEOPLASM OF OVERLAPPING SITES OF LEFT BREAST IN FEMALE, ESTROGEN RECEPTOR POSITIVE (H): ICD-10-CM

## 2025-02-27 DIAGNOSIS — Z15.09 BRCA2 GENE MUTATION POSITIVE IN FEMALE: Primary | ICD-10-CM

## 2025-02-27 DIAGNOSIS — Z15.02 BRCA2 GENE MUTATION POSITIVE IN FEMALE: Primary | ICD-10-CM

## 2025-02-27 DIAGNOSIS — Z15.01 BRCA2 GENE MUTATION POSITIVE IN FEMALE: Primary | ICD-10-CM

## 2025-02-27 DIAGNOSIS — Z17.0 MALIGNANT NEOPLASM OF OVERLAPPING SITES OF LEFT BREAST IN FEMALE, ESTROGEN RECEPTOR POSITIVE (H): ICD-10-CM

## 2025-02-27 NOTE — PROGRESS NOTES
Writer received referral to Cancer Risk Management/Genetic Counseling.    Referred for:    BRCA2 gene mutation positive in female  Discuss specifics of positive gene mutation(s), and further recommendations    Referred By    Provider Department Location Phone   Marisela Pena MD  Breast Imaging [facility] [phone]       Referral reviewed for appropriate plan, and sent to New Patient Scheduling ([phone]) for completion.    Paty Cunningham RN, BSN  Oncology New Patient Nurse Navigator   [facility]

## 2025-02-27 NOTE — TELEPHONE ENCOUNTER
Breast Care Coordination:     Called and spoke with Cortney. Discussed her genetic testing results which are POSITIVE for BRCA2 gene mutation, as well as MICKY gene mutation (see below), and VUS. Dr. Pena's recommendations that she meet with genetic counseling to review the specifics of this gene mutation and what is recommended going forward for screening for other cancers. Let her know the results will not change her surgical plan given she is scheduled for bilateral mastectomies already. Referral to genetic counselor placed.      Discussed preparation for her upcoming breast surgery on 3/5/2025, to see if she had any questions regarding the surgery. She has all appointments coordinated and bought her post-surgical camisoles.     She completed her pre-op examination with Dr. Cardenas on 2/14/2025.      Reminded her of her the following appointments as well:     -Post op visit with Dr. Pena on 3/21/2025.     -Follow up with Dr. Acuna on 3/26/2025.    Patient expressed concern of back pain the past couple days. She said she has this same back pain at baseline, which worsens with standing or walking long periods. She was on her bare feet a lot the other day, and has her usual back pain but it has lasted 2 days now and feels afraid that her cancer spreading to her back - she said she googled and that didn't help her fears. No other symptoms noted. Provided reassurance of what we do know of her cancer, and discussed that it is not uncommon for people to feel more aware of aches and pains in their bodies and associate them with cancer. Let her know we do want to know of these symptoms as they are important to us, and let her know writer will inform her providers. Dr. Acuna will be sure to do a thorough assessment as well.      Patient verbalized understanding and all questions addressed at this time.     Encouraged her to call me if she has any questions or concerns.     Margaret Bryson, RN  Breast Nurse Care  Coordinator  [facility]  [phone]     TEST REQUESTED:  Hereditary Cancer Breast Actionable   Next generation sequencing and copy number variation analysis of genes listed in 'Background' section below.     RESULTS: POSITIVE   Pathogenic/Likely Pathogenic Variant(s): Two Detected   Variant(s) of Uncertain Significance: One Detected    Interpretation    A heterozygous likely pathogenic BRCA2 variant was detected.  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     Genetic counseling regarding these results is recommended.

## 2025-02-28 RX ORDER — MUPIROCIN 20 MG/G
OINTMENT TOPICAL 2 TIMES DAILY
COMMUNITY

## 2025-02-28 NOTE — PROGRESS NOTES
PTA medications updated by Medication Scribe prior to surgery via phone call with patient (last doses completed by Nurse)     Medication history sources: Patient, Surescripts, and H&P  In the past week, patient estimated taking medication this percent of the time: Greater than 90%      Significant changes made to the medication list:  Patient reports no longer taking the following meds (med scribe removed from PTA med list): Dhea, Vitron C, Magnesium, MVI, Fish Oil, Probiotic, Vitamin C, Vitamin D      Additional medication history information:   None    Medication reconciliation completed by provider prior to medication history? No    Time spent in this activity: 30 minutes    The information provided in this note is only as accurate as the sources available at the time of update(s)      Prior to Admission medications    Medication Sig Last Dose Taking? Auth Provider Long Term End Date   fluconazole (DIFLUCAN) 200 MG tablet Take 200 mg by mouth once a week (Sundays) Evening Yes Reported, Patient     levothyroxine (SYNTHROID/LEVOTHROID) 75 MCG tablet Take 1 tablet (75 mcg) by mouth daily By her natural medicine doctor Morning Yes Zeinab Burnham MD Yes    liothyronine (CYTOMEL) 25 MCG tablet Take 0.5 tablets (12.5 mcg) by mouth daily From natural medicine doctor Morning Yes Zeinab Burnham MD Yes    mupirocin (BACTROBAN) 2 % external ointment Apply topically 2 times daily. Evening Yes Reported, Patient     Tirzepatide (MOUNJARO SC) Inject 0.4 mLs subcutaneously every 7 days. Bottle is 16.6 mg/4.5 mL  Yes Reported, Patient     traZODone (DESYREL) 50 MG tablet Take 1 tablet (50 mg) by mouth every evening as needed for sleep Bedtime Yes Zeinab Burnham MD Yes        Medication history completed by: Kadeem Dyer

## 2025-03-05 ENCOUNTER — TRANSFERRED RECORDS (OUTPATIENT)
Dept: HEALTH INFORMATION MANAGEMENT | Facility: CLINIC | Age: 45
End: 2025-03-05

## 2025-03-05 ENCOUNTER — ANESTHESIA EVENT (OUTPATIENT)
Dept: SURGERY | Facility: CLINIC | Age: 45
End: 2025-03-05
Payer: COMMERCIAL

## 2025-03-05 ENCOUNTER — HOSPITAL ENCOUNTER (OUTPATIENT)
Facility: CLINIC | Age: 45
Discharge: HOME | End: 2025-03-06
Attending: SURGERY | Admitting: PLASTIC SURGERY
Payer: COMMERCIAL

## 2025-03-05 ENCOUNTER — ANESTHESIA (OUTPATIENT)
Dept: SURGERY | Facility: CLINIC | Age: 45
End: 2025-03-05
Payer: COMMERCIAL

## 2025-03-05 ENCOUNTER — HOSPITAL ENCOUNTER (OUTPATIENT)
Dept: NUCLEAR MEDICINE | Facility: CLINIC | Age: 45
Setting detail: NUCLEAR MEDICINE
Discharge: HOME | End: 2025-03-05
Attending: SURGERY
Payer: COMMERCIAL

## 2025-03-05 DIAGNOSIS — C50.412 MALIGNANT NEOPLASM OF UPPER-OUTER QUADRANT OF LEFT BREAST IN FEMALE, ESTROGEN RECEPTOR POSITIVE (H): ICD-10-CM

## 2025-03-05 DIAGNOSIS — C50.812 MALIGNANT NEOPLASM OF OVERLAPPING SITES OF LEFT BREAST IN FEMALE, ESTROGEN RECEPTOR POSITIVE (H): Primary | ICD-10-CM

## 2025-03-05 DIAGNOSIS — Z17.0 MALIGNANT NEOPLASM OF UPPER-OUTER QUADRANT OF LEFT BREAST IN FEMALE, ESTROGEN RECEPTOR POSITIVE (H): ICD-10-CM

## 2025-03-05 DIAGNOSIS — Z17.0 MALIGNANT NEOPLASM OF OVERLAPPING SITES OF LEFT BREAST IN FEMALE, ESTROGEN RECEPTOR POSITIVE (H): Primary | ICD-10-CM

## 2025-03-05 PROBLEM — C50.912 BREAST CANCER, LEFT BREAST (H): Status: ACTIVE | Noted: 2025-03-05

## 2025-03-05 PROCEDURE — 250N000009 HC RX 250: Performed by: NURSE ANESTHETIST, CERTIFIED REGISTERED

## 2025-03-05 PROCEDURE — 250N000011 HC RX IP 250 OP 636

## 2025-03-05 PROCEDURE — 250N000011 HC RX IP 250 OP 636: Performed by: SURGERY

## 2025-03-05 PROCEDURE — 258N000003 HC RX IP 258 OP 636

## 2025-03-05 PROCEDURE — 250N000013 HC RX MED GY IP 250 OP 250 PS 637

## 2025-03-05 PROCEDURE — 250N000009 HC RX 250: Performed by: SURGERY

## 2025-03-05 PROCEDURE — 272N000001 HC OR GENERAL SUPPLY STERILE: Performed by: SURGERY

## 2025-03-05 PROCEDURE — 88342 IMHCHEM/IMCYTCHM 1ST ANTB: CPT | Mod: TC | Performed by: SURGERY

## 2025-03-05 PROCEDURE — 19303 MAST SIMPLE COMPLETE: CPT | Mod: AS | Performed by: PHYSICIAN ASSISTANT

## 2025-03-05 PROCEDURE — 19303 MAST SIMPLE COMPLETE: CPT | Mod: 50 | Performed by: SURGERY

## 2025-03-05 PROCEDURE — 343N000001 HC RX 343 MED OP 636: Performed by: SURGERY

## 2025-03-05 PROCEDURE — 38525 BIOPSY/REMOVAL LYMPH NODES: CPT | Mod: 51 | Performed by: SURGERY

## 2025-03-05 PROCEDURE — 88307 TISSUE EXAM BY PATHOLOGIST: CPT | Mod: TC | Performed by: SURGERY

## 2025-03-05 PROCEDURE — 710N000009 HC RECOVERY PHASE 1, LEVEL 1, PER MIN: Performed by: SURGERY

## 2025-03-05 PROCEDURE — 999N000141 HC STATISTIC PRE-PROCEDURE NURSING ASSESSMENT: Performed by: SURGERY

## 2025-03-05 PROCEDURE — A9520 TC99 TILMANOCEPT DIAG 0.5MCI: HCPCS | Performed by: SURGERY

## 2025-03-05 PROCEDURE — 250N000009 HC RX 250

## 2025-03-05 PROCEDURE — 370N000017 HC ANESTHESIA TECHNICAL FEE, PER MIN: Performed by: SURGERY

## 2025-03-05 PROCEDURE — 250N000011 HC RX IP 250 OP 636: Performed by: NURSE ANESTHETIST, CERTIFIED REGISTERED

## 2025-03-05 PROCEDURE — 38792 RA TRACER ID OF SENTINL NODE: CPT

## 2025-03-05 PROCEDURE — 258N000003 HC RX IP 258 OP 636: Performed by: NURSE ANESTHETIST, CERTIFIED REGISTERED

## 2025-03-05 PROCEDURE — C1789 PROSTHESIS, BREAST, IMP: HCPCS | Performed by: SURGERY

## 2025-03-05 PROCEDURE — 250N000013 HC RX MED GY IP 250 OP 250 PS 637: Performed by: PHYSICIAN ASSISTANT

## 2025-03-05 PROCEDURE — 250N000025 HC SEVOFLURANE, PER MIN: Performed by: SURGERY

## 2025-03-05 PROCEDURE — 360N000076 HC SURGERY LEVEL 3, PER MIN: Performed by: SURGERY

## 2025-03-05 DEVICE — GRAFT ALLODERM 16X20CM  1518320P CHARGE PER SQ CM= 320 UNITS: Type: IMPLANTABLE DEVICE | Site: BREAST | Status: FUNCTIONAL

## 2025-03-05 DEVICE — NATRELLE TE SMOOTH 133S-FX-14-T (US)
Type: IMPLANTABLE DEVICE | Site: BREAST | Status: FUNCTIONAL
Brand: NATRELLE 133S TISSUE EXPANDERS

## 2025-03-05 RX ORDER — SCOPOLAMINE 1 MG/3D
1 PATCH, EXTENDED RELEASE TRANSDERMAL ONCE
Status: COMPLETED | OUTPATIENT
Start: 2025-03-05 | End: 2025-03-06

## 2025-03-05 RX ORDER — OXYCODONE HYDROCHLORIDE 5 MG/1
5-10 TABLET ORAL EVERY 6 HOURS PRN
Qty: 12 TABLET | Refills: 0 | Status: SHIPPED | OUTPATIENT
Start: 2025-03-05 | End: 2025-03-08

## 2025-03-05 RX ORDER — HYDROXYZINE HYDROCHLORIDE 25 MG/1
25 TABLET, FILM COATED ORAL EVERY 6 HOURS PRN
Status: DISCONTINUED | OUTPATIENT
Start: 2025-03-05 | End: 2025-03-05 | Stop reason: HOSPADM

## 2025-03-05 RX ORDER — PROPOFOL 10 MG/ML
INJECTION, EMULSION INTRAVENOUS CONTINUOUS PRN
Status: DISCONTINUED | OUTPATIENT
Start: 2025-03-05 | End: 2025-03-05

## 2025-03-05 RX ORDER — METHOCARBAMOL 750 MG/1
750 TABLET, FILM COATED ORAL 4 TIMES DAILY PRN
Qty: 20 TABLET | Refills: 0 | Status: SHIPPED | OUTPATIENT
Start: 2025-03-05

## 2025-03-05 RX ORDER — ONDANSETRON 2 MG/ML
4 INJECTION INTRAMUSCULAR; INTRAVENOUS EVERY 6 HOURS PRN
Status: DISCONTINUED | OUTPATIENT
Start: 2025-03-05 | End: 2025-03-06 | Stop reason: HOSPADM

## 2025-03-05 RX ORDER — ONDANSETRON 4 MG/1
4 TABLET, ORALLY DISINTEGRATING ORAL EVERY 30 MIN PRN
Status: DISCONTINUED | OUTPATIENT
Start: 2025-03-05 | End: 2025-03-05 | Stop reason: HOSPADM

## 2025-03-05 RX ORDER — BISACODYL 10 MG
10 SUPPOSITORY, RECTAL RECTAL DAILY PRN
Status: DISCONTINUED | OUTPATIENT
Start: 2025-03-08 | End: 2025-03-06 | Stop reason: HOSPADM

## 2025-03-05 RX ORDER — METHOCARBAMOL 750 MG/1
750 TABLET, FILM COATED ORAL EVERY 6 HOURS
Status: DISCONTINUED | OUTPATIENT
Start: 2025-03-05 | End: 2025-03-06 | Stop reason: HOSPADM

## 2025-03-05 RX ORDER — TRAZODONE HYDROCHLORIDE 50 MG/1
50 TABLET ORAL
Status: DISCONTINUED | OUTPATIENT
Start: 2025-03-05 | End: 2025-03-06 | Stop reason: HOSPADM

## 2025-03-05 RX ORDER — PROCHLORPERAZINE MALEATE 10 MG
10 TABLET ORAL EVERY 6 HOURS PRN
Status: DISCONTINUED | OUTPATIENT
Start: 2025-03-05 | End: 2025-03-06 | Stop reason: HOSPADM

## 2025-03-05 RX ORDER — SODIUM CHLORIDE, SODIUM LACTATE, POTASSIUM CHLORIDE, CALCIUM CHLORIDE 600; 310; 30; 20 MG/100ML; MG/100ML; MG/100ML; MG/100ML
INJECTION, SOLUTION INTRAVENOUS CONTINUOUS PRN
Status: DISCONTINUED | OUTPATIENT
Start: 2025-03-05 | End: 2025-03-05

## 2025-03-05 RX ORDER — HYDROMORPHONE HCL IN WATER/PF 6 MG/30 ML
0.4 PATIENT CONTROLLED ANALGESIA SYRINGE INTRAVENOUS EVERY 5 MIN PRN
Status: DISCONTINUED | OUTPATIENT
Start: 2025-03-05 | End: 2025-03-05 | Stop reason: HOSPADM

## 2025-03-05 RX ORDER — IBUPROFEN 600 MG/1
600 TABLET, FILM COATED ORAL EVERY 6 HOURS PRN
Status: DISCONTINUED | OUTPATIENT
Start: 2025-03-05 | End: 2025-03-06 | Stop reason: HOSPADM

## 2025-03-05 RX ORDER — HYDROXYZINE HYDROCHLORIDE 50 MG/ML
25 INJECTION, SOLUTION INTRAMUSCULAR ONCE
Status: COMPLETED | OUTPATIENT
Start: 2025-03-05 | End: 2025-03-05

## 2025-03-05 RX ORDER — APREPITANT 40 MG/1
40 CAPSULE ORAL ONCE
Status: COMPLETED | OUTPATIENT
Start: 2025-03-05 | End: 2025-03-05

## 2025-03-05 RX ORDER — FENTANYL CITRATE 0.05 MG/ML
25 INJECTION, SOLUTION INTRAMUSCULAR; INTRAVENOUS EVERY 5 MIN PRN
Status: DISCONTINUED | OUTPATIENT
Start: 2025-03-05 | End: 2025-03-05 | Stop reason: HOSPADM

## 2025-03-05 RX ORDER — PROPOFOL 10 MG/ML
INJECTION, EMULSION INTRAVENOUS PRN
Status: DISCONTINUED | OUTPATIENT
Start: 2025-03-05 | End: 2025-03-05

## 2025-03-05 RX ORDER — HYDROMORPHONE HCL IN WATER/PF 6 MG/30 ML
0.4 PATIENT CONTROLLED ANALGESIA SYRINGE INTRAVENOUS
Status: DISCONTINUED | OUTPATIENT
Start: 2025-03-05 | End: 2025-03-06 | Stop reason: HOSPADM

## 2025-03-05 RX ORDER — OXYCODONE HYDROCHLORIDE 5 MG/1
5 TABLET ORAL EVERY 4 HOURS PRN
Status: DISCONTINUED | OUTPATIENT
Start: 2025-03-05 | End: 2025-03-06 | Stop reason: HOSPADM

## 2025-03-05 RX ORDER — AMOXICILLIN 250 MG
1 CAPSULE ORAL 2 TIMES DAILY
Status: DISCONTINUED | OUTPATIENT
Start: 2025-03-05 | End: 2025-03-06 | Stop reason: HOSPADM

## 2025-03-05 RX ORDER — ONDANSETRON 4 MG/1
4 TABLET, ORALLY DISINTEGRATING ORAL EVERY 6 HOURS PRN
Status: DISCONTINUED | OUTPATIENT
Start: 2025-03-05 | End: 2025-03-06 | Stop reason: HOSPADM

## 2025-03-05 RX ORDER — LEVOTHYROXINE SODIUM 75 UG/1
75 TABLET ORAL DAILY
Status: DISCONTINUED | OUTPATIENT
Start: 2025-03-05 | End: 2025-03-06 | Stop reason: HOSPADM

## 2025-03-05 RX ORDER — NALOXONE HYDROCHLORIDE 0.4 MG/ML
0.1 INJECTION, SOLUTION INTRAMUSCULAR; INTRAVENOUS; SUBCUTANEOUS
Status: DISCONTINUED | OUTPATIENT
Start: 2025-03-05 | End: 2025-03-05 | Stop reason: HOSPADM

## 2025-03-05 RX ORDER — POLYETHYLENE GLYCOL 3350 17 G/17G
17 POWDER, FOR SOLUTION ORAL DAILY PRN
Status: DISCONTINUED | OUTPATIENT
Start: 2025-03-05 | End: 2025-03-06 | Stop reason: HOSPADM

## 2025-03-05 RX ORDER — SODIUM CHLORIDE, SODIUM LACTATE, POTASSIUM CHLORIDE, CALCIUM CHLORIDE 600; 310; 30; 20 MG/100ML; MG/100ML; MG/100ML; MG/100ML
INJECTION, SOLUTION INTRAVENOUS CONTINUOUS
Status: DISCONTINUED | OUTPATIENT
Start: 2025-03-05 | End: 2025-03-05 | Stop reason: HOSPADM

## 2025-03-05 RX ORDER — DEXTROSE MONOHYDRATE AND SODIUM CHLORIDE 5; .45 G/100ML; G/100ML
INJECTION, SOLUTION INTRAVENOUS CONTINUOUS
Status: DISCONTINUED | OUTPATIENT
Start: 2025-03-05 | End: 2025-03-06

## 2025-03-05 RX ORDER — ONDANSETRON 2 MG/ML
4 INJECTION INTRAMUSCULAR; INTRAVENOUS EVERY 30 MIN PRN
Status: DISCONTINUED | OUTPATIENT
Start: 2025-03-05 | End: 2025-03-05 | Stop reason: HOSPADM

## 2025-03-05 RX ORDER — CEPHALEXIN 500 MG/1
500 CAPSULE ORAL 3 TIMES DAILY
Qty: 30 CAPSULE | Refills: 0 | Status: SHIPPED | OUTPATIENT
Start: 2025-03-05 | End: 2025-03-15

## 2025-03-05 RX ORDER — HYDROMORPHONE HCL IN WATER/PF 6 MG/30 ML
0.2 PATIENT CONTROLLED ANALGESIA SYRINGE INTRAVENOUS EVERY 5 MIN PRN
Status: DISCONTINUED | OUTPATIENT
Start: 2025-03-05 | End: 2025-03-05 | Stop reason: HOSPADM

## 2025-03-05 RX ORDER — OXYCODONE HYDROCHLORIDE 5 MG/1
10 TABLET ORAL EVERY 4 HOURS PRN
Status: DISCONTINUED | OUTPATIENT
Start: 2025-03-05 | End: 2025-03-06 | Stop reason: HOSPADM

## 2025-03-05 RX ORDER — SENNA AND DOCUSATE SODIUM 50; 8.6 MG/1; MG/1
1-2 TABLET, FILM COATED ORAL 2 TIMES DAILY PRN
Qty: 15 TABLET | Refills: 0 | Status: SHIPPED | OUTPATIENT
Start: 2025-03-05

## 2025-03-05 RX ORDER — FENTANYL CITRATE 50 UG/ML
INJECTION, SOLUTION INTRAMUSCULAR; INTRAVENOUS PRN
Status: DISCONTINUED | OUTPATIENT
Start: 2025-03-05 | End: 2025-03-05

## 2025-03-05 RX ORDER — ONDANSETRON 2 MG/ML
INJECTION INTRAMUSCULAR; INTRAVENOUS PRN
Status: DISCONTINUED | OUTPATIENT
Start: 2025-03-05 | End: 2025-03-05

## 2025-03-05 RX ORDER — CEFAZOLIN SODIUM/WATER 2 G/20 ML
2 SYRINGE (ML) INTRAVENOUS
Status: COMPLETED | OUTPATIENT
Start: 2025-03-05 | End: 2025-03-05

## 2025-03-05 RX ORDER — NALOXONE HYDROCHLORIDE 0.4 MG/ML
0.4 INJECTION, SOLUTION INTRAMUSCULAR; INTRAVENOUS; SUBCUTANEOUS
Status: DISCONTINUED | OUTPATIENT
Start: 2025-03-05 | End: 2025-03-06 | Stop reason: HOSPADM

## 2025-03-05 RX ORDER — LIDOCAINE HYDROCHLORIDE 20 MG/ML
INJECTION, SOLUTION INFILTRATION; PERINEURAL PRN
Status: DISCONTINUED | OUTPATIENT
Start: 2025-03-05 | End: 2025-03-05

## 2025-03-05 RX ORDER — NALOXONE HYDROCHLORIDE 0.4 MG/ML
0.2 INJECTION, SOLUTION INTRAMUSCULAR; INTRAVENOUS; SUBCUTANEOUS
Status: DISCONTINUED | OUTPATIENT
Start: 2025-03-05 | End: 2025-03-06 | Stop reason: HOSPADM

## 2025-03-05 RX ORDER — LABETALOL HYDROCHLORIDE 5 MG/ML
10 INJECTION, SOLUTION INTRAVENOUS
Status: DISCONTINUED | OUTPATIENT
Start: 2025-03-05 | End: 2025-03-05 | Stop reason: HOSPADM

## 2025-03-05 RX ORDER — LIDOCAINE 40 MG/G
CREAM TOPICAL
Status: DISCONTINUED | OUTPATIENT
Start: 2025-03-05 | End: 2025-03-06 | Stop reason: HOSPADM

## 2025-03-05 RX ORDER — DEXAMETHASONE SODIUM PHOSPHATE 4 MG/ML
4 INJECTION, SOLUTION INTRA-ARTICULAR; INTRALESIONAL; INTRAMUSCULAR; INTRAVENOUS; SOFT TISSUE
Status: DISCONTINUED | OUTPATIENT
Start: 2025-03-05 | End: 2025-03-05 | Stop reason: HOSPADM

## 2025-03-05 RX ORDER — MAGNESIUM HYDROXIDE 1200 MG/15ML
LIQUID ORAL PRN
Status: DISCONTINUED | OUTPATIENT
Start: 2025-03-05 | End: 2025-03-05 | Stop reason: HOSPADM

## 2025-03-05 RX ORDER — POLYETHYLENE GLYCOL 3350 17 G/17G
17 POWDER, FOR SOLUTION ORAL DAILY
Status: DISCONTINUED | OUTPATIENT
Start: 2025-03-06 | End: 2025-03-06 | Stop reason: HOSPADM

## 2025-03-05 RX ORDER — CEFAZOLIN SODIUM/WATER 2 G/20 ML
2 SYRINGE (ML) INTRAVENOUS
Status: DISCONTINUED | OUTPATIENT
Start: 2025-03-05 | End: 2025-03-05 | Stop reason: HOSPADM

## 2025-03-05 RX ORDER — ACETAMINOPHEN 325 MG/1
975 TABLET ORAL EVERY 8 HOURS
Status: DISCONTINUED | OUTPATIENT
Start: 2025-03-05 | End: 2025-03-06 | Stop reason: HOSPADM

## 2025-03-05 RX ORDER — HYDROMORPHONE HCL IN WATER/PF 6 MG/30 ML
0.2 PATIENT CONTROLLED ANALGESIA SYRINGE INTRAVENOUS
Status: DISCONTINUED | OUTPATIENT
Start: 2025-03-05 | End: 2025-03-06 | Stop reason: HOSPADM

## 2025-03-05 RX ORDER — CEFAZOLIN SODIUM 1 G/3ML
1 INJECTION, POWDER, FOR SOLUTION INTRAMUSCULAR; INTRAVENOUS EVERY 8 HOURS
Status: DISCONTINUED | OUTPATIENT
Start: 2025-03-05 | End: 2025-03-06 | Stop reason: HOSPADM

## 2025-03-05 RX ORDER — CEFAZOLIN SODIUM/WATER 2 G/20 ML
2 SYRINGE (ML) INTRAVENOUS SEE ADMIN INSTRUCTIONS
Status: DISCONTINUED | OUTPATIENT
Start: 2025-03-05 | End: 2025-03-05 | Stop reason: HOSPADM

## 2025-03-05 RX ORDER — DEXAMETHASONE SODIUM PHOSPHATE 4 MG/ML
INJECTION, SOLUTION INTRA-ARTICULAR; INTRALESIONAL; INTRAMUSCULAR; INTRAVENOUS; SOFT TISSUE PRN
Status: DISCONTINUED | OUTPATIENT
Start: 2025-03-05 | End: 2025-03-05

## 2025-03-05 RX ORDER — FENTANYL CITRATE 0.05 MG/ML
25 INJECTION, SOLUTION INTRAMUSCULAR; INTRAVENOUS
Status: DISCONTINUED | OUTPATIENT
Start: 2025-03-05 | End: 2025-03-05 | Stop reason: HOSPADM

## 2025-03-05 RX ORDER — HYDRALAZINE HYDROCHLORIDE 20 MG/ML
2.5-5 INJECTION INTRAMUSCULAR; INTRAVENOUS EVERY 10 MIN PRN
Status: DISCONTINUED | OUTPATIENT
Start: 2025-03-05 | End: 2025-03-05 | Stop reason: HOSPADM

## 2025-03-05 RX ORDER — FENTANYL CITRATE 0.05 MG/ML
50 INJECTION, SOLUTION INTRAMUSCULAR; INTRAVENOUS EVERY 5 MIN PRN
Status: DISCONTINUED | OUTPATIENT
Start: 2025-03-05 | End: 2025-03-05 | Stop reason: HOSPADM

## 2025-03-05 RX ADMIN — SODIUM CHLORIDE, POTASSIUM CHLORIDE, SODIUM LACTATE AND CALCIUM CHLORIDE: 600; 310; 30; 20 INJECTION, SOLUTION INTRAVENOUS at 11:11

## 2025-03-05 RX ADMIN — ACETAMINOPHEN 975 MG: 325 TABLET, FILM COATED ORAL at 18:07

## 2025-03-05 RX ADMIN — HYDROMORPHONE HYDROCHLORIDE 0.4 MG: 0.2 INJECTION, SOLUTION INTRAMUSCULAR; INTRAVENOUS; SUBCUTANEOUS at 16:45

## 2025-03-05 RX ADMIN — DEXMEDETOMIDINE HYDROCHLORIDE 8 MCG: 100 INJECTION, SOLUTION INTRAVENOUS at 14:43

## 2025-03-05 RX ADMIN — APREPITANT 40 MG: 40 CAPSULE ORAL at 11:00

## 2025-03-05 RX ADMIN — PHENYLEPHRINE HYDROCHLORIDE 100 MCG: 10 INJECTION INTRAVENOUS at 12:26

## 2025-03-05 RX ADMIN — DEXAMETHASONE SODIUM PHOSPHATE 4 MG: 4 INJECTION, SOLUTION INTRA-ARTICULAR; INTRALESIONAL; INTRAMUSCULAR; INTRAVENOUS; SOFT TISSUE at 11:27

## 2025-03-05 RX ADMIN — HYDROMORPHONE HYDROCHLORIDE 0.4 MG: 0.2 INJECTION, SOLUTION INTRAMUSCULAR; INTRAVENOUS; SUBCUTANEOUS at 15:23

## 2025-03-05 RX ADMIN — SENNOSIDES AND DOCUSATE SODIUM 1 TABLET: 50; 8.6 TABLET ORAL at 20:53

## 2025-03-05 RX ADMIN — TILMANOCEPT 0.54 MILLICURIE: KIT at 09:50

## 2025-03-05 RX ADMIN — HYDROMORPHONE HYDROCHLORIDE 0.4 MG: 0.2 INJECTION, SOLUTION INTRAMUSCULAR; INTRAVENOUS; SUBCUTANEOUS at 15:54

## 2025-03-05 RX ADMIN — METHOCARBAMOL 750 MG: 750 TABLET ORAL at 18:07

## 2025-03-05 RX ADMIN — PROPOFOL 200 MG: 10 INJECTION, EMULSION INTRAVENOUS at 11:19

## 2025-03-05 RX ADMIN — Medication 2 G: at 11:15

## 2025-03-05 RX ADMIN — PHENYLEPHRINE HYDROCHLORIDE 0.5 MCG/KG/MIN: 10 INJECTION INTRAVENOUS at 12:27

## 2025-03-05 RX ADMIN — CEFAZOLIN 1 G: 1 INJECTION, POWDER, FOR SOLUTION INTRAMUSCULAR; INTRAVENOUS at 18:13

## 2025-03-05 RX ADMIN — LIDOCAINE HYDROCHLORIDE 100 MG: 20 INJECTION, SOLUTION INFILTRATION; PERINEURAL at 11:19

## 2025-03-05 RX ADMIN — HYDROXYZINE HYDROCHLORIDE 25 MG: 50 INJECTION, SOLUTION INTRAMUSCULAR at 15:14

## 2025-03-05 RX ADMIN — FENTANYL CITRATE 50 MCG: 50 INJECTION, SOLUTION INTRAMUSCULAR; INTRAVENOUS at 14:46

## 2025-03-05 RX ADMIN — HYDROMORPHONE HYDROCHLORIDE 0.4 MG: 0.2 INJECTION, SOLUTION INTRAMUSCULAR; INTRAVENOUS; SUBCUTANEOUS at 20:52

## 2025-03-05 RX ADMIN — DEXTROSE AND SODIUM CHLORIDE: 5; 450 INJECTION, SOLUTION INTRAVENOUS at 17:40

## 2025-03-05 RX ADMIN — SODIUM CHLORIDE, POTASSIUM CHLORIDE, SODIUM LACTATE AND CALCIUM CHLORIDE: 600; 310; 30; 20 INJECTION, SOLUTION INTRAVENOUS at 14:20

## 2025-03-05 RX ADMIN — ONDANSETRON 4 MG: 2 INJECTION INTRAMUSCULAR; INTRAVENOUS at 11:27

## 2025-03-05 RX ADMIN — FENTANYL CITRATE 50 MCG: 50 INJECTION, SOLUTION INTRAMUSCULAR; INTRAVENOUS at 14:55

## 2025-03-05 RX ADMIN — MIDAZOLAM 2 MG: 1 INJECTION INTRAMUSCULAR; INTRAVENOUS at 11:15

## 2025-03-05 RX ADMIN — PROPOFOL 75 MCG/KG/MIN: 10 INJECTION, EMULSION INTRAVENOUS at 11:19

## 2025-03-05 RX ADMIN — HYDROMORPHONE HYDROCHLORIDE 0.4 MG: 0.2 INJECTION, SOLUTION INTRAMUSCULAR; INTRAVENOUS; SUBCUTANEOUS at 15:08

## 2025-03-05 RX ADMIN — LEVOTHYROXINE SODIUM 75 MCG: 75 TABLET ORAL at 18:08

## 2025-03-05 RX ADMIN — FENTANYL CITRATE 100 MCG: 50 INJECTION INTRAMUSCULAR; INTRAVENOUS at 11:19

## 2025-03-05 RX ADMIN — LIOTHYRONINE SODIUM 12.5 MCG: 25 TABLET ORAL at 20:53

## 2025-03-05 RX ADMIN — SCOLOPAMINE TRANSDERMAL SYSTEM 1 PATCH: 1 PATCH, EXTENDED RELEASE TRANSDERMAL at 10:59

## 2025-03-05 RX ADMIN — HYDROMORPHONE HYDROCHLORIDE 0.5 MG: 1 INJECTION, SOLUTION INTRAMUSCULAR; INTRAVENOUS; SUBCUTANEOUS at 12:07

## 2025-03-05 ASSESSMENT — ACTIVITIES OF DAILY LIVING (ADL)
ADLS_ACUITY_SCORE: 15
ADLS_ACUITY_SCORE: 16
ADLS_ACUITY_SCORE: 15
ADLS_ACUITY_SCORE: 15
ADLS_ACUITY_SCORE: 16
ADLS_ACUITY_SCORE: 16
ADLS_ACUITY_SCORE: 15
ADLS_ACUITY_SCORE: 16
ADLS_ACUITY_SCORE: 41
ADLS_ACUITY_SCORE: 15
ADLS_ACUITY_SCORE: 16

## 2025-03-05 ASSESSMENT — LIFESTYLE VARIABLES: TOBACCO_USE: 0

## 2025-03-05 NOTE — DISCHARGE INSTRUCTIONS
[facility] - [facility]  Discharge Instructions: Post-Operative Mastectomy with Reconstruction    ACTIVITY  Take frequent, short walks and increase your activity gradually.    Avoid strenuous physical activity or heavy lifting greater than 10 pounds. You may climb stairs.   Gentle rotation and stretching of your arms and shoulders will prevent joint stiffness.  You may drive without restrictions when you are not using any prescription pain medication and feel comfortable in a car.  You may return to work/school when you are comfortable without any prescription pain medication.    DRAIN CARE  You have drains at your surgical site.  In order to empty these, open the tab/vent on the drain and record how much liquid you remove. To properly close the drain, squeeze the bulb (with tab/vent open) then close the tab while still squeezing the bulb. You should notice that liquid moves in the tubing toward the bulb if it is properly closed.   You should also strip the drain tubing once daily to avoid any clogging. You do this by gently pinching the drain, starting near the skin, and stretching the tubing out this way until you reach the bulb. Do not pull hard on the drain tubing to the point of pulling the drain away from the skin.     DIET  Start with liquids, then gradually resume your regular diet as tolerated.   Drink plenty of fluids to stay hydrated.    PAIN  Expect some tenderness and discomfort at the incision site(s).  Use the prescribed pain medication at your discretion.  Expect gradual resolution of your pain over several days.   Do not drink alcohol or drive while you are taking pain medications.  You may apply ice to your incisions in 20 minute intervals as needed.    EXPECTATIONS  Pain medications can cause constipation.  Limit use when possible.  Take the prescribed stool softener/stimulant twice daily as needed. You may take a mild over the counter laxative, such as Miralax or a  Dulcolax suppository, as needed.   You may discontinue these medications once you are having regular bowel movements and/or are no longer taking your narcotic pain medication.  Blue dye may have been used during your surgery to locate lymph nodes and can cause your urine to be blue/green for several days after surgery.  This is not a cause for concern and will resolve on its own.     RETURN APPOINTMENT  Follow-up with Dr. Neil's office next week as directed.  Follow-up with Dr. Cortez in approximately 2-4 weeks. Call our office at [phone] to schedule this appointment.    CALL DR NEIL'S OR DR CORTEZ'S OFFICE IF YOU HAVE:   Chills or fever above 101 F.  Increased redness, warmth, or drainage at your incisions.  Significant bleeding or swelling.  Pain not relieved by your pain medication or rest.  Increasing pain after the first 48 hours.  Any other concerns or questions.     Information for Patients Discharging with a Transderm Scopolamine Patch     Dry mouth is a common side effect.  Drowsiness is another common side effect especially when combined with pain medication.  Please avoid activities that require mental alertness such as driving a car or making important legal decisions.  Since Scopolamine can cause temporary dilation of the pupils and blurred vision if it comes in contact with the eyes; be sure to wash your hands thoroughly with soap and water immediately after handling the patch.   When you remove your patch, please stick it to a tissue or paper towel for disposal.    Remove the patch immediately and contact a physician in the unlikely event that you experience symptoms of acute glaucoma (pain and reddening of the eyes, accompanied by dilated pupils).  Remove the patch if you develop any difficulties urinating.  If you cannot urinate after removing your patch, please notify your surgeon.  Remove the patch 24 hours after surgery.

## 2025-03-05 NOTE — ANESTHESIA PROCEDURE NOTES
Airway       Patient location during procedure: OR  Staff -        Anesthesiologist:  Avery Romero MD       CRNA: Ade Vega APRN CRNA       Performed By: CRNA  Consent for Airway        Urgency: elective  Indications and Patient Condition       Indications for airway management: jonas-procedural       Induction type:intravenous       Mask difficulty assessment: 0 - not attempted    Final Airway Details       Final airway type: supraglottic airway    Supraglottic Airway Details        Type: LMA       Brand: I-Gel       LMA size: 4    Post intubation assessment        Placement verified by: capnometry, equal breath sounds and chest rise        Number of attempts at approach: 1       Number of other approaches attempted: 0       Secured with: tape       Ease of procedure: easy       Dentition: Intact and Unchanged

## 2025-03-05 NOTE — ANESTHESIA CARE TRANSFER NOTE
Patient: Cortney Willoughby    Procedure: Procedure(s):  bilateral skin sparing mastectomies  with left sentinel node biopsy  Bilateral breast reconstruction with tissue expander placement       Diagnosis: Malignant neoplasm of upper-outer quadrant of left breast in female, estrogen receptor positive (H) [C50.412, Z17.0]  Breast CA (H) [C50.919]  Diagnosis Additional Information: No value filed.    Anesthesia Type:   General     Note:    Oropharynx: oropharynx clear of all foreign objects and spontaneously breathing  Level of Consciousness: awake  Oxygen Supplementation: face mask  Level of Supplemental Oxygen (L/min / FiO2): 6  Independent Airway: airway patency satisfactory and stable  Dentition: dentition unchanged  Vital Signs Stable: post-procedure vital signs reviewed and stable  Report to RN Given: handoff report given  Patient transferred to: Phase II    Handoff Report: Identifed the Patient, Identified the Reponsible Provider, Reviewed the pertinent medical history, Discussed the surgical course, Reviewed Intra-OP anesthesia mangement and issues during anesthesia, Set expectations for post-procedure period and Allowed opportunity for questions and acknowledgement of understanding      Vitals:  Vitals Value Taken Time   /82 03/05/25 1438   Temp 36.3  C (97.34  F) 03/05/25 1443   Pulse 96 03/05/25 1443   Resp 7 03/05/25 1443   SpO2 100 % 03/05/25 1443   Vitals shown include unfiled device data.    Electronically Signed By: KEYSHA Painting CRNA  March 5, 2025  2:44 PM

## 2025-03-05 NOTE — ANESTHESIA POSTPROCEDURE EVALUATION
Patient: Cortney Willoughby    Procedure: Procedure(s):  bilateral skin sparing mastectomies  with left sentinel node biopsy  Bilateral breast reconstruction with tissue expander placement       Anesthesia Type:  General    Note:  Disposition: Outpatient   Postop Pain Control: Uneventful            Sign Out: Well controlled pain   PONV: No   Neuro/Psych: Uneventful            Sign Out: Acceptable/Baseline neuro status   Airway/Respiratory: Uneventful            Sign Out: Acceptable/Baseline resp. status   CV/Hemodynamics: Uneventful            Sign Out: Acceptable CV status   Other NRE: NONE   DID A NON-ROUTINE EVENT OCCUR? No           Last vitals:  Vitals Value Taken Time   /58 03/05/25 1445   Temp 36.4  C (97.52  F) 03/05/25 1459   Pulse 84 03/05/25 1459   Resp 0 03/05/25 1459   SpO2 96 % 03/05/25 1459   Vitals shown include unfiled device data.    Electronically Signed By: Avery Romero MD  March 5, 2025  3:00 PM

## 2025-03-05 NOTE — ANESTHESIA PREPROCEDURE EVALUATION
Anesthesia Pre-Procedure Evaluation    Patient: Cortney Willoughby   MRN: [id number] : 1980        Procedure : Procedure(s):  bilateral skin sparing mastectomies  with left sentinel node biopsy  Bilateral breast reconstruction with tissue expander placement          Past Medical History:   Diagnosis Date    Anxiety     Closed fracture of unspecified part of ulna (alone) 2006    Depression     Family history of malignant neoplasm of breast     Fibroid uterus     Hyperlipidemia     Hypothyroidism, unspecified type 2019    Insomnia     Ovarian cyst     Scoliosis (and kyphoscoliosis), idiopathic       Past Surgical History:   Procedure Laterality Date    COSMETIC SURGERY  2018    Liposuction    CYSTOSCOPY N/A 10/20/2021    Procedure: diagnostic cystoscopy;  Surgeon: Tiffanie Oliveira MD;  Location: [facility] OR    LAPAROSCOPIC HYSTERECTOMY SUPRACERVICAL N/A 10/20/2021    Procedure: laparascopic supracervical hysterectomy;  Surgeon: Tiffanie Oliveira MD;  Location: [facility]    LAPAROSCOPIC SALPINGECTOMY Bilateral 10/20/2021    Procedure: bilateral salpingectomy;  Surgeon: Tiffanie Oliveira MD;  Location: [facility]    LIPOSUCTION (LOCATION)        Allergies   Allergen Reactions    No Known Drug Allergy       Social History     Tobacco Use    Smoking status: Never    Smokeless tobacco: Never   Substance Use Topics    Alcohol use: Yes     Comment: 2 drinks per week      Wt Readings from Last 1 Encounters:   25 64.8 kg (142 lb 14.4 oz)        Anesthesia Evaluation            ROS/MED HX  ENT/Pulmonary:    (-) tobacco use and sleep apnea   Neurologic: Comment: scoliosis      Cardiovascular:     (+) Dyslipidemia - -   -  - -                                      METS/Exercise Tolerance:     Hematologic:       Musculoskeletal: Comment: Scoliosis       GI/Hepatic:       Renal/Genitourinary: Comment: Ovarian cyst, irregular bleeding/fibroid uterus      Endo:     (+)          thyroid problem,           "  Psychiatric/Substance Use: Comment: insomnia    (+) psychiatric history depression and anxiety       Infectious Disease:       Malignancy:   (+) Malignancy, History of Breast.Breast CA Active status post.      Other:            Physical Exam    Airway        Mallampati: II   TM distance: > 3 FB   Neck ROM: full   Mouth opening: > 3 cm    Respiratory Devices and Support         Dental       (+) Completely normal teeth      Cardiovascular   cardiovascular exam normal          Pulmonary   pulmonary exam normal                OUTSIDE LABS:  CBC:   Lab Results   Component Value Date    WBC 5.6 02/04/2025    WBC 4.9 05/20/2024    HGB 14.1 02/04/2025    HGB 13.5 05/20/2024    HCT 42.9 02/04/2025    HCT 41.2 05/20/2024     02/04/2025     05/20/2024     BMP:   Lab Results   Component Value Date     02/04/2025     05/20/2024    POTASSIUM 3.9 02/04/2025    POTASSIUM 4.1 05/20/2024    CHLORIDE 106 02/04/2025    CHLORIDE 107 05/20/2024    CO2 23 02/14/2025    CO2 21 (L) 02/04/2025    BUN 9.0 02/04/2025    BUN 11.2 05/20/2024    CR 0.78 02/04/2025    CR 0.85 05/20/2024    GLC 96 02/04/2025    GLC 80 05/20/2024     COAGS: No results found for: \"PTT\", \"INR\", \"FIBR\"  POC: No results found for: \"BGM\", \"HCG\", \"HCGS\"  HEPATIC:   Lab Results   Component Value Date    ALBUMIN 4.2 02/04/2025    PROTTOTAL 7.1 02/04/2025    ALT 17 02/04/2025    AST 27 02/04/2025    ALKPHOS 50 02/04/2025    BILITOTAL 0.2 02/04/2025     OTHER:   Lab Results   Component Value Date    A1C 5.0 06/18/2021    BAYLEE 9.2 02/04/2025    TSH 13.27 (H) 10/12/2021    T4 0.47 (L) 10/12/2021       Anesthesia Plan    ASA Status:  3    NPO Status:  NPO Appropriate    Anesthesia Type: General.     - Airway: LMA   Induction: Intravenous, Propofol.   Maintenance: Balanced.        Consents    Anesthesia Plan(s) and associated risks, benefits, and realistic alternatives discussed. Questions answered and patient/representative(s) expressed " understanding.     - Discussed: Risks, Benefits and Alternatives for the PROCEDURE were discussed     - Discussed with:  Patient            Postoperative Care    Pain management: IV analgesics, Multi-modal analgesia.   PONV prophylaxis: Ondansetron (or other 5HT-3), Dexamethasone or Solumedrol, Background Propofol Infusion     Comments:               Avery Romero MD    I have reviewed the pertinent notes and labs in the chart from the past 30 days and (re)examined the patient.  Any updates or changes from those notes are reflected in this note.    Clinically Significant Risk Factors Present on Admission

## 2025-03-05 NOTE — OP NOTE
[facility] Breast Surgery Operative Note    PREOPERATIVE DIAGNOSIS:  Left breast cancer    POSTOPERATIVE DIAGNOSIS:  same, pathology pending    PROCEDURE:    1.  Bilateral skin sparing mastectomies  2. Left sentinel lymph node biopsy  3. US guided PECS block  4. Reconstruction per Dr. Neil, please see their operative report for details regarding their portion of the procedure.     SURGEON:  Marisela Pena MD    ASSISTANT:  Breana Xie PA-C  The physician s assistant was medically necessary for their expertise in retraction and suctioning.    ANESTHESIA:  General.    BLOOD LOSS: 30ml    FINDINGS: two clinically benign left axillary sentinel nodes    INDICATIONS:   Please see my clinic notes for details.     DETAILS OF PROCEDURE:     The patient was taken to the operating room and placed in supine position and general anesthesia by endotracheal tube.   Perioperative antiobiotics were given. SCDs were placed on the lower extremities. I performed a bilateral PECS block using ultrasound guidance.  Prior to coming to the operating room, patient had the left breast injected with radiolabeled sulfur colloid.   The patient was also marked by Dr. Neil with periareolar ellipse skin sparing incisions.     The breasts and axilla were prepped with chloraprep and draped in the usual sterile fashion.  The timeout procedure was performed.      Starting on the left side, a periareolar ellipse skin sparing incision was made following the pre operative markings with a #10 scalpel blade and deepened with electrocautery.  The superior flap was raised with electrocautery up to the top edge of the breast tissue just under the clavicle.  The inferior flap was similarly raised using the cautery down to the inframammary fold.  Flaps were raised medially to the lateral aspect of the sternum and laterally to the lateral chest wall. The breast tissue was then elevated off of the pectoralis fascia with cautery. The fascia was  excised with the breast tissue.  Once the breast was removed, it was oriented with sutures with a short suture superior and long suture lateral.  The breast was sent to pathology to be placed in formalin and have routine pathology exam.        Attention was then turned to the  left axilla.  The fascia was incised along the edge of the pectoralis muscle.  The Neoprobe was used to identify the site of increased radioactivity.  2 nodes were excised which had counts of >800 and 45.  The nodes were clinically benign.  There was no ongoing uptake of tracer after these nodes were removed.  The nodes were sent to pathology for routine evaluation.   The left side was irrigated, hemostasis assured and packed with a saline-wetted lap pad and covered with a dry towel while attention was turned to the right breast.     A similar  incision was made on the right side with a #10 scalpel blade and deepened with electrocautery.  Again, the flaps were raised with electrocautery and the breast was dissected away from the pectoralis fascia.  The tissue was oriented with a short suture superior and long suture lateral.  The breast was then sent to pathology and placed in formalin for permanent section.        Dr. Neil then performed their portion of the procedure with reconstruction. Please see their operative report for details.     [place] Node Biopsy for Breast Cancer - Left  Operation performed with curative intent Yes   Tracer(s) used to identify sentinel nodes in the upfront surgery (non-neoadjuvant) setting Radioactive tracer   Tracer(s) used to identify sentinel nodes in the neoadjuvant setting N/A   All nodes (colored or non-colored) present at the end of a dye-filled lymphatic channel were removed N/A   All significantly radioactive nodes were removed Yes   All palpably suspicious nodes were removed N/A   Biopsy-proven positive nodes marked with clips prior to chemotherapy were identified and removed N/A       Marisela  MD Becky

## 2025-03-05 NOTE — BRIEF OP NOTE
[facility]    Brief Operative Note    Pre-operative diagnosis: Malignant neoplasm of upper-outer quadrant of left breast in female, estrogen receptor positive (H) [C50.412, Z17.0]  Breast CA (H) [C50.919]  Post-operative diagnosis Same as pre-operative diagnosis    Procedure: bilateral skin sparing mastectomies, Bilateral - Breast  with left sentinel node biopsy, Left - Breast  Bilateral breast reconstruction with tissue expander placement, Bilateral - Breast    Surgeon: Surgeons and Role:  Panel 1:     * Marisela Pena MD - Primary     * Leah Gutierrez PA-C - Assisting     * Breana Xie PA-C  Panel 2:     * Jason Neil MD - Primary  Anesthesia: General   Estimated Blood Loss: Minimal, see Op note  Drains: See Dr. Neil's Op note  Specimens:   ID Type Source Tests Collected by Time Destination   1 : Left Breast Mastectomy; short stitch superior, long stitch lateral Mastectomy, Simple Breast, Left SURGICAL PATHOLOGY EXAM Marisela Pena MD 3/5/2025 12:16 PM    2 : Right Breast Mastectomy; short stitch superior, long stitch lateral Mastectomy, Simple Breast, Right SURGICAL PATHOLOGY EXAM Marisela Pena MD 3/5/2025 12:17 PM    3 : Left axillary sentinel lymph node # 1 Tissue Lymph Node(s) [place], Breast, Left SURGICAL PATHOLOGY EXAM Marisela Pena MD 3/5/2025 12:17 PM    4 : Left axillary sentinel lymph node # 2 Tissue Lymph Node(s) [place], Breast, Left SURGICAL PATHOLOGY EXAM Marisela Pena MD 3/5/2025 12:30 PM      Findings:   Left skin sparing mastectomy. 2 nodes obtained for left axillary sentinel lymph node biopsy. Right skin sparing mastectomy .  Complications: None.  Implants: * No implants in log *

## 2025-03-05 NOTE — OP NOTE
OPERATIVE NOTE    Date of procedure: 03/05/25    PREOPERATIVE DIAGNOSIS: Left breast cancer with acquired absence of bilateral breasts    POSTOPERATIVE DIAGNOSIS: Same    PROCEDURE:     Bilateral first stage breast reconstruction with tissue expanders   2.   Implantation of acellular dermal matrix bilateral chest wall for soft tissue support     SURGEON: Jason Neil MD    ASSISTANT: Ya Rojas PA-C    A surgical first assistant was medically necessary for patient positioning, retraction and visualization of anatomic structures, hemostasis, and suture closure of incisions.    TECHNIQUE:     The patient was marked preoperatively.  She underwent a bilateral skin sparing mastectomy by Dr. Pena.  The margins were clear.  A timeout was done.    Clinical exam showed adequate perfusion of the tissues.  I was comfortable with the prepectoral reconstruction and this was begun on the left side.  I controlled the mastectomy pocket with internal sutures of 2-0 Vicryl and affixed a medium contour sheet of AlloDerm with 2-0 Vicryl for soft tissue support in the lower pole.  I placed a Allergan style 133  cc tissue expander.  This was prepared by removing the air and rinsing it in Betadine solution.  It was filled with 400 cc of saline and placed in the prepectoral pocket.  Was affixed to the pectoralis fashion in multiple locations with 2-0 silk.  The AlloDerm was draped over the lower pole and affixed to the backside of the upper mastectomy flap with 2-0 Vicryl.  I placed a drain and this was sewn in with 2-0 silk.  The skin was closed with 3-0 and 4-0 Vicryl.  I turned my attention of the right side.  I used the same technique to control the mastectomy pocket, irrigated and placed in identical style and size of expander prepared in identical fashion.  The same chest wall fixation was performed same AlloDerm installation was done and the same closure was done.  There was good symmetry and tissue viability at  the termination of procedure.  Sterile dressings were applied.    Anesthesia was reversed and the patient was taken to the recovery room in satisfactory condition.    ESTIMATED BLOOD LOSS 10cc    Sponge and needle counts correct    Findings noted above    Specimens none    Jason Neil MD on 3/5/2025 at 2:39 PM

## 2025-03-06 VITALS
RESPIRATION RATE: 16 BRPM | HEART RATE: 71 BPM | TEMPERATURE: 98.4 F | WEIGHT: 142.9 LBS | DIASTOLIC BLOOD PRESSURE: 58 MMHG | BODY MASS INDEX: 23.81 KG/M2 | HEIGHT: 65 IN | OXYGEN SATURATION: 98 % | SYSTOLIC BLOOD PRESSURE: 98 MMHG

## 2025-03-06 PROCEDURE — 258N000003 HC RX IP 258 OP 636

## 2025-03-06 PROCEDURE — 250N000013 HC RX MED GY IP 250 OP 250 PS 637: Performed by: PHYSICIAN ASSISTANT

## 2025-03-06 PROCEDURE — 250N000011 HC RX IP 250 OP 636: Mod: JZ

## 2025-03-06 PROCEDURE — 250N000013 HC RX MED GY IP 250 OP 250 PS 637

## 2025-03-06 RX ADMIN — POLYETHYLENE GLYCOL 3350 17 G: 17 POWDER, FOR SOLUTION ORAL at 08:26

## 2025-03-06 RX ADMIN — METHOCARBAMOL 750 MG: 750 TABLET ORAL at 00:26

## 2025-03-06 RX ADMIN — ACETAMINOPHEN 975 MG: 325 TABLET, FILM COATED ORAL at 10:13

## 2025-03-06 RX ADMIN — METHOCARBAMOL 750 MG: 750 TABLET ORAL at 12:13

## 2025-03-06 RX ADMIN — HYDROMORPHONE HYDROCHLORIDE 0.4 MG: 0.2 INJECTION, SOLUTION INTRAMUSCULAR; INTRAVENOUS; SUBCUTANEOUS at 03:18

## 2025-03-06 RX ADMIN — LEVOTHYROXINE SODIUM 75 MCG: 75 TABLET ORAL at 06:37

## 2025-03-06 RX ADMIN — CEFAZOLIN 1 G: 1 INJECTION, POWDER, FOR SOLUTION INTRAMUSCULAR; INTRAVENOUS at 10:13

## 2025-03-06 RX ADMIN — ACETAMINOPHEN 975 MG: 325 TABLET, FILM COATED ORAL at 03:05

## 2025-03-06 RX ADMIN — SENNOSIDES AND DOCUSATE SODIUM 1 TABLET: 50; 8.6 TABLET ORAL at 08:26

## 2025-03-06 RX ADMIN — CEFAZOLIN 1 G: 1 INJECTION, POWDER, FOR SOLUTION INTRAMUSCULAR; INTRAVENOUS at 03:03

## 2025-03-06 RX ADMIN — IBUPROFEN 600 MG: 600 TABLET ORAL at 03:06

## 2025-03-06 RX ADMIN — OXYCODONE HYDROCHLORIDE 5 MG: 5 TABLET ORAL at 12:12

## 2025-03-06 RX ADMIN — LIOTHYRONINE SODIUM 12.5 MCG: 25 TABLET ORAL at 06:37

## 2025-03-06 RX ADMIN — DEXTROSE AND SODIUM CHLORIDE: 5; 450 INJECTION, SOLUTION INTRAVENOUS at 03:27

## 2025-03-06 RX ADMIN — OXYCODONE HYDROCHLORIDE 5 MG: 5 TABLET ORAL at 06:31

## 2025-03-06 RX ADMIN — METHOCARBAMOL 750 MG: 750 TABLET ORAL at 06:31

## 2025-03-06 ASSESSMENT — ACTIVITIES OF DAILY LIVING (ADL)
ADLS_ACUITY_SCORE: 19
ADLS_ACUITY_SCORE: 19
ADLS_ACUITY_SCORE: 24
ADLS_ACUITY_SCORE: 24
ADLS_ACUITY_SCORE: 19
ADLS_ACUITY_SCORE: 19
ADLS_ACUITY_SCORE: 24
ADLS_ACUITY_SCORE: 19
ADLS_ACUITY_SCORE: 19
ADLS_ACUITY_SCORE: 16
ADLS_ACUITY_SCORE: 19
ADLS_ACUITY_SCORE: 24
ADLS_ACUITY_SCORE: 19

## 2025-03-06 NOTE — PROGRESS NOTES
"[facility]  General Surgery Progress Note    Admission Date: 3/5/2025  3/6/2025         Assessment and Plan:     Cortney Willoughby is a 44 year old female 1 day s/p:  Bilateral skin sparing mastectomies with left sentinel node biopsy  Bilateral breast reconstruction with tissue expander placement    - Pain controlled with scheduled tylenol, robaxin, and prn oxycodone.  - Tolerating regular diet. Senokot BID, prn miralax.  - Ambulate 4x day and encourage IS.  - AMAYA drain teaching by nursing prior to discharge.    DISPOSITION:  - Discharge home today with oxycodone, robaxin, and senokot. Antibiotic per Plastics.  - Follow up with Dr. Neil next week and when drains are ready to be removed.  - Follow up with Dr. Pena in 2-4 weeks. She will call when pathology results are available.  - Discharge instructions and incision cares per Plastic Surgery.              Interval History:     Cortney Willoughby is seen on surgical rounds. Pain is controlled with oral analgesia this morning, dose of dilaudid overnight. She is tolerating diet without nausea. Urinating. Plans to ambulate halls with significant other Abiel before leaving. BP soft side, but vitals otherwise wnl. Meds reviewed.                    Physical Exam:   Blood pressure 95/59, pulse 76, temperature 98  F (36.7  C), temperature source Oral, resp. rate 16, height 1.638 m (5' 4.5\"), weight 64.8 kg (142 lb 14.4 oz), last menstrual period 07/10/2021, SpO2 96%, not currently breastfeeding.  Temperature Temp  Av.1  F (36.7  C)  Min: 97.3  F (36.3  C)  Max: 98.8  F (37.1  C)   I/O last 3 completed shifts:  In: 1931.81 [P.O.:480; I.V.:1451.81]  Out: 1319 [Urine:1150; Drains:129; Blood:40]    GENERAL: VS reviewed, alert, oriented, no acute distress  LUNGS: Normal respiratory effort, no wheezing  ABDOMEN:  Soft, non-distended  CHEST: Healthy appearing flaps. No palpable seroma/hematoma. AMAYA drains intact without leakage, serosanguinous. Left- 77 ml " since placement, Right- 52 ml since placement  INCISION: Dressing in place without saturation. No surrounding erythema.  EXTREMITIES: Moving all extremities, no gross deformities  NEUROLOGICAL: Grossly non-focal, mood & affect appropriate         Data:     Recent Labs   Lab Test 02/04/25  1018 05/20/24  0747 10/20/21  0727   WBC 5.6 4.9 6.1   HGB 14.1 13.5 12.8   HCT 42.9 41.2 38.4    184 240      Recent Labs   Lab Test 02/14/25  1158 02/04/25  1018 05/20/24  0747 01/20/23  0907   POTASSIUM  --  3.9 4.1 4.5   CHLORIDE  --  106 107 104   CO2 23 21* 23 19*   BUN  --  9.0 11.2 13.4   CR  --  0.78 0.85 0.75                    No lab results found.     Breana Xie PA-C  [facility]    Please use Vocera to page 7:30am-4pm, page on-call surgeon after 4pm  Office number: [phone]

## 2025-03-06 NOTE — PLAN OF CARE
Goal Outcome Evaluation:         Patient is A&O x4. Up with SBA to bathroom, voiding. Denies chest pain or SOB. Pain managed with oxycodone, tylenol and robaxin. IV access discontinued. Dressing- CDI, AMAYA drain x2- education provided on drain care to patient and Abiel, significant other. Reviewed AVS with patient and Abiel. All questions answered. Discharge home with medications and belonging.

## 2025-03-06 NOTE — DISCHARGE SUMMARY
[facility] Discharge Summary    Cortney Willoughby MRN# [id number]   Age: 44 year old YOB: 1980     Date of Admission:  3/5/2025  Date of Discharge::  3/6/2025  Admitting Physician:  Jason Neil MD  Discharge Physician:  Ya Rojas PA-C     Home clinic: [facility]          Admission Diagnoses:   Malignant neoplasm of upper-outer quadrant of left breast in female, estrogen receptor positive (H) [C50.412, Z17.0]  Breast CA (H) [C50.919]          Discharge Diagnosis:     Malignant neoplasm of upper-outer quadrant of left breast in female, estrogen receptor positive (H) [C50.412, Z17.0]  Breast CA (H) [C50.919]          Procedures:     Procedure(s): Bilateral skin sparing mastectomies with left sentinel node biopsy and immediate breast reconstruction with tissue expanders                  Medications Prior to Admission:     Medications Prior to Admission   Medication Sig Dispense Refill Last Dose/Taking    fluconazole (DIFLUCAN) 200 MG tablet Take 200 mg by mouth once a week (Sundays)   3/2/2025    levothyroxine (SYNTHROID/LEVOTHROID) 75 MCG tablet Take 1 tablet (75 mcg) by mouth daily By her natural medicine doctor   3/4/2025    liothyronine (CYTOMEL) 25 MCG tablet Take 0.5 tablets (12.5 mcg) by mouth daily From natural medicine doctor   3/4/2025    mupirocin (BACTROBAN) 2 % external ointment Apply topically 2 times daily.   3/5/2025 Morning    Tirzepatide (MOUNJARO SC) Inject 0.4 mLs subcutaneously every 7 days. Bottle is 16.6 mg/4.5 mL   2/19/2025    traZODone (DESYREL) 50 MG tablet Take 1 tablet (50 mg) by mouth every evening as needed for sleep 90 tablet 3 3/4/2025             Discharge Medications:     Current Discharge Medication List        START taking these medications    Details   cephALEXin (KEFLEX) 500 MG capsule Take 1 capsule (500 mg) by mouth 3 times daily for 10 days.  Qty: 30 capsule, Refills: 0    Associated Diagnoses: Malignant neoplasm of  overlapping sites of left breast in female, estrogen receptor positive (H)      methocarbamol (ROBAXIN) 750 MG tablet Take 1 tablet (750 mg) by mouth 4 times daily as needed for muscle spasms (pain).  Qty: 20 tablet, Refills: 0    Associated Diagnoses: Malignant neoplasm of overlapping sites of left breast in female, estrogen receptor positive (H)      oxyCODONE (ROXICODONE) 5 MG tablet Take 1-2 tablets (5-10 mg) by mouth every 6 hours as needed for moderate to severe pain.  Qty: 12 tablet, Refills: 0    Associated Diagnoses: Malignant neoplasm of overlapping sites of left breast in female, estrogen receptor positive (H)      SENNA-docusate sodium (SENNA S) 8.6-50 MG tablet Take 1-2 tablets by mouth 2 times daily as needed (constipation).  Qty: 15 tablet, Refills: 0    Associated Diagnoses: Malignant neoplasm of overlapping sites of left breast in female, estrogen receptor positive (H)           CONTINUE these medications which have NOT CHANGED    Details   fluconazole (DIFLUCAN) 200 MG tablet Take 200 mg by mouth once a week (Sundays)      levothyroxine (SYNTHROID/LEVOTHROID) 75 MCG tablet Take 1 tablet (75 mcg) by mouth daily By her natural medicine doctor      liothyronine (CYTOMEL) 25 MCG tablet Take 0.5 tablets (12.5 mcg) by mouth daily From natural medicine doctor      mupirocin (BACTROBAN) 2 % external ointment Apply topically 2 times daily.      Tirzepatide (MOUNJARO SC) Inject 0.4 mLs subcutaneously every 7 days. Bottle is 16.6 mg/4.5 mL      traZODone (DESYREL) 50 MG tablet Take 1 tablet (50 mg) by mouth every evening as needed for sleep  Qty: 90 tablet, Refills: 3    Associated Diagnoses: Primary insomnia                   Consultations:   No consultations were requested during this admission          Brief History of Illness:   Reason for admission requiring a surgical or invasive procedure:   Malignant neoplasm of upper-outer quadrant of left breast in female, estrogen receptor positive (H) [C50.412,  Z17.0]  Breast CA (H) [C50.919]   The patient underwent the following procedure(s):   Bilateral skin sparing mastectomies with left sentinel node biopsy and immediate breast reconstruction with tissue expanders      There were no immediate complications during this procedure.    Please refer to the full operative summary for details.             Hospital Course:   The patient was taken to the operating room on her day of admission and tolerated the procedure well. Diet and activity were gradually advanced and she was discharged on POD# 1.           Discharge Instructions and Follow-Up:     Discharge diet: Regular   Discharge activity: No heavy strenuous physical activity, 15 lb lifting limit   Discharge follow-up: Follow up in 1 week with Dr. Neil. Please call [facility] to schedule ([phone]).   Wound care: Instructions given.           Discharge Disposition:     Wounds clean and dry, no sign of infection or hematoma. Normal/stable vital signs. Pt ambulatory and able to take regular diet well. Comfortable on oral meds.      Attestation:  I have reviewed today's vital signs, notes, medications, labs and imaging.    Ya Rojas PA-C

## 2025-03-06 NOTE — PROGRESS NOTES
Plastic Surgery POD# 1    She is feeling sore, but pain is well managed with medications. Pain is managed by acetaminophen, ibuprofen, oxycodone, and methocarbamol. Her last dose of dilaudid was at 3 am. She is eating and drinking without issue. Denies nausea or vomiting. She is ambulating and voiding without issue. AMAYA drain output is as expected for POD# 1.  VSS, blood pressure is soft. Good oral intake of water.    Exam: Incisions are clean, dry, intact. No erythema, ecchymosis, or edema. Breasts are symmetrical. Breasts are soft, non tender. AMAYA output is serosanguineous. No signs of infection, seroma or hematoma.     Plan: She is healing well and there are no concerns for complications at this time.  Plan is to discharge home today. Discharge instructions reviewed. She will be going home with bilateral AMAYA drains, cephalexin, methocarbamol, and oxycodone. She will follow up next week with [facility]. Patient understands and agrees with the plan. All questions were answered at that time. Encouraged to reach out with future questions.    Ya Rojas PA-C  [facility]    Office: [phone]  Pager: [phone]

## 2025-03-06 NOTE — PROGRESS NOTES
Patient VSS are at baseline yes     Patient able to ambulate as they were prior to admission or with assist devices provided by therapies during their stay:  yes    Patient able to tolerate oral intake and maintain hydration status: yes     Patient has adequate pain control using oral analgesics: yes     Patient must be voiding adequate amounts: yes     Hypercapnia, Hypoventilation or hypoxia resolved for at least 2 hours without supplemental oxygen: yes     Deficits in sensation, mobility or coordination have resolved if spinal or regional anesthesia used: yes     Patient has returned to baseline mental status: yes

## 2025-03-06 NOTE — PROGRESS NOTES
Patient VSS are at baseline yes    Patient able to ambulate as they were prior to admission or with assist devices provided by therapies during their stay; Not OOB yet    Patient able to tolerate oral intake and maintain hydration status yes    Patient has adequate pain control using oral analgesics yes    Patient must be voiding adequate amounts due to void    Hypercapnia, Hypoventilation or hypoxia resolved for at least 2 hours without supplemental oxygen yes    Deficits in sensation, mobility or coordination have resolved if spinal or regional anesthesia used yes    Patient has returned to baseline mental status yes

## 2025-03-12 ENCOUNTER — TELEPHONE (OUTPATIENT)
Dept: SURGERY | Facility: CLINIC | Age: 45
End: 2025-03-12

## 2025-03-12 LAB
PATH REPORT.COMMENTS IMP SPEC: ABNORMAL
PATH REPORT.COMMENTS IMP SPEC: YES
PATH REPORT.FINAL DX SPEC: ABNORMAL
PATH REPORT.GROSS SPEC: ABNORMAL
PATH REPORT.MICROSCOPIC SPEC OTHER STN: ABNORMAL
PATH REPORT.MICROSCOPIC SPEC OTHER STN: ABNORMAL
PATH REPORT.RELEVANT HX SPEC: ABNORMAL
PATHOLOGY SYNOPTIC REPORT: ABNORMAL
PHOTO IMAGE: ABNORMAL

## 2025-03-12 PROCEDURE — 88307 TISSUE EXAM BY PATHOLOGIST: CPT | Mod: 26 | Performed by: PATHOLOGY

## 2025-03-12 PROCEDURE — 88342 IMHCHEM/IMCYTCHM 1ST ANTB: CPT | Mod: 26 | Performed by: PATHOLOGY

## 2025-03-12 NOTE — CONFIDENTIAL NOTE
I called Cortney and discussed her pathology results.  It revealed a left breast invasive ductal carcinoma, grade 2 measuring 2.5 cm with surrounding DCIS.  All final margins were negative.  There was a 1.5 mm deposit and sentinel node #1 consistent with micrometastatic disease.  [place] node #2 was negative.  Her right breast was benign.    I will send a message to Dr. Acuna and her team regarding ordering Oncotype and I would like to present her at our next tumor board to discuss the role of adjuvant radiation given micrometastatic disease.  I will see her next week for postop visit as scheduled.    Marisela Pena MD  [facility]  [phone]

## 2025-03-17 ENCOUNTER — TELEPHONE (OUTPATIENT)
Dept: MAMMOGRAPHY | Facility: CLINIC | Age: 45
End: 2025-03-17
Payer: COMMERCIAL

## 2025-03-17 NOTE — TELEPHONE ENCOUNTER
Breast Care Coordination:    Called Cortney to see how she is feeling after surgery.  She reported feeling good overall. She feels disappointed that one lymph node came back positive for micrometastatic. Discussed the plan for OncotypeDx and plan to discuss her case at Breast Conference on 3/26/25 to discuss adjuvant radiation. She verbalized understanding.      Margaret Bryson, RN, BSN, PHN, CBCN  Breast Nurse Coordinator  [facility]  [phone]

## 2025-03-17 NOTE — PROGRESS NOTES
[facility] Emergency Department    Hematology/Oncology Established Patient Note      Today's Date: 3/26/2025    Reason for visit: Left breast cancer.    HISTORY OF PRESENT ILLNESS: Cortney Willoughby is a 45 year old female status post hysterectomy for fibroids who presents with the following oncologic history:  1.  1/8/2025: Breast MRI showed 1.3 x 2 x 1 cm mass in left breast at 3:00-4:00, 8-9 cm from the nipple.  No lymphadenopathy.  Right breast negative.  2.  1/14/2025: Left breast ultrasound showed no definite abnormality corresponding to MRI abnormality as breast tissue was extremely dense with shadowing artifact scattered throughout glandular tissue related to breast density.  3.  1/27/2025: Left breast needle core biopsy showed grade 2 invasive ductal carcinoma, 8 mm in greatest size and associated grade 2 DCIS as well as PASH.  ER strongly positive at %, NC positive at 81-90%, HER2 IHC = 2+, FISH negative.  4. 3/05/2025: Bilateral mastectomies under care of Dr. Marisela Pena.  Pathology showed left breast with grade 2 invasive ductal carcinoma, 2.5 x 2 x 1.3 cm, associated grade 3 DCIS in 10 of 42 blocks, negative margins but DCIS focally 1 mm from anterior superior margin and 2 mm from posterior margin; no LVI; 1 of 2 left axillary SLNs positive for micrometastatic (1.5 mm) carcinoma. Right breast benign. Oncotype DX = 42, 5-year risk of distant recurrence of > 12% with endocrine therapy alone, adjuvant chemotherapy advised.    INTERVAL HISTORY:  Cortney reports recovering well from surgery.      REVIEW OF SYSTEMS:   14 point ROS was reviewed and is negative other than as noted above in HPI.       HOME MEDICATIONS:  Current Outpatient Medications   Medication Sig Dispense Refill    fluconazole (DIFLUCAN) 200 MG tablet Take 200 mg by mouth once a week (Sundays)      levothyroxine (SYNTHROID/LEVOTHROID) 75 MCG tablet Take 1 tablet (75 mcg) by mouth daily By her natural medicine doctor      liothyronine  (CYTOMEL) 25 MCG tablet Take 0.5 tablets (12.5 mcg) by mouth daily From natural medicine doctor      methocarbamol (ROBAXIN) 750 MG tablet Take 1 tablet (750 mg) by mouth 4 times daily as needed for muscle spasms (pain). 20 tablet 0    mupirocin (BACTROBAN) 2 % external ointment Apply topically 2 times daily.      SENNA-docusate sodium (SENNA S) 8.6-50 MG tablet Take 1-2 tablets by mouth 2 times daily as needed (constipation). 15 tablet 0    Tirzepatide (MOUNJARO SC) Inject 0.4 mLs subcutaneously every 7 days. Bottle is 16.6 mg/4.5 mL      traZODone (DESYREL) 50 MG tablet Take 1 tablet (50 mg) by mouth every evening as needed for sleep 90 tablet 3         ALLERGIES:  Allergies   Allergen Reactions    No Known Drug Allergy          PAST MEDICAL HISTORY:  Past Medical History:   Diagnosis Date    Anxiety     Closed fracture of unspecified part of ulna (alone) 2006    Depression     Family history of malignant neoplasm of breast     Fibroid uterus     Hyperlipidemia     Hypothyroidism, unspecified type 05/02/2019    Insomnia     Ovarian cyst     Scoliosis (and kyphoscoliosis), idiopathic          PAST SURGICAL HISTORY:  Past Surgical History:   Procedure Laterality Date    BIOPSY NODE SENTINEL Left 3/5/2025    Procedure: with left sentinel node biopsy;  Surgeon: Marisela Pena MD;  Location:  OR    COSMETIC SURGERY  June 24 2018    Liposuction    CYSTOSCOPY N/A 10/20/2021    Procedure: diagnostic cystoscopy;  Surgeon: Tiffanie Oliveira MD;  Location:  OR    LAPAROSCOPIC HYSTERECTOMY SUPRACERVICAL N/A 10/20/2021    Procedure: laparascopic supracervical hysterectomy;  Surgeon: Tiffanie Oliveira MD;  Location:  OR    LAPAROSCOPIC SALPINGECTOMY Bilateral 10/20/2021    Procedure: bilateral salpingectomy;  Surgeon: Tiffanie Oliveira MD;  Location:  OR    LIPOSUCTION (LOCATION)      MASTECTOMY SIMPLE BILATERAL Bilateral 3/5/2025    Procedure: bilateral skin sparing mastectomies;  Surgeon: Marisela Pena MD;   Location: [facility] OR    RECONSTRUCT BREAST, INSERT TISSUE EXPANDER BILATERAL, COMBINED Bilateral 3/5/2025    Procedure: Bilateral breast reconstruction with tissue expander placement;  Surgeon: Jason Neil MD;  Location:  OR         SOCIAL HISTORY:  Social History     Socioeconomic History    Marital status:      Spouse name: Not on file    Number of children: 0    Years of education: Not on file    Highest education level: Not on file   Occupational History    Occupation: Account Management leader     Employer: [employer]   Tobacco Use    Smoking status: Never    Smokeless tobacco: Never   Vaping Use    Vaping status: Never Used   Substance and Sexual Activity    Alcohol use: Yes     Comment: 2 drinks per week    Drug use: No    Sexual activity: Yes     Partners: Male     Birth control/protection: Female Surgical   Other Topics Concern     Service Not Asked    Blood Transfusions Not Asked    Caffeine Concern Not Asked    Occupational Exposure Not Asked    Hobby Hazards Not Asked    Sleep Concern Not Asked    Stress Concern Not Asked    Weight Concern Not Asked    Special Diet Not Asked    Back Care Not Asked    Exercise Yes    Bike Helmet Not Asked    Seat Belt Yes    Self-Exams Not Asked    Parent/sibling w/ CABG, MI or angioplasty before 65F 55M? No   Social History Narrative    Not on file     Social Drivers of Health     Financial Resource Strain: Low Risk  (5/13/2024)    Financial Resource Strain     Within the past 12 months, have you or your family members you live with been unable to get utilities (heat, electricity) when it was really needed?: No   Food Insecurity: Low Risk  (5/13/2024)    Food Insecurity     Within the past 12 months, did you worry that your food would run out before you got money to buy more?: No     Within the past 12 months, did the food you bought just not last and you didn t have money to get more?: No   Transportation Needs: Low Risk  (5/13/2024)     Transportation Needs     Within the past 12 months, has lack of transportation kept you from medical appointments, getting your medicines, non-medical meetings or appointments, work, or from getting things that you need?: No   Physical Activity: Sufficiently Active (5/13/2024)    Exercise Vital Sign     Days of Exercise per Week: 3 days     Minutes of Exercise per Session: 50 min   Stress: Stress Concern Present (5/13/2024)    [employer] [place] [employer] - Occupational Stress Questionnaire     Feeling of Stress : To some extent   Social Connections: Unknown (5/13/2024)    Social Connection and Isolation Panel [NHANES]     Frequency of Communication with Friends and Family: Not on file     Frequency of Social Gatherings with Friends and Family: Three times a week     Attends [employer] Services: Not on file     Active Member of Clubs or Organizations: Not on file     Attends Club or Organization Meetings: Not on file     Marital Status: Not on file   Interpersonal Safety: Low Risk  (3/5/2025)    Interpersonal Safety     Do you feel physically and emotionally safe where you currently live?: Yes     Within the past 12 months, have you been hit, slapped, kicked or otherwise physically hurt by someone?: No     Within the past 12 months, have you been humiliated or emotionally abused in other ways by your partner or ex-partner?: No   Housing Stability: Low Risk  (5/13/2024)    Housing Stability     Do you have housing? : Yes     Are you worried about losing your housing?: No   Cortney is G0-P0.      FAMILY HISTORY:  Family History   Problem Relation Age of Onset    Substance Abuse Father         Alcohol and drugs    Breast Cancer Mother         44, negative for gene; was HER-2 positive    No Known Problems Sister     No Known Problems Brother     Diabetes Maternal Grandmother     Kidney Cancer Maternal Grandmother     Breast Cancer Maternal Grandmother         Kidney cancer    Thyroid Disease Maternal  "Grandmother         On medications    Other Cancer Maternal Grandmother         Kidney cancer    Prostate Cancer Maternal Grandfather     Diabetes Paternal Grandmother     Thyroid Disease Paternal Grandmother     Cancer Paternal Grandmother     Hypertension Paternal Grandfather     Lipids Paternal Grandfather     Esophageal Cancer Paternal Grandfather     Hyperlipidemia Paternal Grandfather     Other Cancer Paternal Grandfather         Esophageal cancer    Breast Cancer Maternal Aunt     Depression Maternal Uncle     Breast Cancer Other         Breast Cancer    Depression Other         PTSD maternal uncle    Depression Other         Maternal Uncle    Breast Cancer Maternal Great-Grandmother          PHYSICAL EXAM:  Vital signs:  /71   Pulse 88   Resp 16   Ht 1.638 m (5' 4.5\")   Wt 64.6 kg (142 lb 6.4 oz)   LMP 07/10/2021 (Exact Date)   SpO2 98%   BMI 24.07 kg/m     ECO  GENERAL: No acute distress but tearful at times today.  EYES: No scleral icterus. No overt erythema.  RESPIRATORY: No audible cough, wheezing, or labored breathing.  MUSCULOSKELETAL: Range of motion in the neck, shoulders, and arms appear normal.  SKIN: No overt rashes, discolorations, or lesions over the face and neck.  NEUROLOGIC: Alert.  No overt tremors.    LABS:  CBC RESULTS:   Recent Labs   Lab Test 25  1018   WBC 5.6   RBC 4.68   HGB 14.1   HCT 42.9   MCV 92   MCH 30.1   MCHC 32.9   RDW 11.9        Last Comprehensive Metabolic Panel:  Sodium   Date Value Ref Range Status   2025 140 135 - 145 mmol/L Final   2021 142 133 - 144 mmol/L Final     Potassium   Date Value Ref Range Status   2025 3.9 3.4 - 5.3 mmol/L Final   2021 4.2 3.4 - 5.3 mmol/L Final     Chloride   Date Value Ref Range Status   2025 106 98 - 107 mmol/L Final   2021 113 (H) 94 - 109 mmol/L Final     Carbon Dioxide   Date Value Ref Range Status   2021 26 20 - 32 mmol/L Final     Carbon Dioxide (CO2)   Date " Value Ref Range Status   02/14/2025 23 22 - 29 mmol/L Final     Anion Gap   Date Value Ref Range Status   02/04/2025 13 7 - 15 mmol/L Final   06/18/2021 3 3 - 14 mmol/L Final     Glucose   Date Value Ref Range Status   02/04/2025 96 70 - 99 mg/dL Final   06/18/2021 90 70 - 99 mg/dL Final     Comment:     Fasting specimen     Urea Nitrogen   Date Value Ref Range Status   02/04/2025 9.0 6.0 - 20.0 mg/dL Final   06/18/2021 12 7 - 30 mg/dL Final     Creatinine   Date Value Ref Range Status   02/04/2025 0.78 0.51 - 0.95 mg/dL Final   06/18/2021 0.69 0.52 - 1.04 mg/dL Final     GFR Estimate   Date Value Ref Range Status   02/04/2025 >90 >60 mL/min/1.73m2 Final     Comment:     eGFR calculated using 2021 CKD-EPI equation.   06/18/2021 >90 >60 mL/min/[1.73_m2] Final     Comment:     Non  GFR Calc  Starting 12/18/2018, serum creatinine based estimated GFR (eGFR) will be   calculated using the Chronic Kidney Disease Epidemiology Collaboration   (CKD-EPI) equation.       Calcium   Date Value Ref Range Status   02/04/2025 9.2 8.8 - 10.4 mg/dL Final   06/18/2021 8.8 8.5 - 10.1 mg/dL Final     Bilirubin Total   Date Value Ref Range Status   02/04/2025 0.2 <=1.2 mg/dL Final   06/18/2021 0.4 0.2 - 1.3 mg/dL Final     Alkaline Phosphatase   Date Value Ref Range Status   02/04/2025 50 40 - 150 U/L Final   06/18/2021 50 40 - 150 U/L Final     ALT   Date Value Ref Range Status   02/04/2025 17 0 - 50 U/L Final   06/18/2021 42 0 - 50 U/L Final     AST   Date Value Ref Range Status   02/04/2025 27 0 - 45 U/L Final   06/18/2021 33 0 - 45 U/L Final       PATHOLOGY:  Reviewed as per HPI.    IMAGING:  Reviewed as per HPI.    ASSESSMENT/PLAN:  Cortney Willoughby is a 45 year old female with the following issues:  1.  Pathologic prognostic stage IB, pT2-P1za-U0, grade 2 invasive ductal carcinoma of the left breast, ER positive, NE positive, HER2 FISH negative  2. Family history of breast cancer  3. BRCA-2 mutation  - I reviewed  Cortney's final pathology which showed a 2.5 cm grade 2 left breast invasive ductal carcinoma with micrometastasis to one left axillary sentinel node.  Her Oncotype score was quite high at 42, showing a predictive benefit of chemotherapy and high distant recurrence risk of > 12% if only endocrine therapy were utilized.  --Discussed her case at breast tumor board today with regard to adjuvant radiation therapy -- this was ultimately advised as well.  --I advised adjuvant chemotherapy with dose-dense Adriamycin + Cytoxan, followed by weekly paclitaxel to reduce risk of breast cancer recurrence.  --We discussed the schedule and dosing of chemotherapy regimen, as well as counseled on potential side effects, including fever, chills, nausea, vomiting, diarrhea, constipation, hair loss, pain, rash, infection, bleeding, fatigue, permanent cardiac systolic dysfunction, and < 1% risk of acute leukemia.  Patient expressed understanding and agreed to proceed with chemotherapy.     -- Will arrange for prechemotherapy echocardiogram.  -- To undergo port placement.  - 2/20/2025 DEXA scan showed mild osteopenia in her right hip femoral neck.  Advised adequate calcium, vitamin D, weight bearing exercise when able to resume full activity.  - Discussed role of adjuvant endocrine therapy with more aggressive approach of ovarian suppression therapy with Zoladex in combination with an aromatase inhibitor such as anastrozole.  --Fertility is not an issue as she is s/p hysterectomy (ovaries intact). However, I did recommend eventual removal of ovaries given her pathogenic BRCA-2 mutation.    Georgia Acuna MD  [facility] Hematology/Oncology    Total time spent today: 53 minutes in chart review, patient evaluation, counseling, documentation, test and/or medication/prescription orders, and coordination of care.      The longitudinal plan of care for the diagnosis(es)/condition(s) as documented were addressed during this visit. Due to  the added complexity in care, I will continue to support Cortney in the subsequent management and with ongoing continuity of care.

## 2025-03-25 LAB — SPECIMEN STATUS: NORMAL

## 2025-03-26 ENCOUNTER — PREP FOR PROCEDURE (OUTPATIENT)
Dept: SURGERY | Facility: CLINIC | Age: 45
End: 2025-03-26

## 2025-03-26 ENCOUNTER — TUMOR CONFERENCE (OUTPATIENT)
Dept: ONCOLOGY | Facility: CLINIC | Age: 45
End: 2025-03-26
Payer: COMMERCIAL

## 2025-03-26 ENCOUNTER — PATIENT OUTREACH (OUTPATIENT)
Dept: ONCOLOGY | Facility: CLINIC | Age: 45
End: 2025-03-26

## 2025-03-26 ENCOUNTER — ONCOLOGY VISIT (OUTPATIENT)
Dept: ONCOLOGY | Facility: CLINIC | Age: 45
End: 2025-03-26
Attending: INTERNAL MEDICINE
Payer: COMMERCIAL

## 2025-03-26 ENCOUNTER — DOCUMENTATION ONLY (OUTPATIENT)
Dept: ONCOLOGY | Facility: CLINIC | Age: 45
End: 2025-03-26

## 2025-03-26 VITALS
OXYGEN SATURATION: 98 % | BODY MASS INDEX: 23.72 KG/M2 | SYSTOLIC BLOOD PRESSURE: 101 MMHG | HEIGHT: 65 IN | WEIGHT: 142.4 LBS | RESPIRATION RATE: 16 BRPM | DIASTOLIC BLOOD PRESSURE: 71 MMHG | HEART RATE: 88 BPM

## 2025-03-26 DIAGNOSIS — Z17.0 MALIGNANT NEOPLASM OF OVERLAPPING SITES OF LEFT BREAST IN FEMALE, ESTROGEN RECEPTOR POSITIVE (H): Primary | ICD-10-CM

## 2025-03-26 DIAGNOSIS — Z01.818 ENCOUNTER FOR OTHER PREPROCEDURAL EXAMINATION: ICD-10-CM

## 2025-03-26 DIAGNOSIS — C50.812 MALIGNANT NEOPLASM OF OVERLAPPING SITES OF LEFT BREAST IN FEMALE, ESTROGEN RECEPTOR POSITIVE (H): Primary | ICD-10-CM

## 2025-03-26 DIAGNOSIS — L65.9 ALOPECIA: ICD-10-CM

## 2025-03-26 DIAGNOSIS — Z85.3 PERSONAL HISTORY OF MALIGNANT NEOPLASM OF BREAST: Primary | ICD-10-CM

## 2025-03-26 PROCEDURE — 99215 OFFICE O/P EST HI 40 MIN: CPT | Performed by: INTERNAL MEDICINE

## 2025-03-26 PROCEDURE — 99213 OFFICE O/P EST LOW 20 MIN: CPT | Performed by: INTERNAL MEDICINE

## 2025-03-26 PROCEDURE — G2211 COMPLEX E/M VISIT ADD ON: HCPCS | Performed by: INTERNAL MEDICINE

## 2025-03-26 RX ORDER — CEFAZOLIN SODIUM 2 G/100ML
2 INJECTION, SOLUTION INTRAVENOUS SEE ADMIN INSTRUCTIONS
OUTPATIENT
Start: 2025-03-26

## 2025-03-26 RX ORDER — CEFAZOLIN SODIUM 2 G/100ML
2 INJECTION, SOLUTION INTRAVENOUS
OUTPATIENT
Start: 2025-03-26

## 2025-03-26 ASSESSMENT — PAIN SCALES - GENERAL: PAINLEVEL_OUTOF10: NO PAIN (0)

## 2025-03-26 NOTE — PROGRESS NOTES
"Oncology Rooming Note    March 26, 2025 11:13 AM   Cortney Willoughby is a 45 year old female who presents for:    Chief Complaint   Patient presents with    Oncology Clinic Visit     Initial Vitals: /71   Pulse 88   Resp 16   Ht 1.638 m (5' 4.5\")   Wt 64.6 kg (142 lb 6.4 oz)   LMP 07/10/2021 (Exact Date)   SpO2 98%   BMI 24.07 kg/m   Estimated body mass index is 24.07 kg/m  as calculated from the following:    Height as of this encounter: 1.638 m (5' 4.5\").    Weight as of this encounter: 64.6 kg (142 lb 6.4 oz). Body surface area is 1.71 meters squared.  No Pain (0) Comment: Data Unavailable   Patient's last menstrual period was 07/10/2021 (exact date).  Allergies reviewed: Yes  Medications reviewed: Yes    Medications: Medication refills not needed today.  Pharmacy name entered into [employer]: [id number] [id number] [facility]    Frailty Screening:   Is the patient here for a new oncology consult visit in cancer care? 2. No    PHQ9:  Did this patient require a PHQ9?: No          Inge Ruby MA            "

## 2025-03-26 NOTE — LETTER
3/26/2025      Cortney Willoughby  [address] [place] [id number]      Dear Colleague,    Thank you for referring your patient, Cortney Willoughby, to the [facility]. Please see a copy of my visit note below.    [facility]    Hematology/Oncology Established Patient Note      Today's Date: 3/26/2025    Reason for visit: Left breast cancer.    HISTORY OF PRESENT ILLNESS: Cortney Willoughby is a 45 year old female status post hysterectomy for fibroids who presents with the following oncologic history:  1.  1/8/2025: Breast MRI showed 1.3 x 2 x 1 cm mass in left breast at 3:00-4:00, 8-9 cm from the nipple.  No lymphadenopathy.  Right breast negative.  2.  1/14/2025: Left breast ultrasound showed no definite abnormality corresponding to MRI abnormality as breast tissue was extremely dense with shadowing artifact scattered throughout glandular tissue related to breast density.  3.  1/27/2025: Left breast needle core biopsy showed grade 2 invasive ductal carcinoma, 8 mm in greatest size and associated grade 2 DCIS as well as PASH.  ER strongly positive at %, TX positive at 81-90%, HER2 IHC = 2+, FISH negative.  4. 3/05/2025: Bilateral mastectomies under care of Dr. Marisela Pena.  Pathology showed left breast with grade 2 invasive ductal carcinoma, 2.5 x 2 x 1.3 cm, associated grade 3 DCIS in 10 of 42 blocks, negative margins but DCIS focally 1 mm from anterior superior margin and 2 mm from posterior margin; no LVI; 1 of 2 left axillary SLNs positive for micrometastatic (1.5 mm) carcinoma. Right breast benign. Oncotype DX = 42, 5-year risk of distant recurrence of > 12% with endocrine therapy alone, adjuvant chemotherapy advised.    INTERVAL HISTORY:  Cortney reports recovering well from surgery.      REVIEW OF SYSTEMS:   14 point ROS was reviewed and is negative other than as noted above in HPI.       HOME MEDICATIONS:  Current Outpatient Medications   Medication  Sig Dispense Refill     fluconazole (DIFLUCAN) 200 MG tablet Take 200 mg by mouth once a week (Sundays)       levothyroxine (SYNTHROID/LEVOTHROID) 75 MCG tablet Take 1 tablet (75 mcg) by mouth daily By her natural medicine doctor       liothyronine (CYTOMEL) 25 MCG tablet Take 0.5 tablets (12.5 mcg) by mouth daily From natural medicine doctor       methocarbamol (ROBAXIN) 750 MG tablet Take 1 tablet (750 mg) by mouth 4 times daily as needed for muscle spasms (pain). 20 tablet 0     mupirocin (BACTROBAN) 2 % external ointment Apply topically 2 times daily.       SENNA-docusate sodium (SENNA S) 8.6-50 MG tablet Take 1-2 tablets by mouth 2 times daily as needed (constipation). 15 tablet 0     Tirzepatide (MOUNJARO SC) Inject 0.4 mLs subcutaneously every 7 days. Bottle is 16.6 mg/4.5 mL       traZODone (DESYREL) 50 MG tablet Take 1 tablet (50 mg) by mouth every evening as needed for sleep 90 tablet 3         ALLERGIES:  Allergies   Allergen Reactions     No Known Drug Allergy          PAST MEDICAL HISTORY:  Past Medical History:   Diagnosis Date     Anxiety      Closed fracture of unspecified part of ulna (alone) 2006     Depression      Family history of malignant neoplasm of breast      Fibroid uterus      Hyperlipidemia      Hypothyroidism, unspecified type 05/02/2019     Insomnia      Ovarian cyst      Scoliosis (and kyphoscoliosis), idiopathic          PAST SURGICAL HISTORY:  Past Surgical History:   Procedure Laterality Date     BIOPSY NODE SENTINEL Left 3/5/2025    Procedure: with left sentinel node biopsy;  Surgeon: Marisela Pena MD;  Location:  OR     COSMETIC SURGERY  June 24 2018    Liposuction     CYSTOSCOPY N/A 10/20/2021    Procedure: diagnostic cystoscopy;  Surgeon: Tiffanie Oliveira MD;  Location:  OR     LAPAROSCOPIC HYSTERECTOMY SUPRACERVICAL N/A 10/20/2021    Procedure: laparascopic supracervical hysterectomy;  Surgeon: Tiffanie Oliveira MD;  Location:  OR     LAPAROSCOPIC SALPINGECTOMY  Bilateral 10/20/2021    Procedure: bilateral salpingectomy;  Surgeon: Tiffanie Oliveira MD;  Location: [facility] OR     LIPOSUCTION (LOCATION)       MASTECTOMY SIMPLE BILATERAL Bilateral 3/5/2025    Procedure: bilateral skin sparing mastectomies;  Surgeon: Marisela Pena MD;  Location: [facility] OR     RECONSTRUCT BREAST, INSERT TISSUE EXPANDER BILATERAL, COMBINED Bilateral 3/5/2025    Procedure: Bilateral breast reconstruction with tissue expander placement;  Surgeon: Jason Neil MD;  Location: [facility] OR         SOCIAL HISTORY:  Social History     Socioeconomic History     Marital status:      Spouse name: Not on file     Number of children: 0     Years of education: Not on file     Highest education level: Not on file   Occupational History     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     Employer: [employer]   Tobacco Use     Smoking status: Never     Smokeless tobacco: Never   Vaping Use     Vaping status: Never Used   Substance and Sexual Activity     Alcohol use: Yes     Comment: 2 drinks per week     Drug use: No     Sexual activity: Yes     Partners: Male     Birth control/protection: Female Surgical   Other Topics Concern      Service Not Asked     Blood Transfusions Not Asked     Caffeine Concern Not Asked     Occupational Exposure Not Asked     Hobby Hazards Not Asked     Sleep Concern Not Asked     Stress Concern Not Asked     Weight Concern Not Asked     Special Diet Not Asked     Back Care Not Asked     Exercise Yes     Bike Helmet Not Asked     Seat Belt Yes     Self-Exams Not Asked     Parent/sibling w/ CABG, MI or angioplasty before 65F 55M? No   Social History Narrative     Not on file     Social Drivers of Health     Financial Resource Strain: Low Risk  (5/13/2024)    Financial Resource Strain      Within the past 12 months, have you or your family members you live with been unable to get utilities (heat, electricity) when it was really needed?: No   Food Insecurity: Low Risk   (5/13/2024)    Food Insecurity      Within the past 12 months, did you worry that your food would run out before you got money to buy more?: No      Within the past 12 months, did the food you bought just not last and you didn t have money to get more?: No   Transportation Needs: Low Risk  (5/13/2024)    Transportation Needs      Within the past 12 months, has lack of transportation kept you from medical appointments, getting your medicines, non-medical meetings or appointments, work, or from getting things that you need?: No   Physical Activity: Sufficiently Active (5/13/2024)    Exercise Vital Sign      Days of Exercise per Week: 3 days      Minutes of Exercise per Session: 50 min   Stress: Stress Concern Present (5/13/2024)    [place] [employer] [place] [employer] - Occupational Stress Questionnaire      Feeling of Stress : To some extent   Social Connections: Unknown (5/13/2024)    Social Connection and Isolation Panel [NHANES]      Frequency of Communication with Friends and Family: Not on file      Frequency of Social Gatherings with Friends and Family: Three times a week      Attends [employer] Services: Not on file      Active Member of Clubs or Organizations: Not on file      Attends Club or Organization Meetings: Not on file      Marital Status: Not on file   Interpersonal Safety: Low Risk  (3/5/2025)    Interpersonal Safety      Do you feel physically and emotionally safe where you currently live?: Yes      Within the past 12 months, have you been hit, slapped, kicked or otherwise physically hurt by someone?: No      Within the past 12 months, have you been humiliated or emotionally abused in other ways by your partner or ex-partner?: No   Housing Stability: Low Risk  (5/13/2024)    Housing Stability      Do you have housing? : Yes      Are you worried about losing your housing?: No   Cortney is G0-P0.      FAMILY HISTORY:  Family History   Problem Relation Age of Onset     Substance Abuse Father        "  Alcohol and drugs     Breast Cancer Mother         44, negative for gene; was HER-2 positive     No Known Problems Sister      No Known Problems Brother      Diabetes Maternal Grandmother      Kidney Cancer Maternal Grandmother      Breast Cancer Maternal Grandmother         Kidney cancer     Thyroid Disease Maternal Grandmother         On medications     Other Cancer Maternal Grandmother         Kidney cancer     Prostate Cancer Maternal Grandfather      Diabetes Paternal Grandmother      Thyroid Disease Paternal Grandmother      Cancer Paternal Grandmother      Hypertension Paternal Grandfather      Lipids Paternal Grandfather      Esophageal Cancer Paternal Grandfather      Hyperlipidemia Paternal Grandfather      Other Cancer Paternal Grandfather         Esophageal cancer     Breast Cancer Maternal Aunt      Depression Maternal Uncle      Breast Cancer Other         Breast Cancer     Depression Other         PTSD maternal uncle     Depression Other         Maternal Uncle     Breast Cancer Maternal Great-Grandmother          PHYSICAL EXAM:  Vital signs:  /71   Pulse 88   Resp 16   Ht 1.638 m (5' 4.5\")   Wt 64.6 kg (142 lb 6.4 oz)   LMP 07/10/2021 (Exact Date)   SpO2 98%   BMI 24.07 kg/m     ECO  GENERAL: No acute distress but tearful at times today.  EYES: No scleral icterus. No overt erythema.  RESPIRATORY: No audible cough, wheezing, or labored breathing.  MUSCULOSKELETAL: Range of motion in the neck, shoulders, and arms appear normal.  SKIN: No overt rashes, discolorations, or lesions over the face and neck.  NEUROLOGIC: Alert.  No overt tremors.    LABS:  CBC RESULTS:   Recent Labs   Lab Test 25  1018   WBC 5.6   RBC 4.68   HGB 14.1   HCT 42.9   MCV 92   MCH 30.1   MCHC 32.9   RDW 11.9        Last Comprehensive Metabolic Panel:  Sodium   Date Value Ref Range Status   2025 140 135 - 145 mmol/L Final   2021 142 133 - 144 mmol/L Final     Potassium   Date Value Ref " Range Status   02/04/2025 3.9 3.4 - 5.3 mmol/L Final   06/18/2021 4.2 3.4 - 5.3 mmol/L Final     Chloride   Date Value Ref Range Status   02/04/2025 106 98 - 107 mmol/L Final   06/18/2021 113 (H) 94 - 109 mmol/L Final     Carbon Dioxide   Date Value Ref Range Status   06/18/2021 26 20 - 32 mmol/L Final     Carbon Dioxide (CO2)   Date Value Ref Range Status   02/14/2025 23 22 - 29 mmol/L Final     Anion Gap   Date Value Ref Range Status   02/04/2025 13 7 - 15 mmol/L Final   06/18/2021 3 3 - 14 mmol/L Final     Glucose   Date Value Ref Range Status   02/04/2025 96 70 - 99 mg/dL Final   06/18/2021 90 70 - 99 mg/dL Final     Comment:     Fasting specimen     Urea Nitrogen   Date Value Ref Range Status   02/04/2025 9.0 6.0 - 20.0 mg/dL Final   06/18/2021 12 7 - 30 mg/dL Final     Creatinine   Date Value Ref Range Status   02/04/2025 0.78 0.51 - 0.95 mg/dL Final   06/18/2021 0.69 0.52 - 1.04 mg/dL Final     GFR Estimate   Date Value Ref Range Status   02/04/2025 >90 >60 mL/min/1.73m2 Final     Comment:     eGFR calculated using 2021 CKD-EPI equation.   06/18/2021 >90 >60 mL/min/[1.73_m2] Final     Comment:     Non  GFR Calc  Starting 12/18/2018, serum creatinine based estimated GFR (eGFR) will be   calculated using the Chronic Kidney Disease Epidemiology Collaboration   (CKD-EPI) equation.       Calcium   Date Value Ref Range Status   02/04/2025 9.2 8.8 - 10.4 mg/dL Final   06/18/2021 8.8 8.5 - 10.1 mg/dL Final     Bilirubin Total   Date Value Ref Range Status   02/04/2025 0.2 <=1.2 mg/dL Final   06/18/2021 0.4 0.2 - 1.3 mg/dL Final     Alkaline Phosphatase   Date Value Ref Range Status   02/04/2025 50 40 - 150 U/L Final   06/18/2021 50 40 - 150 U/L Final     ALT   Date Value Ref Range Status   02/04/2025 17 0 - 50 U/L Final   06/18/2021 42 0 - 50 U/L Final     AST   Date Value Ref Range Status   02/04/2025 27 0 - 45 U/L Final   06/18/2021 33 0 - 45 U/L Final       PATHOLOGY:  Reviewed as per  HPI.    IMAGING:  Reviewed as per HPI.    ASSESSMENT/PLAN:  Cortney Willoughby is a 45 year old female with the following issues:  1.  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 Family history of breast cancer  3. BRCA-2 mutation  - I reviewed Cortney's final pathology which showed a 2.5 cm grade 2 left breast invasive ductal carcinoma with micrometastasis to one left axillary sentinel node.  Her Oncotype score was quite high at 42, showing a predictive benefit of chemotherapy and high distant recurrence risk of > 12% if only endocrine therapy were utilized.  --Discussed her case at breast tumor board today with regard to adjuvant radiation therapy -- this was ultimately advised as well.  --I advised adjuvant chemotherapy with dose-dense Adriamycin + Cytoxan, followed by weekly paclitaxel to reduce risk of breast cancer recurrence.  --We discussed the schedule and dosing of chemotherapy regimen, as well as counseled on potential side effects, including fever, chills, nausea, vomiting, diarrhea, constipation, hair loss, pain, rash, infection, bleeding, fatigue, permanent cardiac systolic dysfunction, and < 1% risk of acute leukemia.  Patient expressed understanding and agreed to proceed with chemotherapy.     -- Will arrange for prechemotherapy echocardiogram.  -- To undergo port placement.  - 2/20/2025 DEXA scan showed mild osteopenia in her right hip femoral neck.  Advised adequate calcium, vitamin D, weight bearing exercise when able to resume full activity.  - Discussed role of adjuvant endocrine therapy with more aggressive approach of ovarian suppression therapy with Zoladex in combination with an aromatase inhibitor such as anastrozole.  --Fertility is not an issue as she is s/p hysterectomy (ovaries intact). However, I did recommend eventual removal of ovaries given her pathogenic BRCA-2 mutation.    [place]  "MD Armand  [facility] Hematology/Oncology    Total time spent today: 53 minutes in chart review, patient evaluation, counseling, documentation, test and/or medication/prescription orders, and coordination of care.      The longitudinal plan of care for the diagnosis(es)/condition(s) as documented were addressed during this visit. Due to the added complexity in care, I will continue to support Cortney in the subsequent management and with ongoing continuity of care.    Oncology Rooming Note    March 26, 2025 11:13 AM   Cortney Willoughby is a 45 year old female who presents for:    Chief Complaint   Patient presents with     Oncology Clinic Visit     Initial Vitals: /71   Pulse 88   Resp 16   Ht 1.638 m (5' 4.5\")   Wt 64.6 kg (142 lb 6.4 oz)   LMP 07/10/2021 (Exact Date)   SpO2 98%   BMI 24.07 kg/m   Estimated body mass index is 24.07 kg/m  as calculated from the following:    Height as of this encounter: 1.638 m (5' 4.5\").    Weight as of this encounter: 64.6 kg (142 lb 6.4 oz). Body surface area is 1.71 meters squared.  No Pain (0) Comment: Data Unavailable   Patient's last menstrual period was 07/10/2021 (exact date).  Allergies reviewed: Yes  Medications reviewed: Yes    Medications: Medication refills not needed today.  Pharm[facility] entered into [employer]: [id number] [address] AT [facility]    Frailty Screening:   Is the patient here for a new oncology consult visit in cancer care? 2. No    PHQ9:  Did this patient require a PHQ9?: No          Inge Ruby MA              Again, thank you for allowing me to participate in the care of your patient.        Sincerely,        Georgia Acuna MD    Electronically signed"

## 2025-03-26 NOTE — TUMOR CONFERENCE
Tumor Conference Information  Tumor Conference: Breast  Specialties Present: Medical oncology, Radiation oncology, Pathology, Radiology, Surgery  Patient Status: Prospective  Stage: Stage IB  Treatment to Date: Biopsy, Surgery  Clinical Trials: Not discussed  Genetic Testing Discussed/Recommended?: Already Completed  Supportive Care Services Discussed/Recommended?: No  Recommended Plan: Follows evidence-based guidelines  Did the review exceed 30 minutes?: did not     Discussed imaging and pathology. Recommendation for adjuvant chemotherapy and adjuvant radiation oncology consultation.       Documentation / Disclaimer Cancer Tumor Board Note  Cancer tumor board recommendations do not override what is determined to be reasonable care and treatment, which is dependent on the circumstances of a patient's case; the patient's medical, social, and personal concerns; and the clinical judgment of the oncologist [physician].

## 2025-03-26 NOTE — PATIENT INSTRUCTIONS
1. Arrange for lab draw and Adriamycin + Cytoxan every 2 weeks x 4 cycles followed by lab draw and paclitaxel weekly x 12 weeks.  2. RTC NP alternating with MD visit every 2 weeks x 4 then RTC NP alternating with MD visit every 3 weeks during paclitaxel chemo.  3. Arrange for echocardiogram.

## 2025-03-26 NOTE — LETTER
March 26, 2025      RE: Cortney Willoughby  [address] [place] [address]              To Whom It May Concern,      Ms Cortney Willoughby is under my care for breast cancer. I am recommending that she work from home during the course of her treatment which goes through 12/31/2025.      If you have any questions, please contact my office at [phone].          Sincerely,      Georgia Acuna MD  Medical Oncologist

## 2025-03-27 ENCOUNTER — TELEPHONE (OUTPATIENT)
Dept: SURGERY | Facility: CLINIC | Age: 45
End: 2025-03-27
Payer: COMMERCIAL

## 2025-03-27 NOTE — TELEPHONE ENCOUNTER
Type of surgery: Port placement  Location of surgery: [facility]  Date and time of surgery: 4/2/25 at 8am  Surgeon: Dr. Marisela Pena  Pre-Op Appt Date: patient to schedule  Post-Op Appt Date: patient to schedule   Packet sent out: Yes  Pre-cert/Authorization completed:  Not Applicable  Date: 3/27/25

## 2025-03-31 ENCOUNTER — ANCILLARY PROCEDURE (OUTPATIENT)
Dept: CARDIOLOGY | Facility: CLINIC | Age: 45
End: 2025-03-31
Attending: INTERNAL MEDICINE
Payer: COMMERCIAL

## 2025-03-31 DIAGNOSIS — Z01.818 ENCOUNTER FOR OTHER PREPROCEDURAL EXAMINATION: ICD-10-CM

## 2025-03-31 DIAGNOSIS — Z17.0 MALIGNANT NEOPLASM OF OVERLAPPING SITES OF LEFT BREAST IN FEMALE, ESTROGEN RECEPTOR POSITIVE (H): ICD-10-CM

## 2025-03-31 DIAGNOSIS — C50.812 MALIGNANT NEOPLASM OF OVERLAPPING SITES OF LEFT BREAST IN FEMALE, ESTROGEN RECEPTOR POSITIVE (H): ICD-10-CM

## 2025-03-31 LAB — LVEF ECHO: NORMAL

## 2025-03-31 PROCEDURE — 93306 TTE W/DOPPLER COMPLETE: CPT | Performed by: INTERNAL MEDICINE

## 2025-03-31 PROCEDURE — 93356 MYOCRD STRAIN IMG SPCKL TRCK: CPT | Performed by: INTERNAL MEDICINE

## 2025-04-02 ENCOUNTER — ANESTHESIA EVENT (OUTPATIENT)
Dept: SURGERY | Facility: CLINIC | Age: 45
End: 2025-04-02
Payer: COMMERCIAL

## 2025-04-02 ENCOUNTER — HOSPITAL ENCOUNTER (OUTPATIENT)
Facility: CLINIC | Age: 45
Discharge: HOME | End: 2025-04-02
Attending: SURGERY | Admitting: SURGERY
Payer: COMMERCIAL

## 2025-04-02 ENCOUNTER — ANESTHESIA (OUTPATIENT)
Dept: SURGERY | Facility: CLINIC | Age: 45
End: 2025-04-02
Payer: COMMERCIAL

## 2025-04-02 VITALS
RESPIRATION RATE: 16 BRPM | SYSTOLIC BLOOD PRESSURE: 98 MMHG | DIASTOLIC BLOOD PRESSURE: 68 MMHG | OXYGEN SATURATION: 96 % | WEIGHT: 141.7 LBS | TEMPERATURE: 97 F | HEIGHT: 65 IN | BODY MASS INDEX: 23.61 KG/M2 | HEART RATE: 90 BPM

## 2025-04-02 DIAGNOSIS — G89.18 POST-OP PAIN: Primary | ICD-10-CM

## 2025-04-02 PROCEDURE — 250N000011 HC RX IP 250 OP 636: Mod: JZ

## 2025-04-02 PROCEDURE — 36561 INSERT TUNNELED CV CATH: CPT | Mod: 79 | Performed by: SURGERY

## 2025-04-02 PROCEDURE — 250N000011 HC RX IP 250 OP 636: Performed by: SURGERY

## 2025-04-02 PROCEDURE — 250N000009 HC RX 250: Performed by: SURGERY

## 2025-04-02 PROCEDURE — 250N000009 HC RX 250

## 2025-04-02 PROCEDURE — 710N000012 HC RECOVERY PHASE 2, PER MINUTE: Performed by: SURGERY

## 2025-04-02 PROCEDURE — 999N000141 HC STATISTIC PRE-PROCEDURE NURSING ASSESSMENT: Performed by: SURGERY

## 2025-04-02 PROCEDURE — C1788 PORT, INDWELLING, IMP: HCPCS | Performed by: SURGERY

## 2025-04-02 PROCEDURE — 370N000017 HC ANESTHESIA TECHNICAL FEE, PER MIN: Performed by: SURGERY

## 2025-04-02 PROCEDURE — 258N000003 HC RX IP 258 OP 636: Performed by: ANESTHESIOLOGY

## 2025-04-02 PROCEDURE — 710N000009 HC RECOVERY PHASE 1, LEVEL 1, PER MIN: Performed by: SURGERY

## 2025-04-02 PROCEDURE — 272N000001 HC OR GENERAL SUPPLY STERILE: Performed by: SURGERY

## 2025-04-02 PROCEDURE — 258N000003 HC RX IP 258 OP 636: Performed by: SURGERY

## 2025-04-02 PROCEDURE — 36561 INSERT TUNNELED CV CATH: CPT | Mod: AS | Performed by: PHYSICIAN ASSISTANT

## 2025-04-02 PROCEDURE — 360N000082 HC SURGERY LEVEL 2 W/ FLUORO, PER MIN: Performed by: SURGERY

## 2025-04-02 DEVICE — SMARTPORT PLASTIC LOW PROFILE PORT WITH VORTEX TECHNOLOGY
Type: IMPLANTABLE DEVICE | Site: NECK | Status: FUNCTIONAL
Brand: BIOFLO VORTEX

## 2025-04-02 RX ORDER — BUPIVACAINE HYDROCHLORIDE 2.5 MG/ML
INJECTION, SOLUTION EPIDURAL; INFILTRATION; INTRACAUDAL; PERINEURAL
Status: DISCONTINUED
Start: 2025-04-02 | End: 2025-04-02 | Stop reason: HOSPADM

## 2025-04-02 RX ORDER — HEPARIN SODIUM 1000 [USP'U]/ML
INJECTION, SOLUTION INTRAVENOUS; SUBCUTANEOUS
Status: DISCONTINUED
Start: 2025-04-02 | End: 2025-04-02 | Stop reason: HOSPADM

## 2025-04-02 RX ORDER — CEFAZOLIN SODIUM/WATER 2 G/20 ML
2 SYRINGE (ML) INTRAVENOUS SEE ADMIN INSTRUCTIONS
Status: DISCONTINUED | OUTPATIENT
Start: 2025-04-02 | End: 2025-04-02 | Stop reason: HOSPADM

## 2025-04-02 RX ORDER — PROPOFOL 10 MG/ML
INJECTION, EMULSION INTRAVENOUS CONTINUOUS PRN
Status: DISCONTINUED | OUTPATIENT
Start: 2025-04-02 | End: 2025-04-02

## 2025-04-02 RX ORDER — NALOXONE HYDROCHLORIDE 0.4 MG/ML
0.1 INJECTION, SOLUTION INTRAMUSCULAR; INTRAVENOUS; SUBCUTANEOUS
Status: DISCONTINUED | OUTPATIENT
Start: 2025-04-02 | End: 2025-04-02 | Stop reason: HOSPADM

## 2025-04-02 RX ORDER — DEXAMETHASONE SODIUM PHOSPHATE 4 MG/ML
INJECTION, SOLUTION INTRA-ARTICULAR; INTRALESIONAL; INTRAMUSCULAR; INTRAVENOUS; SOFT TISSUE PRN
Status: DISCONTINUED | OUTPATIENT
Start: 2025-04-02 | End: 2025-04-02

## 2025-04-02 RX ORDER — FENTANYL CITRATE 0.05 MG/ML
50 INJECTION, SOLUTION INTRAMUSCULAR; INTRAVENOUS EVERY 5 MIN PRN
Status: DISCONTINUED | OUTPATIENT
Start: 2025-04-02 | End: 2025-04-02 | Stop reason: HOSPADM

## 2025-04-02 RX ORDER — SODIUM CHLORIDE, SODIUM LACTATE, POTASSIUM CHLORIDE, CALCIUM CHLORIDE 600; 310; 30; 20 MG/100ML; MG/100ML; MG/100ML; MG/100ML
INJECTION, SOLUTION INTRAVENOUS CONTINUOUS
Status: DISCONTINUED | OUTPATIENT
Start: 2025-04-02 | End: 2025-04-02 | Stop reason: HOSPADM

## 2025-04-02 RX ORDER — LIDOCAINE HYDROCHLORIDE 10 MG/ML
INJECTION, SOLUTION INFILTRATION; PERINEURAL
Status: DISCONTINUED
Start: 2025-04-02 | End: 2025-04-02 | Stop reason: HOSPADM

## 2025-04-02 RX ORDER — HYDROMORPHONE HCL IN WATER/PF 6 MG/30 ML
0.2 PATIENT CONTROLLED ANALGESIA SYRINGE INTRAVENOUS EVERY 5 MIN PRN
Status: DISCONTINUED | OUTPATIENT
Start: 2025-04-02 | End: 2025-04-02 | Stop reason: HOSPADM

## 2025-04-02 RX ORDER — ONDANSETRON 2 MG/ML
INJECTION INTRAMUSCULAR; INTRAVENOUS PRN
Status: DISCONTINUED | OUTPATIENT
Start: 2025-04-02 | End: 2025-04-02

## 2025-04-02 RX ORDER — DEXAMETHASONE SODIUM PHOSPHATE 4 MG/ML
4 INJECTION, SOLUTION INTRA-ARTICULAR; INTRALESIONAL; INTRAMUSCULAR; INTRAVENOUS; SOFT TISSUE
Status: DISCONTINUED | OUTPATIENT
Start: 2025-04-02 | End: 2025-04-02 | Stop reason: HOSPADM

## 2025-04-02 RX ORDER — PROPOFOL 10 MG/ML
INJECTION, EMULSION INTRAVENOUS PRN
Status: DISCONTINUED | OUTPATIENT
Start: 2025-04-02 | End: 2025-04-02

## 2025-04-02 RX ORDER — CEFAZOLIN SODIUM/WATER 2 G/20 ML
2 SYRINGE (ML) INTRAVENOUS
Status: COMPLETED | OUTPATIENT
Start: 2025-04-02 | End: 2025-04-02

## 2025-04-02 RX ORDER — MAGNESIUM HYDROXIDE 1200 MG/15ML
LIQUID ORAL PRN
Status: DISCONTINUED | OUTPATIENT
Start: 2025-04-02 | End: 2025-04-02 | Stop reason: HOSPADM

## 2025-04-02 RX ORDER — LIDOCAINE HYDROCHLORIDE 20 MG/ML
INJECTION, SOLUTION INFILTRATION; PERINEURAL PRN
Status: DISCONTINUED | OUTPATIENT
Start: 2025-04-02 | End: 2025-04-02

## 2025-04-02 RX ORDER — ONDANSETRON 2 MG/ML
4 INJECTION INTRAMUSCULAR; INTRAVENOUS EVERY 30 MIN PRN
Status: DISCONTINUED | OUTPATIENT
Start: 2025-04-02 | End: 2025-04-02 | Stop reason: HOSPADM

## 2025-04-02 RX ORDER — OXYCODONE HYDROCHLORIDE 5 MG/1
5 TABLET ORAL
Status: DISCONTINUED | OUTPATIENT
Start: 2025-04-02 | End: 2025-04-02 | Stop reason: HOSPADM

## 2025-04-02 RX ORDER — FENTANYL CITRATE 0.05 MG/ML
25 INJECTION, SOLUTION INTRAMUSCULAR; INTRAVENOUS EVERY 5 MIN PRN
Status: DISCONTINUED | OUTPATIENT
Start: 2025-04-02 | End: 2025-04-02 | Stop reason: HOSPADM

## 2025-04-02 RX ORDER — HEPARIN SODIUM (PORCINE) LOCK FLUSH IV SOLN 100 UNIT/ML 100 UNIT/ML
SOLUTION INTRAVENOUS PRN
Status: DISCONTINUED | OUTPATIENT
Start: 2025-04-02 | End: 2025-04-02 | Stop reason: HOSPADM

## 2025-04-02 RX ORDER — HYDROCODONE BITARTRATE AND ACETAMINOPHEN 5; 325 MG/1; MG/1
1 TABLET ORAL EVERY 6 HOURS PRN
Qty: 5 TABLET | Refills: 0 | Status: SHIPPED | OUTPATIENT
Start: 2025-04-02 | End: 2025-04-05

## 2025-04-02 RX ORDER — ONDANSETRON 4 MG/1
4 TABLET, ORALLY DISINTEGRATING ORAL EVERY 30 MIN PRN
Status: DISCONTINUED | OUTPATIENT
Start: 2025-04-02 | End: 2025-04-02 | Stop reason: HOSPADM

## 2025-04-02 RX ORDER — FENTANYL CITRATE 50 UG/ML
INJECTION, SOLUTION INTRAMUSCULAR; INTRAVENOUS PRN
Status: DISCONTINUED | OUTPATIENT
Start: 2025-04-02 | End: 2025-04-02

## 2025-04-02 RX ORDER — INDOMETHACIN 25 MG/1
CAPSULE ORAL
Status: DISCONTINUED
Start: 2025-04-02 | End: 2025-04-02 | Stop reason: HOSPADM

## 2025-04-02 RX ORDER — HYDROMORPHONE HCL IN WATER/PF 6 MG/30 ML
0.4 PATIENT CONTROLLED ANALGESIA SYRINGE INTRAVENOUS EVERY 5 MIN PRN
Status: DISCONTINUED | OUTPATIENT
Start: 2025-04-02 | End: 2025-04-02 | Stop reason: HOSPADM

## 2025-04-02 RX ORDER — HEPARIN SODIUM (PORCINE) LOCK FLUSH IV SOLN 100 UNIT/ML 100 UNIT/ML
SOLUTION INTRAVENOUS
Status: DISCONTINUED
Start: 2025-04-02 | End: 2025-04-02 | Stop reason: HOSPADM

## 2025-04-02 RX ADMIN — FENTANYL CITRATE 50 MCG: 50 INJECTION INTRAMUSCULAR; INTRAVENOUS at 07:50

## 2025-04-02 RX ADMIN — DEXAMETHASONE SODIUM PHOSPHATE 4 MG: 4 INJECTION, SOLUTION INTRA-ARTICULAR; INTRALESIONAL; INTRAMUSCULAR; INTRAVENOUS; SOFT TISSUE at 07:45

## 2025-04-02 RX ADMIN — PROPOFOL 50 MG: 10 INJECTION, EMULSION INTRAVENOUS at 07:45

## 2025-04-02 RX ADMIN — FENTANYL CITRATE 50 MCG: 50 INJECTION INTRAMUSCULAR; INTRAVENOUS at 07:45

## 2025-04-02 RX ADMIN — ONDANSETRON 4 MG: 2 INJECTION INTRAMUSCULAR; INTRAVENOUS at 07:45

## 2025-04-02 RX ADMIN — SODIUM CHLORIDE, SODIUM LACTATE, POTASSIUM CHLORIDE, AND CALCIUM CHLORIDE: .6; .31; .03; .02 INJECTION, SOLUTION INTRAVENOUS at 07:37

## 2025-04-02 RX ADMIN — PROPOFOL 200 MCG/KG/MIN: 10 INJECTION, EMULSION INTRAVENOUS at 07:45

## 2025-04-02 RX ADMIN — LIDOCAINE HYDROCHLORIDE 100 MG: 20 INJECTION, SOLUTION INFILTRATION; PERINEURAL at 07:45

## 2025-04-02 RX ADMIN — Medication 2 G: at 07:37

## 2025-04-02 ASSESSMENT — ACTIVITIES OF DAILY LIVING (ADL)
ADLS_ACUITY_SCORE: 49

## 2025-04-02 ASSESSMENT — LIFESTYLE VARIABLES: TOBACCO_USE: 0

## 2025-04-02 NOTE — ANESTHESIA PREPROCEDURE EVALUATION
Anesthesia Pre-Procedure Evaluation    Patient: Cortney Willoughby   MRN: [id number] : 1980        Procedure : Procedure(s):  INSERTION, VASCULAR ACCESS PORT          Past Medical History:   Diagnosis Date    Anxiety     Closed fracture of unspecified part of ulna (alone)     Depression     Family history of malignant neoplasm of breast     Fibroid uterus     Hyperlipidemia     Hypothyroidism, unspecified type 2019    Insomnia     Ovarian cyst     Scoliosis (and kyphoscoliosis), idiopathic       Past Surgical History:   Procedure Laterality Date    BIOPSY NODE SENTINEL Left 3/5/2025    Procedure: with left sentinel node biopsy;  Surgeon: Marisela Pena MD;  Location:  OR    COSMETIC SURGERY  2018    Liposuction    CYSTOSCOPY N/A 10/20/2021    Procedure: diagnostic cystoscopy;  Surgeon: Tiffanie Oliveira MD;  Location:  OR    LAPAROSCOPIC HYSTERECTOMY SUPRACERVICAL N/A 10/20/2021    Procedure: laparascopic supracervical hysterectomy;  Surgeon: Tiffanie Oliveira MD;  Location:  OR    LAPAROSCOPIC SALPINGECTOMY Bilateral 10/20/2021    Procedure: bilateral salpingectomy;  Surgeon: Tiffanie Oliveira MD;  Location:  OR    LIPOSUCTION (LOCATION)      MASTECTOMY SIMPLE BILATERAL Bilateral 3/5/2025    Procedure: bilateral skin sparing mastectomies;  Surgeon: Marisela Pena MD;  Location:  OR    RECONSTRUCT BREAST, INSERT TISSUE EXPANDER BILATERAL, COMBINED Bilateral 3/5/2025    Procedure: Bilateral breast reconstruction with tissue expander placement;  Surgeon: Jason Neil MD;  Location:  OR      Allergies   Allergen Reactions    No Known Drug Allergy       Social History     Tobacco Use    Smoking status: Never    Smokeless tobacco: Never   Substance Use Topics    Alcohol use: Yes     Comment: 2 drinks per week      Wt Readings from Last 1 Encounters:   25 64.3 kg (141 lb 11.2 oz)        Anesthesia Evaluation            ROS/MED HX  ENT/Pulmonary:    (-) tobacco  "use   Neurologic:       Cardiovascular:     (+) Dyslipidemia - -   -  - -                                      METS/Exercise Tolerance: >4 METS    Hematologic:       Musculoskeletal:       GI/Hepatic:       Renal/Genitourinary:       Endo:     (+)          thyroid problem, hypothyroidism,           Psychiatric/Substance Use:     (+) psychiatric history anxiety and depression       Infectious Disease:       Malignancy:   (+) Malignancy (s/p bilateral mastectomy and reconstruction; planning for chemo), History of Breast.    Other:            Physical Exam    Airway        Mallampati: II   TM distance: > 3 FB   Neck ROM: full   Mouth opening: > 3 cm    Respiratory Devices and Support         Dental  no notable dental history         Cardiovascular          Rhythm and rate: regular and normal     Pulmonary   pulmonary exam normal                OUTSIDE LABS:  CBC:   Lab Results   Component Value Date    WBC 5.6 02/04/2025    WBC 4.9 05/20/2024    HGB 14.1 02/04/2025    HGB 13.5 05/20/2024    HCT 42.9 02/04/2025    HCT 41.2 05/20/2024     02/04/2025     05/20/2024     BMP:   Lab Results   Component Value Date     02/04/2025     05/20/2024    POTASSIUM 3.9 02/04/2025    POTASSIUM 4.1 05/20/2024    CHLORIDE 106 02/04/2025    CHLORIDE 107 05/20/2024    CO2 23 02/14/2025    CO2 21 (L) 02/04/2025    BUN 9.0 02/04/2025    BUN 11.2 05/20/2024    CR 0.78 02/04/2025    CR 0.85 05/20/2024    GLC 96 02/04/2025    GLC 80 05/20/2024     COAGS: No results found for: \"PTT\", \"INR\", \"FIBR\"  POC: No results found for: \"BGM\", \"HCG\", \"HCGS\"  HEPATIC:   Lab Results   Component Value Date    ALBUMIN 4.2 02/04/2025    PROTTOTAL 7.1 02/04/2025    ALT 17 02/04/2025    AST 27 02/04/2025    ALKPHOS 50 02/04/2025    BILITOTAL 0.2 02/04/2025     OTHER:   Lab Results   Component Value Date    A1C 5.0 06/18/2021    BAYLEE 9.2 02/04/2025    TSH 13.27 (H) 10/12/2021    T4 0.47 (L) 10/12/2021       Anesthesia Plan    ASA Status:  3 "    NPO Status:  NPO Appropriate    Anesthesia Type: MAC.     - Reason for MAC: straight local not clinically adequate   Induction: N/a.   Maintenance: TIVA.        Consents    Anesthesia Plan(s) and associated risks, benefits, and realistic alternatives discussed. Questions answered and patient/representative(s) expressed understanding.     - Discussed:     - Discussed with:  Patient            Postoperative Care    Pain management: IV analgesics.   PONV prophylaxis: Ondansetron (or other 5HT-3)     Comments:               Jose Ramsey MD    Clinically Significant Risk Factors Present on Admission

## 2025-04-02 NOTE — ANESTHESIA POSTPROCEDURE EVALUATION
Patient: Cortney Willoughby    Procedure: Procedure(s):  INSERTION, VASCULAR ACCESS PORT       Anesthesia Type:  MAC    Note:  Disposition: Outpatient   Postop Pain Control: Uneventful            Sign Out: Well controlled pain   PONV: No   Neuro/Psych: Uneventful            Sign Out: Acceptable/Baseline neuro status   Airway/Respiratory: Uneventful            Sign Out: Acceptable/Baseline resp. status   CV/Hemodynamics: Uneventful            Sign Out: Acceptable CV status   Other NRE: NONE   DID A NON-ROUTINE EVENT OCCUR? No           Last vitals:  Vitals Value Taken Time   BP 98/68 04/02/25 0852   Temp 36  C (96.8  F) 04/02/25 0853   Pulse 82 04/02/25 0852   Resp 16 04/02/25 0852   SpO2 98 % 04/02/25 0853   Vitals shown include unfiled device data.    Electronically Signed By: Jose Ramsey MD  April 2, 2025  4:23 PM

## 2025-04-02 NOTE — ANESTHESIA CARE TRANSFER NOTE
Patient: Cortney Willoughby    Procedure: Procedure(s):  INSERTION, VASCULAR ACCESS PORT       Diagnosis: Personal history of malignant neoplasm of breast [Z85.3]  Diagnosis Additional Information: No value filed.    Anesthesia Type:   MAC     Note:    Oropharynx: spontaneously breathing and oropharynx clear of all foreign objects  Level of Consciousness: awake  Oxygen Supplementation: room air    Independent Airway: airway patency satisfactory and stable  Dentition: dentition unchanged  Vital Signs Stable: post-procedure vital signs reviewed and stable  Report to RN Given: handoff report given  Patient transferred to: PACU    Handoff Report: Identifed the Patient, Identified the Reponsible Provider, Reviewed the pertinent medical history, Discussed the surgical course, Reviewed Intra-OP anesthesia mangement and issues during anesthesia, Set expectations for post-procedure period and Allowed opportunity for questions and acknowledgement of understanding      Vitals:  Vitals Value    BP 88/49    Temp 36  C    Pulse 95    Resp 14    SpO2 95 %      Electronically Signed By: KEYSHA Isaac CRNA  April 2, 2025  8:29 AM

## 2025-04-02 NOTE — DISCHARGE INSTRUCTIONS
Same Day Surgery Discharge Instructions for  Sedation and General Anesthesia     It's not unusual to feel dizzy, light-headed or faint for up to 24 hours after surgery or while taking pain medication.  If you have these symptoms: sit for a few minutes before standing and have someone assist you when you get up to walk or use the bathroom.    You should rest and relax for the next 24 hours. We recommend you make arrangements to have an adult stay with you for at least 24 hours after your discharge.  Avoid hazardous and strenuous activity.    DO NOT DRIVE any vehicle or operate mechanical equipment for 24 hours following the end of your surgery.  Even though you may feel normal, your reactions may be affected by the medication you have received.    Do not drink alcoholic beverages for 24 hours following surgery.     Slowly progress to your regular diet as you feel able. It's not unusual to feel nauseated and/or vomit after receiving anesthesia.  If you develop these symptoms, drink clear liquids (apple juice, ginger ale, broth, 7-up, etc. ) until you feel better.  If your nausea and vomiting persists for 24 hours, please notify your surgeon.      All narcotic pain medications, along with inactivity and anesthesia, can cause constipation. Drinking plenty of liquids and increasing fiber intake will help.    For any questions of a medical nature, call your surgeon.    Do not make important decisions for 24 hours.    If you had general anesthesia, you may have a sore throat for a couple of days related to the breathing tube used during surgery.  You may use Cepacol lozenges to help with this discomfort.  If it worsens or if you develop a fever, contact your surgeon.     If you feel your pain is not well managed with the pain medications prescribed by your surgeon, please contact your surgeon's office to let them know so they can address your concerns.         [facility]  Discharge  Instructions: Post-Operative Port Placement     ACTIVITY  Take frequent, short walks and increase your activity gradually.    Avoid strenuous physical activity or heavy lifting greater than 15-20 lbs. for 1 week.  You may climb stairs.  You may drive without restrictions when you are not using any prescription pain medication and feel comfortable in a car.  You may return to work/school when you are comfortable without any prescription pain medication.    WOUND CARE  You may remove your outer dressing or Band-Aids and shower 48 hours after the surgery.  Pat your incisions dry and leave them open to air.  Re-apply dressing (Band-Aids or gauze/tape) as needed for comfort or drainage.  You may have steri-strips (looks like white tape) or skin glue on your incisions.  You may peel off the steri-strips 2 weeks after your surgery if they have not peeled off on their own.  If you have skin glue, it will peel up and fall off on its own.  Do not soak your incisions in a tub or pool for 2 weeks.   Do not apply any lotions, creams, or ointments to your incision(s).  A ridge under your incision(s) is normal and will gradually resolve.    DIET  Start with liquids, then gradually resume your regular diet as tolerated.   Drink plenty of liquids to stay hydrated.    PAIN  Expect some tenderness and discomfort at the incision site(s).  Use the prescribed pain medication at your discretion.  Expect gradual resolution of your pain over several days.  You may take ibuprofen with food (unless you have been told not to) or acetaminophen/Tylenol instead of or in addition to your prescribed pain medication.  If you are taking Norco or Percocet, do not take any additional acetaminophen/Tylenol.  Do not drink alcohol or drive while you are taking pain medications.  You may apply ice to your incisions in 20 minute intervals as needed for the next 48 hours.  After that time, consider switching to heat if you prefer.    EXPECTATIONS  Pain  medications can cause constipation.  Limit use when possible.  Take over the counter stool softener/stimulant, such as Colace or Senna, 1-2 times a day with plenty of water.  You may take a mild over the counter laxative, such as Miralax or a suppository, as needed.    You may discontinue these medications once you are having regular bowel movements and/or are no longer taking your narcotic pain medication.    FOLLOW UP  You do not need to follow up with our office unless you have questions or concerns.    CALL OUR OFFICE -[phone] IF YOU HAVE:   Chills or fever above 101 F.  Increased redness, warmth, or drainage at your incisions.  Significant bleeding.  Pain not relieved by your pain medication or rest.  Increasing pain after the first 48 hours.  Any other concerns or questions.       Rev9/20  **If you have concerns or questions about your procedure,    please contact Dr Pena at  [phone]**

## 2025-04-02 NOTE — OR NURSING
Meets criteria for discharge.  Discharge instructions reviewed with pt and pt's designated responsible party.  Pt label on prescription bag from pharmacy matched to pt's wristband. Pharmacy bag opened with 1 prescriptions inside. Medications were reviewed to match pt wristband while pt and significant other agreed with identification. Prescriptions placed back in pharmacy bag resealed with tape and placed in belongings bag per pt request.

## 2025-04-02 NOTE — OP NOTE
General Surgery Operative Note      Pre-operative diagnosis: Personal history of malignant neoplasm of breast [Z85.3]   Post-operative diagnosis: Same   Procedure: Right internal jugular Power Port placement    Surgeon: Marisela Pena MD   Assistant(s): Latrell Billy PA-C  The PA s assistance was medically necessary to provide adequate exposure in the operating field, maintain hemostasis, cutting suture, clamping and ligating bleeding vessels, and visualization of anatomic structures throughout the surgical procedure.    Anesthesia                                   Local with MAC    Estimated blood loss:  Findings:                                        5 cc  Tip of the catheter at the atriocaval junction           DESCRIPTION OF PROCEDURE:  The patient was taken to the operating room after obtaining informed consent.  The patient was marked in the pre operative area. The patient was placed on the table in supine position.  A roll was placed between her shoulder blades.  IV anesthetic was administered.  The bilateral neck and upper chest were prepped and draped in standard sterile fashion.  We anesthetized the skin of the upper right chest.  The patient was placed in Trendelenburg.  Local anesthetic was injected into the skin in the lower neck, upper chest for the port site and proposed track for the catheter. We made an incision in the skin.  We cannulated the internal jugular vein using ultrasound guidance with a needle.  We then passed a wire through the needle into the internal jugular vein.  Fluoroscopy was used and confirmed the wire was traveling into the SVC. We then used a #15 blade to make a skin incision in the upper chest wall. The subcutaneous tissue was divided with cautery. The fascia was identified. A pocket was created above the fascia with blunt dissection. The port fit well into the pocket. We then used a tunneling device to place the catheter through the incision in the chest and tunneled to  the neck incision. We then passed a dilator over the wire under fluoroscopy.  The catheter was then placed through the catheter introducer and threaded, using fluoroscopy until the tip of the catheter was at the atrial caval junction.  The catheter introducer was removed.  We flushed the port with injectable saline.  We attached the catheter to the port and secured it in place with the catheter hub.   The port was secured to the fascia with two 2-0 prolene sutures on either side.  We then closed the subcutaneous tissue with 3-0 interrupted Vicryl sutures.  The skin was closed with a running 4-0 Vicryl subcuticular suture and Dermabond.  A final flush of full strength heparin (2ml) was injected into the port using a Hall needle.  The patient tolerated the procedure well.  All sponge and instrument counts were correct.    Marisela Pena MD

## 2025-04-03 NOTE — TELEPHONE ENCOUNTER
[facility]: Cancer Care Initial Note                                    Discussion with Patient:                                                      Met with pt and spouse to review potential side effects of chemo. All questions answered at this time         Assessment:                                                      Initial  Current living arrangement:: I live in a private home with spouse  Informal Support system:: Family, Friends, Spouse  Equipment Currently Used at Home: none  Bed or wheelchair confined:: No  Mobility Status: Independent  Transportation means:: Regular car  Referrals Placed: None    No assessment indicated    Intervention/Education provided during outreach:                                                     Port will be scheduled    Patient to follow up as scheduled at next appt  Patient to call/Cie Gamest message with updates  Confirmed patient has clinic and triage numbers    Signature:  Renu Germain RN

## 2025-04-07 ENCOUNTER — DOCUMENTATION ONLY (OUTPATIENT)
Dept: ONCOLOGY | Facility: CLINIC | Age: 45
End: 2025-04-07
Payer: COMMERCIAL

## 2025-04-07 DIAGNOSIS — C50.912 BREAST CANCER, LEFT BREAST (H): Primary | ICD-10-CM

## 2025-04-07 RX ORDER — LIDOCAINE AND PRILOCAINE 25; 25 MG/G; MG/G
CREAM TOPICAL
Qty: 30 G | Refills: 1 | Status: SHIPPED | OUTPATIENT
Start: 2025-04-07

## 2025-04-08 ENCOUNTER — INFUSION THERAPY VISIT (OUTPATIENT)
Dept: INFUSION THERAPY | Facility: CLINIC | Age: 45
End: 2025-04-08
Attending: INTERNAL MEDICINE
Payer: COMMERCIAL

## 2025-04-08 DIAGNOSIS — Z17.0 MALIGNANT NEOPLASM OF OVERLAPPING SITES OF LEFT BREAST IN FEMALE, ESTROGEN RECEPTOR POSITIVE (H): Primary | ICD-10-CM

## 2025-04-08 DIAGNOSIS — C50.812 MALIGNANT NEOPLASM OF OVERLAPPING SITES OF LEFT BREAST IN FEMALE, ESTROGEN RECEPTOR POSITIVE (H): Primary | ICD-10-CM

## 2025-04-08 LAB
ALBUMIN SERPL BCG-MCNC: 4 G/DL (ref 3.5–5.2)
ALP SERPL-CCNC: 58 U/L (ref 40–150)
ALT SERPL W P-5'-P-CCNC: 20 U/L (ref 0–50)
ANION GAP SERPL CALCULATED.3IONS-SCNC: 9 MMOL/L (ref 7–15)
AST SERPL W P-5'-P-CCNC: 32 U/L (ref 0–45)
BASOPHILS # BLD AUTO: 0 10E3/UL (ref 0–0.2)
BASOPHILS NFR BLD AUTO: 0 %
BILIRUB SERPL-MCNC: 0.2 MG/DL
BUN SERPL-MCNC: 10.8 MG/DL (ref 6–20)
CALCIUM SERPL-MCNC: 9 MG/DL (ref 8.8–10.4)
CHLORIDE SERPL-SCNC: 105 MMOL/L (ref 98–107)
CREAT SERPL-MCNC: 0.73 MG/DL (ref 0.51–0.95)
EGFRCR SERPLBLD CKD-EPI 2021: >90 ML/MIN/1.73M2
EOSINOPHIL # BLD AUTO: 0.2 10E3/UL (ref 0–0.7)
EOSINOPHIL NFR BLD AUTO: 3 %
ERYTHROCYTE [DISTWIDTH] IN BLOOD BY AUTOMATED COUNT: 12.3 % (ref 10–15)
GLUCOSE SERPL-MCNC: 92 MG/DL (ref 70–99)
HCO3 SERPL-SCNC: 24 MMOL/L (ref 22–29)
HCT VFR BLD AUTO: 39.8 % (ref 35–47)
HGB BLD-MCNC: 13.3 G/DL (ref 11.7–15.7)
IMM GRANULOCYTES # BLD: 0 10E3/UL
IMM GRANULOCYTES NFR BLD: 0 %
LYMPHOCYTES # BLD AUTO: 2.3 10E3/UL (ref 0.8–5.3)
LYMPHOCYTES NFR BLD AUTO: 34 %
MCH RBC QN AUTO: 30.5 PG (ref 26.5–33)
MCHC RBC AUTO-ENTMCNC: 33.4 G/DL (ref 31.5–36.5)
MCV RBC AUTO: 91 FL (ref 78–100)
MONOCYTES # BLD AUTO: 0.3 10E3/UL (ref 0–1.3)
MONOCYTES NFR BLD AUTO: 5 %
NEUTROPHILS # BLD AUTO: 4 10E3/UL (ref 1.6–8.3)
NEUTROPHILS NFR BLD AUTO: 58 %
NRBC # BLD AUTO: 0 10E3/UL
NRBC BLD AUTO-RTO: 0 /100
PLATELET # BLD AUTO: 191 10E3/UL (ref 150–450)
POTASSIUM SERPL-SCNC: 4.3 MMOL/L (ref 3.4–5.3)
PROT SERPL-MCNC: 6.7 G/DL (ref 6.4–8.3)
RBC # BLD AUTO: 4.36 10E6/UL (ref 3.8–5.2)
SODIUM SERPL-SCNC: 138 MMOL/L (ref 135–145)
WBC # BLD AUTO: 6.8 10E3/UL (ref 4–11)

## 2025-04-08 PROCEDURE — 80053 COMPREHEN METABOLIC PANEL: CPT | Performed by: INTERNAL MEDICINE

## 2025-04-08 PROCEDURE — 85041 AUTOMATED RBC COUNT: CPT | Performed by: INTERNAL MEDICINE

## 2025-04-08 PROCEDURE — 36415 COLL VENOUS BLD VENIPUNCTURE: CPT | Performed by: INTERNAL MEDICINE

## 2025-04-08 PROCEDURE — 85004 AUTOMATED DIFF WBC COUNT: CPT | Performed by: INTERNAL MEDICINE

## 2025-04-08 RX ORDER — ALBUTEROL SULFATE 90 UG/1
1-2 INHALANT RESPIRATORY (INHALATION)
Status: CANCELLED
Start: 2025-04-09

## 2025-04-08 RX ORDER — EPINEPHRINE 1 MG/ML
0.3 INJECTION, SOLUTION, CONCENTRATE INTRAVENOUS EVERY 5 MIN PRN
Status: CANCELLED | OUTPATIENT
Start: 2025-04-09

## 2025-04-08 RX ORDER — HEPARIN SODIUM,PORCINE 10 UNIT/ML
5-20 VIAL (ML) INTRAVENOUS DAILY PRN
Status: CANCELLED | OUTPATIENT
Start: 2025-04-09

## 2025-04-08 RX ORDER — DOXORUBICIN HYDROCHLORIDE 2 MG/ML
60 INJECTION, SOLUTION INTRAVENOUS ONCE
Status: CANCELLED | OUTPATIENT
Start: 2025-04-09

## 2025-04-08 RX ORDER — DIPHENHYDRAMINE HYDROCHLORIDE 50 MG/ML
25 INJECTION, SOLUTION INTRAMUSCULAR; INTRAVENOUS
Status: CANCELLED
Start: 2025-04-09

## 2025-04-08 RX ORDER — PALONOSETRON 0.05 MG/ML
0.25 INJECTION, SOLUTION INTRAVENOUS ONCE
Status: CANCELLED | OUTPATIENT
Start: 2025-04-09

## 2025-04-08 RX ORDER — HEPARIN SODIUM (PORCINE) LOCK FLUSH IV SOLN 100 UNIT/ML 100 UNIT/ML
5 SOLUTION INTRAVENOUS
Status: CANCELLED | OUTPATIENT
Start: 2025-04-09

## 2025-04-08 RX ORDER — ALBUTEROL SULFATE 0.83 MG/ML
2.5 SOLUTION RESPIRATORY (INHALATION)
Status: CANCELLED | OUTPATIENT
Start: 2025-04-09

## 2025-04-08 RX ORDER — DIPHENHYDRAMINE HYDROCHLORIDE 50 MG/ML
50 INJECTION, SOLUTION INTRAMUSCULAR; INTRAVENOUS
Status: CANCELLED
Start: 2025-04-09

## 2025-04-08 RX ORDER — MEPERIDINE HYDROCHLORIDE 25 MG/ML
25 INJECTION INTRAMUSCULAR; INTRAVENOUS; SUBCUTANEOUS
Status: CANCELLED | OUTPATIENT
Start: 2025-04-09

## 2025-04-08 RX ORDER — LORAZEPAM 2 MG/ML
0.5 INJECTION INTRAMUSCULAR EVERY 4 HOURS PRN
Status: CANCELLED | OUTPATIENT
Start: 2025-04-09

## 2025-04-08 NOTE — PROGRESS NOTES
Nursing Note:  Cortney Willoughby presents today for labs for tx tomorrow.    Patient seen by provider today: No   present during visit today: Not Applicable.    Note: N/A.    Intravenous Access:  Lab draw site RAC, Needle type BF, Gauge 23.  Labs drawn without difficulty.    Discharge Plan:   Patient was sent home.    Arcadio Rider RN

## 2025-04-09 ENCOUNTER — ONCOLOGY VISIT (OUTPATIENT)
Dept: ONCOLOGY | Facility: CLINIC | Age: 45
End: 2025-04-09
Attending: INTERNAL MEDICINE
Payer: COMMERCIAL

## 2025-04-09 ENCOUNTER — INFUSION THERAPY VISIT (OUTPATIENT)
Dept: INFUSION THERAPY | Facility: CLINIC | Age: 45
End: 2025-04-09
Attending: INTERNAL MEDICINE
Payer: COMMERCIAL

## 2025-04-09 VITALS
OXYGEN SATURATION: 98 % | BODY MASS INDEX: 24.5 KG/M2 | HEART RATE: 81 BPM | RESPIRATION RATE: 14 BRPM | DIASTOLIC BLOOD PRESSURE: 76 MMHG | WEIGHT: 145 LBS | TEMPERATURE: 97.9 F | SYSTOLIC BLOOD PRESSURE: 107 MMHG

## 2025-04-09 DIAGNOSIS — Z17.0 MALIGNANT NEOPLASM OF OVERLAPPING SITES OF LEFT BREAST IN FEMALE, ESTROGEN RECEPTOR POSITIVE (H): Primary | ICD-10-CM

## 2025-04-09 DIAGNOSIS — C50.812 MALIGNANT NEOPLASM OF OVERLAPPING SITES OF LEFT BREAST IN FEMALE, ESTROGEN RECEPTOR POSITIVE (H): Primary | ICD-10-CM

## 2025-04-09 PROCEDURE — 96413 CHEMO IV INFUSION 1 HR: CPT

## 2025-04-09 PROCEDURE — 96367 TX/PROPH/DG ADDL SEQ IV INF: CPT

## 2025-04-09 PROCEDURE — 96375 TX/PRO/DX INJ NEW DRUG ADDON: CPT

## 2025-04-09 PROCEDURE — 258N000003 HC RX IP 258 OP 636: Performed by: INTERNAL MEDICINE

## 2025-04-09 PROCEDURE — 250N000011 HC RX IP 250 OP 636: Mod: JZ | Performed by: INTERNAL MEDICINE

## 2025-04-09 PROCEDURE — 96411 CHEMO IV PUSH ADDL DRUG: CPT

## 2025-04-09 PROCEDURE — 96372 THER/PROPH/DIAG INJ SC/IM: CPT | Performed by: INTERNAL MEDICINE

## 2025-04-09 PROCEDURE — 99213 OFFICE O/P EST LOW 20 MIN: CPT | Performed by: NURSE PRACTITIONER

## 2025-04-09 PROCEDURE — 96377 APPLICATON ON-BODY INJECTOR: CPT | Mod: XS

## 2025-04-09 RX ORDER — DOXORUBICIN HYDROCHLORIDE 2 MG/ML
60 INJECTION, SOLUTION INTRAVENOUS ONCE
Status: COMPLETED | OUTPATIENT
Start: 2025-04-09 | End: 2025-04-09

## 2025-04-09 RX ORDER — HEPARIN SODIUM (PORCINE) LOCK FLUSH IV SOLN 100 UNIT/ML 100 UNIT/ML
5 SOLUTION INTRAVENOUS
Status: DISCONTINUED | OUTPATIENT
Start: 2025-04-09 | End: 2025-04-09 | Stop reason: HOSPADM

## 2025-04-09 RX ORDER — PALONOSETRON 0.05 MG/ML
0.25 INJECTION, SOLUTION INTRAVENOUS ONCE
Status: COMPLETED | OUTPATIENT
Start: 2025-04-09 | End: 2025-04-09

## 2025-04-09 RX ADMIN — PALONOSETRON HYDROCHLORIDE 0.25 MG: 0.25 INJECTION INTRAVENOUS at 09:58

## 2025-04-09 RX ADMIN — SODIUM CHLORIDE 250 ML: 0.9 INJECTION, SOLUTION INTRAVENOUS at 09:58

## 2025-04-09 RX ADMIN — FOSAPREPITANT: 150 INJECTION, POWDER, LYOPHILIZED, FOR SOLUTION INTRAVENOUS at 10:03

## 2025-04-09 RX ADMIN — DOXORUBICIN HYDROCHLORIDE 100 MG: 2 INJECTION, SOLUTION INTRAVENOUS at 10:26

## 2025-04-09 RX ADMIN — CYCLOPHOSPHAMIDE 1000 MG: 1 INJECTION, POWDER, FOR SOLUTION INTRAVENOUS; ORAL at 10:48

## 2025-04-09 RX ADMIN — PEGFILGRASTIM 6 MG: KIT SUBCUTANEOUS at 11:10

## 2025-04-09 RX ADMIN — HEPARIN 5 ML: 100 SYRINGE at 11:20

## 2025-04-09 ASSESSMENT — PAIN SCALES - GENERAL: PAINLEVEL_OUTOF10: NO PAIN (0)

## 2025-04-09 NOTE — PROGRESS NOTES
"Oncology Rooming Note    April 9, 2025 9:05 AM   Cortney Willoughby is a 45 year old female who presents for:    Chief Complaint   Patient presents with    Oncology Clinic Visit     Initial Vitals: /76   Pulse 81   Temp 97.9  F (36.6  C) (Oral)   Resp 14   Wt 65.8 kg (145 lb)   LMP 07/10/2021 (Exact Date)   SpO2 98%   BMI 24.50 kg/m   Estimated body mass index is 24.5 kg/m  as calculated from the following:    Height as of 4/2/25: 1.638 m (5' 4.5\").    Weight as of this encounter: 65.8 kg (145 lb). Body surface area is 1.73 meters squared.  No Pain (0) Comment: Data Unavailable   Patient's last menstrual period was 07/10/2021 (exact date).  Allergies reviewed: Yes  Medications reviewed: Yes    Medications: Medication refills not needed today.  Pharmacy name entered into [employer]: [id number] [address] AT [facility]    Frailty Screening:   Is the patient here for a new oncology consult visit in cancer care? 2. No    PHQ9:  Did this patient require a PHQ9?: No      Clinical concerns: no       Shari J. Schoenberger, CMA            "

## 2025-04-09 NOTE — PROGRESS NOTES
Oncology/Hematology Visit Note  Apr 9, 2025    Reason for Visit: follow up of left breast cancer  Per Dr. Acuna's  note  1.  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 Family history of breast cancer  3. BRCA-2 mutation  4. oncotype score 42    Dr. Acuna recommends adjuvant chemotherapy with dose dense Adriamycin and Cytoxan followed by weekly paclitaxel    Interval History:  Patient denies fever chills sweats cough shortness of breath chest pain nausea vomiting diarrhea abdominal pain or bleeding      Review of Systems:  14 point ROS of systems including Constitutional, Eyes, Respiratory, Cardiovascular, Gastroenterology, Genitourinary, Integumentary, Muscularskeletal, Psychiatric were all negative except for pertinent positives noted in my HPI.    Physical Examination:  General: The patient is a pleasant female in no acute distress.  /76   Pulse 81   Temp 97.9  F (36.6  C) (Oral)   Resp 14   Wt 65.8 kg (145 lb)   LMP 07/10/2021 (Exact Date)   SpO2 98%   BMI 24.50 kg/m    HEENT: EOMI, PERRL. Sclerae are anicteric. Oral mucosa is pink and moist with no lesions or thrush.   Lymph: Neck is supple with no lymphadenopathy in the cervical or supraclavicular areas.   Heart: Regular rate and rhythm.   Lungs: Clear to auscultation bilaterally.   GI: Bowel sounds present, soft, nontender with no palpable hepatosplenomegaly or masses.   Extremities: No lower extremity edema noted bilaterally.   Skin: No rashes, petechiae, or bruising noted on exposed skin.    Laboratory Data:  CBC CMP results reviewed echocardiogram reviewed    Assessment and Plan:    Left Breast cancer  Per Dr. Acuna's  note  1.  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 Family history of breast cancer  3. BRCA-2 mutation  4. oncotype  score 42  - Patient to start today adjuvant chemotherapy with dose dense Adriamycin and Cytoxan followed by weekly paclitaxel.  Regimen and potential side effects reviewed  Patient agrees to proceed with treatment  Labs and echocardiogram reviewed . Ok  to proceed with cycle 1 day 1 Adriamycin Cytoxan  04/22-scheduled for cycle 2 appointment with CAMERON    BRCA 2 mutation  Patient status post hysterectomy.  Dr. Acuna recommended removal of ovaries    Osteopenia  Continue with calcium vitamin D and weightbearing exercise      Please call clinic with any changes in her condition or questions     KEYSHA Montgomery [facility]     Chart documentation with Dragon Voice recognition Software. Although reviewed after completion, some words and grammatical errors may remain.

## 2025-04-09 NOTE — PATIENT INSTRUCTIONS
Your On-body Neulasta Injector was applied to your right arm at 11:10am.  At approximately 2:10pm on 4/10/25, your On-body Injector will beep to let you know your dose delivery will begin in 2 minutes.  Your medication will be delivered over the next 45 minutes.  You can remove your Injector when the black indicator line is on empty (about 3pm).  Please make sure your Injector has a solid green light or has turned off prior to removing the device.  Please contact your provider at [phone] with questions or concerns.

## 2025-04-09 NOTE — LETTER
"4/9/2025      Cortney Willoughby  [address] [place] [address]      Dear Colleague,    Thank you for referring your patient, Cortney Willoughby, to the [facility]. Please see a copy of my visit note below.    Oncology Rooming Note    April 9, 2025 9:05 AM   Cortney Willoughby is a 45 year old female who presents for:    Chief Complaint   Patient presents with     Oncology Clinic Visit     Initial Vitals: /76   Pulse 81   Temp 97.9  F (36.6  C) (Oral)   Resp 14   Wt 65.8 kg (145 lb)   LMP 07/10/2021 (Exact Date)   SpO2 98%   BMI 24.50 kg/m   Estimated body mass index is 24.5 kg/m  as calculated from the following:    Height as of 4/2/25: 1.638 m (5' 4.5\").    Weight as of this encounter: 65.8 kg (145 lb). Body surface area is 1.73 meters squared.  No Pain (0) Comment: Data Unavailable   Patient's last menstrual period was 07/10/2021 (exact date).  Allergies reviewed: Yes  Medications reviewed: Yes    Medications: Medication refills not needed today.  Pharmacy name entered into [employer]: [id number] [address] AT [facility] OF [facility]    Frailty Screening:   Is the patient here for a new oncology consult visit in cancer care? 2. No    PHQ9:  Did this patient require a PHQ9?: No      Clinical concerns: no       Shari J. Schoenberger, [facility]              Oncology/Hematology Visit Note  Apr 9, 2025    Reason for Visit: follow up of left breast cancer  Per Dr. Acuna's  note  1.  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 Family history of breast cancer  3. BRCA-2 mutation  4. oncotype score 42    Dr. Acuna recommends adjuvant chemotherapy with dose dense Adriamycin and Cytoxan followed by weekly paclitaxel    Interval History:  Patient denies fever chills sweats cough shortness of breath chest pain nausea vomiting diarrhea abdominal " pain or bleeding      Review of Systems:  14 point ROS of systems including Constitutional, Eyes, Respiratory, Cardiovascular, Gastroenterology, Genitourinary, Integumentary, Muscularskeletal, Psychiatric were all negative except for pertinent positives noted in my HPI.    Physical Examination:  General: The patient is a pleasant female in no acute distress.  /76   Pulse 81   Temp 97.9  F (36.6  C) (Oral)   Resp 14   Wt 65.8 kg (145 lb)   LMP 07/10/2021 (Exact Date)   SpO2 98%   BMI 24.50 kg/m    HEENT: EOMI, PERRL. Sclerae are anicteric. Oral mucosa is pink and moist with no lesions or thrush.   Lymph: Neck is supple with no lymphadenopathy in the cervical or supraclavicular areas.   Heart: Regular rate and rhythm.   Lungs: Clear to auscultation bilaterally.   GI: Bowel sounds present, soft, nontender with no palpable hepatosplenomegaly or masses.   Extremities: No lower extremity edema noted bilaterally.   Skin: No rashes, petechiae, or bruising noted on exposed skin.    Laboratory Data:  CBC CMP results reviewed echocardiogram reviewed    Assessment and Plan:    Left Breast cancer  Per Dr. Acuna's  note  1.  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 Family history of breast cancer  3. BRCA-2 mutation  4. oncotype score 42  - Patient to start today adjuvant chemotherapy with dose dense Adriamycin and Cytoxan followed by weekly paclitaxel.  Regimen and potential side effects reviewed  Patient agrees to proceed with treatment  Labs and echocardiogram reviewed . Ok  to proceed with cycle 1 day 1 Adriamycin Cytoxan  04/22-scheduled for cycle 2 appointment with CAMERON    BRCA 2 mutation  Patient status post hysterectomy.  Dr. Acuna recommended removal of ovaries    Osteopenia  Continue with calcium vitamin D and weightbearing exercise      Please call clinic with any changes in her condition or  questions     KEYSHA Montgomery CNP  [facility]     Chart documentation with Dragon Voice recognition Software. Although reviewed after completion, some words and grammatical errors may remain.          Again, thank you for allowing me to participate in the care of your patient.        Sincerely,        KEYSHA Montgomery CNP    Electronically signed

## 2025-04-09 NOTE — PROGRESS NOTES
Take Home Medication Teaching    Patient was educated on the following oral medications: dexamethasone, ondansetron and prochlorperazine on April 9, 2025. Teaching provided to patient included indication, dose, administration, adverse effects and side effect management. Written materials were provided and patient was given the opportunity to ask questions. Patient verbalized understanding of the information presented.     Rachel Moniz, PharmD  Hematology/Oncology Pharmacist  [facility]  [phone]

## 2025-04-09 NOTE — PROGRESS NOTES
Infusion Nursing Note:  Cortney Willoughby presents today for C1D1 Adriamcyin, Cytoxan, neulasta onpro.    Patient seen by provider today: Yes: Jelani Pittman NP.   present during visit today: Not Applicable.    Note: Patient seen and assessed in clinic prior to infusion by Jelani Pittman NP. Per jerrica Palomares to proceed with treatment today. Patient verbalizes understanding of correct dosing/schedule of post-chemo oral dexamethasone as ordered, denies need for refill. Patient prefers to use ice to hands and feet, brings own ice packs. Educated patient that ice is generally recommended during taxanes, however she wishes to use during all chemo.     First time getting chemotherapy today. Oriented to infusion center. Chemotherapy teaching, side effects, and schedule reviewed with patient. General and individual chemotherapy side effects reviewed with patient including fatigue, immunosuppression, alopecia, nausea/vomiting, constipation/diarrhea and cardiotoxicity. Patient previously received printed info on new medications from clinic. Pt instructed to call care coordinator, triage (or MD on call if after hours/weekends) with chills/temp >=100.5, questions/concerns. Pt stated understanding of plan.       ONPRO  Was placed on patient's: back of right arm.    Was placed at 11:10 AM    Podpal used: Yes    ONPRO injector device Lot number: [id number]    Patient education included: what patient can expect after application, what colored lights mean on the device, when to remove device, when and where to call with questions or issues, all patients questions answered, and that Neulasta administration will occur at 2:10pm on 4/10/25. Patient watched onpro instructional video.    Patient tolerated administration well.      Intravenous Access:  Implanted Port.    Treatment Conditions:  Lab Results   Component Value Date    HGB 13.3 04/08/2025    WBC 6.8 04/08/2025    ANEU 3.3 03/30/2009    ANEUTAUTO 4.0 04/08/2025      04/08/2025        Lab Results   Component Value Date     04/08/2025    POTASSIUM 4.3 04/08/2025    CR 0.73 04/08/2025    BAYLEE 9.0 04/08/2025    BILITOTAL 0.2 04/08/2025    ALBUMIN 4.0 04/08/2025    ALT 20 04/08/2025    AST 32 04/08/2025     Results reviewed, labs MET treatment parameters, ok to proceed with treatment.  ECHO/MUGA completed 3/31/25  EF 55-60%.      Post Infusion Assessment:  Patient tolerated infusion without incident.  Patient tolerated injection without incident.  Blood return noted pre and post infusion.  Blood return noted during adriamycin push every 2cc per protocol.  Site patent and intact, free from redness, edema or discomfort.  No evidence of extravasations.  Access discontinued per protocol.       Discharge Plan:   Discharge instructions reviewed with: Patient and Family.  Patient and/or family verbalized understanding of discharge instructions and all questions answered.  Copy of AVS reviewed with patient and/or family.  Patient will return 4/22 & 4/23 for next appointment.  Patient discharged in stable condition accompanied by:  and mother.  Departure Mode: Ambulatory.      Angelita Dunlap RN

## 2025-04-14 ENCOUNTER — TRANSFERRED RECORDS (OUTPATIENT)
Dept: HEALTH INFORMATION MANAGEMENT | Facility: CLINIC | Age: 45
End: 2025-04-14
Payer: COMMERCIAL

## 2025-04-22 ENCOUNTER — VIRTUAL VISIT (OUTPATIENT)
Dept: ONCOLOGY | Facility: CLINIC | Age: 45
End: 2025-04-22
Payer: COMMERCIAL

## 2025-04-22 ENCOUNTER — INFUSION THERAPY VISIT (OUTPATIENT)
Dept: INFUSION THERAPY | Facility: CLINIC | Age: 45
End: 2025-04-22
Attending: INTERNAL MEDICINE
Payer: COMMERCIAL

## 2025-04-22 VITALS — WEIGHT: 145 LBS | BODY MASS INDEX: 24.75 KG/M2 | HEIGHT: 64 IN

## 2025-04-22 DIAGNOSIS — C50.812 MALIGNANT NEOPLASM OF OVERLAPPING SITES OF LEFT BREAST IN FEMALE, ESTROGEN RECEPTOR POSITIVE (H): Primary | ICD-10-CM

## 2025-04-22 DIAGNOSIS — Z17.0 MALIGNANT NEOPLASM OF OVERLAPPING SITES OF LEFT BREAST IN FEMALE, ESTROGEN RECEPTOR POSITIVE (H): Primary | ICD-10-CM

## 2025-04-22 LAB
BASOPHILS # BLD AUTO: 0 10E3/UL (ref 0–0.2)
BASOPHILS NFR BLD AUTO: 1 %
EOSINOPHIL # BLD AUTO: 0 10E3/UL (ref 0–0.7)
EOSINOPHIL NFR BLD AUTO: 0 %
ERYTHROCYTE [DISTWIDTH] IN BLOOD BY AUTOMATED COUNT: 12.8 % (ref 10–15)
HCT VFR BLD AUTO: 36.6 % (ref 35–47)
HGB BLD-MCNC: 12.5 G/DL (ref 11.7–15.7)
IMM GRANULOCYTES # BLD: 0.4 10E3/UL
IMM GRANULOCYTES NFR BLD: 5 %
LYMPHOCYTES # BLD AUTO: 1.6 10E3/UL (ref 0.8–5.3)
LYMPHOCYTES NFR BLD AUTO: 22 %
MCH RBC QN AUTO: 31.4 PG (ref 26.5–33)
MCHC RBC AUTO-ENTMCNC: 34.2 G/DL (ref 31.5–36.5)
MCV RBC AUTO: 92 FL (ref 78–100)
MONOCYTES # BLD AUTO: 0.5 10E3/UL (ref 0–1.3)
MONOCYTES NFR BLD AUTO: 6 %
NEUTROPHILS # BLD AUTO: 4.8 10E3/UL (ref 1.6–8.3)
NEUTROPHILS NFR BLD AUTO: 66 %
NRBC # BLD AUTO: 0 10E3/UL
NRBC BLD AUTO-RTO: 0 /100
PLATELET # BLD AUTO: 151 10E3/UL (ref 150–450)
RBC # BLD AUTO: 3.98 10E6/UL (ref 3.8–5.2)
WBC # BLD AUTO: 7.3 10E3/UL (ref 4–11)

## 2025-04-22 PROCEDURE — G2211 COMPLEX E/M VISIT ADD ON: HCPCS

## 2025-04-22 PROCEDURE — 36591 DRAW BLOOD OFF VENOUS DEVICE: CPT | Performed by: INTERNAL MEDICINE

## 2025-04-22 PROCEDURE — 1126F AMNT PAIN NOTED NONE PRSNT: CPT

## 2025-04-22 PROCEDURE — 250N000011 HC RX IP 250 OP 636: Performed by: INTERNAL MEDICINE

## 2025-04-22 PROCEDURE — 85004 AUTOMATED DIFF WBC COUNT: CPT | Performed by: INTERNAL MEDICINE

## 2025-04-22 PROCEDURE — 98006 SYNCH AUDIO-VIDEO EST MOD 30: CPT

## 2025-04-22 RX ORDER — HEPARIN SODIUM (PORCINE) LOCK FLUSH IV SOLN 100 UNIT/ML 100 UNIT/ML
5 SOLUTION INTRAVENOUS ONCE
Status: COMPLETED | OUTPATIENT
Start: 2025-04-22 | End: 2025-04-22

## 2025-04-22 RX ADMIN — Medication 5 ML: at 09:46

## 2025-04-22 ASSESSMENT — PAIN SCALES - GENERAL: PAINLEVEL_OUTOF10: NO PAIN (0)

## 2025-04-22 NOTE — LETTER
4/22/2025      Cortney Willoughby  [address] [place] [id number]      Dear Colleague,    Thank you for referring your patient, Cortney Willoughby, to the [facility]. Please see a copy of my visit note below.    Virtual Visit Details    Type of service:  Video Visit     Originating Location (pt. Location): Home    Distant Location (provider location):  On-site  Platform used for Video Visit: AmWell  Duration: 4303-2727= 25 min    [facility]     Hematology/Oncology Established Patient Note  4/22/2025     Primary Oncologist: Dr. Acuna    Reason for Visit: Follow-up Left breast cancer    Oncology HPI:   - 1/8/2025: Breast MRI showed 1.3 x 2 x 1 cm mass in left breast at 3:00-4:00, 8-9 cm from the nipple.  No lymphadenopathy.  Right breast negative.  -  1/14/2025: Left breast ultrasound showed no definite abnormality corresponding to MRI abnormality as breast tissue was extremely dense with shadowing artifact scattered throughout glandular tissue related to breast density.  -  1/27/2025: Left breast needle core biopsy showed grade 2 invasive ductal carcinoma, 8 mm in greatest size and associated grade 2 DCIS as well as PASH.  ER strongly positive at %, MA positive at 81-90%, HER2 IHC = 2+, FISH negative.  - 3/05/2025: Bilateral mastectomies under care of Dr. Marisela Pena.  Pathology showed left breast with grade 2 invasive ductal carcinoma, 2.5 x 2 x 1.3 cm, associated grade 3 DCIS in 10 of 42 blocks, negative margins but DCIS focally 1 mm from anterior superior margin and 2 mm from posterior margin; no LVI; 1 of 2 left axillary SLNs positive for micrometastatic (1.5 mm) carcinoma. Right breast benign. Oncotype DX = 42, 5-year risk of distant recurrence of > 12% with endocrine therapy alone, adjuvant chemotherapy advised.  - 3/31/25: Echo LVEF 55-60%, LV strain -24%  - 4/9/25: started adjuvant dose dense adriamycin cytoxan    Current Treatment: dose dense  adriamycin cytoxan    Interval history:   Cortney is seen via video visit today. She reports that she tolerated Cycle one well, and overall is feeling well. She plans to work full-time while undergoing treatment, she works for [employer].     She is noting hair loss. She had constipation after first cycle, she took ducolax which caused abdominal cramping that was very painful. She did begin to have regular bowel movements, and has incorporated Miralax into her daily routine.     REVIEW OF SYSTEMS:   14 point ROS was reviewed and is negative other than as noted above in HPI.     Current Outpatient Medications   Medication Sig Dispense Refill     dexAMETHasone (DECADRON) 4 MG tablet Take 2 tablets (8 mg) by mouth daily for 3 days. Start on Day 2 of Cycles 1 through 4. 6 tablet 3     levothyroxine (SYNTHROID/LEVOTHROID) 75 MCG tablet Take 1 tablet (75 mcg) by mouth daily By her natural medicine doctor       lidocaine-prilocaine (EMLA) 2.5-2.5 % external cream Apply liberally to port site, 60-90 min prior to port access. Cover with plastic wrap 30 g 1     liothyronine (CYTOMEL) 25 MCG tablet Take 0.5 tablets (12.5 mcg) by mouth daily From natural medicine doctor       methocarbamol (ROBAXIN) 750 MG tablet Take 1 tablet (750 mg) by mouth 4 times daily as needed for muscle spasms (pain). 20 tablet 0     ondansetron (ZOFRAN) 8 MG tablet Take 1 tablet (8 mg) by mouth every 8 hours as needed for nausea (vomiting). 30 tablet 2     prochlorperazine (COMPAZINE) 10 MG tablet Take 1 tablet (10 mg) by mouth every 6 hours as needed for nausea or vomiting. 30 tablet 2     SENNA-docusate sodium (SENNA S) 8.6-50 MG tablet Take 1-2 tablets by mouth 2 times daily as needed (constipation). 15 tablet 0     Tirzepatide (MOUNJARO SC) Inject 0.4 mLs subcutaneously every 7 days. Bottle is 16.6 mg/4.5 mL       traZODone (DESYREL) 50 MG tablet Take 1 tablet (50 mg) by mouth every evening as needed for sleep 90 tablet 3          Allergies    Allergen Reactions     No Known Drug Allergy          Exam: video visit  Constitutional: Pleasant female in no acute distress.  Eyes: No scleral icterus. No conjunctival erythema or discharge  Respiratory: speaking in full clear sentences without audible wheezes or hoarseness  Neuro: Cranial nerves II-XII intact  Skin: No visible lesions, bruising or rashes  Psych: appropriate mood and affect         Labs: reviewed previous    Imaging: imaging    Impression/plan: Cortney Willoughby is a 45 year old female status post hysterectomy for fibroids diagnosed with left breast cancer.     Left Breast Cancer  -  Pathologic prognostic stage IB, pT2-U8jf-N0, grade 2 invasive ductal carcinoma of the left breast, ER positive, ME positive, HER2 FISH negative   - case discussed at breast tumor board 3/26/25 with regard to adjuvant radiation therapy -- this was ultimately advised as well.   - Treatment Plan:  adjuvant chemotherapy with dose-dense Adriamycin + Cytoxan, followed by weekly paclitaxel to reduce risk of breast cancer recurrence.   - adjuvant endocrine therapy with more aggressive approach of ovarian suppression therapy with Zoladex in combination with an aromatase inhibitor such as anastrozole.   - 3/31/25: Echo LVEF 55-60%, LV strain -24%  - 4/9/25: started adjuvant dose dense adriamycin cytoxan  - 4/22/2025: Cortney is doing well, no new complaints. Reviewed antiemetics and dexamethasone, she will order refill from pharmacy  - Schedule with CAMERON 5/6 or 5/7  - Follow up as scheduled  - Review vitals prior to infusion on 4/23/25    Constipation  - continue Miralax  - will monitor for worsening symptoms    Genetics  - BRCA-2 mutation  - family history of breast cancer    Bone Health  - 2/20/2025 DEXA scan showed mild osteopenia in her right hip femoral neck.    - Advised adequate calcium, vitamin D, weight bearing exercise when able to resume full activity.     Fertility  - Fertility is not an issue as she is s/p  hysterectomy (ovaries intact).  Dr. Acuna recommend eventual removal of ovaries given her pathogenic BRCA-2 mutation.  - as above adjuvant endocrine therapy with more aggressive approach of ovarian suppression therapy with Zoladex in combination with an aromatase inhibitor such as anastrozole.     Maura Maldonado PA-C  The longitudinal plan of care for the diagnosis(es)/condition(s) as documented were addressed during this visit. Due to the added complexity in care, I will continue to support Cortney in the subsequent management and with ongoing continuity of care.      potential side effects, including fever, chills, nausea, vomiting, diarrhea, constipation, hair loss, pain, rash, infection, bleeding, fatigue, permanent cardiac systolic dysfunction, and < 1% risk of acute leukemia.       Again, thank you for allowing me to participate in the care of your patient.        Sincerely,        Maura Maldonado PA-C    Electronically signed Normal Normal Normal Normal Normal Normal Normal Normal Normal Normal Normal Normal Normal Normal Normal Normal Normal Normal

## 2025-04-22 NOTE — PROGRESS NOTES
Virtual Visit Details    Type of service:  Video Visit     Originating Location (pt. Location): Home    Distant Location (provider location):  On-site  Platform used for Video Visit: Kirstin  Duration: 0382-8424= 25 min    [facility]     Hematology/Oncology Established Patient Note  4/22/2025     Primary Oncologist: Dr. Acuna    Reason for Visit: Follow-up Left breast cancer    Oncology HPI:   - 1/8/2025: Breast MRI showed 1.3 x 2 x 1 cm mass in left breast at 3:00-4:00, 8-9 cm from the nipple.  No lymphadenopathy.  Right breast negative.  -  1/14/2025: Left breast ultrasound showed no definite abnormality corresponding to MRI abnormality as breast tissue was extremely dense with shadowing artifact scattered throughout glandular tissue related to breast density.  -  1/27/2025: Left breast needle core biopsy showed grade 2 invasive ductal carcinoma, 8 mm in greatest size and associated grade 2 DCIS as well as PASH.  ER strongly positive at %, OR positive at 81-90%, HER2 IHC = 2+, FISH negative.  - 3/05/2025: Bilateral mastectomies under care of Dr. Marisela Pena.  Pathology showed left breast with grade 2 invasive ductal carcinoma, 2.5 x 2 x 1.3 cm, associated grade 3 DCIS in 10 of 42 blocks, negative margins but DCIS focally 1 mm from anterior superior margin and 2 mm from posterior margin; no LVI; 1 of 2 left axillary SLNs positive for micrometastatic (1.5 mm) carcinoma. Right breast benign. Oncotype DX = 42, 5-year risk of distant recurrence of > 12% with endocrine therapy alone, adjuvant chemotherapy advised.  - 3/31/25: Echo LVEF 55-60%, LV strain -24%  - 4/9/25: started adjuvant dose dense adriamycin cytoxan    Current Treatment: dose dense adriamycin cytoxan    Interval history:   Cortney is seen via video visit today. She reports that she tolerated Cycle one well, and overall is feeling well. She plans to work full-time while undergoing treatment, she works for [employer].     She is  noting hair loss. She had constipation after first cycle, she took ducolax which caused abdominal cramping that was very painful. She did begin to have regular bowel movements, and has incorporated Miralax into her daily routine.     REVIEW OF SYSTEMS:   14 point ROS was reviewed and is negative other than as noted above in HPI.     Current Outpatient Medications   Medication Sig Dispense Refill    dexAMETHasone (DECADRON) 4 MG tablet Take 2 tablets (8 mg) by mouth daily for 3 days. Start on Day 2 of Cycles 1 through 4. 6 tablet 3    levothyroxine (SYNTHROID/LEVOTHROID) 75 MCG tablet Take 1 tablet (75 mcg) by mouth daily By her natural medicine doctor      lidocaine-prilocaine (EMLA) 2.5-2.5 % external cream Apply liberally to port site, 60-90 min prior to port access. Cover with plastic wrap 30 g 1    liothyronine (CYTOMEL) 25 MCG tablet Take 0.5 tablets (12.5 mcg) by mouth daily From natural medicine doctor      methocarbamol (ROBAXIN) 750 MG tablet Take 1 tablet (750 mg) by mouth 4 times daily as needed for muscle spasms (pain). 20 tablet 0    ondansetron (ZOFRAN) 8 MG tablet Take 1 tablet (8 mg) by mouth every 8 hours as needed for nausea (vomiting). 30 tablet 2    prochlorperazine (COMPAZINE) 10 MG tablet Take 1 tablet (10 mg) by mouth every 6 hours as needed for nausea or vomiting. 30 tablet 2    SENNA-docusate sodium (SENNA S) 8.6-50 MG tablet Take 1-2 tablets by mouth 2 times daily as needed (constipation). 15 tablet 0    Tirzepatide (MOUNJARO SC) Inject 0.4 mLs subcutaneously every 7 days. Bottle is 16.6 mg/4.5 mL      traZODone (DESYREL) 50 MG tablet Take 1 tablet (50 mg) by mouth every evening as needed for sleep 90 tablet 3          Allergies   Allergen Reactions    No Known Drug Allergy          Exam: video visit  Constitutional: Pleasant female in no acute distress.  Eyes: No scleral icterus. No conjunctival erythema or discharge  Respiratory: speaking in full clear sentences without audible wheezes or  hoarseness  Neuro: Cranial nerves II-XII intact  Skin: No visible lesions, bruising or rashes  Psych: appropriate mood and affect         Labs: reviewed previous    Imaging: imaging    Impression/plan: Cortney Willoughby is a 45 year old female status post hysterectomy for fibroids diagnosed with left breast cancer.     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   - Treatment Plan:  adjuvant chemotherapy with dose-dense Adriamycin + Cytoxan, followed by weekly paclitaxel to reduce risk of breast cancer recurrence.   - adjuvant endocrine therapy with more aggressive approach of ovarian suppression therapy with Zoladex in combination with an aromatase inhibitor such as anastrozole.   - 3/31/25: Echo LVEF 55-60%, LV strain -24%  - 4/9/25: started adjuvant dose dense adriamycin cytoxan  - 4/22/2025: Cortney is doing well, no new complaints. Reviewed antiemetics and dexamethasone, she will order refill from pharmacy  - Schedule with CAMERON 5/6 or 5/7  - Follow up as scheduled  - Review vitals prior to infusion on 4/23/25    Constipation  - continue Miralax  - will monitor for worsening symptoms    Genetics  - BRCA-2 mutation  - family history of breast cancer    Bone Health  - 2/20/2025 DEXA scan showed mild osteopenia in her right hip femoral neck.    - Advised adequate calcium, vitamin D, weight bearing exercise when able to resume full activity.     Fertility  - Fertility is not an issue as she is s/p hysterectomy (ovaries intact).  Dr. Acuna recommend eventual removal of ovaries given her pathogenic BRCA-2 mutation.  - as above adjuvant endocrine therapy with more aggressive approach of ovarian suppression therapy with Zoladex in combination with an aromatase inhibitor  such as anastrozole.     Maura Maldonado PA-C  The longitudinal plan of care for the diagnosis(es)/condition(s) as documented were addressed during this visit. Due to the added complexity in care, I will continue to support Cortney in the subsequent management and with ongoing continuity of care.      potential side effects, including fever, chills, nausea, vomiting, diarrhea, constipation, hair loss, pain, rash, infection, bleeding, fatigue, permanent cardiac systolic dysfunction, and < 1% risk of acute leukemia.

## 2025-04-22 NOTE — LETTER
4/22/2025      Cortney Willoughby  [address] [place] [id number]      Dear Colleague,    Thank you for referring your patient, Cortney Willoughby, to the [facility]. Please see a copy of my visit note below.    Virtual Visit Details    Type of service:  Video Visit     Originating Location (pt. Location): Home    Distant Location (provider location):  On-site  Platform used for Video Visit: AmWell  Duration: 0189-0438= 25 min    [facility]     Hematology/Oncology Established Patient Note  4/22/2025     Primary Oncologist: Dr. Acuna    Reason for Visit: Follow-up Left breast cancer    Oncology HPI:   - 1/8/2025: Breast MRI showed 1.3 x 2 x 1 cm mass in left breast at 3:00-4:00, 8-9 cm from the nipple.  No lymphadenopathy.  Right breast negative.  -  1/14/2025: Left breast ultrasound showed no definite abnormality corresponding to MRI abnormality as breast tissue was extremely dense with shadowing artifact scattered throughout glandular tissue related to breast density.  -  1/27/2025: Left breast needle core biopsy showed grade 2 invasive ductal carcinoma, 8 mm in greatest size and associated grade 2 DCIS as well as PASH.  ER strongly positive at %, ME positive at 81-90%, HER2 IHC = 2+, FISH negative.  - 3/05/2025: Bilateral mastectomies under care of Dr. Marisela Pena.  Pathology showed left breast with grade 2 invasive ductal carcinoma, 2.5 x 2 x 1.3 cm, associated grade 3 DCIS in 10 of 42 blocks, negative margins but DCIS focally 1 mm from anterior superior margin and 2 mm from posterior margin; no LVI; 1 of 2 left axillary SLNs positive for micrometastatic (1.5 mm) carcinoma. Right breast benign. Oncotype DX = 42, 5-year risk of distant recurrence of > 12% with endocrine therapy alone, adjuvant chemotherapy advised.  - 3/31/25: Echo LVEF 55-60%, LV strain -24%  - 4/9/25: started adjuvant dose dense adriamycin cytoxan    Current Treatment: dose dense  adriamycin cytoxan    Interval history:   Cortney is seen via video visit today. She reports that she tolerated Cycle one well, and overall is feeling well. She plans to work full-time while undergoing treatment, she works for [employer].     She is noting hair loss. She had constipation after first cycle, she took ducolax which caused abdominal cramping that was very painful. She did begin to have regular bowel movements, and has incorporated Miralax into her daily routine.     REVIEW OF SYSTEMS:   14 point ROS was reviewed and is negative other than as noted above in HPI.     Current Outpatient Medications   Medication Sig Dispense Refill     dexAMETHasone (DECADRON) 4 MG tablet Take 2 tablets (8 mg) by mouth daily for 3 days. Start on Day 2 of Cycles 1 through 4. 6 tablet 3     levothyroxine (SYNTHROID/LEVOTHROID) 75 MCG tablet Take 1 tablet (75 mcg) by mouth daily By her natural medicine doctor       lidocaine-prilocaine (EMLA) 2.5-2.5 % external cream Apply liberally to port site, 60-90 min prior to port access. Cover with plastic wrap 30 g 1     liothyronine (CYTOMEL) 25 MCG tablet Take 0.5 tablets (12.5 mcg) by mouth daily From natural medicine doctor       methocarbamol (ROBAXIN) 750 MG tablet Take 1 tablet (750 mg) by mouth 4 times daily as needed for muscle spasms (pain). 20 tablet 0     ondansetron (ZOFRAN) 8 MG tablet Take 1 tablet (8 mg) by mouth every 8 hours as needed for nausea (vomiting). 30 tablet 2     prochlorperazine (COMPAZINE) 10 MG tablet Take 1 tablet (10 mg) by mouth every 6 hours as needed for nausea or vomiting. 30 tablet 2     SENNA-docusate sodium (SENNA S) 8.6-50 MG tablet Take 1-2 tablets by mouth 2 times daily as needed (constipation). 15 tablet 0     Tirzepatide (MOUNJARO SC) Inject 0.4 mLs subcutaneously every 7 days. Bottle is 16.6 mg/4.5 mL       traZODone (DESYREL) 50 MG tablet Take 1 tablet (50 mg) by mouth every evening as needed for sleep 90 tablet 3          Allergies    Allergen Reactions     No Known Drug Allergy          Exam: video visit  Constitutional: Pleasant female in no acute distress.  Eyes: No scleral icterus. No conjunctival erythema or discharge  Respiratory: speaking in full clear sentences without audible wheezes or hoarseness  Neuro: Cranial nerves II-XII intact  Skin: No visible lesions, bruising or rashes  Psych: appropriate mood and affect         Labs: reviewed previous    Imaging: imaging    Impression/plan: Cortney Willoughby is a 45 year old female status post hysterectomy for fibroids diagnosed with left breast cancer.     Left Breast Cancer  -  Pathologic prognostic stage IB, pT2-M9bx-M1, grade 2 invasive ductal carcinoma of the left breast, ER positive, [place] positive, HER2 FISH negative   - case discussed at breast tumor board 3/26/25 with regard to adjuvant radiation therapy -- this was ultimately advised as well.   - Treatment Plan:  adjuvant chemotherapy with dose-dense Adriamycin + Cytoxan, followed by weekly paclitaxel to reduce risk of breast cancer recurrence.   - adjuvant endocrine therapy with more aggressive approach of ovarian suppression therapy with Zoladex in combination with an aromatase inhibitor such as anastrozole.   - 3/31/25: Echo LVEF 55-60%, LV strain -24%  - 4/9/25: started adjuvant dose dense adriamycin cytoxan  - 4/22/2025: Cortney is doing well, no new complaints. Reviewed antiemetics and dexamethasone, she will order refill from pharmacy  - Schedule with CAMERON 5/6 or 5/7  - Follow up as scheduled  - Review vitals prior to infusion on 4/23/25    Constipation  - continue Miralax  - will monitor for worsening symptoms    Genetics  - BRCA-2 mutation  - family history of breast cancer    Bone Health  - 2/20/2025 DEXA scan showed mild osteopenia in her right hip femoral neck.    - Advised adequate calcium, vitamin D, weight bearing exercise when able to resume full activity.     Fertility  - Fertility is not an issue as she is s/p  hysterectomy (ovaries intact).  Dr. Acuna recommend eventual removal of ovaries given her pathogenic BRCA-2 mutation.  - as above adjuvant endocrine therapy with more aggressive approach of ovarian suppression therapy with Zoladex in combination with an aromatase inhibitor such as anastrozole.     Maura Maldonado PA-C  The longitudinal plan of care for the diagnosis(es)/condition(s) as documented were addressed during this visit. Due to the added complexity in care, I will continue to support Cortney in the subsequent management and with ongoing continuity of care.      potential side effects, including fever, chills, nausea, vomiting, diarrhea, constipation, hair loss, pain, rash, infection, bleeding, fatigue, permanent cardiac systolic dysfunction, and < 1% risk of acute leukemia.       Again, thank you for allowing me to participate in the care of your patient.        Sincerely,        Maura Maldonado PA-C    Electronically signed

## 2025-04-22 NOTE — NURSING NOTE
Current patient location: [address] [place] [id number]    Is the patient currently in the state of [place]? YES    Visit mode: VIDEO    If the visit is dropped, the patient can be reconnected by:VIDEO VISIT: Send to e-mail at: [email or web address]    Will anyone else be joining the visit? NO  (If patient encounters technical issues they should call [phone]    Are changes needed to the allergy or medication list? No    Are refills needed on medications prescribed by this physician? NO    Rooming Documentation:  Questionnaire(s) not done per department protocol    Reason for visit: JOSE NORMAN

## 2025-04-22 NOTE — PROGRESS NOTES
Infusion Nursing Note:  Cortney Willoughby presents today for port labs.    Patient seen by provider today: Yes: Virtual visit with HONG Vogt.   present during visit today: Not Applicable.    Note: N/A.    Intravenous Access:  Labs drawn without difficulty.  Implanted Port.    Treatment Conditions:  Not Applicable. Labs pending at time of discharge.      Post Infusion Assessment:  Site patent and intact, free from redness, edema or discomfort.  No evidence of extravasations.  Access discontinued per protocol.       Discharge Plan:   Patient discharged in stable condition accompanied by: self.  Departure Mode: Ambulatory.    Angelita Dunlap RN

## 2025-04-23 ENCOUNTER — INFUSION THERAPY VISIT (OUTPATIENT)
Dept: INFUSION THERAPY | Facility: CLINIC | Age: 45
End: 2025-04-23
Attending: INTERNAL MEDICINE
Payer: COMMERCIAL

## 2025-04-23 VITALS
OXYGEN SATURATION: 95 % | BODY MASS INDEX: 25.44 KG/M2 | SYSTOLIC BLOOD PRESSURE: 109 MMHG | DIASTOLIC BLOOD PRESSURE: 70 MMHG | RESPIRATION RATE: 18 BRPM | WEIGHT: 148.2 LBS | HEART RATE: 99 BPM | TEMPERATURE: 98.7 F

## 2025-04-23 DIAGNOSIS — Z17.0 MALIGNANT NEOPLASM OF OVERLAPPING SITES OF LEFT BREAST IN FEMALE, ESTROGEN RECEPTOR POSITIVE (H): Primary | ICD-10-CM

## 2025-04-23 DIAGNOSIS — C50.812 MALIGNANT NEOPLASM OF OVERLAPPING SITES OF LEFT BREAST IN FEMALE, ESTROGEN RECEPTOR POSITIVE (H): Primary | ICD-10-CM

## 2025-04-23 PROCEDURE — 96372 THER/PROPH/DIAG INJ SC/IM: CPT | Performed by: INTERNAL MEDICINE

## 2025-04-23 PROCEDURE — 96413 CHEMO IV INFUSION 1 HR: CPT

## 2025-04-23 PROCEDURE — 96377 APPLICATON ON-BODY INJECTOR: CPT | Mod: XS | Performed by: INTERNAL MEDICINE

## 2025-04-23 PROCEDURE — 96367 TX/PROPH/DG ADDL SEQ IV INF: CPT

## 2025-04-23 PROCEDURE — 250N000011 HC RX IP 250 OP 636: Performed by: INTERNAL MEDICINE

## 2025-04-23 PROCEDURE — 258N000003 HC RX IP 258 OP 636: Performed by: INTERNAL MEDICINE

## 2025-04-23 PROCEDURE — 96411 CHEMO IV PUSH ADDL DRUG: CPT

## 2025-04-23 PROCEDURE — 96375 TX/PRO/DX INJ NEW DRUG ADDON: CPT

## 2025-04-23 RX ORDER — EPINEPHRINE 1 MG/ML
0.3 INJECTION, SOLUTION, CONCENTRATE INTRAVENOUS EVERY 5 MIN PRN
Status: CANCELLED | OUTPATIENT
Start: 2025-04-23

## 2025-04-23 RX ORDER — MEPERIDINE HYDROCHLORIDE 25 MG/ML
25 INJECTION INTRAMUSCULAR; INTRAVENOUS; SUBCUTANEOUS
Status: CANCELLED | OUTPATIENT
Start: 2025-04-23

## 2025-04-23 RX ORDER — HEPARIN SODIUM,PORCINE 10 UNIT/ML
5-20 VIAL (ML) INTRAVENOUS DAILY PRN
Status: CANCELLED | OUTPATIENT
Start: 2025-04-23

## 2025-04-23 RX ORDER — PALONOSETRON 0.05 MG/ML
0.25 INJECTION, SOLUTION INTRAVENOUS ONCE
Status: COMPLETED | OUTPATIENT
Start: 2025-04-23 | End: 2025-04-23

## 2025-04-23 RX ORDER — PALONOSETRON 0.05 MG/ML
0.25 INJECTION, SOLUTION INTRAVENOUS ONCE
Status: CANCELLED | OUTPATIENT
Start: 2025-04-23

## 2025-04-23 RX ORDER — ALBUTEROL SULFATE 90 UG/1
1-2 INHALANT RESPIRATORY (INHALATION)
Status: CANCELLED
Start: 2025-04-23

## 2025-04-23 RX ORDER — DIPHENHYDRAMINE HYDROCHLORIDE 50 MG/ML
50 INJECTION, SOLUTION INTRAMUSCULAR; INTRAVENOUS
Status: CANCELLED
Start: 2025-04-23

## 2025-04-23 RX ORDER — DOXORUBICIN HYDROCHLORIDE 2 MG/ML
60 INJECTION, SOLUTION INTRAVENOUS ONCE
Status: CANCELLED | OUTPATIENT
Start: 2025-04-23

## 2025-04-23 RX ORDER — HEPARIN SODIUM (PORCINE) LOCK FLUSH IV SOLN 100 UNIT/ML 100 UNIT/ML
5 SOLUTION INTRAVENOUS
Status: CANCELLED | OUTPATIENT
Start: 2025-04-23

## 2025-04-23 RX ORDER — HEPARIN SODIUM (PORCINE) LOCK FLUSH IV SOLN 100 UNIT/ML 100 UNIT/ML
5 SOLUTION INTRAVENOUS
Status: DISCONTINUED | OUTPATIENT
Start: 2025-04-23 | End: 2025-04-23 | Stop reason: HOSPADM

## 2025-04-23 RX ORDER — DOXORUBICIN HYDROCHLORIDE 2 MG/ML
60 INJECTION, SOLUTION INTRAVENOUS ONCE
Status: COMPLETED | OUTPATIENT
Start: 2025-04-23 | End: 2025-04-23

## 2025-04-23 RX ORDER — DIPHENHYDRAMINE HYDROCHLORIDE 50 MG/ML
25 INJECTION, SOLUTION INTRAMUSCULAR; INTRAVENOUS
Status: CANCELLED
Start: 2025-04-23

## 2025-04-23 RX ORDER — LORAZEPAM 2 MG/ML
0.5 INJECTION INTRAMUSCULAR EVERY 4 HOURS PRN
Status: CANCELLED | OUTPATIENT
Start: 2025-04-23

## 2025-04-23 RX ORDER — ALBUTEROL SULFATE 0.83 MG/ML
2.5 SOLUTION RESPIRATORY (INHALATION)
Status: CANCELLED | OUTPATIENT
Start: 2025-04-23

## 2025-04-23 RX ADMIN — PALONOSETRON HYDROCHLORIDE 0.25 MG: 0.25 INJECTION INTRAVENOUS at 13:05

## 2025-04-23 RX ADMIN — FOSAPREPITANT: 150 INJECTION, POWDER, LYOPHILIZED, FOR SOLUTION INTRAVENOUS at 13:09

## 2025-04-23 RX ADMIN — CYCLOPHOSPHAMIDE 1000 MG: 1 INJECTION, POWDER, FOR SOLUTION INTRAVENOUS; ORAL at 13:57

## 2025-04-23 RX ADMIN — DOXORUBICIN HYDROCHLORIDE 100 MG: 2 INJECTION, SOLUTION INTRAVENOUS at 13:37

## 2025-04-23 RX ADMIN — SODIUM CHLORIDE 250 ML: 0.9 INJECTION, SOLUTION INTRAVENOUS at 13:05

## 2025-04-23 RX ADMIN — PEGFILGRASTIM 6 MG: KIT SUBCUTANEOUS at 14:32

## 2025-04-23 ASSESSMENT — PAIN SCALES - GENERAL: PAINLEVEL_OUTOF10: NO PAIN (0)

## 2025-04-23 NOTE — PROGRESS NOTES
Infusion Nursing Note:  Cortney Willoughby presents today for C2D1 Cytoxan/Doxorubicin.    Patient seen by provider today: No   present during visit today: Not Applicable.    Note: Patient states no new medical changes since last treatment. Patient has enough dexamethasone to cover the next three days as prescribed.    Intravenous Access:  Implanted Port.    Treatment Conditions:  Lab Results   Component Value Date    HGB 12.5 04/22/2025    WBC 7.3 04/22/2025    ANEU 4.8 04/22/2025     04/22/2025        Results reviewed, labs MET treatment parameters, ok to proceed with treatment.      Post Infusion Assessment:  Patient tolerated infusion without incident.  Blood return noted every 1-2 ml's while pushing Adriamycin.  Blood return noted pre and post infusion.  Site patent and intact, free from redness, edema or discomfort.  No evidence of extravasations.  Access discontinued per protocol.     ONPRO  Was placed on patient's: back of right arm.    Was placed at 1432 PM    Podpal used: Yes    ONPRO injector device Lot number: [id number]    Patient education included: what patient can expect after application, what colored lights mean on the device, when to remove device, when and where to call with questions or issues, all patients questions answered, and that Neulasta administration will occur at 1730 pm on 4/24/25.    Patient tolerated administration well.      Discharge Plan:   Discharge instructions reviewed with: Patient and Family.  Patient and/or family verbalized understanding of discharge instructions and all questions answered.  AVS to patient via [employer].  Patient will return 5/6/25 for next appointment.   Patient discharged in stable condition accompanied by: mother.  Departure Mode: Ambulatory.      Myra Sheridan RN

## 2025-05-06 ENCOUNTER — INFUSION THERAPY VISIT (OUTPATIENT)
Dept: INFUSION THERAPY | Facility: CLINIC | Age: 45
End: 2025-05-06
Attending: INTERNAL MEDICINE
Payer: COMMERCIAL

## 2025-05-06 ENCOUNTER — VIRTUAL VISIT (OUTPATIENT)
Dept: ONCOLOGY | Facility: CLINIC | Age: 45
End: 2025-05-06
Payer: COMMERCIAL

## 2025-05-06 DIAGNOSIS — Z17.0 MALIGNANT NEOPLASM OF OVERLAPPING SITES OF LEFT BREAST IN FEMALE, ESTROGEN RECEPTOR POSITIVE (H): Primary | ICD-10-CM

## 2025-05-06 DIAGNOSIS — C50.812 MALIGNANT NEOPLASM OF OVERLAPPING SITES OF LEFT BREAST IN FEMALE, ESTROGEN RECEPTOR POSITIVE (H): Primary | ICD-10-CM

## 2025-05-06 LAB
ALBUMIN SERPL BCG-MCNC: 3.8 G/DL (ref 3.5–5.2)
ALP SERPL-CCNC: 91 U/L (ref 40–150)
ALT SERPL W P-5'-P-CCNC: 21 U/L (ref 0–50)
ANION GAP SERPL CALCULATED.3IONS-SCNC: 10 MMOL/L (ref 7–15)
AST SERPL W P-5'-P-CCNC: 21 U/L (ref 0–45)
BASOPHILS # BLD MANUAL: 0 10E3/UL (ref 0–0.2)
BASOPHILS NFR BLD MANUAL: 0 %
BILIRUB SERPL-MCNC: <0.2 MG/DL
BUN SERPL-MCNC: 9 MG/DL (ref 6–20)
CALCIUM SERPL-MCNC: 8.5 MG/DL (ref 8.8–10.4)
CHLORIDE SERPL-SCNC: 107 MMOL/L (ref 98–107)
CREAT SERPL-MCNC: 0.71 MG/DL (ref 0.51–0.95)
DACRYOCYTES BLD QL SMEAR: SLIGHT
EGFRCR SERPLBLD CKD-EPI 2021: >90 ML/MIN/1.73M2
EOSINOPHIL # BLD MANUAL: 0 10E3/UL (ref 0–0.7)
EOSINOPHIL NFR BLD MANUAL: 0 %
ERYTHROCYTE [DISTWIDTH] IN BLOOD BY AUTOMATED COUNT: 13.9 % (ref 10–15)
GLUCOSE SERPL-MCNC: 95 MG/DL (ref 70–99)
HCO3 SERPL-SCNC: 22 MMOL/L (ref 22–29)
HCT VFR BLD AUTO: 34.7 % (ref 35–47)
HGB BLD-MCNC: 11.6 G/DL (ref 11.7–15.7)
LYMPHOCYTES # BLD MANUAL: 1.2 10E3/UL (ref 0.8–5.3)
LYMPHOCYTES NFR BLD MANUAL: 14 %
MCH RBC QN AUTO: 31.2 PG (ref 26.5–33)
MCHC RBC AUTO-ENTMCNC: 33.4 G/DL (ref 31.5–36.5)
MCV RBC AUTO: 93 FL (ref 78–100)
METAMYELOCYTES # BLD MANUAL: 0.2 10E3/UL
METAMYELOCYTES NFR BLD MANUAL: 2 %
MONOCYTES # BLD MANUAL: 0.4 10E3/UL (ref 0–1.3)
MONOCYTES NFR BLD MANUAL: 5 %
MYELOCYTES # BLD MANUAL: 0.3 10E3/UL
MYELOCYTES NFR BLD MANUAL: 3 %
NEUTROPHILS # BLD MANUAL: 6.7 10E3/UL (ref 1.6–8.3)
NEUTROPHILS NFR BLD MANUAL: 76 %
PLAT MORPH BLD: ABNORMAL
PLATELET # BLD AUTO: 147 10E3/UL (ref 150–450)
POTASSIUM SERPL-SCNC: 4.1 MMOL/L (ref 3.4–5.3)
PROT SERPL-MCNC: 5.8 G/DL (ref 6.4–8.3)
RBC # BLD AUTO: 3.72 10E6/UL (ref 3.8–5.2)
RBC MORPH BLD: ABNORMAL
SODIUM SERPL-SCNC: 139 MMOL/L (ref 135–145)
VARIANT LYMPHS BLD QL SMEAR: PRESENT
WBC # BLD AUTO: 8.8 10E3/UL (ref 4–11)

## 2025-05-06 PROCEDURE — 85007 BL SMEAR W/DIFF WBC COUNT: CPT | Performed by: INTERNAL MEDICINE

## 2025-05-06 PROCEDURE — 80053 COMPREHEN METABOLIC PANEL: CPT | Performed by: INTERNAL MEDICINE

## 2025-05-06 PROCEDURE — 85041 AUTOMATED RBC COUNT: CPT | Performed by: INTERNAL MEDICINE

## 2025-05-06 PROCEDURE — 250N000011 HC RX IP 250 OP 636: Performed by: INTERNAL MEDICINE

## 2025-05-06 PROCEDURE — 36591 DRAW BLOOD OFF VENOUS DEVICE: CPT

## 2025-05-06 PROCEDURE — 36415 COLL VENOUS BLD VENIPUNCTURE: CPT | Performed by: INTERNAL MEDICINE

## 2025-05-06 RX ORDER — METHYLPREDNISOLONE SODIUM SUCCINATE 40 MG/ML
40 INJECTION INTRAMUSCULAR; INTRAVENOUS
Status: CANCELLED
Start: 2025-05-07

## 2025-05-06 RX ORDER — DIPHENHYDRAMINE HYDROCHLORIDE 50 MG/ML
25 INJECTION, SOLUTION INTRAMUSCULAR; INTRAVENOUS
Status: CANCELLED
Start: 2025-05-07

## 2025-05-06 RX ORDER — HEPARIN SODIUM,PORCINE 10 UNIT/ML
5-20 VIAL (ML) INTRAVENOUS DAILY PRN
OUTPATIENT
Start: 2025-05-06

## 2025-05-06 RX ORDER — LORAZEPAM 2 MG/ML
0.5 INJECTION INTRAMUSCULAR EVERY 4 HOURS PRN
Status: CANCELLED | OUTPATIENT
Start: 2025-05-07

## 2025-05-06 RX ORDER — DEXAMETHASONE 4 MG/1
8 TABLET ORAL DAILY
Qty: 6 TABLET | Refills: 3 | Status: SHIPPED | OUTPATIENT
Start: 2025-05-06

## 2025-05-06 RX ORDER — ALBUTEROL SULFATE 90 UG/1
1-2 INHALANT RESPIRATORY (INHALATION)
Status: CANCELLED
Start: 2025-05-07

## 2025-05-06 RX ORDER — DOXORUBICIN HYDROCHLORIDE 2 MG/ML
60 INJECTION, SOLUTION INTRAVENOUS ONCE
Status: CANCELLED | OUTPATIENT
Start: 2025-05-07

## 2025-05-06 RX ORDER — HEPARIN SODIUM (PORCINE) LOCK FLUSH IV SOLN 100 UNIT/ML 100 UNIT/ML
5 SOLUTION INTRAVENOUS
Status: CANCELLED | OUTPATIENT
Start: 2025-05-07

## 2025-05-06 RX ORDER — PALONOSETRON 0.05 MG/ML
0.25 INJECTION, SOLUTION INTRAVENOUS ONCE
Status: CANCELLED | OUTPATIENT
Start: 2025-05-07

## 2025-05-06 RX ORDER — HEPARIN SODIUM,PORCINE 10 UNIT/ML
5-20 VIAL (ML) INTRAVENOUS DAILY PRN
Status: DISCONTINUED | OUTPATIENT
Start: 2025-05-06 | End: 2025-05-06 | Stop reason: HOSPADM

## 2025-05-06 RX ORDER — MEPERIDINE HYDROCHLORIDE 25 MG/ML
25 INJECTION INTRAMUSCULAR; INTRAVENOUS; SUBCUTANEOUS
Status: CANCELLED | OUTPATIENT
Start: 2025-05-07

## 2025-05-06 RX ORDER — EPINEPHRINE 1 MG/ML
0.3 INJECTION, SOLUTION INTRAMUSCULAR; SUBCUTANEOUS EVERY 5 MIN PRN
Status: CANCELLED | OUTPATIENT
Start: 2025-05-07

## 2025-05-06 RX ORDER — DIPHENHYDRAMINE HYDROCHLORIDE 50 MG/ML
50 INJECTION, SOLUTION INTRAMUSCULAR; INTRAVENOUS
Status: CANCELLED
Start: 2025-05-07

## 2025-05-06 RX ORDER — HEPARIN SODIUM,PORCINE 10 UNIT/ML
5-20 VIAL (ML) INTRAVENOUS DAILY PRN
Status: CANCELLED | OUTPATIENT
Start: 2025-05-07

## 2025-05-06 RX ORDER — HEPARIN SODIUM (PORCINE) LOCK FLUSH IV SOLN 100 UNIT/ML 100 UNIT/ML
5 SOLUTION INTRAVENOUS
Status: DISCONTINUED | OUTPATIENT
Start: 2025-05-06 | End: 2025-05-06 | Stop reason: HOSPADM

## 2025-05-06 RX ORDER — ALBUTEROL SULFATE 0.83 MG/ML
2.5 SOLUTION RESPIRATORY (INHALATION)
Status: CANCELLED | OUTPATIENT
Start: 2025-05-07

## 2025-05-06 RX ORDER — HEPARIN SODIUM (PORCINE) LOCK FLUSH IV SOLN 100 UNIT/ML 100 UNIT/ML
5 SOLUTION INTRAVENOUS
OUTPATIENT
Start: 2025-05-06

## 2025-05-06 RX ADMIN — HEPARIN 5 ML: 100 SYRINGE at 07:52

## 2025-05-06 ASSESSMENT — PAIN SCALES - GENERAL: PAINLEVEL_OUTOF10: NO PAIN (0)

## 2025-05-06 NOTE — Clinical Note
"5/6/2025      Cortney Willoughby  [address] [place] [id number]      Dear Colleague,    Thank you for referring your patient, Cortney Willoughby, to the [facility]. Please see a copy of my visit note below.    Virtual Visit Details    Type of service:  Video Visit     Originating Location (pt. Location): {video visit patient location:421587::\"Home\"}  {PROVIDER LOCATION On-site should be selected for visits conducted from your clinic location or adjoining [facility] hospital, academic office, or other nearby [facility] building. Off-site should be selected for all other provider locations, including home:197[address]}  Distant Location (provider location):  {virtual location provider:428333}  Platform used for Video Visit: {Virtual Visit Platforms:037459::\"Ecociclus\"}      Again, thank you for allowing me to participate in the care of your patient.        Sincerely,        KEYSHA Montgomery CNP    Electronically signed"

## 2025-05-06 NOTE — PROGRESS NOTES
Nursing Note:  Cortney Willoughby presents today for port labs.    Patient seen by provider today: Yes: FAWAD Palomares   present during visit today: Not Applicable.    Note: N/A.    Intravenous Access:  Labs drawn without difficulty.  Implanted Port.    Discharge Plan:   Patient was sent to home for tomorrow's appointment.    Ya Pelayo RN

## 2025-05-06 NOTE — PROGRESS NOTES
Telephone visit 11 minutes      Oncology/Hematology Visit Note  May 6, 2025    Reason for Visit: follow up of left breast cancer  Per Dr. Acuna's  note  1.  Pathologic prognostic stage IB, pT2-A6xq-O4, grade 2 invasive ductal carcinoma of the left breast, ER positive, CA positive, HER2 FISH negative  2. Family history of breast cancer  3. BRCA-2 mutation  4. oncotype score 42    Dr. Acuna recommends adjuvant chemotherapy with dose dense Adriamycin and Cytoxan followed by weekly paclitaxel    Interval History:  Patient reports feeling well reports some mild nausea well-controlled with antinausea medication denies vomiting or abdominal pain.  Some lack of sleep she takes trazodone for it.  Patient denies fever chills sweats cough shortness of breath chest pain , denies edema      Review of Systems:  14 point ROS of systems including Constitutional, Eyes, Respiratory, Cardiovascular, Gastroenterology, Genitourinary, Integumentary, Muscularskeletal, Psychiatric were all negative except for pertinent positives noted in my HPI.    Physical Examination:  Telephone visit no audible wheezing or cough patient answers all questions appropriately    Laboratory Data:  CBC CMP results reviewed echocardiogram reviewed    Assessment and Plan:    Left Breast cancer  Per Dr. Acuna's  note  1.  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 Family history of breast cancer  3. BRCA-2 mutation  4. oncotype score 42  - Patient to start today adjuvant chemotherapy with dose dense Adriamycin and Cytoxan followed by weekly paclitaxel.  Regimen and potential side effects reviewed  Patient agrees to proceed with treatment  Labs and echocardiogram reviewed . Ok  to continue with  Adriamycin Cytoxan-cycle 3 tomorrow      BRCA 2 mutation  Patient status post hysterectomy.  Dr. Acuna recommended removal of ovaries    Osteopenia  Continue with  calcium vitamin D and weightbearing exercise    Insomnia  Continue with trazodone  Okay to take Benadryl as needed    Mild chemotherapy-induced nausea  Well-controlled with antinausea medication  Continue with Compazine as needed      Please call clinic with any changes in her condition or questions     KEYSHA Montgomery [facility]     Chart documentation with Dragon Voice recognition Software. Although reviewed after completion, some words and grammatical errors may remain.

## 2025-05-06 NOTE — NURSING NOTE
Current patient location: [address] [place] [address]    Is the patient currently in the state of [place]? YES    Visit mode: CONVERT TO TELEPHONE    If the visit is dropped, the patient can be reconnected by:VIDEO VISIT: Text to cell phone:   Telephone Information:   Mobile [phone]       Will anyone else be joining the visit? NO  (If patient encounters technical issues they should call [phone])    Are changes needed to the allergy or medication list? Pt stated no changes to allergies and Pt stated no med changes    Are refills needed on medications prescribed by this physician? NO    Rooming Documentation:  Unable to complete distress screening, provider called pt early.    Reason for visit: JOSE CELESTINF

## 2025-05-06 NOTE — Clinical Note
"5/6/2025      Cortney Willoughby  [address] [place] [id number]      Dear Colleague,    Thank you for referring your patient, Cortney Willoughby, to the [facility]. Please see a copy of my visit note below.    Virtual Visit Details    Type of service:  Video Visit     Originating Location (pt. Location): {video visit patient location:884362::\"Home\"}  {PROVIDER LOCATION On-site should be selected for visits conducted from your clinic location or adjoining [facility] hospital, academic office, or other nearby [facility] building. Off-site should be selected for all other provider locations, including home:336214}  Distant Location (provider location):  {virtual location provider:642042}  Platform used for Video Visit: {Virtual Visit Platforms:925673::\"LifeBook\"}      Again, thank you for allowing me to participate in the care of your patient.        Sincerely,        KEYSHA Montgomery CNP    Electronically signed"

## 2025-05-07 ENCOUNTER — INFUSION THERAPY VISIT (OUTPATIENT)
Dept: INFUSION THERAPY | Facility: CLINIC | Age: 45
End: 2025-05-07
Attending: INTERNAL MEDICINE
Payer: COMMERCIAL

## 2025-05-07 VITALS
WEIGHT: 151.4 LBS | DIASTOLIC BLOOD PRESSURE: 66 MMHG | TEMPERATURE: 98 F | OXYGEN SATURATION: 97 % | RESPIRATION RATE: 20 BRPM | HEART RATE: 84 BPM | SYSTOLIC BLOOD PRESSURE: 101 MMHG | BODY MASS INDEX: 25.99 KG/M2

## 2025-05-07 DIAGNOSIS — C50.812 MALIGNANT NEOPLASM OF OVERLAPPING SITES OF LEFT BREAST IN FEMALE, ESTROGEN RECEPTOR POSITIVE (H): Primary | ICD-10-CM

## 2025-05-07 DIAGNOSIS — Z17.0 MALIGNANT NEOPLASM OF OVERLAPPING SITES OF LEFT BREAST IN FEMALE, ESTROGEN RECEPTOR POSITIVE (H): Primary | ICD-10-CM

## 2025-05-07 LAB
ALBUMIN UR-MCNC: NEGATIVE MG/DL
APPEARANCE UR: CLEAR
BACTERIA #/AREA URNS HPF: ABNORMAL /HPF
BILIRUB UR QL STRIP: NEGATIVE
COLOR UR AUTO: ABNORMAL
GLUCOSE UR STRIP-MCNC: NEGATIVE MG/DL
HGB UR QL STRIP: NEGATIVE
KETONES UR STRIP-MCNC: NEGATIVE MG/DL
LEUKOCYTE ESTERASE UR QL STRIP: NEGATIVE
NITRATE UR QL: NEGATIVE
PH UR STRIP: 6.5 [PH] (ref 5–7)
RBC URINE: <1 /HPF
SP GR UR STRIP: 1.01 (ref 1–1.03)
SQUAMOUS EPITHELIAL: 1 /HPF
UROBILINOGEN UR STRIP-MCNC: NORMAL MG/DL
WBC URINE: 1 /HPF

## 2025-05-07 PROCEDURE — 258N000003 HC RX IP 258 OP 636: Performed by: NURSE PRACTITIONER

## 2025-05-07 PROCEDURE — 96411 CHEMO IV PUSH ADDL DRUG: CPT

## 2025-05-07 PROCEDURE — 96367 TX/PROPH/DG ADDL SEQ IV INF: CPT

## 2025-05-07 PROCEDURE — 96372 THER/PROPH/DIAG INJ SC/IM: CPT | Performed by: NURSE PRACTITIONER

## 2025-05-07 PROCEDURE — 250N000011 HC RX IP 250 OP 636: Mod: JZ | Performed by: NURSE PRACTITIONER

## 2025-05-07 PROCEDURE — 81001 URINALYSIS AUTO W/SCOPE: CPT | Performed by: NURSE PRACTITIONER

## 2025-05-07 PROCEDURE — 96377 APPLICATON ON-BODY INJECTOR: CPT | Mod: XS

## 2025-05-07 PROCEDURE — 96413 CHEMO IV INFUSION 1 HR: CPT

## 2025-05-07 PROCEDURE — 96375 TX/PRO/DX INJ NEW DRUG ADDON: CPT

## 2025-05-07 RX ORDER — DOXORUBICIN HYDROCHLORIDE 2 MG/ML
60 INJECTION, SOLUTION INTRAVENOUS ONCE
Status: COMPLETED | OUTPATIENT
Start: 2025-05-07 | End: 2025-05-07

## 2025-05-07 RX ORDER — PALONOSETRON 0.05 MG/ML
0.25 INJECTION, SOLUTION INTRAVENOUS ONCE
Status: COMPLETED | OUTPATIENT
Start: 2025-05-07 | End: 2025-05-07

## 2025-05-07 RX ORDER — HEPARIN SODIUM (PORCINE) LOCK FLUSH IV SOLN 100 UNIT/ML 100 UNIT/ML
5 SOLUTION INTRAVENOUS
Status: DISCONTINUED | OUTPATIENT
Start: 2025-05-07 | End: 2025-05-07 | Stop reason: HOSPADM

## 2025-05-07 RX ADMIN — PEGFILGRASTIM 6 MG: KIT SUBCUTANEOUS at 15:51

## 2025-05-07 RX ADMIN — CYCLOPHOSPHAMIDE 1000 MG: 1 INJECTION, POWDER, FOR SOLUTION INTRAVENOUS; ORAL at 15:17

## 2025-05-07 RX ADMIN — SODIUM CHLORIDE 250 ML: 0.9 INJECTION, SOLUTION INTRAVENOUS at 14:34

## 2025-05-07 RX ADMIN — DOXORUBICIN HYDROCHLORIDE 100 MG: 2 INJECTION, SOLUTION INTRAVENOUS at 15:02

## 2025-05-07 RX ADMIN — FOSAPREPITANT: 150 INJECTION, POWDER, LYOPHILIZED, FOR SOLUTION INTRAVENOUS at 14:40

## 2025-05-07 RX ADMIN — Medication 5 ML: at 15:51

## 2025-05-07 RX ADMIN — PALONOSETRON HYDROCHLORIDE 0.25 MG: 0.25 INJECTION INTRAVENOUS at 14:34

## 2025-05-07 ASSESSMENT — PAIN SCALES - GENERAL: PAINLEVEL_OUTOF10: NO PAIN (0)

## 2025-05-07 NOTE — PATIENT INSTRUCTIONS
Your On-body Neulasta Injector was applied to your                                           at                                    .  At approximately                                   on                                       , your On-body Injector will beep to let you know your dose delivery will begin in 2 minutes.  Your medication will be delivered over the next 45 minutes.  You can remove your Injector at                                         .  Please make sure your Injector has a solid green light or has turned off prior to removing the device.  Please contact your provider at [phone] with questions or concerns.

## 2025-05-07 NOTE — PROGRESS NOTES
Infusion Nursing Note:  Cortney Willoughby presents today for C3D1 Doxorubicin, Cyclophosphamide, Neulast Onpro.    Patient seen by provider today: No--VV with Jelani Pittman CNP 5/6/25   present during visit today: Not Applicable.    Note: pt icing hands/feet with AC and will cont with Taxol. Tolerating treatment well at this time. Pt anxious re side effects worsening. Explained PRN Ativan in Tx plan if should want at some point. .  UA ordered today as pt mentioned some urinary frequency at provider visit yesterday. Symptoms resolved at this time.   Pt confirms taking oral Dexamethasone as prescribed.     Intravenous Access:  Implanted Port.    Treatment Conditions:  Lab Results   Component Value Date    HGB 11.6 (L) 05/06/2025    WBC 8.8 05/06/2025    ANEU 6.7 05/06/2025     (L) 05/06/2025        Lab Results   Component Value Date     05/06/2025    POTASSIUM 4.1 05/06/2025    CR 0.71 05/06/2025    BAYLEE 8.5 (L) 05/06/2025    BILITOTAL <0.2 05/06/2025    ALBUMIN 3.8 05/06/2025    ALT 21 05/06/2025    AST 21 05/06/2025       Results reviewed, labs MET treatment parameters, ok to proceed with treatment.  ECHO/MUGA completed 3/31/25  EF 55-60%.      Post Infusion Assessment:  Patient tolerated infusion without incident.  Blood return noted pre and post infusion.  Blood return noted during administration every 5 ml with Doxorubicin administration  Site patent and intact, free from redness, edema or discomfort.  No evidence of extravasations.  Access discontinued per protocol.   ONPRO  Was placed on patient's: back of right arm.    Was placed at 4:00 PM    Podpal used: Yes    ONPRO injector device Lot number: [id number]    Patient education included: what patient can expect after application, what colored lights mean on the device, when to remove device, when and where to call with questions or issues, and all patients questions answered.    Patient tolerated administration well.      Discharge Plan:    Discharge instructions reviewed with: Patient and Family.  Patient and/or family verbalized understanding of discharge instructions and all questions answered.  Copy of AVS reviewed with patient and/or family.  Patient will return 2 weeks for next appointment.  Patient discharged in stable condition accompanied by: .  Departure Mode: Ambulatory.      Inez Pinto RN

## 2025-05-20 ENCOUNTER — INFUSION THERAPY VISIT (OUTPATIENT)
Dept: INFUSION THERAPY | Facility: CLINIC | Age: 45
End: 2025-05-20
Attending: INTERNAL MEDICINE
Payer: COMMERCIAL

## 2025-05-20 DIAGNOSIS — Z17.0 MALIGNANT NEOPLASM OF OVERLAPPING SITES OF LEFT BREAST IN FEMALE, ESTROGEN RECEPTOR POSITIVE (H): Primary | ICD-10-CM

## 2025-05-20 DIAGNOSIS — C50.812 MALIGNANT NEOPLASM OF OVERLAPPING SITES OF LEFT BREAST IN FEMALE, ESTROGEN RECEPTOR POSITIVE (H): Primary | ICD-10-CM

## 2025-05-20 LAB
BASOPHILS # BLD MANUAL: 0 10E3/UL (ref 0–0.2)
BASOPHILS NFR BLD MANUAL: 0 %
DACRYOCYTES BLD QL SMEAR: SLIGHT
EOSINOPHIL # BLD MANUAL: 0.1 10E3/UL (ref 0–0.7)
EOSINOPHIL NFR BLD MANUAL: 2 %
ERYTHROCYTE [DISTWIDTH] IN BLOOD BY AUTOMATED COUNT: 15.1 % (ref 10–15)
HCT VFR BLD AUTO: 36.5 % (ref 35–47)
HGB BLD-MCNC: 12.3 G/DL (ref 11.7–15.7)
LYMPHOCYTES # BLD MANUAL: 1 10E3/UL (ref 0.8–5.3)
LYMPHOCYTES NFR BLD MANUAL: 17 %
MCH RBC QN AUTO: 31.1 PG (ref 26.5–33)
MCHC RBC AUTO-ENTMCNC: 33.7 G/DL (ref 31.5–36.5)
MCV RBC AUTO: 92 FL (ref 78–100)
METAMYELOCYTES # BLD MANUAL: 0.1 10E3/UL
METAMYELOCYTES NFR BLD MANUAL: 1 %
MONOCYTES # BLD MANUAL: 0.5 10E3/UL (ref 0–1.3)
MONOCYTES NFR BLD MANUAL: 9 %
MYELOCYTES # BLD MANUAL: 0.1 10E3/UL
MYELOCYTES NFR BLD MANUAL: 1 %
NEUTROPHILS # BLD MANUAL: 4.3 10E3/UL (ref 1.6–8.3)
NEUTROPHILS NFR BLD MANUAL: 70 %
PLAT MORPH BLD: ABNORMAL
PLATELET # BLD AUTO: 183 10E3/UL (ref 150–450)
RBC # BLD AUTO: 3.96 10E6/UL (ref 3.8–5.2)
RBC MORPH BLD: ABNORMAL
VARIANT LYMPHS BLD QL SMEAR: PRESENT
WBC # BLD AUTO: 6.1 10E3/UL (ref 4–11)

## 2025-05-20 PROCEDURE — 36591 DRAW BLOOD OFF VENOUS DEVICE: CPT | Performed by: INTERNAL MEDICINE

## 2025-05-20 PROCEDURE — 85007 BL SMEAR W/DIFF WBC COUNT: CPT | Performed by: INTERNAL MEDICINE

## 2025-05-20 PROCEDURE — 85018 HEMOGLOBIN: CPT | Performed by: INTERNAL MEDICINE

## 2025-05-20 PROCEDURE — 250N000011 HC RX IP 250 OP 636: Performed by: INTERNAL MEDICINE

## 2025-05-20 RX ORDER — HEPARIN SODIUM,PORCINE 10 UNIT/ML
5-20 VIAL (ML) INTRAVENOUS DAILY PRN
OUTPATIENT
Start: 2025-05-20

## 2025-05-20 RX ORDER — HEPARIN SODIUM (PORCINE) LOCK FLUSH IV SOLN 100 UNIT/ML 100 UNIT/ML
5 SOLUTION INTRAVENOUS
Status: DISCONTINUED | OUTPATIENT
Start: 2025-05-20 | End: 2025-05-20 | Stop reason: HOSPADM

## 2025-05-20 RX ORDER — HEPARIN SODIUM (PORCINE) LOCK FLUSH IV SOLN 100 UNIT/ML 100 UNIT/ML
5 SOLUTION INTRAVENOUS
OUTPATIENT
Start: 2025-05-20

## 2025-05-20 RX ADMIN — Medication 5 ML: at 07:40

## 2025-05-20 NOTE — PROGRESS NOTES
Infusion Nursing Note:  Cortney Willoughby presents today for port labs.    Patient seen by provider today: No   present during visit today: Not Applicable.    Note: N/A.    Intravenous Access:  Labs drawn without difficulty.  Implanted Port.    Treatment Conditions:  Not Applicable. Labs pending at time of discharge.      Post Infusion Assessment:  Site patent and intact, free from redness, edema or discomfort.  No evidence of extravasations.  Access discontinued per protocol.       Discharge Plan:   Patient discharged in stable condition accompanied by: self.  Departure Mode: Ambulatory.    Angelita Dunlap RN

## 2025-05-21 ENCOUNTER — INFUSION THERAPY VISIT (OUTPATIENT)
Dept: INFUSION THERAPY | Facility: CLINIC | Age: 45
End: 2025-05-21
Attending: INTERNAL MEDICINE
Payer: COMMERCIAL

## 2025-05-21 ENCOUNTER — ONCOLOGY VISIT (OUTPATIENT)
Dept: ONCOLOGY | Facility: CLINIC | Age: 45
End: 2025-05-21
Attending: INTERNAL MEDICINE
Payer: COMMERCIAL

## 2025-05-21 VITALS
HEART RATE: 98 BPM | DIASTOLIC BLOOD PRESSURE: 78 MMHG | SYSTOLIC BLOOD PRESSURE: 114 MMHG | TEMPERATURE: 97.7 F | BODY MASS INDEX: 25.85 KG/M2 | OXYGEN SATURATION: 97 % | RESPIRATION RATE: 16 BRPM | WEIGHT: 150.6 LBS

## 2025-05-21 DIAGNOSIS — Z17.0 MALIGNANT NEOPLASM OF OVERLAPPING SITES OF LEFT BREAST IN FEMALE, ESTROGEN RECEPTOR POSITIVE (H): Primary | ICD-10-CM

## 2025-05-21 DIAGNOSIS — C50.812 MALIGNANT NEOPLASM OF OVERLAPPING SITES OF LEFT BREAST IN FEMALE, ESTROGEN RECEPTOR POSITIVE (H): Primary | ICD-10-CM

## 2025-05-21 PROCEDURE — 96377 APPLICATON ON-BODY INJECTOR: CPT | Mod: XS

## 2025-05-21 PROCEDURE — 96375 TX/PRO/DX INJ NEW DRUG ADDON: CPT

## 2025-05-21 PROCEDURE — 96411 CHEMO IV PUSH ADDL DRUG: CPT

## 2025-05-21 PROCEDURE — 99212 OFFICE O/P EST SF 10 MIN: CPT | Performed by: NURSE PRACTITIONER

## 2025-05-21 PROCEDURE — 96367 TX/PROPH/DG ADDL SEQ IV INF: CPT

## 2025-05-21 PROCEDURE — 96372 THER/PROPH/DIAG INJ SC/IM: CPT | Performed by: NURSE PRACTITIONER

## 2025-05-21 PROCEDURE — 96413 CHEMO IV INFUSION 1 HR: CPT

## 2025-05-21 PROCEDURE — 99213 OFFICE O/P EST LOW 20 MIN: CPT | Mod: 25 | Performed by: NURSE PRACTITIONER

## 2025-05-21 PROCEDURE — 250N000011 HC RX IP 250 OP 636: Mod: JZ | Performed by: NURSE PRACTITIONER

## 2025-05-21 PROCEDURE — 258N000003 HC RX IP 258 OP 636: Performed by: NURSE PRACTITIONER

## 2025-05-21 RX ORDER — PALONOSETRON 0.05 MG/ML
0.25 INJECTION, SOLUTION INTRAVENOUS ONCE
Status: CANCELLED | OUTPATIENT
Start: 2025-05-21

## 2025-05-21 RX ORDER — MEPERIDINE HYDROCHLORIDE 25 MG/ML
25 INJECTION INTRAMUSCULAR; INTRAVENOUS; SUBCUTANEOUS
Status: CANCELLED | OUTPATIENT
Start: 2025-05-21

## 2025-05-21 RX ORDER — DIPHENHYDRAMINE HYDROCHLORIDE 50 MG/ML
25 INJECTION, SOLUTION INTRAMUSCULAR; INTRAVENOUS
Status: CANCELLED
Start: 2025-05-21

## 2025-05-21 RX ORDER — HEPARIN SODIUM,PORCINE 10 UNIT/ML
5-20 VIAL (ML) INTRAVENOUS DAILY PRN
Status: CANCELLED | OUTPATIENT
Start: 2025-05-21

## 2025-05-21 RX ORDER — DOXORUBICIN HYDROCHLORIDE 2 MG/ML
60 INJECTION, SOLUTION INTRAVENOUS ONCE
Status: CANCELLED | OUTPATIENT
Start: 2025-05-21

## 2025-05-21 RX ORDER — METHYLPREDNISOLONE SODIUM SUCCINATE 40 MG/ML
40 INJECTION INTRAMUSCULAR; INTRAVENOUS
Status: CANCELLED
Start: 2025-05-21

## 2025-05-21 RX ORDER — DIPHENHYDRAMINE HYDROCHLORIDE 50 MG/ML
50 INJECTION, SOLUTION INTRAMUSCULAR; INTRAVENOUS
Status: CANCELLED
Start: 2025-05-21

## 2025-05-21 RX ORDER — HEPARIN SODIUM (PORCINE) LOCK FLUSH IV SOLN 100 UNIT/ML 100 UNIT/ML
5 SOLUTION INTRAVENOUS
Status: DISCONTINUED | OUTPATIENT
Start: 2025-05-21 | End: 2025-05-21 | Stop reason: HOSPADM

## 2025-05-21 RX ORDER — HEPARIN SODIUM (PORCINE) LOCK FLUSH IV SOLN 100 UNIT/ML 100 UNIT/ML
5 SOLUTION INTRAVENOUS
Status: CANCELLED | OUTPATIENT
Start: 2025-05-21

## 2025-05-21 RX ORDER — EPINEPHRINE 1 MG/ML
0.3 INJECTION, SOLUTION INTRAMUSCULAR; SUBCUTANEOUS EVERY 5 MIN PRN
Status: CANCELLED | OUTPATIENT
Start: 2025-05-21

## 2025-05-21 RX ORDER — PALONOSETRON 0.05 MG/ML
0.25 INJECTION, SOLUTION INTRAVENOUS ONCE
Status: COMPLETED | OUTPATIENT
Start: 2025-05-21 | End: 2025-05-21

## 2025-05-21 RX ORDER — LORAZEPAM 2 MG/ML
0.5 INJECTION INTRAMUSCULAR EVERY 4 HOURS PRN
Status: CANCELLED | OUTPATIENT
Start: 2025-05-21

## 2025-05-21 RX ORDER — ALBUTEROL SULFATE 0.83 MG/ML
2.5 SOLUTION RESPIRATORY (INHALATION)
Status: CANCELLED | OUTPATIENT
Start: 2025-05-21

## 2025-05-21 RX ORDER — ALBUTEROL SULFATE 90 UG/1
1-2 INHALANT RESPIRATORY (INHALATION)
Status: CANCELLED
Start: 2025-05-21

## 2025-05-21 RX ORDER — DOXORUBICIN HYDROCHLORIDE 2 MG/ML
60 INJECTION, SOLUTION INTRAVENOUS ONCE
Status: COMPLETED | OUTPATIENT
Start: 2025-05-21 | End: 2025-05-21

## 2025-05-21 RX ADMIN — CYCLOPHOSPHAMIDE 1000 MG: 1 INJECTION, POWDER, FOR SOLUTION INTRAVENOUS; ORAL at 14:44

## 2025-05-21 RX ADMIN — PEGFILGRASTIM 6 MG: KIT SUBCUTANEOUS at 15:19

## 2025-05-21 RX ADMIN — FOSAPREPITANT: 150 INJECTION, POWDER, LYOPHILIZED, FOR SOLUTION INTRAVENOUS at 14:07

## 2025-05-21 RX ADMIN — PALONOSETRON HYDROCHLORIDE 0.25 MG: 0.25 INJECTION INTRAVENOUS at 14:06

## 2025-05-21 RX ADMIN — Medication 5 ML: at 15:15

## 2025-05-21 RX ADMIN — SODIUM CHLORIDE 250 ML: 0.9 INJECTION, SOLUTION INTRAVENOUS at 14:06

## 2025-05-21 RX ADMIN — DOXORUBICIN HYDROCHLORIDE 100 MG: 2 INJECTION, SOLUTION INTRAVENOUS at 14:34

## 2025-05-21 ASSESSMENT — PAIN SCALES - GENERAL: PAINLEVEL_OUTOF10: MODERATE PAIN (6)

## 2025-05-21 NOTE — PROGRESS NOTES
Infusion Nursing Note:  Cortney Willoughby presents today for C4D1 Adryiamycin/Cyclophosphamide/OnPro.    Patient seen by provider today: Yes: Jelani Pittman   present during visit today: Not Applicable.    Note: Icing hands and feet during treatment.  ONPRO  Was placed on patient's: back of right arm.    Was placed at 3:25 PM    Podpal used: Yes    ONPRO injector device Lot number: [id number]  Patient education included: what patient can expect after application, what colored lights mean on the device, when to remove device, when and where to call with questions or issues, all patients questions answered, and that Neulasta administration will occur at 6:25 PM on 5/22.    Patient tolerated administration well.        Intravenous Access:  Implanted Port.    Treatment Conditions:  Lab Results   Component Value Date    HGB 12.3 05/20/2025    WBC 6.1 05/20/2025    ANEU 4.3 05/20/2025     05/20/2025     Last Echo 3/31/25 with EF at 55-60%.  Results reviewed, labs MET treatment parameters, ok to proceed with treatment.      Post Infusion Assessment:  Patient tolerated infusion without incident.  Blood return noted pre and post infusion.  Site patent and intact, free from redness, edema or discomfort.  No evidence of extravasations.  Access discontinued per protocol.       Discharge Plan:   Patient declined prescription refills.  Discharge instructions reviewed with: Patient.  Patient and/or family verbalized understanding of discharge instructions and all questions answered.  AVS to patient via [employer].  Patient will return 6/3 for next appointment.   Patient discharged in stable condition accompanied by: self and friend.  Departure Mode: Ambulatory.      Luis Hayes RN

## 2025-05-21 NOTE — PROGRESS NOTES
Telephone visit 11 minutes      Oncology/Hematology Visit Note  May 21, 2025    Reason for Visit: follow up of left breast cancer  Per Dr. Acuna's  note  1.  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 Family history of breast cancer  3. BRCA-2 mutation  4. oncotype score 42    Dr. Acuna recommends adjuvant chemotherapy with dose dense Adriamycin and Cytoxan followed by weekly paclitaxel    Interval History:  Patient denies fever chills sweats cough shortness of breath.  Denies nausea vomiting diarrhea abdominal pain bleeding  Denies edema      Review of Systems:  14 point ROS of systems including Constitutional, Eyes, Respiratory, Cardiovascular, Gastroenterology, Genitourinary, Integumentary, Muscularskeletal, Psychiatric were all negative except for pertinent positives noted in my HPI.    Physical Examination:  Physical Exam  HENT:      Head: Normocephalic.      Nose: Nose normal.   Eyes:      Pupils: Pupils are equal, round, and reactive to light.   Cardiovascular:      Rate and Rhythm: Normal rate.   Pulmonary:      Effort: Pulmonary effort is normal.   Abdominal:      General: Abdomen is flat.   Genitourinary:     General: Normal vulva.   Musculoskeletal:         General: Normal range of motion.      Cervical back: Normal range of motion.   Skin:     General: Skin is warm.   Neurological:      Mental Status: She is alert.       Laboratory Data:  CBC CMP results reviewed echocardiogram reviewed    Assessment and Plan:    Left Breast cancer  Per Dr. Acuna's  note  1.  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 Family history of breast cancer  3. BRCA-2 mutation  4. oncotype score 42  - Currently on adjuvant chemotherapy with dose dense Adriamycin and Cytoxan followed by weekly paclitaxel.  Regimen and  potential side effects reviewed  Patient agrees to continue with with treatment  Labs and echocardiogram reviewed . Ok  to continue with  Adriamycin Cytoxan-cycle 4 today   Schedule for weekly Taxol in 2 weeks      BRCA 2 mutation  Patient status post hysterectomy.  Dr. Acuna recommended removal of ovaries    Osteopenia  Continue with calcium vitamin D and weightbearing exercise    Please call clinic with any changes in her condition or questions     KEYSHA Montgomery [facility]     Chart documentation with Dragon Voice recognition Software. Although reviewed after completion, some words and grammatical errors may remain.

## 2025-05-21 NOTE — PROGRESS NOTES
"Oncology Rooming Note    May 21, 2025 1:42 PM   Cortney Willoughby is a 45 year old female who presents for:    Chief Complaint   Patient presents with    Oncology Clinic Visit     Initial Vitals: /78   Pulse 98   Temp 97.7  F (36.5  C) (Oral)   Resp 16   Wt 68.3 kg (150 lb 9.6 oz)   LMP 07/10/2021 (Exact Date)   SpO2 97%   BMI 25.85 kg/m   Estimated body mass index is 25.85 kg/m  as calculated from the following:    Height as of 4/22/25: 1.626 m (5' 4\").    Weight as of this encounter: 68.3 kg (150 lb 9.6 oz). Body surface area is 1.76 meters squared.  Moderate Pain (6) Comment: Data Unavailable   Patient's last menstrual period was 07/10/2021 (exact date).  Allergies reviewed: Yes  Medications reviewed: Yes    Medications: Medication refills not needed today.  Pharmacy name entered into [employer] ([id number] [address] AT [facility]    Frailty Screening:   Is the patient here for a new oncology consult visit in cancer care? 2. No    PHQ9:  Did this patient require a PHQ9?: No      Clinical concerns:  np was notified.      Janel Almazan CMA            "

## 2025-05-21 NOTE — LETTER
"5/21/2025      Cortney Willoughby  [address] [facility] [facility] [address]      Dear Colleague,    Thank you for referring your patient, Cortney Willoughby, to the [facility]. Please see a copy of my visit note below.    Oncology Rooming Note    May 21, 2025 1:42 PM   Cortney Willoughby is a 45 year old female who presents for:    Chief Complaint   Patient presents with     Oncology Clinic Visit     Initial Vitals: /78   Pulse 98   Temp 97.7  F (36.5  C) (Oral)   Resp 16   Wt 68.3 kg (150 lb 9.6 oz)   LMP 07/10/2021 (Exact Date)   SpO2 97%   BMI 25.85 kg/m   Estimated body mass index is 25.85 kg/m  as calculated from the following:    Height as of 4/22/25: 1.626 m (5' 4\").    Weight as of this encounter: 68.3 kg (150 lb 9.6 oz). Body surface area is 1.76 meters squared.  Moderate Pain (6) Comment: Data Unavailable   Patient's last menstrual period was 07/10/2021 (exact date).  Allergies reviewed: Yes  Medications reviewed: Yes    Medications: Medication refills not needed today.  Pharmacy name entered into [employer]Âº: [facility] #[id number] [facility] [address] AT [facility]    Frailty Screening:   Is the patient here for a new oncology consult visit in cancer care? 2. No    PHQ9:  Did this patient require a PHQ9?: No      Clinical concerns:  np was notified.      Janel Almazan CMA              Telephone visit 11 minutes      Oncology/Hematology Visit Note  May 21, 2025    Reason for Visit: follow up of left breast cancer  Per Dr. Acuna's  note  1.  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 Family history of breast cancer  3. BRCA-2 mutation  4. oncotype score 42    Dr. Acuna recommends adjuvant chemotherapy with dose dense Adriamycin and Cytoxan followed by weekly paclitaxel    Interval History:  Patient denies fever chills sweats cough " shortness of breath.  Denies nausea vomiting diarrhea abdominal pain bleeding  Denies edema      Review of Systems:  14 point ROS of systems including Constitutional, Eyes, Respiratory, Cardiovascular, Gastroenterology, Genitourinary, Integumentary, Muscularskeletal, Psychiatric were all negative except for pertinent positives noted in my HPI.    Physical Examination:  Physical Exam  HENT:      Head: Normocephalic.      Nose: Nose normal.   Eyes:      Pupils: Pupils are equal, round, and reactive to light.   Cardiovascular:      Rate and Rhythm: Normal rate.   Pulmonary:      Effort: Pulmonary effort is normal.   Abdominal:      General: Abdomen is flat.   Genitourinary:     General: Normal vulva.   Musculoskeletal:         General: Normal range of motion.      Cervical back: Normal range of motion.   Skin:     General: Skin is warm.   Neurological:      Mental Status: She is alert.       Laboratory Data:  CBC CMP results reviewed echocardiogram reviewed    Assessment and Plan:    Left Breast cancer  Per Dr. Acuna's  note  1.  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 Family history of breast cancer  3. BRCA-2 mutation  4. oncotype score 42  - Currently on adjuvant chemotherapy with dose dense Adriamycin and Cytoxan followed by weekly paclitaxel.  Regimen and potential side effects reviewed  Patient agrees to continue with with treatment  Labs and echocardiogram reviewed . Ok  to continue with  Adriamycin Cytoxan-cycle 4 today   Schedule for weekly Taxol in 2 weeks      BRCA 2 mutation  Patient status post hysterectomy.  Dr. Acuna recommended removal of ovaries    Osteopenia  Continue with calcium vitamin D and weightbearing exercise    Please call clinic with any changes in her condition or questions     KEYSHA Montgomery [facility]     Chart documentation with Dragon Voice  recognition Software. Although reviewed after completion, some words and grammatical errors may remain.          Again, thank you for allowing me to participate in the care of your patient.        Sincerely,        KEYSHA Montgomery CNP    Electronically signed

## 2025-05-21 NOTE — PATIENT INSTRUCTIONS
Your On-body Neulasta Injector was applied to your back of right arm at 3:25 PM on 5/21.  At approximately 6:25 PM on 5/22, your On-body Injector will beep to let you know your dose delivery will begin in 2 minutes.  Your medication will be delivered over the next 45 minutes.  You can remove your Injector at 7:25 PM on 5/22.  Please make sure your Injector has a solid green light or has turned off prior to removing the device.  Please contact your provider at [phone] with questions or concerns.

## 2025-05-30 PROBLEM — Z90.13 S/P BILATERAL MASTECTOMY: Status: ACTIVE | Noted: 2025-05-30

## 2025-05-30 PROBLEM — Z15.01 BRCA2 GENE MUTATION POSITIVE: Status: ACTIVE | Noted: 2025-05-30

## 2025-05-30 PROBLEM — Z15.09 BRCA2 GENE MUTATION POSITIVE: Status: ACTIVE | Noted: 2025-05-30

## 2025-06-02 ENCOUNTER — RESULTS FOLLOW-UP (OUTPATIENT)
Dept: OBGYN | Facility: CLINIC | Age: 45
End: 2025-06-02

## 2025-06-02 DIAGNOSIS — R87.610 ASCUS OF CERVIX WITH NEGATIVE HIGH RISK HPV: Primary | ICD-10-CM

## 2025-06-03 ENCOUNTER — INFUSION THERAPY VISIT (OUTPATIENT)
Dept: INFUSION THERAPY | Facility: CLINIC | Age: 45
End: 2025-06-03
Attending: INTERNAL MEDICINE
Payer: COMMERCIAL

## 2025-06-03 ENCOUNTER — VIRTUAL VISIT (OUTPATIENT)
Dept: ONCOLOGY | Facility: CLINIC | Age: 45
End: 2025-06-03
Payer: COMMERCIAL

## 2025-06-03 DIAGNOSIS — Z17.0 MALIGNANT NEOPLASM OF OVERLAPPING SITES OF LEFT BREAST IN FEMALE, ESTROGEN RECEPTOR POSITIVE (H): Primary | ICD-10-CM

## 2025-06-03 DIAGNOSIS — C50.812 MALIGNANT NEOPLASM OF OVERLAPPING SITES OF LEFT BREAST IN FEMALE, ESTROGEN RECEPTOR POSITIVE (H): Primary | ICD-10-CM

## 2025-06-03 DIAGNOSIS — Z15.09 BRCA2 GENE MUTATION POSITIVE: ICD-10-CM

## 2025-06-03 DIAGNOSIS — E78.5 HYPERLIPIDEMIA, UNSPECIFIED HYPERLIPIDEMIA TYPE: ICD-10-CM

## 2025-06-03 DIAGNOSIS — M85.9 DISORDER OF BONE DENSITY AND STRUCTURE, UNSPECIFIED: ICD-10-CM

## 2025-06-03 DIAGNOSIS — Z15.01 BRCA2 GENE MUTATION POSITIVE: ICD-10-CM

## 2025-06-03 DIAGNOSIS — Z13.0 SCREENING FOR DEFICIENCY ANEMIA: ICD-10-CM

## 2025-06-03 LAB
ALBUMIN SERPL BCG-MCNC: 3.9 G/DL (ref 3.5–5.2)
ALP SERPL-CCNC: 90 U/L (ref 40–150)
ALT SERPL W P-5'-P-CCNC: 18 U/L (ref 0–50)
AST SERPL W P-5'-P-CCNC: 17 U/L (ref 0–45)
BASOPHILS # BLD MANUAL: 0.1 10E3/UL (ref 0–0.2)
BASOPHILS NFR BLD MANUAL: 1 %
BILIRUB DIRECT SERPL-MCNC: <0.08 MG/DL (ref 0–0.3)
BILIRUB SERPL-MCNC: <0.2 MG/DL
CHOLEST SERPL-MCNC: 225 MG/DL
EOSINOPHIL # BLD MANUAL: 0.1 10E3/UL (ref 0–0.7)
EOSINOPHIL NFR BLD MANUAL: 1 %
ERYTHROCYTE [DISTWIDTH] IN BLOOD BY AUTOMATED COUNT: 15.6 % (ref 10–15)
FASTING STATUS PATIENT QL REPORTED: YES
FERRITIN SERPL-MCNC: 311 NG/ML (ref 6–175)
HCT VFR BLD AUTO: 37.1 % (ref 35–47)
HDLC SERPL-MCNC: 53 MG/DL
HGB BLD-MCNC: 12.1 G/DL (ref 11.7–15.7)
LDLC SERPL CALC-MCNC: 110 MG/DL
LYMPHOCYTES # BLD MANUAL: 1.1 10E3/UL (ref 0.8–5.3)
LYMPHOCYTES NFR BLD MANUAL: 13 %
MCH RBC QN AUTO: 30.7 PG (ref 26.5–33)
MCHC RBC AUTO-ENTMCNC: 32.6 G/DL (ref 31.5–36.5)
MCV RBC AUTO: 94 FL (ref 78–100)
METAMYELOCYTES # BLD MANUAL: 0.1 10E3/UL
METAMYELOCYTES NFR BLD MANUAL: 1 %
MONOCYTES # BLD MANUAL: 0.7 10E3/UL (ref 0–1.3)
MONOCYTES NFR BLD MANUAL: 8 %
NEUTROPHILS # BLD MANUAL: 6.3 10E3/UL (ref 1.6–8.3)
NEUTROPHILS NFR BLD MANUAL: 76 %
NONHDLC SERPL-MCNC: 172 MG/DL
PLAT MORPH BLD: NORMAL
PLATELET # BLD AUTO: 159 10E3/UL (ref 150–450)
PROT SERPL-MCNC: 6.1 G/DL (ref 6.4–8.3)
RBC # BLD AUTO: 3.94 10E6/UL (ref 3.8–5.2)
RBC MORPH BLD: NORMAL
TRIGL SERPL-MCNC: 311 MG/DL
WBC # BLD AUTO: 8.3 10E3/UL (ref 4–11)

## 2025-06-03 PROCEDURE — 1126F AMNT PAIN NOTED NONE PRSNT: CPT

## 2025-06-03 PROCEDURE — 250N000011 HC RX IP 250 OP 636: Performed by: INTERNAL MEDICINE

## 2025-06-03 PROCEDURE — 83718 ASSAY OF LIPOPROTEIN: CPT | Performed by: INTERNAL MEDICINE

## 2025-06-03 PROCEDURE — 82728 ASSAY OF FERRITIN: CPT | Performed by: INTERNAL MEDICINE

## 2025-06-03 PROCEDURE — 85007 BL SMEAR W/DIFF WBC COUNT: CPT | Performed by: INTERNAL MEDICINE

## 2025-06-03 PROCEDURE — 98012 SYNCH AUDIO-ONLY EST SF 10: CPT

## 2025-06-03 PROCEDURE — 82247 BILIRUBIN TOTAL: CPT | Performed by: INTERNAL MEDICINE

## 2025-06-03 PROCEDURE — 82040 ASSAY OF SERUM ALBUMIN: CPT | Performed by: INTERNAL MEDICINE

## 2025-06-03 PROCEDURE — 82465 ASSAY BLD/SERUM CHOLESTEROL: CPT | Performed by: INTERNAL MEDICINE

## 2025-06-03 PROCEDURE — 85014 HEMATOCRIT: CPT | Performed by: INTERNAL MEDICINE

## 2025-06-03 PROCEDURE — 36591 DRAW BLOOD OFF VENOUS DEVICE: CPT | Performed by: INTERNAL MEDICINE

## 2025-06-03 RX ORDER — HEPARIN SODIUM (PORCINE) LOCK FLUSH IV SOLN 100 UNIT/ML 100 UNIT/ML
5 SOLUTION INTRAVENOUS
Status: DISCONTINUED | OUTPATIENT
Start: 2025-06-03 | End: 2025-06-03 | Stop reason: HOSPADM

## 2025-06-03 RX ORDER — HEPARIN SODIUM (PORCINE) LOCK FLUSH IV SOLN 100 UNIT/ML 100 UNIT/ML
5 SOLUTION INTRAVENOUS
OUTPATIENT
Start: 2025-06-03

## 2025-06-03 RX ORDER — HEPARIN SODIUM,PORCINE 10 UNIT/ML
5-20 VIAL (ML) INTRAVENOUS DAILY PRN
OUTPATIENT
Start: 2025-06-03

## 2025-06-03 RX ADMIN — Medication 5 ML: at 07:35

## 2025-06-03 ASSESSMENT — PAIN SCALES - GENERAL: PAINLEVEL_OUTOF10: NO PAIN (0)

## 2025-06-03 NOTE — PROGRESS NOTES
Infusion Nursing Note:  Cortney Willoughby presents today for port labs.    Patient seen by provider today: Yes: HONG Vogt.   present during visit today: Not Applicable.    Note: N/A.    Intravenous Access:  Labs drawn without difficulty.  Implanted Port.    Treatment Conditions:  Not Applicable. Labs pending at time of discharge.      Post Infusion Assessment:  Site patent and intact, free from redness, edema or discomfort.  No evidence of extravasations.  Access discontinued per protocol.       Discharge Plan:   Patient discharged in stable condition accompanied by: self.  Departure Mode: Ambulatory.    Angelita Dunlap RN

## 2025-06-03 NOTE — LETTER
6/3/2025      Cortney Willoughby  [address] [place] [id number]      Dear Colleague,    Thank you for referring your patient, Cortney Willoughby, to the [facility]. Please see a copy of my visit note below.    Virtual Visit Details    Type of service:  Video Visit     Originating Location (pt. Location): Home    Distant Location (provider location):  On-site  Platform used for Video Visit: AmWell  Duration: 15 minutes    [facility]     Hematology/Oncology Established Patient Note  6/3/2025      Primary Oncologist: Dr. Acuna    Reason for Visit: Follow-up Left breast cancer    Oncology HPI:   - 1/8/2025: Breast MRI showed 1.3 x 2 x 1 cm mass in left breast at 3:00-4:00, 8-9 cm from the nipple.  No lymphadenopathy.  Right breast negative.  -  1/14/2025: Left breast ultrasound showed no definite abnormality corresponding to MRI abnormality as breast tissue was extremely dense with shadowing artifact scattered throughout glandular tissue related to breast density.  -  1/27/2025: Left breast needle core biopsy showed grade 2 invasive ductal carcinoma, 8 mm in greatest size and associated grade 2 DCIS as well as PASH.  ER strongly positive at %, MD positive at 81-90%, HER2 IHC = 2+, FISH negative.  - 3/05/2025: Bilateral mastectomies under care of Dr. Marisela Pena.  Pathology showed left breast with grade 2 invasive ductal carcinoma, 2.5 x 2 x 1.3 cm, associated grade 3 DCIS in 10 of 42 blocks, negative margins but DCIS focally 1 mm from anterior superior margin and 2 mm from posterior margin; no LVI; 1 of 2 left axillary SLNs positive for micrometastatic (1.5 mm) carcinoma. Right breast benign. Oncotype DX = 42, 5-year risk of distant recurrence of > 12% with endocrine therapy alone, adjuvant chemotherapy advised.  - 3/31/25: Echo LVEF 55-60%, LV strain -24%  - 4/9/25-5/21/25: adjuvant dose dense adriamycin cytoxan x 4 cycles  - 6/4/25: scheduled to start  Taxol    Current Treatment: Taxol weekly    Interval history:   Cortney is seen via telephone visit today. She reports that she tolerated A/C well, and is a bit nervous about starting Taxol.      She is eating well. She reports she has gained some weight, concerned about recent cholesterol labs.       REVIEW OF SYSTEMS:   14 point ROS was reviewed and is negative other than as noted above in HPI.     Current Outpatient Medications   Medication Sig Dispense Refill     dexAMETHasone (DECADRON) 4 MG tablet Take 2 tablets (8 mg) by mouth daily. Start on Day 2 of Cycles 1 through 4. 6 tablet 3     levothyroxine (SYNTHROID/LEVOTHROID) 75 MCG tablet Take 1 tablet (75 mcg) by mouth daily By her natural medicine doctor       lidocaine-prilocaine (EMLA) 2.5-2.5 % external cream Apply liberally to port site, 60-90 min prior to port access. Cover with plastic wrap 30 g 1     liothyronine (CYTOMEL) 25 MCG tablet Take 0.5 tablets (12.5 mcg) by mouth daily From natural medicine doctor       methocarbamol (ROBAXIN) 750 MG tablet Take 1 tablet (750 mg) by mouth 4 times daily as needed for muscle spasms (pain). 20 tablet 0     ondansetron (ZOFRAN) 8 MG tablet Take 1 tablet (8 mg) by mouth every 8 hours as needed for nausea (vomiting). 30 tablet 2     prochlorperazine (COMPAZINE) 10 MG tablet Take 1 tablet (10 mg) by mouth every 6 hours as needed for nausea or vomiting. 30 tablet 2     SENNA-docusate sodium (SENNA S) 8.6-50 MG tablet Take 1-2 tablets by mouth 2 times daily as needed (constipation). 15 tablet 0     Tirzepatide (MOUNJARO SC) Inject 0.4 mLs subcutaneously every 7 days. Bottle is 16.6 mg/4.5 mL       traZODone (DESYREL) 50 MG tablet Take 2 tablets (100 mg) by mouth every evening as needed for sleep. 180 tablet 1        Allergies   Allergen Reactions     No Known Drug Allergy        Exam: video visit  Constitutional: Pleasant female in no acute distress.  Eyes: No scleral icterus. No conjunctival erythema or  discharge  Respiratory: speaking in full clear sentences without audible wheezes or hoarseness  Neuro: Cranial nerves II-XII intact  Skin: No visible lesions, bruising or rashes  Psych: appropriate mood and affect       Labs:    Latest Reference Range & Units 06/03/25 07:36   Albumin 3.5 - 5.2 g/dL 3.9   Protein Total 6.4 - 8.3 g/dL 6.1 (L)   Alkaline Phosphatase 40 - 150 U/L 90   ALT 0 - 50 U/L 18   AST 0 - 45 U/L 17   Bilirubin Direct 0.00 - 0.30 mg/dL <0.08   Bilirubin Total <=1.2 mg/dL <0.2   Cholesterol <200 mg/dL 225 (H)   Patient Fasting?  Yes   Ferritin 6 - 175 ng/mL 311 (H)   HDL Cholesterol >=50 mg/dL 53   LDL Cholesterol Calculated <100 mg/dL 110 (H)   Non HDL Cholesterol <130 mg/dL 172 (H)   Triglycerides <150 mg/dL 311 (H)   WBC 4.0 - 11.0 10e3/uL 8.3   Hemoglobin 11.7 - 15.7 g/dL 12.1   Hematocrit 35.0 - 47.0 % 37.1   Platelet Count 150 - 450 10e3/uL 159   RBC Count 3.80 - 5.20 10e6/uL 3.94   MCV 78 - 100 fL 94   MCH 26.5 - 33.0 pg 30.7   MCHC 31.5 - 36.5 g/dL 32.6   RDW 10.0 - 15.0 % 15.6 (H)   % Neutrophils % 76   % Lymphocytes % 13   % Monocytes % 8   % Eosinophils % 1   % Basophils % 1   % Metamyelocytes % 1   Absolute Neutrophil 1.6 - 8.3 10e3/uL 6.3   Absolute Lymphocytes 0.8 - 5.3 10e3/uL 1.1   Absolute Monocytes 0.0 - 1.3 10e3/uL 0.7   Absolute Eosinophils 0.0 - 0.7 10e3/uL 0.1   Absolute Basophils 0.0 - 0.2 10e3/uL 0.1   Absolute Metamyelocytes <=0.0 10e3/uL 0.1 (H)   RBC Morphology  Confirmed RBC Indices   Platelet Morphology Automated Count Confirmed. Platelet morphology is normal.  Automated Count Confirmed. Platelet morphology is normal.       Imaging: imaging    Impression/plan: Cortney Willoughby is a 45 year old female status post hysterectomy for fibroids diagnosed with left breast cancer.     Left Breast Cancer  -  Pathologic prognostic stage IB, pT2-M5yc-H4, grade 2 invasive ductal carcinoma of the left breast, ER positive, CO positive, HER2 FISH negative   - case discussed at breast  tumor board 3/26/25 with regard to adjuvant radiation therapy -- this was ultimately advised as well.   - Treatment Plan:  adjuvant chemotherapy with dose-dense Adriamycin + Cytoxan, followed by weekly paclitaxel to reduce risk of breast cancer recurrence.   - adjuvant endocrine therapy with more aggressive approach of ovarian suppression therapy with Zoladex in combination with an aromatase inhibitor such as anastrozole.   - 3/31/25: Echo LVEF 55-60%, LV strain -24%  - 4/9/25-5/21/25: adjuvant dose dense adriamycin cytoxan x 4 cycles  - 6/3/2025: labs reviewed, ok to proceed with D1C5 Taxol. Discussed compression gloves and compression socks for chemotherapy to reduce MELYSSA. Sent information via my chart. Will have her double glove with her infusion tomorrow.     Genetics  BRCA-2 mutation  - family history of breast cancer  - Fertility is not an issue as she is s/p hysterectomy (ovaries intact).  Dr. Acuna recommend eventual removal of ovaries given her pathogenic BRCA-2 mutation.  - as above adjuvant endocrine therapy with more aggressive approach of ovarian suppression therapy with Zoladex in combination with an aromatase inhibitor such as anastrozole.     Bone Health  - 2/20/2025 DEXA scan showed mild osteopenia in her right hip femoral neck.    - Advised adequate calcium, vitamin D, weight bearing exercise when able to resume full activity.     Constipation  - continue Miralax and Mg as needed  - will monitor for worsening symptoms    Elevated Cholesterol  - mild elevations  - following with PC  - discussed the importance of working with PCP to reduce risk factors as it appears she may have a familial form of hypercholesterinemia and now undergoing chemotherapy and anti-estrogen therapy in the future    Maura Maldonado PA-C  The longitudinal plan of care for the diagnosis(es)/condition(s) as documented were addressed during this visit. Due to the added complexity in care, I will continue to support Cortney in the  subsequent management and with ongoing continuity of care.      Again, thank you for allowing me to participate in the care of your patient.        Sincerely,        Maura Maldonado PA-C    Electronically signed

## 2025-06-03 NOTE — LETTER
6/3/2025      Cortney Willoughby  [address] [place] [id number]      Dear Colleague,    Thank you for referring your patient, Cortney Willoughby, to the [facility]. Please see a copy of my visit note below.    Virtual Visit Details    Type of service:  Video Visit     Originating Location (pt. Location): Home    Distant Location (provider location):  On-site  Platform used for Video Visit: AmWell  Duration: 15 minutes    [facility]     Hematology/Oncology Established Patient Note  6/3/2025      Primary Oncologist: Dr. Acuna    Reason for Visit: Follow-up Left breast cancer    Oncology HPI:   - 1/8/2025: Breast MRI showed 1.3 x 2 x 1 cm mass in left breast at 3:00-4:00, 8-9 cm from the nipple.  No lymphadenopathy.  Right breast negative.  -  1/14/2025: Left breast ultrasound showed no definite abnormality corresponding to MRI abnormality as breast tissue was extremely dense with shadowing artifact scattered throughout glandular tissue related to breast density.  -  1/27/2025: Left breast needle core biopsy showed grade 2 invasive ductal carcinoma, 8 mm in greatest size and associated grade 2 DCIS as well as PASH.  ER strongly positive at %, DC positive at 81-90%, HER2 IHC = 2+, FISH negative.  - 3/05/2025: Bilateral mastectomies under care of Dr. Marisela Pena.  Pathology showed left breast with grade 2 invasive ductal carcinoma, 2.5 x 2 x 1.3 cm, associated grade 3 DCIS in 10 of 42 blocks, negative margins but DCIS focally 1 mm from anterior superior margin and 2 mm from posterior margin; no LVI; 1 of 2 left axillary SLNs positive for micrometastatic (1.5 mm) carcinoma. Right breast benign. Oncotype DX = 42, 5-year risk of distant recurrence of > 12% with endocrine therapy alone, adjuvant chemotherapy advised.  - 3/31/25: Echo LVEF 55-60%, LV strain -24%  - 4/9/25-5/21/25: adjuvant dose dense adriamycin cytoxan x 4 cycles  - 6/4/25: scheduled to start  Taxol    Current Treatment: Taxol weekly    Interval history:   Cortney is seen via telephone visit today. She reports that she tolerated A/C well, and is a bit nervous about starting Taxol.      She is eating well. She reports she has gained some weight, concerned about recent cholesterol labs.       REVIEW OF SYSTEMS:   14 point ROS was reviewed and is negative other than as noted above in HPI.     Current Outpatient Medications   Medication Sig Dispense Refill     dexAMETHasone (DECADRON) 4 MG tablet Take 2 tablets (8 mg) by mouth daily. Start on Day 2 of Cycles 1 through 4. 6 tablet 3     levothyroxine (SYNTHROID/LEVOTHROID) 75 MCG tablet Take 1 tablet (75 mcg) by mouth daily By her natural medicine doctor       lidocaine-prilocaine (EMLA) 2.5-2.5 % external cream Apply liberally to port site, 60-90 min prior to port access. Cover with plastic wrap 30 g 1     liothyronine (CYTOMEL) 25 MCG tablet Take 0.5 tablets (12.5 mcg) by mouth daily From natural medicine doctor       methocarbamol (ROBAXIN) 750 MG tablet Take 1 tablet (750 mg) by mouth 4 times daily as needed for muscle spasms (pain). 20 tablet 0     ondansetron (ZOFRAN) 8 MG tablet Take 1 tablet (8 mg) by mouth every 8 hours as needed for nausea (vomiting). 30 tablet 2     prochlorperazine (COMPAZINE) 10 MG tablet Take 1 tablet (10 mg) by mouth every 6 hours as needed for nausea or vomiting. 30 tablet 2     SENNA-docusate sodium (SENNA S) 8.6-50 MG tablet Take 1-2 tablets by mouth 2 times daily as needed (constipation). 15 tablet 0     Tirzepatide (MOUNJARO SC) Inject 0.4 mLs subcutaneously every 7 days. Bottle is 16.6 mg/4.5 mL       traZODone (DESYREL) 50 MG tablet Take 2 tablets (100 mg) by mouth every evening as needed for sleep. 180 tablet 1        Allergies   Allergen Reactions     No Known Drug Allergy        Exam: video visit  Constitutional: Pleasant female in no acute distress.  Eyes: No scleral icterus. No conjunctival erythema or  discharge  Respiratory: speaking in full clear sentences without audible wheezes or hoarseness  Neuro: Cranial nerves II-XII intact  Skin: No visible lesions, bruising or rashes  Psych: appropriate mood and affect       Labs:    Latest Reference Range & Units 06/03/25 07:36   Albumin 3.5 - 5.2 g/dL 3.9   Protein Total 6.4 - 8.3 g/dL 6.1 (L)   Alkaline Phosphatase 40 - 150 U/L 90   ALT 0 - 50 U/L 18   AST 0 - 45 U/L 17   Bilirubin Direct 0.00 - 0.30 mg/dL <0.08   Bilirubin Total <=1.2 mg/dL <0.2   Cholesterol <200 mg/dL 225 (H)   Patient Fasting?  Yes   Ferritin 6 - 175 ng/mL 311 (H)   HDL Cholesterol >=50 mg/dL 53   LDL Cholesterol Calculated <100 mg/dL 110 (H)   Non HDL Cholesterol <130 mg/dL 172 (H)   Triglycerides <150 mg/dL 311 (H)   WBC 4.0 - 11.0 10e3/uL 8.3   Hemoglobin 11.7 - 15.7 g/dL 12.1   Hematocrit 35.0 - 47.0 % 37.1   Platelet Count 150 - 450 10e3/uL 159   RBC Count 3.80 - 5.20 10e6/uL 3.94   MCV 78 - 100 fL 94   MCH 26.5 - 33.0 pg 30.7   MCHC 31.5 - 36.5 g/dL 32.6   RDW 10.0 - 15.0 % 15.6 (H)   % Neutrophils % 76   % Lymphocytes % 13   % Monocytes % 8   % Eosinophils % 1   % Basophils % 1   % Metamyelocytes % 1   Absolute Neutrophil 1.6 - 8.3 10e3/uL 6.3   Absolute Lymphocytes 0.8 - 5.3 10e3/uL 1.1   Absolute Monocytes 0.0 - 1.3 10e3/uL 0.7   Absolute Eosinophils 0.0 - 0.7 10e3/uL 0.1   Absolute Basophils 0.0 - 0.2 10e3/uL 0.1   Absolute Metamyelocytes <=0.0 10e3/uL 0.1 (H)   RBC Morphology  Confirmed RBC Indices   Platelet Morphology Automated Count Confirmed. Platelet morphology is normal.  Automated Count Confirmed. Platelet morphology is normal.       Imaging: imaging    Impression/plan: Cortney Willoughby is a 45 year old female status post hysterectomy for fibroids diagnosed with left breast cancer.     Left Breast Cancer  -  Pathologic prognostic stage IB, pT2-H6ew-G5, grade 2 invasive ductal carcinoma of the left breast, ER positive, IA positive, HER2 FISH negative   - case discussed at breast  tumor board 3/26/25 with regard to adjuvant radiation therapy -- this was ultimately advised as well.   - Treatment Plan:  adjuvant chemotherapy with dose-dense Adriamycin + Cytoxan, followed by weekly paclitaxel to reduce risk of breast cancer recurrence.   - adjuvant endocrine therapy with more aggressive approach of ovarian suppression therapy with Zoladex in combination with an aromatase inhibitor such as anastrozole.   - 3/31/25: Echo LVEF 55-60%, LV strain -24%  - 4/9/25-5/21/25: adjuvant dose dense adriamycin cytoxan x 4 cycles  - 6/3/2025: labs reviewed, ok to proceed with D1C5 Taxol. Discussed compression gloves and compression socks for chemotherapy to reduce MELYSSA. Sent information via my chart. Will have her double glove with her infusion tomorrow.     Genetics  BRCA-2 mutation  - family history of breast cancer  - Fertility is not an issue as she is s/p hysterectomy (ovaries intact).  Dr. Acuna recommend eventual removal of ovaries given her pathogenic BRCA-2 mutation.  - as above adjuvant endocrine therapy with more aggressive approach of ovarian suppression therapy with Zoladex in combination with an aromatase inhibitor such as anastrozole.     Bone Health  - 2/20/2025 DEXA scan showed mild osteopenia in her right hip femoral neck.    - Advised adequate calcium, vitamin D, weight bearing exercise when able to resume full activity.     Constipation  - continue Miralax and Mg as needed  - will monitor for worsening symptoms    Elevated Cholesterol  - mild elevations  - following with PC  - discussed the importance of working with PCP to reduce risk factors as it appears she may have a familial form of hypercholesterinemia and now undergoing chemotherapy and anti-estrogen therapy in the future    Maura Maldonado PA-C  The longitudinal plan of care for the diagnosis(es)/condition(s) as documented were addressed during this visit. Due to the added complexity in care, I will continue to support Cortney in the  subsequent management and with ongoing continuity of care.      Again, thank you for allowing me to participate in the care of your patient.        Sincerely,        Maura Maldonado PA-C    Electronically signed

## 2025-06-03 NOTE — PROGRESS NOTES
Virtual Visit Details    Type of service:  Video Visit     Originating Location (pt. Location): Home    Distant Location (provider location):  On-site  Platform used for Video Visit: AmWell  Duration: 15 minutes    [facility]     Hematology/Oncology Established Patient Note  6/3/2025      Primary Oncologist: Dr. Acuna    Reason for Visit: Follow-up Left breast cancer    Oncology HPI:   - 1/8/2025: Breast MRI showed 1.3 x 2 x 1 cm mass in left breast at 3:00-4:00, 8-9 cm from the nipple.  No lymphadenopathy.  Right breast negative.  -  1/14/2025: Left breast ultrasound showed no definite abnormality corresponding to MRI abnormality as breast tissue was extremely dense with shadowing artifact scattered throughout glandular tissue related to breast density.  -  1/27/2025: Left breast needle core biopsy showed grade 2 invasive ductal carcinoma, 8 mm in greatest size and associated grade 2 DCIS as well as PASH.  ER strongly positive at %, SD positive at 81-90%, HER2 IHC = 2+, FISH negative.  - 3/05/2025: Bilateral mastectomies under care of Dr. Marisela Pena.  Pathology showed left breast with grade 2 invasive ductal carcinoma, 2.5 x 2 x 1.3 cm, associated grade 3 DCIS in 10 of 42 blocks, negative margins but DCIS focally 1 mm from anterior superior margin and 2 mm from posterior margin; no LVI; 1 of 2 left axillary SLNs positive for micrometastatic (1.5 mm) carcinoma. Right breast benign. Oncotype DX = 42, 5-year risk of distant recurrence of > 12% with endocrine therapy alone, adjuvant chemotherapy advised.  - 3/31/25: Echo LVEF 55-60%, LV strain -24%  - 4/9/25-5/21/25: adjuvant dose dense adriamycin cytoxan x 4 cycles  - 6/4/25: scheduled to start Taxol    Current Treatment: Taxol weekly    Interval history:   Cortney is seen via telephone visit today. She reports that she tolerated A/C well, and is a bit nervous about starting Taxol.      She is eating well. She reports she has gained some  weight, concerned about recent cholesterol labs.       REVIEW OF SYSTEMS:   14 point ROS was reviewed and is negative other than as noted above in HPI.     Current Outpatient Medications   Medication Sig Dispense Refill    dexAMETHasone (DECADRON) 4 MG tablet Take 2 tablets (8 mg) by mouth daily. Start on Day 2 of Cycles 1 through 4. 6 tablet 3    levothyroxine (SYNTHROID/LEVOTHROID) 75 MCG tablet Take 1 tablet (75 mcg) by mouth daily By her natural medicine doctor      lidocaine-prilocaine (EMLA) 2.5-2.5 % external cream Apply liberally to port site, 60-90 min prior to port access. Cover with plastic wrap 30 g 1    liothyronine (CYTOMEL) 25 MCG tablet Take 0.5 tablets (12.5 mcg) by mouth daily From natural medicine doctor      methocarbamol (ROBAXIN) 750 MG tablet Take 1 tablet (750 mg) by mouth 4 times daily as needed for muscle spasms (pain). 20 tablet 0    ondansetron (ZOFRAN) 8 MG tablet Take 1 tablet (8 mg) by mouth every 8 hours as needed for nausea (vomiting). 30 tablet 2    prochlorperazine (COMPAZINE) 10 MG tablet Take 1 tablet (10 mg) by mouth every 6 hours as needed for nausea or vomiting. 30 tablet 2    SENNA-docusate sodium (SENNA S) 8.6-50 MG tablet Take 1-2 tablets by mouth 2 times daily as needed (constipation). 15 tablet 0    Tirzepatide (MOUNJARO SC) Inject 0.4 mLs subcutaneously every 7 days. Bottle is 16.6 mg/4.5 mL      traZODone (DESYREL) 50 MG tablet Take 2 tablets (100 mg) by mouth every evening as needed for sleep. 180 tablet 1        Allergies   Allergen Reactions    No Known Drug Allergy        Exam: video visit  Constitutional: Pleasant female in no acute distress.  Eyes: No scleral icterus. No conjunctival erythema or discharge  Respiratory: speaking in full clear sentences without audible wheezes or hoarseness  Neuro: Cranial nerves II-XII intact  Skin: No visible lesions, bruising or rashes  Psych: appropriate mood and affect       Labs:    Latest Reference Range & Units 06/03/25  07:36   Albumin 3.5 - 5.2 g/dL 3.9   Protein Total 6.4 - 8.3 g/dL 6.1 (L)   Alkaline Phosphatase 40 - 150 U/L 90   ALT 0 - 50 U/L 18   AST 0 - 45 U/L 17   Bilirubin Direct 0.00 - 0.30 mg/dL <0.08   Bilirubin Total <=1.2 mg/dL <0.2   Cholesterol <200 mg/dL 225 (H)   Patient Fasting?  Yes   Ferritin 6 - 175 ng/mL 311 (H)   HDL Cholesterol >=50 mg/dL 53   LDL Cholesterol Calculated <100 mg/dL 110 (H)   Non HDL Cholesterol <130 mg/dL 172 (H)   Triglycerides <150 mg/dL 311 (H)   WBC 4.0 - 11.0 10e3/uL 8.3   Hemoglobin 11.7 - 15.7 g/dL 12.1   Hematocrit 35.0 - 47.0 % 37.1   Platelet Count 150 - 450 10e3/uL 159   RBC Count 3.80 - 5.20 10e6/uL 3.94   MCV 78 - 100 fL 94   MCH 26.5 - 33.0 pg 30.7   MCHC 31.5 - 36.5 g/dL 32.6   RDW 10.0 - 15.0 % 15.6 (H)   % Neutrophils % 76   % Lymphocytes % 13   % Monocytes % 8   % Eosinophils % 1   % Basophils % 1   % Metamyelocytes % 1   Absolute Neutrophil 1.6 - 8.3 10e3/uL 6.3   Absolute Lymphocytes 0.8 - 5.3 10e3/uL 1.1   Absolute Monocytes 0.0 - 1.3 10e3/uL 0.7   Absolute Eosinophils 0.0 - 0.7 10e3/uL 0.1   Absolute Basophils 0.0 - 0.2 10e3/uL 0.1   Absolute Metamyelocytes <=0.0 10e3/uL 0.1 (H)   RBC Morphology  Confirmed RBC Indices   Platelet Morphology Automated Count Confirmed. Platelet morphology is normal.  Automated Count Confirmed. Platelet morphology is normal.       Imaging: imaging    Impression/plan: Cortney Willoughby is a 45 year old female status post hysterectomy for fibroids diagnosed with left breast cancer.     Left Breast Cancer  -  Pathologic prognostic stage IB, pT2-B3uo-Y4, grade 2 invasive ductal carcinoma of the left breast, ER positive, ND positive, HER2 FISH negative   - case discussed at breast tumor board 3/26/25 with regard to adjuvant radiation therapy -- this was ultimately advised as well.   - Treatment Plan:  adjuvant chemotherapy with dose-dense Adriamycin + Cytoxan, followed by weekly paclitaxel to reduce risk of breast cancer recurrence.   - adjuvant  endocrine therapy with more aggressive approach of ovarian suppression therapy with Zoladex in combination with an aromatase inhibitor such as anastrozole.   - 3/31/25: Echo LVEF 55-60%, LV strain -24%  - 4/9/25-5/21/25: adjuvant dose dense adriamycin cytoxan x 4 cycles  - 6/3/2025: labs reviewed, ok to proceed with D1C5 Taxol. Discussed compression gloves and compression socks for chemotherapy to reduce MELYSSA. Sent information via my chart. Will have her double glove with her infusion tomorrow.     Genetics  BRCA-2 mutation  - family history of breast cancer  - Fertility is not an issue as she is s/p hysterectomy (ovaries intact).  Dr. Acuna recommend eventual removal of ovaries given her pathogenic BRCA-2 mutation.  - as above adjuvant endocrine therapy with more aggressive approach of ovarian suppression therapy with Zoladex in combination with an aromatase inhibitor such as anastrozole.     Bone Health  - 2/20/2025 DEXA scan showed mild osteopenia in her right hip femoral neck.    - Advised adequate calcium, vitamin D, weight bearing exercise when able to resume full activity.     Constipation  - continue Miralax and Mg as needed  - will monitor for worsening symptoms    Elevated Cholesterol  - mild elevations  - following with PC  - discussed the importance of working with PCP to reduce risk factors as it appears she may have a familial form of hypercholesterinemia and now undergoing chemotherapy and anti-estrogen therapy in the future    Maura Maldonado PA-C  The longitudinal plan of care for the diagnosis(es)/condition(s) as documented were addressed during this visit. Due to the added complexity in care, I will continue to support Cortney in the subsequent management and with ongoing continuity of care.

## 2025-06-03 NOTE — NURSING NOTE
Current patient location: [address] [place] [address]    Is the patient currently in the state of [place]? YES    Visit mode: CONVERT TO TELEPHONE    If the visit is dropped, the patient can be reconnected by:TELEPHONE VISIT: Phone number:   Telephone Information:   Mobile [phone]       Will anyone else be joining the visit? NO  (If patient encounters technical issues they should call [phone]    Are changes needed to the allergy or medication list? Pt stated no changes to allergies and Pt stated no med changes    Are refills needed on medications prescribed by this physician? NO    Rooming Documentation:  Questionnaire(s) completed    Reason for visit: JOSE CELESTINF

## 2025-06-04 ENCOUNTER — INFUSION THERAPY VISIT (OUTPATIENT)
Dept: INFUSION THERAPY | Facility: CLINIC | Age: 45
End: 2025-06-04
Attending: INTERNAL MEDICINE
Payer: COMMERCIAL

## 2025-06-04 VITALS
SYSTOLIC BLOOD PRESSURE: 115 MMHG | HEART RATE: 90 BPM | OXYGEN SATURATION: 100 % | BODY MASS INDEX: 25.63 KG/M2 | RESPIRATION RATE: 16 BRPM | TEMPERATURE: 98.2 F | DIASTOLIC BLOOD PRESSURE: 63 MMHG | WEIGHT: 152 LBS

## 2025-06-04 DIAGNOSIS — Z17.0 MALIGNANT NEOPLASM OF OVERLAPPING SITES OF LEFT BREAST IN FEMALE, ESTROGEN RECEPTOR POSITIVE (H): Primary | ICD-10-CM

## 2025-06-04 DIAGNOSIS — C50.812 MALIGNANT NEOPLASM OF OVERLAPPING SITES OF LEFT BREAST IN FEMALE, ESTROGEN RECEPTOR POSITIVE (H): Primary | ICD-10-CM

## 2025-06-04 PROCEDURE — 96375 TX/PRO/DX INJ NEW DRUG ADDON: CPT

## 2025-06-04 PROCEDURE — 96367 TX/PROPH/DG ADDL SEQ IV INF: CPT

## 2025-06-04 PROCEDURE — 250N000011 HC RX IP 250 OP 636: Performed by: INTERNAL MEDICINE

## 2025-06-04 PROCEDURE — 258N000003 HC RX IP 258 OP 636

## 2025-06-04 PROCEDURE — 96413 CHEMO IV INFUSION 1 HR: CPT

## 2025-06-04 PROCEDURE — 250N000011 HC RX IP 250 OP 636

## 2025-06-04 RX ORDER — HEPARIN SODIUM,PORCINE 10 UNIT/ML
5-20 VIAL (ML) INTRAVENOUS DAILY PRN
Status: CANCELLED | OUTPATIENT
Start: 2025-06-04

## 2025-06-04 RX ORDER — HEPARIN SODIUM (PORCINE) LOCK FLUSH IV SOLN 100 UNIT/ML 100 UNIT/ML
5 SOLUTION INTRAVENOUS
Status: DISCONTINUED | OUTPATIENT
Start: 2025-06-04 | End: 2025-06-04 | Stop reason: HOSPADM

## 2025-06-04 RX ORDER — EPINEPHRINE 1 MG/ML
0.3 INJECTION, SOLUTION INTRAMUSCULAR; SUBCUTANEOUS EVERY 5 MIN PRN
Status: CANCELLED | OUTPATIENT
Start: 2025-06-04

## 2025-06-04 RX ORDER — DIPHENHYDRAMINE HYDROCHLORIDE 50 MG/ML
50 INJECTION, SOLUTION INTRAMUSCULAR; INTRAVENOUS ONCE
Status: COMPLETED | OUTPATIENT
Start: 2025-06-04 | End: 2025-06-04

## 2025-06-04 RX ORDER — MEPERIDINE HYDROCHLORIDE 25 MG/ML
25 INJECTION INTRAMUSCULAR; INTRAVENOUS; SUBCUTANEOUS
Status: CANCELLED | OUTPATIENT
Start: 2025-06-04

## 2025-06-04 RX ORDER — DIPHENHYDRAMINE HYDROCHLORIDE 50 MG/ML
25 INJECTION, SOLUTION INTRAMUSCULAR; INTRAVENOUS
Status: CANCELLED
Start: 2025-06-04

## 2025-06-04 RX ORDER — ALBUTEROL SULFATE 0.83 MG/ML
2.5 SOLUTION RESPIRATORY (INHALATION)
Status: CANCELLED | OUTPATIENT
Start: 2025-06-04

## 2025-06-04 RX ORDER — ALBUTEROL SULFATE 90 UG/1
1-2 INHALANT RESPIRATORY (INHALATION)
Status: CANCELLED
Start: 2025-06-04

## 2025-06-04 RX ORDER — LORAZEPAM 2 MG/ML
0.5 INJECTION INTRAMUSCULAR EVERY 4 HOURS PRN
Status: CANCELLED | OUTPATIENT
Start: 2025-06-04

## 2025-06-04 RX ORDER — METHYLPREDNISOLONE SODIUM SUCCINATE 40 MG/ML
40 INJECTION INTRAMUSCULAR; INTRAVENOUS
Status: CANCELLED
Start: 2025-06-04

## 2025-06-04 RX ORDER — HEPARIN SODIUM (PORCINE) LOCK FLUSH IV SOLN 100 UNIT/ML 100 UNIT/ML
5 SOLUTION INTRAVENOUS
Status: CANCELLED | OUTPATIENT
Start: 2025-06-04

## 2025-06-04 RX ORDER — DIPHENHYDRAMINE HYDROCHLORIDE 50 MG/ML
50 INJECTION, SOLUTION INTRAMUSCULAR; INTRAVENOUS
Status: CANCELLED
Start: 2025-06-04

## 2025-06-04 RX ORDER — DIPHENHYDRAMINE HCL 25 MG
50 CAPSULE ORAL ONCE
Status: CANCELLED
Start: 2025-06-04

## 2025-06-04 RX ADMIN — FAMOTIDINE 20 MG: 10 INJECTION, SOLUTION INTRAVENOUS at 12:50

## 2025-06-04 RX ADMIN — DIPHENHYDRAMINE HYDROCHLORIDE 50 MG: 50 INJECTION, SOLUTION INTRAMUSCULAR; INTRAVENOUS at 12:50

## 2025-06-04 RX ADMIN — Medication 5 ML: at 14:25

## 2025-06-04 RX ADMIN — PACLITAXEL 137 MG: 6 INJECTION, SOLUTION INTRAVENOUS at 13:25

## 2025-06-04 RX ADMIN — DEXAMETHASONE SODIUM PHOSPHATE: 10 INJECTION, SOLUTION INTRAMUSCULAR; INTRAVENOUS at 12:58

## 2025-06-04 RX ADMIN — SODIUM CHLORIDE 250 ML: 0.9 INJECTION, SOLUTION INTRAVENOUS at 12:50

## 2025-06-04 NOTE — PROGRESS NOTES
Infusion Nursing Note:  Cortney Willoughby presents today for C5D1 first Taxol.    Patient seen by provider today: No, saw Maura yesterday 6/5/25   present during visit today: Not Applicable.    Note: Pt used compression and iced hands/feet during infusion.      Intravenous Access:  Implanted Port.    Treatment Conditions:  Lab Results   Component Value Date    HGB 12.1 06/03/2025    WBC 8.3 06/03/2025    ANEU 6.3 06/03/2025     06/03/2025        Lab Results   Component Value Date     05/06/2025    POTASSIUM 4.1 05/06/2025    CR 0.71 05/06/2025    BAYLEE 8.5 (L) 05/06/2025    BILITOTAL <0.2 06/03/2025    ALBUMIN 3.9 06/03/2025    ALT 18 06/03/2025    AST 17 06/03/2025       Results reviewed, labs MET treatment parameters, ok to proceed with treatment.      Post Infusion Assessment:  Patient tolerated infusion without incident.  Blood return noted pre and post infusion.  Site patent and intact, free from redness, edema or discomfort.  No evidence of extravasations.  Access discontinued per protocol.       Discharge Plan:   Discharge instructions reviewed with: Patient and Family.  Patient and/or family verbalized understanding of discharge instructions and all questions answered.  AVS to patient via [employer].  Patient will return 6/10/25 for next appointment.   Patient discharged in stable condition accompanied by: .  Departure Mode: Ambulatory.      Paty Agudelo RN

## 2025-06-05 PROBLEM — R87.810 ASCUS WITH POSITIVE HIGH RISK HPV CERVICAL: Status: ACTIVE | Noted: 2025-06-05

## 2025-06-05 PROBLEM — R87.610 ASCUS WITH POSITIVE HIGH RISK HPV CERVICAL: Status: ACTIVE | Noted: 2025-06-05

## 2025-06-10 ENCOUNTER — TELEPHONE (OUTPATIENT)
Dept: ONCOLOGY | Facility: CLINIC | Age: 45
End: 2025-06-10

## 2025-06-10 ENCOUNTER — INFUSION THERAPY VISIT (OUTPATIENT)
Dept: INFUSION THERAPY | Facility: CLINIC | Age: 45
End: 2025-06-10
Attending: NURSE PRACTITIONER
Payer: COMMERCIAL

## 2025-06-10 ENCOUNTER — VIRTUAL VISIT (OUTPATIENT)
Dept: ONCOLOGY | Facility: CLINIC | Age: 45
End: 2025-06-10
Attending: INTERNAL MEDICINE
Payer: COMMERCIAL

## 2025-06-10 VITALS — BODY MASS INDEX: 25.16 KG/M2 | HEIGHT: 65 IN | WEIGHT: 151 LBS

## 2025-06-10 DIAGNOSIS — Z17.0 MALIGNANT NEOPLASM OF OVERLAPPING SITES OF LEFT BREAST IN FEMALE, ESTROGEN RECEPTOR POSITIVE (H): Primary | ICD-10-CM

## 2025-06-10 DIAGNOSIS — C50.812 MALIGNANT NEOPLASM OF OVERLAPPING SITES OF LEFT BREAST IN FEMALE, ESTROGEN RECEPTOR POSITIVE (H): Primary | ICD-10-CM

## 2025-06-10 LAB
BASOPHILS # BLD AUTO: 0.1 10E3/UL (ref 0–0.2)
BASOPHILS NFR BLD AUTO: 1 %
EOSINOPHIL # BLD AUTO: 0 10E3/UL (ref 0–0.7)
EOSINOPHIL NFR BLD AUTO: 0 %
ERYTHROCYTE [DISTWIDTH] IN BLOOD BY AUTOMATED COUNT: 15.4 % (ref 10–15)
HCT VFR BLD AUTO: 34.5 % (ref 35–47)
HGB BLD-MCNC: 11.7 G/DL (ref 11.7–15.7)
IMM GRANULOCYTES # BLD: 0 10E3/UL
IMM GRANULOCYTES NFR BLD: 1 %
LYMPHOCYTES # BLD AUTO: 0.7 10E3/UL (ref 0.8–5.3)
LYMPHOCYTES NFR BLD AUTO: 17 %
MCH RBC QN AUTO: 31.4 PG (ref 26.5–33)
MCHC RBC AUTO-ENTMCNC: 33.9 G/DL (ref 31.5–36.5)
MCV RBC AUTO: 93 FL (ref 78–100)
MONOCYTES # BLD AUTO: 0.4 10E3/UL (ref 0–1.3)
MONOCYTES NFR BLD AUTO: 10 %
NEUTROPHILS # BLD AUTO: 3.1 10E3/UL (ref 1.6–8.3)
NEUTROPHILS NFR BLD AUTO: 71 %
NRBC # BLD AUTO: 0 10E3/UL
NRBC BLD AUTO-RTO: 0 /100
PLATELET # BLD AUTO: 269 10E3/UL (ref 150–450)
RBC # BLD AUTO: 3.73 10E6/UL (ref 3.8–5.2)
WBC # BLD AUTO: 4.4 10E3/UL (ref 4–11)

## 2025-06-10 PROCEDURE — 1126F AMNT PAIN NOTED NONE PRSNT: CPT | Performed by: NURSE PRACTITIONER

## 2025-06-10 PROCEDURE — 36591 DRAW BLOOD OFF VENOUS DEVICE: CPT | Performed by: INTERNAL MEDICINE

## 2025-06-10 PROCEDURE — 98012 SYNCH AUDIO-ONLY EST SF 10: CPT | Performed by: NURSE PRACTITIONER

## 2025-06-10 PROCEDURE — 250N000011 HC RX IP 250 OP 636: Performed by: INTERNAL MEDICINE

## 2025-06-10 PROCEDURE — 85004 AUTOMATED DIFF WBC COUNT: CPT | Performed by: INTERNAL MEDICINE

## 2025-06-10 RX ORDER — HEPARIN SODIUM (PORCINE) LOCK FLUSH IV SOLN 100 UNIT/ML 100 UNIT/ML
5 SOLUTION INTRAVENOUS
OUTPATIENT
Start: 2025-06-10

## 2025-06-10 RX ORDER — LORAZEPAM 2 MG/ML
0.5 INJECTION INTRAMUSCULAR EVERY 4 HOURS PRN
Status: CANCELLED | OUTPATIENT
Start: 2025-06-11

## 2025-06-10 RX ORDER — METHYLPREDNISOLONE SODIUM SUCCINATE 40 MG/ML
40 INJECTION INTRAMUSCULAR; INTRAVENOUS
Start: 2025-06-18

## 2025-06-10 RX ORDER — MEPERIDINE HYDROCHLORIDE 25 MG/ML
25 INJECTION INTRAMUSCULAR; INTRAVENOUS; SUBCUTANEOUS
Status: CANCELLED | OUTPATIENT
Start: 2025-06-11

## 2025-06-10 RX ORDER — HEPARIN SODIUM (PORCINE) LOCK FLUSH IV SOLN 100 UNIT/ML 100 UNIT/ML
5 SOLUTION INTRAVENOUS
OUTPATIENT
Start: 2025-06-18

## 2025-06-10 RX ORDER — ALBUTEROL SULFATE 0.83 MG/ML
2.5 SOLUTION RESPIRATORY (INHALATION)
OUTPATIENT
Start: 2025-06-18

## 2025-06-10 RX ORDER — HEPARIN SODIUM,PORCINE 10 UNIT/ML
5-20 VIAL (ML) INTRAVENOUS DAILY PRN
Status: CANCELLED | OUTPATIENT
Start: 2025-06-11

## 2025-06-10 RX ORDER — ONDANSETRON 2 MG/ML
8 INJECTION INTRAMUSCULAR; INTRAVENOUS ONCE
OUTPATIENT
Start: 2025-06-18

## 2025-06-10 RX ORDER — DIPHENHYDRAMINE HYDROCHLORIDE 50 MG/ML
25 INJECTION, SOLUTION INTRAMUSCULAR; INTRAVENOUS
Status: CANCELLED
Start: 2025-06-11

## 2025-06-10 RX ORDER — LORAZEPAM 2 MG/ML
0.5 INJECTION INTRAMUSCULAR EVERY 4 HOURS PRN
OUTPATIENT
Start: 2025-06-18

## 2025-06-10 RX ORDER — HEPARIN SODIUM,PORCINE 10 UNIT/ML
5-20 VIAL (ML) INTRAVENOUS DAILY PRN
OUTPATIENT
Start: 2025-06-18

## 2025-06-10 RX ORDER — DIPHENHYDRAMINE HCL 25 MG
50 CAPSULE ORAL
Start: 2025-06-18

## 2025-06-10 RX ORDER — EPINEPHRINE 1 MG/ML
0.3 INJECTION, SOLUTION INTRAMUSCULAR; SUBCUTANEOUS EVERY 5 MIN PRN
Status: CANCELLED | OUTPATIENT
Start: 2025-06-11

## 2025-06-10 RX ORDER — ALBUTEROL SULFATE 90 UG/1
1-2 INHALANT RESPIRATORY (INHALATION)
Start: 2025-06-18

## 2025-06-10 RX ORDER — DIPHENHYDRAMINE HYDROCHLORIDE 50 MG/ML
50 INJECTION, SOLUTION INTRAMUSCULAR; INTRAVENOUS
Status: CANCELLED
Start: 2025-06-11

## 2025-06-10 RX ORDER — ALBUTEROL SULFATE 0.83 MG/ML
2.5 SOLUTION RESPIRATORY (INHALATION)
Status: CANCELLED | OUTPATIENT
Start: 2025-06-11

## 2025-06-10 RX ORDER — HEPARIN SODIUM (PORCINE) LOCK FLUSH IV SOLN 100 UNIT/ML 100 UNIT/ML
5 SOLUTION INTRAVENOUS
Status: DISCONTINUED | OUTPATIENT
Start: 2025-06-10 | End: 2025-06-10 | Stop reason: HOSPADM

## 2025-06-10 RX ORDER — EPINEPHRINE 1 MG/ML
0.3 INJECTION, SOLUTION INTRAMUSCULAR; SUBCUTANEOUS EVERY 5 MIN PRN
OUTPATIENT
Start: 2025-06-18

## 2025-06-10 RX ORDER — HEPARIN SODIUM (PORCINE) LOCK FLUSH IV SOLN 100 UNIT/ML 100 UNIT/ML
5 SOLUTION INTRAVENOUS
Status: CANCELLED | OUTPATIENT
Start: 2025-06-11

## 2025-06-10 RX ORDER — DIPHENHYDRAMINE HYDROCHLORIDE 50 MG/ML
25 INJECTION, SOLUTION INTRAMUSCULAR; INTRAVENOUS
Start: 2025-06-18

## 2025-06-10 RX ORDER — MEPERIDINE HYDROCHLORIDE 25 MG/ML
25 INJECTION INTRAMUSCULAR; INTRAVENOUS; SUBCUTANEOUS
OUTPATIENT
Start: 2025-06-18

## 2025-06-10 RX ORDER — ALBUTEROL SULFATE 90 UG/1
1-2 INHALANT RESPIRATORY (INHALATION)
Status: CANCELLED
Start: 2025-06-11

## 2025-06-10 RX ORDER — HEPARIN SODIUM,PORCINE 10 UNIT/ML
5-20 VIAL (ML) INTRAVENOUS DAILY PRN
OUTPATIENT
Start: 2025-06-10

## 2025-06-10 RX ORDER — DIPHENHYDRAMINE HYDROCHLORIDE 50 MG/ML
50 INJECTION, SOLUTION INTRAMUSCULAR; INTRAVENOUS
Start: 2025-06-18

## 2025-06-10 RX ORDER — METHYLPREDNISOLONE SODIUM SUCCINATE 40 MG/ML
40 INJECTION INTRAMUSCULAR; INTRAVENOUS
Status: CANCELLED
Start: 2025-06-11

## 2025-06-10 RX ORDER — DIPHENHYDRAMINE HCL 25 MG
50 CAPSULE ORAL ONCE
Status: CANCELLED
Start: 2025-06-11

## 2025-06-10 RX ADMIN — Medication 5 ML: at 07:23

## 2025-06-10 ASSESSMENT — PAIN SCALES - GENERAL: PAINLEVEL_OUTOF10: NO PAIN (0)

## 2025-06-10 NOTE — PROGRESS NOTES
Nursing Note:  Cortney Willoughby presents today for Port Labs.    Patient seen by provider today: No   present during visit today: Not Applicable.    Note: N/A.    Intravenous Access:  Labs drawn without difficulty.  Implanted Port.    Discharge Plan:   Patient was discharged.    Luis Hayes RN

## 2025-06-10 NOTE — PROGRESS NOTES
Virtual Visit Details    Type of service:  Telephone Visit   Phone call duration: 12 minutes   Originating Location (pt. Location): Home  Distant Location (provider location):  On-site  Telephone visit completed due to the patient did not have access to video, while the distant provider did.      Oncology/Hematology Visit Note  Dave 10, 2025    Reason for Visit: follow up of left breast cancer  Per Dr. Acuna's  note  1.  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 Family history of breast cancer  3. BRCA-2 mutation  4. oncotype score 42    Dr. Acuna recommends adjuvant chemotherapy with dose dense Adriamycin and Cytoxan followed by weekly paclitaxel  05/21/2025-patient completed 4 cycles of dose dense Adriamycin and Cytoxan currently on weekly Taxol      Interval History:  Overall patient reports tolerating Taxol well.  Denies fever chills sweats cough shortness of breath chest pain nausea vomiting diarrhea abdominal pain or bleeding.  Denies neuropathy.  Reports good energy and appetite.      Review of Systems:  14 point ROS of systems including Constitutional, Eyes, Respiratory, Cardiovascular, Gastroenterology, Genitourinary, Integumentary, Muscularskeletal, Psychiatric were all negative except for pertinent positives noted in my HPI.    Physical Examination:  Physical Exam  HENT:      Head: Normocephalic.      Nose: Nose normal.   Eyes:      Pupils: Pupils are equal, round, and reactive to light.   Cardiovascular:      Rate and Rhythm: Normal rate.   Pulmonary:      Effort: Pulmonary effort is normal.   Abdominal:      General: Abdomen is flat.   Genitourinary:     General: Normal vulva.   Musculoskeletal:         General: Normal range of motion.      Cervical back: Normal range of motion.   Skin:     General: Skin is warm.   Neurological:      Mental Status: She is alert.       Laboratory Data:  CBC with  differential reviewed    Assessment and Plan:    1 Left Breast cancer  Per Dr. Acuna's  note  1.  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 Family history of breast cancer  3. BRCA-2 mutation  4. oncotype score 42  - Currently on adjuvant chemotherapy with dose dense Adriamycin and Cytoxan followed by weekly paclitaxel.    05/21/2025-patient completed 4 cycles of dose dense Adriamycin and Cytoxan currently on weekly Taxol.  Regimen potential side effects reviewed with patient.  Patient agrees to continue with treatment  Labs reviewed abnormalities discussed  Ok to proceed with Taxol    2 BRCA 2 mutation  Patient status post hysterectomy.  Dr. Acuna recommended removal of ovaries    3 Osteopenia  Continue with calcium vitamin D and weightbearing exercise      Please call clinic with any changes in her condition or questions  KEYSHA Montgomery [facility]     Chart documentation with Dragon Voice recognition Software. Although reviewed after completion, some words and grammatical errors may remain.

## 2025-06-10 NOTE — LETTER
6/10/2025      Cortney Willoughby  [address] [place] [id number]      Dear Colleague,    Thank you for referring your patient, Cortney Willoughby, to the [facility]. Please see a copy of my visit note below.    Virtual Visit Details    Type of service:  Telephone Visit   Phone call duration: 12 minutes   Originating Location (pt. Location): Home  Distant Location (provider location):  On-site  Telephone visit completed due to the patient did not have access to video, while the distant provider did.      Oncology/Hematology Visit Note  Dave 10, 2025    Reason for Visit: follow up of left breast cancer  Per Dr. Acuna's  note  1.  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 Family history of breast cancer  3. BRCA-2 mutation  4. oncotype score 42    Dr. Acuna recommends adjuvant chemotherapy with dose dense Adriamycin and Cytoxan followed by weekly paclitaxel  05/21/2025-patient completed 4 cycles of dose dense Adriamycin and Cytoxan currently on weekly Taxol      Interval History:  Overall patient reports tolerating Taxol well.  Denies fever chills sweats cough shortness of breath chest pain nausea vomiting diarrhea abdominal pain or bleeding.  Denies neuropathy.  Reports good energy and appetite.      Review of Systems:  14 point ROS of systems including Constitutional, Eyes, Respiratory, Cardiovascular, Gastroenterology, Genitourinary, Integumentary, Muscularskeletal, Psychiatric were all negative except for pertinent positives noted in my HPI.    Physical Examination:  Physical Exam  HENT:      Head: Normocephalic.      Nose: Nose normal.   Eyes:      Pupils: Pupils are equal, round, and reactive to light.   Cardiovascular:      Rate and Rhythm: Normal rate.   Pulmonary:      Effort: Pulmonary effort is normal.   Abdominal:      General: Abdomen is flat.   Genitourinary:      General: Normal vulva.   Musculoskeletal:         General: Normal range of motion.      Cervical back: Normal range of motion.   Skin:     General: Skin is warm.   Neurological:      Mental Status: She is alert.       Laboratory Data:  CBC with differential reviewed    Assessment and Plan:    1 Left Breast cancer  Per Dr. Acuna's  note  1.  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 Family history of breast cancer  3. BRCA-2 mutation  4. oncotype score 42  - Currently on adjuvant chemotherapy with dose dense Adriamycin and Cytoxan followed by weekly paclitaxel.    05/21/2025-patient completed 4 cycles of dose dense Adriamycin and Cytoxan currently on weekly Taxol.  Regimen potential side effects reviewed with patient.  Patient agrees to continue with treatment  Labs reviewed abnormalities discussed  Ok to proceed with Taxol    2 BRCA 2 mutation  Patient status post hysterectomy.  Dr. Acuna recommended removal of ovaries    3 Osteopenia  Continue with calcium vitamin D and weightbearing exercise      Please call clinic with any changes in her condition or questions  KEYSHA Montgomery CNP  [facility]     Chart documentation with Dragon Voice recognition Software. Although reviewed after completion, some words and grammatical errors may remain.          Again, thank you for allowing me to participate in the care of your patient.        Sincerely,        KEYSHA Montgomery CNP    Electronically signed

## 2025-06-10 NOTE — NURSING NOTE
Current patient location: [address] [place] [address]    Is the patient currently in the state of [place]? YES    Visit mode: TELEPHONE    If the visit is dropped, the patient can be reconnected by:TELEPHONE VISIT: Phone number:   Telephone Information:   Mobile [phone]       Will anyone else be joining the visit? NO  (If patient encounters technical issues they should call [phone]    Are changes needed to the allergy or medication list? Pt stated no changes to allergies and Pt stated no med changes    Are refills needed on medications prescribed by this physician? NO    Rooming Documentation:  Not applicable    Reason for visit: JOSE NORMAN

## 2025-06-10 NOTE — LETTER
6/10/2025      Cortney Willoughby  [address] [place] [id number]      Dear Colleague,    Thank you for referring your patient, Cortney Willoughby, to the [facility]. Please see a copy of my visit note below.    Virtual Visit Details    Type of service:  Telephone Visit   Phone call duration: 12 minutes   Originating Location (pt. Location): Home  Distant Location (provider location):  On-site  Telephone visit completed due to the patient did not have access to video, while the distant provider did.      Oncology/Hematology Visit Note  Dave 10, 2025    Reason for Visit: follow up of left breast cancer  Per Dr. Acuna's  note  1.  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 Family history of breast cancer  3. BRCA-2 mutation  4. oncotype score 42    Dr. Acuna recommends adjuvant chemotherapy with dose dense Adriamycin and Cytoxan followed by weekly paclitaxel  05/21/2025-patient completed 4 cycles of dose dense Adriamycin and Cytoxan currently on weekly Taxol      Interval History:  Overall patient reports tolerating Taxol well.  Denies fever chills sweats cough shortness of breath chest pain nausea vomiting diarrhea abdominal pain or bleeding.  Denies neuropathy.  Reports good energy and appetite.      Review of Systems:  14 point ROS of systems including Constitutional, Eyes, Respiratory, Cardiovascular, Gastroenterology, Genitourinary, Integumentary, Muscularskeletal, Psychiatric were all negative except for pertinent positives noted in my HPI.    Physical Examination:  Physical Exam  HENT:      Head: Normocephalic.      Nose: Nose normal.   Eyes:      Pupils: Pupils are equal, round, and reactive to light.   Cardiovascular:      Rate and Rhythm: Normal rate.   Pulmonary:      Effort: Pulmonary effort is normal.   Abdominal:      General: Abdomen is flat.   Genitourinary:      General: Normal vulva.   Musculoskeletal:         General: Normal range of motion.      Cervical back: Normal range of motion.   Skin:     General: Skin is warm.   Neurological:      Mental Status: She is alert.       Laboratory Data:  CBC with differential reviewed    Assessment and Plan:    1 Left Breast cancer  Per Dr. Acuna's  note  1.  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 Family history of breast cancer  3. BRCA-2 mutation  4. oncotype score 42  - Currently on adjuvant chemotherapy with dose dense Adriamycin and Cytoxan followed by weekly paclitaxel.    05/21/2025-patient completed 4 cycles of dose dense Adriamycin and Cytoxan currently on weekly Taxol.  Regimen potential side effects reviewed with patient.  Patient agrees to continue with treatment  Labs reviewed abnormalities discussed  Ok to proceed with Taxol    2 BRCA 2 mutation  Patient status post hysterectomy.  Dr. Acuna recommended removal of ovaries    3 Osteopenia  Continue with calcium vitamin D and weightbearing exercise      Please call clinic with any changes in her condition or questions  KEYSHA Montgomery CNP  [facility]     Chart documentation with Dragon Voice recognition Software. Although reviewed after completion, some words and grammatical errors may remain.          Again, thank you for allowing me to participate in the care of your patient.        Sincerely,        KEYSHA Montgomery CNP    Electronically signed

## 2025-06-10 NOTE — PROGRESS NOTES
Received positive distress screen regarding patient's request to speak with RD. Called and spoke with patient. Nutrition appointment scheduled 6/17/2025 at 9:30 AM . Patient verbalized understanding of plan.     Rosemary Dennis, RD, LD  Clinical Dietitian  [facility]  [phone] (direct)

## 2025-06-11 ENCOUNTER — INFUSION THERAPY VISIT (OUTPATIENT)
Dept: INFUSION THERAPY | Facility: CLINIC | Age: 45
End: 2025-06-11
Attending: INTERNAL MEDICINE
Payer: COMMERCIAL

## 2025-06-11 VITALS
HEART RATE: 102 BPM | RESPIRATION RATE: 18 BRPM | OXYGEN SATURATION: 96 % | TEMPERATURE: 98 F | SYSTOLIC BLOOD PRESSURE: 113 MMHG | DIASTOLIC BLOOD PRESSURE: 60 MMHG

## 2025-06-11 DIAGNOSIS — C50.812 MALIGNANT NEOPLASM OF OVERLAPPING SITES OF LEFT BREAST IN FEMALE, ESTROGEN RECEPTOR POSITIVE (H): Primary | ICD-10-CM

## 2025-06-11 DIAGNOSIS — Z17.0 MALIGNANT NEOPLASM OF OVERLAPPING SITES OF LEFT BREAST IN FEMALE, ESTROGEN RECEPTOR POSITIVE (H): Primary | ICD-10-CM

## 2025-06-11 PROCEDURE — 96375 TX/PRO/DX INJ NEW DRUG ADDON: CPT

## 2025-06-11 PROCEDURE — 258N000003 HC RX IP 258 OP 636: Performed by: NURSE PRACTITIONER

## 2025-06-11 PROCEDURE — 96367 TX/PROPH/DG ADDL SEQ IV INF: CPT

## 2025-06-11 PROCEDURE — 96413 CHEMO IV INFUSION 1 HR: CPT

## 2025-06-11 PROCEDURE — 250N000011 HC RX IP 250 OP 636: Performed by: NURSE PRACTITIONER

## 2025-06-11 PROCEDURE — 250N000013 HC RX MED GY IP 250 OP 250 PS 637: Performed by: NURSE PRACTITIONER

## 2025-06-11 RX ORDER — DIPHENHYDRAMINE HCL 25 MG
50 CAPSULE ORAL ONCE
Status: COMPLETED | OUTPATIENT
Start: 2025-06-11 | End: 2025-06-11

## 2025-06-11 RX ORDER — HEPARIN SODIUM (PORCINE) LOCK FLUSH IV SOLN 100 UNIT/ML 100 UNIT/ML
5 SOLUTION INTRAVENOUS
Status: DISCONTINUED | OUTPATIENT
Start: 2025-06-11 | End: 2025-06-11 | Stop reason: HOSPADM

## 2025-06-11 RX ADMIN — DEXAMETHASONE SODIUM PHOSPHATE: 10 INJECTION, SOLUTION INTRAMUSCULAR; INTRAVENOUS at 12:52

## 2025-06-11 RX ADMIN — SODIUM CHLORIDE 250 ML: 0.9 INJECTION, SOLUTION INTRAVENOUS at 12:46

## 2025-06-11 RX ADMIN — Medication 5 ML: at 14:35

## 2025-06-11 RX ADMIN — FAMOTIDINE 20 MG: 10 INJECTION, SOLUTION INTRAVENOUS at 12:47

## 2025-06-11 RX ADMIN — DIPHENHYDRAMINE HYDROCHLORIDE 50 MG: 25 CAPSULE ORAL at 12:39

## 2025-06-11 RX ADMIN — PACLITAXEL 137 MG: 6 INJECTION, SOLUTION INTRAVENOUS at 13:27

## 2025-06-11 NOTE — PROGRESS NOTES
Infusion Nursing Note:  Cortney Willoughby presents today for C6D1 Taxol.    Patient seen by provider today: Yes: Jelani Pittman NP   present during visit today: Not Applicable.    Note: N/A.      Intravenous Access:  Implanted Port.    Treatment Conditions:  Lab Results   Component Value Date    HGB 11.7 06/10/2025    WBC 4.4 06/10/2025    ANEU 3.1 06/10/2025     06/10/2025        Results reviewed, labs MET treatment parameters, ok to proceed with treatment.      Post Infusion Assessment:  Patient tolerated infusion without incident.  Blood return noted pre and post infusion.  Site patent and intact, free from redness, edema or discomfort.  No evidence of extravasations.  Access discontinued per protocol.       Discharge Plan:   Patient declined prescription refills.  Discharge instructions reviewed with: Patient.  Patient and/or family verbalized understanding of discharge instructions and all questions answered.  AVS to patient via [employer].  Patient will return 6/17 for next appointment.   Patient discharged in stable condition accompanied by: self and friend.  Departure Mode: Ambulatory.      Lius Hayes RN

## 2025-06-17 ENCOUNTER — INFUSION THERAPY VISIT (OUTPATIENT)
Dept: INFUSION THERAPY | Facility: CLINIC | Age: 45
End: 2025-06-17
Attending: NURSE PRACTITIONER
Payer: COMMERCIAL

## 2025-06-17 ENCOUNTER — VIRTUAL VISIT (OUTPATIENT)
Dept: ONCOLOGY | Facility: CLINIC | Age: 45
End: 2025-06-17
Attending: DIETITIAN, REGISTERED
Payer: COMMERCIAL

## 2025-06-17 DIAGNOSIS — C50.812 MALIGNANT NEOPLASM OF OVERLAPPING SITES OF LEFT BREAST IN FEMALE, ESTROGEN RECEPTOR POSITIVE (H): Primary | ICD-10-CM

## 2025-06-17 DIAGNOSIS — Z17.0 MALIGNANT NEOPLASM OF OVERLAPPING SITES OF LEFT BREAST IN FEMALE, ESTROGEN RECEPTOR POSITIVE (H): Primary | ICD-10-CM

## 2025-06-17 LAB
BASOPHILS # BLD AUTO: 0.1 10E3/UL (ref 0–0.2)
BASOPHILS NFR BLD AUTO: 1 %
EOSINOPHIL # BLD AUTO: 0 10E3/UL (ref 0–0.7)
EOSINOPHIL NFR BLD AUTO: 1 %
ERYTHROCYTE [DISTWIDTH] IN BLOOD BY AUTOMATED COUNT: 15.7 % (ref 10–15)
HCT VFR BLD AUTO: 35.4 % (ref 35–47)
HGB BLD-MCNC: 11.9 G/DL (ref 11.7–15.7)
IMM GRANULOCYTES # BLD: 0 10E3/UL
IMM GRANULOCYTES NFR BLD: 1 %
LYMPHOCYTES # BLD AUTO: 0.7 10E3/UL (ref 0.8–5.3)
LYMPHOCYTES NFR BLD AUTO: 16 %
MCH RBC QN AUTO: 31.2 PG (ref 26.5–33)
MCHC RBC AUTO-ENTMCNC: 33.6 G/DL (ref 31.5–36.5)
MCV RBC AUTO: 93 FL (ref 78–100)
MONOCYTES # BLD AUTO: 0.3 10E3/UL (ref 0–1.3)
MONOCYTES NFR BLD AUTO: 7 %
NEUTROPHILS # BLD AUTO: 3.3 10E3/UL (ref 1.6–8.3)
NEUTROPHILS NFR BLD AUTO: 75 %
NRBC # BLD AUTO: 0 10E3/UL
NRBC BLD AUTO-RTO: 0 /100
PLATELET # BLD AUTO: 302 10E3/UL (ref 150–450)
RBC # BLD AUTO: 3.82 10E6/UL (ref 3.8–5.2)
WBC # BLD AUTO: 4.4 10E3/UL (ref 4–11)

## 2025-06-17 PROCEDURE — 250N000011 HC RX IP 250 OP 636: Performed by: INTERNAL MEDICINE

## 2025-06-17 PROCEDURE — 36415 COLL VENOUS BLD VENIPUNCTURE: CPT | Performed by: INTERNAL MEDICINE

## 2025-06-17 PROCEDURE — 85025 COMPLETE CBC W/AUTO DIFF WBC: CPT | Performed by: INTERNAL MEDICINE

## 2025-06-17 PROCEDURE — 97802 MEDICAL NUTRITION INDIV IN: CPT | Mod: GT,95 | Performed by: DIETITIAN, REGISTERED

## 2025-06-17 RX ORDER — HEPARIN SODIUM,PORCINE 10 UNIT/ML
5-20 VIAL (ML) INTRAVENOUS DAILY PRN
OUTPATIENT
Start: 2025-06-17

## 2025-06-17 RX ORDER — HEPARIN SODIUM (PORCINE) LOCK FLUSH IV SOLN 100 UNIT/ML 100 UNIT/ML
5 SOLUTION INTRAVENOUS
OUTPATIENT
Start: 2025-06-17

## 2025-06-17 RX ORDER — HEPARIN SODIUM (PORCINE) LOCK FLUSH IV SOLN 100 UNIT/ML 100 UNIT/ML
5 SOLUTION INTRAVENOUS
Status: DISCONTINUED | OUTPATIENT
Start: 2025-06-17 | End: 2025-06-17 | Stop reason: HOSPADM

## 2025-06-17 RX ADMIN — Medication 5 ML: at 07:39

## 2025-06-17 NOTE — PROGRESS NOTES
Nursing Note:  Cortney Willoughby presents today for port labs.    Patient seen by provider today: No   present during visit today: Not Applicable.    Note: N/A.    Intravenous Access:  Implanted Port.    Discharge Plan:   Patient was discharged to home.     Tenisha James RN

## 2025-06-17 NOTE — LETTER
6/17/2025      Cortney Willoguhby  [address] [address]      Dear Colleague,    Thank you for referring your patient, Cortney Willoughby, to the [facility]. Please see a copy of my visit note below.    Virtual Visit Details    Type of service:  Video Visit     Originating Location (pt. Location): Home    Distant Location (provider location):  On-site  Platform used for Video Visit: [facility]     CLINICAL NUTRITION SERVICES - ASSESSMENT NOTE    Cortney Willoughby 45 year old referred for MNT related to Malignant neoplasm of overlapping sites of left breast in female, estrogen receptor positive (H) [C50.812, Z17.0]     Total Time Spent with patient: 45 min  Referred by: Jelani Pittman NP  Reason for referral: initial   Patient accompanied by: self     Patient Medical History  Past Medical History:   Diagnosis Date     Anxiety      Closed fracture of unspecified part of ulna (alone) 2006     Depression      Family history of malignant neoplasm of breast      Fibroid uterus      Hyperlipidemia      Hypothyroidism, unspecified type 05/02/2019     Insomnia      Ovarian cyst      Scoliosis (and kyphoscoliosis), idiopathic        Current Medications    Current Outpatient Medications:      dexAMETHasone (DECADRON) 4 MG tablet, Take 2 tablets (8 mg) by mouth daily. Start on Day 2 of Cycles 1 through 4., Disp: 6 tablet, Rfl: 3     levothyroxine (SYNTHROID/LEVOTHROID) 75 MCG tablet, Take 1 tablet (75 mcg) by mouth daily By her natural medicine doctor, Disp: , Rfl:      lidocaine-prilocaine (EMLA) 2.5-2.5 % external cream, Apply liberally to port site, 60-90 min prior to port access. Cover with plastic wrap, Disp: 30 g, Rfl: 1     liothyronine (CYTOMEL) 25 MCG tablet, Take 0.5 tablets (12.5 mcg) by mouth daily From natural medicine doctor, Disp: , Rfl:      methocarbamol (ROBAXIN) 750 MG tablet, Take 1 tablet (750 mg) by mouth 4 times daily as needed for muscle spasms (pain)., Disp: 20 tablet,  Rfl: 0     ondansetron (ZOFRAN) 8 MG tablet, Take 1 tablet (8 mg) by mouth every 8 hours as needed for nausea (vomiting)., Disp: 30 tablet, Rfl: 2     prochlorperazine (COMPAZINE) 10 MG tablet, Take 1 tablet (10 mg) by mouth every 6 hours as needed for nausea or vomiting., Disp: 30 tablet, Rfl: 2     SENNA-docusate sodium (SENNA S) 8.6-50 MG tablet, Take 1-2 tablets by mouth 2 times daily as needed (constipation)., Disp: 15 tablet, Rfl: 0     Tirzepatide (MOUNJARO SC), Inject 0.4 mLs subcutaneously every 7 days. Bottle is 16.6 mg/4.5 mL, Disp: , Rfl:      traZODone (DESYREL) 50 MG tablet, Take 2 tablets (100 mg) by mouth every evening as needed for sleep., Disp: 180 tablet, Rfl: 1  No current facility-administered medications for this visit.    Facility-Administered Medications Ordered in Other Visits:      heparin lock flush 100 unit/mL injection 5 mL, 5 mL, Intracatheter, Once PRN, Georgia Acuna MD, 5 mL at 06/17/25 0739     sodium chloride (PF) 0.9% PF flush 3-20 mL, 3-20 mL, Intracatheter, q1 min prn, Georgia Acuna MD, 20 mL at 06/17/25 0739    Medications have been reviewed.    Pertinent lab results:  HDL Cholesterol   Date Value Ref Range Status   06/18/2021 75 >49 mg/dL Final     Direct Measure HDL   Date Value Ref Range Status   06/03/2025 53 >=50 mg/dL Final     LDL Cholesterol Calculated   Date Value Ref Range Status   06/03/2025 110 (H) <100 mg/dL Final   06/18/2021 95 <100 mg/dL Final     Comment:     Desirable:       <100 mg/dl     Urea Nitrogen   Date Value Ref Range Status   05/06/2025 9.0 6.0 - 20.0 mg/dL Final   06/18/2021 12 7 - 30 mg/dL Final     Potassium   Date Value Ref Range Status   05/06/2025 4.1 3.4 - 5.3 mmol/L Final   06/18/2021 4.2 3.4 - 5.3 mmol/L Final       Labs have been reviewed and noted.    Treatment Plan:  Oncology History   Malignant neoplasm of overlapping sites of left breast in female, estrogen receptor positive (H)   2/13/2025 Initial Diagnosis    Malignant neoplasm of  "overlapping sites of left breast in female, estrogen receptor positive (H)     4/9/2025 -  Chemotherapy    OP ONC Breast Cancer Dose Dense - DOXOrubicin / Cyclophosphamide followed by Weekly PACLitaxel  Plan Provider: Georgia Acuna MD  Treatment goal: Curative  Line of treatment: Adjuvant       Treatment plan has been reviewed.    Food and Nutrition Related History  --Over the counter supplements: has stopped since starting chemotherapy; natural path provider recommended her supplements   --Food Allergies: NKFA  --Food Access: no known issues   --Physical Activity: Patient used to exercise 4-6 days per week prior to diagnosis. Patient lifting weights for upper body; patient walks dog. Patient starting to spin 1 minute on Patient Conversation Media  --Oncology treatment side effects: fatigue   --Factors effecting nutritional intake: none at this time     Patient Intermittent fasting -per thyroid MD recommendations. Patient fasts from 8 PM -12 PM. Patient frustrated with weight gain. Patient struggles with eating lunch. Patient previously used [employer] for her lunch. Patient had been having Home  meals for years but has stopped in the past 2 weeks and meal planning. Patient focuses on getting in protein. Patient previously worked at [employer] and had hormone testing done.     Patient does not eat fish or seafood. Patient avoids frozen meals.     Fruit-berries    Likes sweets -ma have treat after lunch and dinner    Miralax 4 days after treatment to prevent constipation     Diet Recall  Breakfast Don't eat breakfast    Lunch Yogurt; sandwich [employer] at chemo    Dinner Protein, vegetable and starch -chicken or steak pork loin cheddarwurst; broccoli and green beans    Snacks Carrots and dip    Beverages Protein shakes (13 grams protein); water; tea; 1 diet Coke      ANTHROPOMETRICS  Height: 5'4.5\"  Weight: 151 lbs   BMI: 25.5 kg/m2   Weight Status:  Overweight BMI 25-29.9  IBW: 123 lbs +/-10% (122%)  Weight History:   Wt " Readings from Last 10 Encounters:   06/10/25 68.5 kg (151 lb)   06/04/25 68.9 kg (152 lb)   05/30/25 69.1 kg (152 lb 4.8 oz)   05/21/25 68.3 kg (150 lb 9.6 oz)   05/07/25 68.7 kg (151 lb 6.4 oz)   04/23/25 67.2 kg (148 lb 3.2 oz)   04/22/25 65.8 kg (145 lb)   04/09/25 65.8 kg (145 lb)   04/02/25 64.3 kg (141 lb 11.2 oz)   03/26/25 64.6 kg (142 lb 6.4 oz)      Dosing Weight: 68 kg    MALNUTRITION:  % Weight Loss:  None noted  % Intake:  No decreased intake noted  Subcutaneous Fat Loss:  None observed  Muscle Loss:  None observed  Fluid Retention:  None noted    Malnutrition Diagnosis: Patient does not meet two of the above criteria necessary for diagnosing malnutrition    ASSESSED NUTRITION NEEDS:  Estimated Energy Needs: 3791-7968 kcals (22-25 Kcal/Kg)  Justification: maintenance  Estimated Protein Needs: 71-88 grams protein (1.2-1.5 g pro/Kg)  Justification: preservation of lean body mass  Estimated Fluid Needs: 6502-3862  mL   Justification: maintenance    Nutrition Diagnosis:  Food and nutrition-related knowledge deficit related to lack of prior exposure as evidenced by no previous need for food and nutrition related recommendations      Intervention/Recommendations:  Provided written & verbal education:     - Reviewed nutrition and hydration needs.   Advised pt to aim for at least 1700 kcal and 70-90 g protein daily.   Advised pt to aim for 80 oz fluid daily.     - Discussed strategies to help fortify meals and snacks with calories and protein.   - Encouraged to focus on 5-6 small, frequent meals.   - Encouraged to have a protein source with each meal and snack.    - Discussed foods to fight cancer -legumes, whole grains, fruits, vegetables-especially cruciferous, omega 3 fatty acids. Suggest limit red meat <12 oz per week and limit/avoid processed foods. Recommend [employer] as resource.  - Discussed cancer fatigue. Recommend movement for 2-3 minutes as able. Also suggest gradually  increasing physical activity as exercise was part of lifestyle.      Provided pt with corresponding education materials/handouts on:  [employer] Nutrition During and After Cancer Treatment     Goals:  Add beans into diet  Aim for 70-90 grams protein and 80 oz fluids per day     Monitor and Evaluation:  Progress towards goals will be monitored and evaluated per protocol and Practice Guidelines  Patient has RD contact information for further nutrition questions/concerns.     Rosemary Dennis, RD, LD  Clinical Dietitian  [facility]  [phone] (direct)        Again, thank you for allowing me to participate in the care of your patient.        Sincerely,        Arlet Mccarty RD, LD    Electronically signed

## 2025-06-17 NOTE — PATIENT INSTRUCTIONS
Brandon Barr,    It was nice meeting you today.  Below are some of our talking points:    Aim for 70-90 grams protein per day  Add beans into diet  Reduce red meat consumption <12 oz per week  Power Plates are still available-can order online for delivery. Check their website if those would work for you again  Aim for 80 oz fluids  [employer] [place] [employer] as resource      Nutrition During and After Cancer Treatment    Nutrition is an important component for cancer prevention and survivorship as well as during cancer treatment. Nutrition during cancer treatment is often focused on a high-calorie, high-protein diet to avoid weight loss and compensate for treatment side effects. Following certain nutrition recommendations will help you reduce your risk of cancer recurrence and risk for other cancers as well as other chronic diseases like diabetes and heart disease.    Tips and Recommendations    Nutritional Considerations    Eat a diet rich in whole grains, fruits, vegetables, and beans    Build a healthy plate     -   of the plate vegetables    or less of the plate fruit    of the plate whole grains or starches    of the plate lean or plant-based protein  Eat at least 5 servings of different-colored fruits and vegetables every.  Fruits and vegetables of different colors will provide you with health-promoting nutrients (phytochemicals)  Limit how much  fast foods  and other processed foods that are high in fat, starches, or sugars that you eat    Fuel up on foods that are less processed, including fruit, vegetables, whole grains, nuts, seeds, and beans  Limit common processed foods such as chips, cookies, candy, desserts, baked goods, and fried foods  Bring healthy meals and snacks with you when you re on the go  Limit how much red meat and processed meat you eat    Eat no more than 12-18 ounces of red meat such as beef, pork, and lamb each week  Eat little to no processed meats including hot dogs, glass,  sausage, and deli meats  Think of meat as a side dish and fill the plate with fruits, vegetables, and whole grains, leaving just1/3 of the plate for meat  Limit how many sugar-sweetened drinks you consume    Avoid regular intake of sugar-sweetened beverages to avoid extra sugar and calories in your diet  Choose plain and flavored malik and unsweetened coffee and tea  Limit how much alcohol you drink    For cancer prevention and survivorship, it is best to not drink alcohol  Limit alcohol intake to no more than 2 drinks a day for men and 1 drink a day for women  A serving equals 12 ounces of beer, 1.5 ounces of distilled spirits, or 5 ounces of wine  Avoid use of supplements for cancer prevention    Meet nutrition needs by eating a healthy diet alone. Whole foods provide more nutritional benefits than supplements.    During and After Cancer Treatment Sample 1-Day Menu   Breakfast    cup oatmeal    cup blueberries  2 tablespoon walnuts    cup of yogurt    Lunch  Chicken sandwich made with: 2 slices bread, whole wheat  2 ounces chicken breast, roasted  2 tomato slices  2 teaspoons mayonnaise    cup carrots  2 tablespoons hummus  1 apple    Afternoon Snack  1 banana  1 tablespoon peanut butter    Evening Meal  3 ounces white fish, baked  1 cup broccoli, steamed  1 tablespoon grated parmesan, for broccoli  1 sweet potato, baked  1 teaspoon margarine, soft, tub    cup frozen yogurt    Copyright Academy of [employer] Dietetics. This handout may be duplicated for client education.     Please let me know if you have any questions.    Take care,    Rosemary Cordero, RD, LD  Clinical Dietitian  [facility]  [phone] (direct)

## 2025-06-17 NOTE — PROGRESS NOTES
Virtual Visit Details    Type of service:  Video Visit     Originating Location (pt. Location): Home    Distant Location (provider location):  On-site  Platform used for Video Visit: [facility] NUTRITION SERVICES - ASSESSMENT NOTE    Cortney Willoughby 45 year old referred for MNT related to Malignant neoplasm of overlapping sites of left breast in female, estrogen receptor positive (H) [C50.812, Z17.0]     Total Time Spent with patient: 45 min  Referred by: Jelani Pittman NP  Reason for referral: initial   Patient accompanied by: self     Patient Medical History  Past Medical History:   Diagnosis Date    Anxiety     Closed fracture of unspecified part of ulna (alone) 2006    Depression     Family history of malignant neoplasm of breast     Fibroid uterus     Hyperlipidemia     Hypothyroidism, unspecified type 05/02/2019    Insomnia     Ovarian cyst     Scoliosis (and kyphoscoliosis), idiopathic        Current Medications    Current Outpatient Medications:     dexAMETHasone (DECADRON) 4 MG tablet, Take 2 tablets (8 mg) by mouth daily. Start on Day 2 of Cycles 1 through 4., Disp: 6 tablet, Rfl: 3    levothyroxine (SYNTHROID/LEVOTHROID) 75 MCG tablet, Take 1 tablet (75 mcg) by mouth daily By her natural medicine doctor, Disp: , Rfl:     lidocaine-prilocaine (EMLA) 2.5-2.5 % external cream, Apply liberally to port site, 60-90 min prior to port access. Cover with plastic wrap, Disp: 30 g, Rfl: 1    liothyronine (CYTOMEL) 25 MCG tablet, Take 0.5 tablets (12.5 mcg) by mouth daily From natural medicine doctor, Disp: , Rfl:     methocarbamol (ROBAXIN) 750 MG tablet, Take 1 tablet (750 mg) by mouth 4 times daily as needed for muscle spasms (pain)., Disp: 20 tablet, Rfl: 0    ondansetron (ZOFRAN) 8 MG tablet, Take 1 tablet (8 mg) by mouth every 8 hours as needed for nausea (vomiting)., Disp: 30 tablet, Rfl: 2    prochlorperazine (COMPAZINE) 10 MG tablet, Take 1 tablet (10 mg) by mouth every 6 hours as needed for nausea or  vomiting., Disp: 30 tablet, Rfl: 2    SENNA-docusate sodium (SENNA S) 8.6-50 MG tablet, Take 1-2 tablets by mouth 2 times daily as needed (constipation)., Disp: 15 tablet, Rfl: 0    Tirzepatide (MOUNJARO SC), Inject 0.4 mLs subcutaneously every 7 days. Bottle is 16.6 mg/4.5 mL, Disp: , Rfl:     traZODone (DESYREL) 50 MG tablet, Take 2 tablets (100 mg) by mouth every evening as needed for sleep., Disp: 180 tablet, Rfl: 1  No current facility-administered medications for this visit.    Facility-Administered Medications Ordered in Other Visits:     heparin lock flush 100 unit/mL injection 5 mL, 5 mL, Intracatheter, Once PRN, Georgia Acuna MD, 5 mL at 06/17/25 0739    sodium chloride (PF) 0.9% PF flush 3-20 mL, 3-20 mL, Intracatheter, q1 min prn, Georgia Acuna MD, 20 mL at 06/17/25 0739    Medications have been reviewed.    Pertinent lab results:  HDL Cholesterol   Date Value Ref Range Status   06/18/2021 75 >49 mg/dL Final     Direct Measure HDL   Date Value Ref Range Status   06/03/2025 53 >=50 mg/dL Final     LDL Cholesterol Calculated   Date Value Ref Range Status   06/03/2025 110 (H) <100 mg/dL Final   06/18/2021 95 <100 mg/dL Final     Comment:     Desirable:       <100 mg/dl     Urea Nitrogen   Date Value Ref Range Status   05/06/2025 9.0 6.0 - 20.0 mg/dL Final   06/18/2021 12 7 - 30 mg/dL Final     Potassium   Date Value Ref Range Status   05/06/2025 4.1 3.4 - 5.3 mmol/L Final   06/18/2021 4.2 3.4 - 5.3 mmol/L Final       Labs have been reviewed and noted.    Treatment Plan:  Oncology History   Malignant neoplasm of overlapping sites of left breast in female, estrogen receptor positive (H)   2/13/2025 Initial Diagnosis    Malignant neoplasm of overlapping sites of left breast in female, estrogen receptor positive (H)     4/9/2025 -  Chemotherapy    OP ONC Breast Cancer Dose Dense - DOXOrubicin / Cyclophosphamide followed by Weekly PACLitaxel  Plan Provider: Georgia Acuna MD  Treatment goal: Curative  Line  "of treatment: Adjuvant       Treatment plan has been reviewed.    Food and Nutrition Related History  --Over the counter supplements: has stopped since starting chemotherapy; natural path provider recommended her supplements   --Food Allergies: NKFA  --Food Access: no known issues   --Physical Activity: Patient used to exercise 4-6 days per week prior to diagnosis. Patient lifting weights for upper body; patient walks dog. Patient starting to spin 1 minute on [place]  --Oncology treatment side effects: fatigue   --Factors effecting nutritional intake: none at this time     Patient Intermittent fasting -per thyroid MD recommendations. Patient fasts from 8 PM -12 PM. Patient frustrated with weight gain. Patient struggles with eating lunch. Patient previously used Power Plates for her lunch. Patient had been having Home  meals for years but has stopped in the past 2 weeks and meal planning. Patient focuses on getting in protein. Patient previously worked at [employer] and had hormone testing done.     Patient does not eat fish or seafood. Patient avoids frozen meals.     Fruit-berries    Likes sweets -ma have treat after lunch and dinner    Miralax 4 days after treatment to prevent constipation     Diet Recall  Breakfast Don't eat breakfast    Lunch Yogurt; sandwich [employer] at chemo    Dinner Protein, vegetable and starch -chicken or steak pork loin cheddarwurst; broccoli and green beans    Snacks Carrots and dip    Beverages Protein shakes (13 grams protein); water; tea; 1 diet Coke      ANTHROPOMETRICS  Height: 5'4.5\"  Weight: 151 lbs   BMI: 25.5 kg/m2   Weight Status:  Overweight BMI 25-29.9  IBW: 123 lbs +/-10% (122%)  Weight History:   Wt Readings from Last 10 Encounters:   06/10/25 68.5 kg (151 lb)   06/04/25 68.9 kg (152 lb)   05/30/25 69.1 kg (152 lb 4.8 oz)   05/21/25 68.3 kg (150 lb 9.6 oz)   05/07/25 68.7 kg (151 lb 6.4 oz)   04/23/25 67.2 kg (148 lb 3.2 oz)   04/22/25 65.8 kg (145 lb)   04/09/25 65.8 " kg (145 lb)   04/02/25 64.3 kg (141 lb 11.2 oz)   03/26/25 64.6 kg (142 lb 6.4 oz)      Dosing Weight: 68 kg    MALNUTRITION:  % Weight Loss:  None noted  % Intake:  No decreased intake noted  Subcutaneous Fat Loss:  None observed  Muscle Loss:  None observed  Fluid Retention:  None noted    Malnutrition Diagnosis: Patient does not meet two of the above criteria necessary for diagnosing malnutrition    ASSESSED NUTRITION NEEDS:  Estimated Energy Needs: 4508-6789 kcals (22-25 Kcal/Kg)  Justification: maintenance  Estimated Protein Needs: 71-88 grams protein (1.2-1.5 g pro/Kg)  Justification: preservation of lean body mass  Estimated Fluid Needs: 6614-6568  mL   Justification: maintenance    Nutrition Diagnosis:  Food and nutrition-related knowledge deficit related to lack of prior exposure as evidenced by no previous need for food and nutrition related recommendations      Intervention/Recommendations:  Provided written & verbal education:     - Reviewed nutrition and hydration needs.   Advised pt to aim for at least 1700 kcal and 70-90 g protein daily.   Advised pt to aim for 80 oz fluid daily.     - Discussed strategies to help fortify meals and snacks with calories and protein.   - Encouraged to focus on 5-6 small, frequent meals.   - Encouraged to have a protein source with each meal and snack.    - Discussed foods to fight cancer -legumes, whole grains, fruits, vegetables-especially cruciferous, omega 3 fatty acids. Suggest limit red meat <12 oz per week and limit/avoid processed foods. Recommend [employer] [place] [employer] as resource.  - Discussed cancer fatigue. Recommend movement for 2-3 minutes as able. Also suggest gradually increasing physical activity as exercise was part of lifestyle.      Provided pt with corresponding education materials/handouts on:  Academy of [employer] Nutrition During and After Cancer Treatment     Goals:  Add beans into diet  Aim for 70-90 grams protein  and 80 oz fluids per day     Monitor and Evaluation:  Progress towards goals will be monitored and evaluated per protocol and Practice Guidelines  Patient has RD contact information for further nutrition questions/concerns.     Rosemary Denins, RD, LD  Clinical Dietitian  [facility]  [phone] (direct)

## 2025-06-17 NOTE — LETTER
6/17/2025      Cortney Willoughby  [address] [address]      Dear Colleague,    Thank you for referring your patient, Cortney Willoughby, to the [facility]. Please see a copy of my visit note below.    Virtual Visit Details    Type of service:  Video Visit     Originating Location (pt. Location): Home    Distant Location (provider location):  On-site  Platform used for Video Visit: [facility]     CLINICAL NUTRITION SERVICES - ASSESSMENT NOTE    Cortney Willoughby 45 year old referred for MNT related to Malignant neoplasm of overlapping sites of left breast in female, estrogen receptor positive (H) [C50.812, Z17.0]     Total Time Spent with patient: 45 min  Referred by: Jelani Pittman NP  Reason for referral: initial   Patient accompanied by: self     Patient Medical History  Past Medical History:   Diagnosis Date     Anxiety      Closed fracture of unspecified part of ulna (alone) 2006     Depression      Family history of malignant neoplasm of breast      Fibroid uterus      Hyperlipidemia      Hypothyroidism, unspecified type 05/02/2019     Insomnia      Ovarian cyst      Scoliosis (and kyphoscoliosis), idiopathic        Current Medications    Current Outpatient Medications:      dexAMETHasone (DECADRON) 4 MG tablet, Take 2 tablets (8 mg) by mouth daily. Start on Day 2 of Cycles 1 through 4., Disp: 6 tablet, Rfl: 3     levothyroxine (SYNTHROID/LEVOTHROID) 75 MCG tablet, Take 1 tablet (75 mcg) by mouth daily By her natural medicine doctor, Disp: , Rfl:      lidocaine-prilocaine (EMLA) 2.5-2.5 % external cream, Apply liberally to port site, 60-90 min prior to port access. Cover with plastic wrap, Disp: 30 g, Rfl: 1     liothyronine (CYTOMEL) 25 MCG tablet, Take 0.5 tablets (12.5 mcg) by mouth daily From natural medicine doctor, Disp: , Rfl:      methocarbamol (ROBAXIN) 750 MG tablet, Take 1 tablet (750 mg) by mouth 4 times daily as needed for muscle spasms (pain)., Disp: 20 tablet,  Rfl: 0     ondansetron (ZOFRAN) 8 MG tablet, Take 1 tablet (8 mg) by mouth every 8 hours as needed for nausea (vomiting)., Disp: 30 tablet, Rfl: 2     prochlorperazine (COMPAZINE) 10 MG tablet, Take 1 tablet (10 mg) by mouth every 6 hours as needed for nausea or vomiting., Disp: 30 tablet, Rfl: 2     SENNA-docusate sodium (SENNA S) 8.6-50 MG tablet, Take 1-2 tablets by mouth 2 times daily as needed (constipation)., Disp: 15 tablet, Rfl: 0     Tirzepatide (MOUNJARO SC), Inject 0.4 mLs subcutaneously every 7 days. Bottle is 16.6 mg/4.5 mL, Disp: , Rfl:      traZODone (DESYREL) 50 MG tablet, Take 2 tablets (100 mg) by mouth every evening as needed for sleep., Disp: 180 tablet, Rfl: 1  No current facility-administered medications for this visit.    Facility-Administered Medications Ordered in Other Visits:      heparin lock flush 100 unit/mL injection 5 mL, 5 mL, Intracatheter, Once PRN, Georgia Acuna MD, 5 mL at 06/17/25 0739     sodium chloride (PF) 0.9% PF flush 3-20 mL, 3-20 mL, Intracatheter, q1 min prn, Georgia Acuna MD, 20 mL at 06/17/25 0739    Medications have been reviewed.    Pertinent lab results:  HDL Cholesterol   Date Value Ref Range Status   06/18/2021 75 >49 mg/dL Final     Direct Measure HDL   Date Value Ref Range Status   06/03/2025 53 >=50 mg/dL Final     LDL Cholesterol Calculated   Date Value Ref Range Status   06/03/2025 110 (H) <100 mg/dL Final   06/18/2021 95 <100 mg/dL Final     Comment:     Desirable:       <100 mg/dl     Urea Nitrogen   Date Value Ref Range Status   05/06/2025 9.0 6.0 - 20.0 mg/dL Final   06/18/2021 12 7 - 30 mg/dL Final     Potassium   Date Value Ref Range Status   05/06/2025 4.1 3.4 - 5.3 mmol/L Final   06/18/2021 4.2 3.4 - 5.3 mmol/L Final       Labs have been reviewed and noted.    Treatment Plan:  Oncology History   Malignant neoplasm of overlapping sites of left breast in female, estrogen receptor positive (H)   2/13/2025 Initial Diagnosis    Malignant neoplasm of  "overlapping sites of left breast in female, estrogen receptor positive (H)     4/9/2025 -  Chemotherapy    OP ONC Breast Cancer Dose Dense - DOXOrubicin / Cyclophosphamide followed by Weekly PACLitaxel  Plan Provider: Georgia Acuna MD  Treatment goal: Curative  Line of treatment: Adjuvant       Treatment plan has been reviewed.    Food and Nutrition Related History  --Over the counter supplements: has stopped since starting chemotherapy; natural path provider recommended her supplements   --Food Allergies: NKFA  --Food Access: no known issues   --Physical Activity: Patient used to exercise 4-6 days per week prior to diagnosis. Patient lifting weights for upper body; patient walks dog. Patient starting to spin 1 minute on [facility]  --Oncology treatment side effects: fatigue   --Factors effecting nutritional intake: none at this time     Patient Intermittent fasting -per thyroid MD recommendations. Patient fasts from 8 PM -12 PM. Patient frustrated with weight gain. Patient struggles with eating lunch. Patient previously used Power Plates for her lunch. Patient had been having Home  meals for years but has stopped in the past 2 weeks and meal planning. Patient focuses on getting in protein. Patient previously worked at [employer] and had hormone testing done.     Patient does not eat fish or seafood. Patient avoids frozen meals.     Fruit-berries    Likes sweets -ma have treat after lunch and dinner    Miralax 4 days after treatment to prevent constipation     Diet Recall  Breakfast Don't eat breakfast    Lunch Yogurt; sandwich [employer] at chemo    Dinner Protein, vegetable and starch -chicken or steak pork loin cheddarwurst; broccoli and green beans    Snacks Carrots and dip    Beverages Protein shakes (13 grams protein); water; tea; 1 diet Coke      ANTHROPOMETRICS  Height: 5'4.5\"  Weight: 151 lbs   BMI: 25.5 kg/m2   Weight Status:  Overweight BMI 25-29.9  IBW: 123 lbs +/-10% (122%)  Weight History:   Wt " Readings from Last 10 Encounters:   06/10/25 68.5 kg (151 lb)   06/04/25 68.9 kg (152 lb)   05/30/25 69.1 kg (152 lb 4.8 oz)   05/21/25 68.3 kg (150 lb 9.6 oz)   05/07/25 68.7 kg (151 lb 6.4 oz)   04/23/25 67.2 kg (148 lb 3.2 oz)   04/22/25 65.8 kg (145 lb)   04/09/25 65.8 kg (145 lb)   04/02/25 64.3 kg (141 lb 11.2 oz)   03/26/25 64.6 kg (142 lb 6.4 oz)      Dosing Weight: 68 kg    MALNUTRITION:  % Weight Loss:  None noted  % Intake:  No decreased intake noted  Subcutaneous Fat Loss:  None observed  Muscle Loss:  None observed  Fluid Retention:  None noted    Malnutrition Diagnosis: Patient does not meet two of the above criteria necessary for diagnosing malnutrition    ASSESSED NUTRITION NEEDS:  Estimated Energy Needs: 0045-3926 kcals (22-25 Kcal/Kg)  Justification: maintenance  Estimated Protein Needs: 71-88 grams protein (1.2-1.5 g pro/Kg)  Justification: preservation of lean body mass  Estimated Fluid Needs: 9529-6587  mL   Justification: maintenance    Nutrition Diagnosis:  Food and nutrition-related knowledge deficit related to lack of prior exposure as evidenced by no previous need for food and nutrition related recommendations      Intervention/Recommendations:  Provided written & verbal education:     - Reviewed nutrition and hydration needs.   Advised pt to aim for at least 1700 kcal and 70-90 g protein daily.   Advised pt to aim for 80 oz fluid daily.     - Discussed strategies to help fortify meals and snacks with calories and protein.   - Encouraged to focus on 5-6 small, frequent meals.   - Encouraged to have a protein source with each meal and snack.    - Discussed foods to fight cancer -legumes, whole grains, fruits, vegetables-especially cruciferous, omega 3 fatty acids. Suggest limit red meat <12 oz per week and limit/avoid processed foods. Recommend [employer] [place] [employer] as resource.  - Discussed cancer fatigue. Recommend movement for 2-3 minutes as able. Also suggest gradually  increasing physical activity as exercise was part of lifestyle.      Provided pt with corresponding education materials/handouts on:  [employer]y of [employer] Nutrition During and After Cancer Treatment     Goals:  Add beans into diet  Aim for 70-90 grams protein and 80 oz fluids per day     Monitor and Evaluation:  Progress towards goals will be monitored and evaluated per protocol and Practice Guidelines  Patient has RD contact information for further nutrition questions/concerns.     Rosemary Dennis, RD, LD  Clinical Dietitian  [facility]  [phone] (direct)        Again, thank you for allowing me to participate in the care of your patient.        Sincerely,        Arlet Mccarty RD, LD    Electronically signed

## 2025-06-18 ENCOUNTER — PATIENT OUTREACH (OUTPATIENT)
Dept: CARE COORDINATION | Facility: CLINIC | Age: 45
End: 2025-06-18
Payer: COMMERCIAL

## 2025-06-18 ENCOUNTER — INFUSION THERAPY VISIT (OUTPATIENT)
Dept: INFUSION THERAPY | Facility: CLINIC | Age: 45
End: 2025-06-18
Attending: INTERNAL MEDICINE
Payer: COMMERCIAL

## 2025-06-18 VITALS
BODY MASS INDEX: 26.16 KG/M2 | OXYGEN SATURATION: 97 % | SYSTOLIC BLOOD PRESSURE: 111 MMHG | WEIGHT: 154.8 LBS | DIASTOLIC BLOOD PRESSURE: 71 MMHG | HEART RATE: 101 BPM | RESPIRATION RATE: 18 BRPM | TEMPERATURE: 98.3 F

## 2025-06-18 DIAGNOSIS — Z17.0 MALIGNANT NEOPLASM OF OVERLAPPING SITES OF LEFT BREAST IN FEMALE, ESTROGEN RECEPTOR POSITIVE (H): Primary | ICD-10-CM

## 2025-06-18 DIAGNOSIS — C50.812 MALIGNANT NEOPLASM OF OVERLAPPING SITES OF LEFT BREAST IN FEMALE, ESTROGEN RECEPTOR POSITIVE (H): Primary | ICD-10-CM

## 2025-06-18 PROCEDURE — 250N000013 HC RX MED GY IP 250 OP 250 PS 637: Performed by: NURSE PRACTITIONER

## 2025-06-18 PROCEDURE — 96375 TX/PRO/DX INJ NEW DRUG ADDON: CPT

## 2025-06-18 PROCEDURE — 96413 CHEMO IV INFUSION 1 HR: CPT

## 2025-06-18 PROCEDURE — 258N000003 HC RX IP 258 OP 636: Performed by: NURSE PRACTITIONER

## 2025-06-18 PROCEDURE — 250N000011 HC RX IP 250 OP 636: Performed by: NURSE PRACTITIONER

## 2025-06-18 RX ORDER — HEPARIN SODIUM (PORCINE) LOCK FLUSH IV SOLN 100 UNIT/ML 100 UNIT/ML
5 SOLUTION INTRAVENOUS
Status: DISCONTINUED | OUTPATIENT
Start: 2025-06-18 | End: 2025-06-18 | Stop reason: HOSPADM

## 2025-06-18 RX ORDER — ONDANSETRON 2 MG/ML
8 INJECTION INTRAMUSCULAR; INTRAVENOUS ONCE
Status: COMPLETED | OUTPATIENT
Start: 2025-06-18 | End: 2025-06-18

## 2025-06-18 RX ORDER — DIPHENHYDRAMINE HCL 25 MG
50 CAPSULE ORAL
Status: DISCONTINUED | OUTPATIENT
Start: 2025-06-18 | End: 2025-06-18 | Stop reason: HOSPADM

## 2025-06-18 RX ADMIN — DIPHENHYDRAMINE HYDROCHLORIDE 50 MG: 25 CAPSULE ORAL at 12:49

## 2025-06-18 RX ADMIN — PACLITAXEL 137 MG: 6 INJECTION, SOLUTION INTRAVENOUS at 13:13

## 2025-06-18 RX ADMIN — ONDANSETRON 8 MG: 2 INJECTION INTRAMUSCULAR; INTRAVENOUS at 13:03

## 2025-06-18 RX ADMIN — HEPARIN 5 ML: 100 SYRINGE at 14:34

## 2025-06-18 RX ADMIN — SODIUM CHLORIDE 250 ML: 0.9 INJECTION, SOLUTION INTRAVENOUS at 13:03

## 2025-06-18 ASSESSMENT — PAIN SCALES - GENERAL: PAINLEVEL_OUTOF10: NO PAIN (0)

## 2025-06-18 NOTE — PROGRESS NOTES
Infusion Nursing Note:  Cortney Willoughby presents today for C7D1 Taxol.    Patient seen by provider today: No   present during visit today: Not Applicable.    Note: N/A.      Intravenous Access:  Implanted Port.    Treatment Conditions:  Lab Results   Component Value Date    HGB 11.9 06/17/2025    WBC 4.4 06/17/2025    ANEU 3.3 06/17/2025     06/17/2025        Lab Results   Component Value Date     05/06/2025    POTASSIUM 4.1 05/06/2025    CR 0.71 05/06/2025    BAYLEE 8.5 (L) 05/06/2025    BILITOTAL <0.2 06/03/2025    ALBUMIN 3.9 06/03/2025    ALT 18 06/03/2025    AST 17 06/03/2025       Results reviewed, labs MET treatment parameters, ok to proceed with treatment.      Post Infusion Assessment:  Patient tolerated infusion without incident.  Blood return noted pre and post infusion.  Site patent and intact, free from redness, edema or discomfort.  No evidence of extravasations.  Access discontinued per protocol.       Discharge Plan:   Discharge instructions reviewed with: Patient and Family.  Patient and/or family verbalized understanding of discharge instructions and all questions answered.  Patient discharged in stable condition accompanied by: self and friend.  Departure Mode: Ambulatory.      Selina Garcia RN

## 2025-06-23 RX ORDER — HEPARIN SODIUM,PORCINE 10 UNIT/ML
5-20 VIAL (ML) INTRAVENOUS DAILY PRN
Status: CANCELLED | OUTPATIENT
Start: 2025-06-25

## 2025-06-23 RX ORDER — METHYLPREDNISOLONE SODIUM SUCCINATE 40 MG/ML
40 INJECTION INTRAMUSCULAR; INTRAVENOUS
Status: CANCELLED
Start: 2025-06-25

## 2025-06-23 RX ORDER — DIPHENHYDRAMINE HYDROCHLORIDE 50 MG/ML
50 INJECTION, SOLUTION INTRAMUSCULAR; INTRAVENOUS
Status: CANCELLED
Start: 2025-06-25

## 2025-06-23 RX ORDER — MEPERIDINE HYDROCHLORIDE 25 MG/ML
25 INJECTION INTRAMUSCULAR; INTRAVENOUS; SUBCUTANEOUS
Status: CANCELLED | OUTPATIENT
Start: 2025-06-25

## 2025-06-23 RX ORDER — ALBUTEROL SULFATE 90 UG/1
1-2 INHALANT RESPIRATORY (INHALATION)
Status: CANCELLED
Start: 2025-06-25

## 2025-06-23 RX ORDER — DIPHENHYDRAMINE HYDROCHLORIDE 50 MG/ML
25 INJECTION, SOLUTION INTRAMUSCULAR; INTRAVENOUS
Status: CANCELLED
Start: 2025-06-25

## 2025-06-23 RX ORDER — EPINEPHRINE 1 MG/ML
0.3 INJECTION, SOLUTION INTRAMUSCULAR; SUBCUTANEOUS EVERY 5 MIN PRN
Status: CANCELLED | OUTPATIENT
Start: 2025-06-25

## 2025-06-23 RX ORDER — ONDANSETRON 2 MG/ML
8 INJECTION INTRAMUSCULAR; INTRAVENOUS ONCE
Status: CANCELLED | OUTPATIENT
Start: 2025-06-25

## 2025-06-23 RX ORDER — LORAZEPAM 2 MG/ML
0.5 INJECTION INTRAMUSCULAR EVERY 4 HOURS PRN
Status: CANCELLED | OUTPATIENT
Start: 2025-06-25

## 2025-06-23 RX ORDER — ALBUTEROL SULFATE 0.83 MG/ML
2.5 SOLUTION RESPIRATORY (INHALATION)
Status: CANCELLED | OUTPATIENT
Start: 2025-06-25

## 2025-06-23 RX ORDER — DIPHENHYDRAMINE HCL 25 MG
50 CAPSULE ORAL
Status: CANCELLED
Start: 2025-06-25

## 2025-06-23 RX ORDER — HEPARIN SODIUM (PORCINE) LOCK FLUSH IV SOLN 100 UNIT/ML 100 UNIT/ML
5 SOLUTION INTRAVENOUS
Status: CANCELLED | OUTPATIENT
Start: 2025-06-25

## 2025-06-24 ENCOUNTER — INFUSION THERAPY VISIT (OUTPATIENT)
Dept: INFUSION THERAPY | Facility: CLINIC | Age: 45
End: 2025-06-24
Attending: NURSE PRACTITIONER
Payer: COMMERCIAL

## 2025-06-24 DIAGNOSIS — Z17.0 MALIGNANT NEOPLASM OF OVERLAPPING SITES OF LEFT BREAST IN FEMALE, ESTROGEN RECEPTOR POSITIVE (H): Primary | ICD-10-CM

## 2025-06-24 DIAGNOSIS — C50.812 MALIGNANT NEOPLASM OF OVERLAPPING SITES OF LEFT BREAST IN FEMALE, ESTROGEN RECEPTOR POSITIVE (H): Primary | ICD-10-CM

## 2025-06-24 LAB
BASOPHILS # BLD AUTO: 0 10E3/UL (ref 0–0.2)
BASOPHILS NFR BLD AUTO: 1 %
EOSINOPHIL # BLD AUTO: 0 10E3/UL (ref 0–0.7)
EOSINOPHIL NFR BLD AUTO: 0 %
ERYTHROCYTE [DISTWIDTH] IN BLOOD BY AUTOMATED COUNT: 15.3 % (ref 10–15)
HCT VFR BLD AUTO: 32.6 % (ref 35–47)
HGB BLD-MCNC: 11.2 G/DL (ref 11.7–15.7)
IMM GRANULOCYTES # BLD: 0 10E3/UL
IMM GRANULOCYTES NFR BLD: 0 %
LYMPHOCYTES # BLD AUTO: 0.8 10E3/UL (ref 0.8–5.3)
LYMPHOCYTES NFR BLD AUTO: 26 %
MCH RBC QN AUTO: 31.6 PG (ref 26.5–33)
MCHC RBC AUTO-ENTMCNC: 34.4 G/DL (ref 31.5–36.5)
MCV RBC AUTO: 92 FL (ref 78–100)
MONOCYTES # BLD AUTO: 0.3 10E3/UL (ref 0–1.3)
MONOCYTES NFR BLD AUTO: 11 %
NEUTROPHILS # BLD AUTO: 1.9 10E3/UL (ref 1.6–8.3)
NEUTROPHILS NFR BLD AUTO: 63 %
NRBC # BLD AUTO: 0 10E3/UL
NRBC BLD AUTO-RTO: 0 /100
PLATELET # BLD AUTO: 270 10E3/UL (ref 150–450)
RBC # BLD AUTO: 3.54 10E6/UL (ref 3.8–5.2)
WBC # BLD AUTO: 3 10E3/UL (ref 4–11)

## 2025-06-24 PROCEDURE — 36415 COLL VENOUS BLD VENIPUNCTURE: CPT | Performed by: INTERNAL MEDICINE

## 2025-06-24 PROCEDURE — 85025 COMPLETE CBC W/AUTO DIFF WBC: CPT | Performed by: INTERNAL MEDICINE

## 2025-06-24 PROCEDURE — 250N000011 HC RX IP 250 OP 636: Performed by: INTERNAL MEDICINE

## 2025-06-24 PROCEDURE — 36591 DRAW BLOOD OFF VENOUS DEVICE: CPT

## 2025-06-24 RX ORDER — HEPARIN SODIUM (PORCINE) LOCK FLUSH IV SOLN 100 UNIT/ML 100 UNIT/ML
5 SOLUTION INTRAVENOUS
OUTPATIENT
Start: 2025-06-24

## 2025-06-24 RX ORDER — HEPARIN SODIUM (PORCINE) LOCK FLUSH IV SOLN 100 UNIT/ML 100 UNIT/ML
5 SOLUTION INTRAVENOUS
Status: DISCONTINUED | OUTPATIENT
Start: 2025-06-24 | End: 2025-06-24 | Stop reason: HOSPADM

## 2025-06-24 RX ORDER — HEPARIN SODIUM,PORCINE 10 UNIT/ML
5-20 VIAL (ML) INTRAVENOUS DAILY PRN
OUTPATIENT
Start: 2025-06-24

## 2025-06-24 RX ADMIN — Medication 5 ML: at 07:40

## 2025-06-24 NOTE — PROGRESS NOTES
Nursing Note:  Cortney Willoughby presents today for Port Labs.    Patient seen by provider today: No   present during visit today: Not Applicable.    Note: Pt reports no new health changes or concerns today.    Intravenous Access:  Labs drawn without difficulty.  Implanted Port.    Discharge Plan:   Patient was sent to home in stable conditions. Pt will return to clinic tomorrow for next appointment.    Meryl Zamudio RN

## 2025-06-25 ENCOUNTER — INFUSION THERAPY VISIT (OUTPATIENT)
Dept: INFUSION THERAPY | Facility: CLINIC | Age: 45
End: 2025-06-25
Attending: INTERNAL MEDICINE
Payer: COMMERCIAL

## 2025-06-25 VITALS
DIASTOLIC BLOOD PRESSURE: 67 MMHG | TEMPERATURE: 98 F | OXYGEN SATURATION: 98 % | BODY MASS INDEX: 26.63 KG/M2 | RESPIRATION RATE: 16 BRPM | HEART RATE: 92 BPM | WEIGHT: 157.6 LBS | SYSTOLIC BLOOD PRESSURE: 120 MMHG

## 2025-06-25 DIAGNOSIS — Z17.0 MALIGNANT NEOPLASM OF OVERLAPPING SITES OF LEFT BREAST IN FEMALE, ESTROGEN RECEPTOR POSITIVE (H): Primary | ICD-10-CM

## 2025-06-25 DIAGNOSIS — C50.812 MALIGNANT NEOPLASM OF OVERLAPPING SITES OF LEFT BREAST IN FEMALE, ESTROGEN RECEPTOR POSITIVE (H): Primary | ICD-10-CM

## 2025-06-25 PROCEDURE — 96413 CHEMO IV INFUSION 1 HR: CPT

## 2025-06-25 PROCEDURE — 96375 TX/PRO/DX INJ NEW DRUG ADDON: CPT

## 2025-06-25 PROCEDURE — 258N000003 HC RX IP 258 OP 636: Performed by: NURSE PRACTITIONER

## 2025-06-25 PROCEDURE — 250N000011 HC RX IP 250 OP 636: Performed by: NURSE PRACTITIONER

## 2025-06-25 RX ORDER — HEPARIN SODIUM (PORCINE) LOCK FLUSH IV SOLN 100 UNIT/ML 100 UNIT/ML
5 SOLUTION INTRAVENOUS
Status: DISCONTINUED | OUTPATIENT
Start: 2025-06-25 | End: 2025-06-25 | Stop reason: HOSPADM

## 2025-06-25 RX ORDER — ONDANSETRON 2 MG/ML
8 INJECTION INTRAMUSCULAR; INTRAVENOUS ONCE
Status: COMPLETED | OUTPATIENT
Start: 2025-06-25 | End: 2025-06-25

## 2025-06-25 RX ADMIN — PACLITAXEL 137 MG: 6 INJECTION, SOLUTION INTRAVENOUS at 13:00

## 2025-06-25 RX ADMIN — ONDANSETRON 8 MG: 2 INJECTION INTRAMUSCULAR; INTRAVENOUS at 12:54

## 2025-06-25 RX ADMIN — SODIUM CHLORIDE 250 ML: 0.9 INJECTION, SOLUTION INTRAVENOUS at 12:56

## 2025-06-25 RX ADMIN — Medication 5 ML: at 14:07

## 2025-06-25 NOTE — PROGRESS NOTES
Infusion Nursing Note:  Cortney Willoughby presents today for C8D1 Taxol.    Patient seen by provider today: No   present during visit today: Not Applicable.    Note: Ice to hands and feet during Taxol.      Intravenous Access:  Implanted Port.    Treatment Conditions:  Lab Results   Component Value Date    HGB 11.2 (L) 06/24/2025    WBC 3.0 (L) 06/24/2025    ANEU 1.9 06/24/2025     06/24/2025        Lab Results   Component Value Date     05/06/2025    POTASSIUM 4.1 05/06/2025    CR 0.71 05/06/2025    BAYLEE 8.5 (L) 05/06/2025    BILITOTAL <0.2 06/03/2025    ALBUMIN 3.9 06/03/2025    ALT 18 06/03/2025    AST 17 06/03/2025       Results reviewed, labs MET treatment parameters, ok to proceed with treatment.      Post Infusion Assessment:  Patient tolerated infusion without incident.  Blood return noted pre and post infusion.  Site patent and intact, free from redness, edema or discomfort.  No evidence of extravasations.  Access discontinued per protocol.       Discharge Plan:   AVS to patient via MYCHART.  Patient will return as prev macho for next appointment.   Patient discharged in stable condition accompanied by: friend.  Departure Mode: Ambulatory.      Pablo Wilkinson RN

## 2025-07-01 ENCOUNTER — INFUSION THERAPY VISIT (OUTPATIENT)
Dept: INFUSION THERAPY | Facility: CLINIC | Age: 45
End: 2025-07-01
Attending: NURSE PRACTITIONER
Payer: COMMERCIAL

## 2025-07-01 DIAGNOSIS — Z17.0 MALIGNANT NEOPLASM OF OVERLAPPING SITES OF LEFT BREAST IN FEMALE, ESTROGEN RECEPTOR POSITIVE (H): Primary | ICD-10-CM

## 2025-07-01 DIAGNOSIS — C50.812 MALIGNANT NEOPLASM OF OVERLAPPING SITES OF LEFT BREAST IN FEMALE, ESTROGEN RECEPTOR POSITIVE (H): Primary | ICD-10-CM

## 2025-07-01 LAB
ALBUMIN SERPL BCG-MCNC: 3.8 G/DL (ref 3.5–5.2)
ALP SERPL-CCNC: 62 U/L (ref 40–150)
ALT SERPL W P-5'-P-CCNC: 38 U/L (ref 0–50)
AST SERPL W P-5'-P-CCNC: 30 U/L (ref 0–45)
BASOPHILS # BLD AUTO: 0 10E3/UL (ref 0–0.2)
BASOPHILS NFR BLD AUTO: 1 %
BILIRUB DIRECT SERPL-MCNC: <0.08 MG/DL (ref 0–0.3)
BILIRUB SERPL-MCNC: <0.2 MG/DL
EOSINOPHIL # BLD AUTO: 0 10E3/UL (ref 0–0.7)
EOSINOPHIL NFR BLD AUTO: 0 %
ERYTHROCYTE [DISTWIDTH] IN BLOOD BY AUTOMATED COUNT: 14.6 % (ref 10–15)
HCT VFR BLD AUTO: 33 % (ref 35–47)
HGB BLD-MCNC: 11.3 G/DL (ref 11.7–15.7)
IMM GRANULOCYTES # BLD: 0 10E3/UL
IMM GRANULOCYTES NFR BLD: 0 %
LYMPHOCYTES # BLD AUTO: 0.9 10E3/UL (ref 0.8–5.3)
LYMPHOCYTES NFR BLD AUTO: 28 %
MCH RBC QN AUTO: 31.3 PG (ref 26.5–33)
MCHC RBC AUTO-ENTMCNC: 34.2 G/DL (ref 31.5–36.5)
MCV RBC AUTO: 91 FL (ref 78–100)
MONOCYTES # BLD AUTO: 0.3 10E3/UL (ref 0–1.3)
MONOCYTES NFR BLD AUTO: 8 %
NEUTROPHILS # BLD AUTO: 2 10E3/UL (ref 1.6–8.3)
NEUTROPHILS NFR BLD AUTO: 63 %
NRBC # BLD AUTO: 0 10E3/UL
NRBC BLD AUTO-RTO: 0 /100
PLATELET # BLD AUTO: 238 10E3/UL (ref 150–450)
PROT SERPL-MCNC: 6.1 G/DL (ref 6.4–8.3)
RBC # BLD AUTO: 3.61 10E6/UL (ref 3.8–5.2)
WBC # BLD AUTO: 3.1 10E3/UL (ref 4–11)

## 2025-07-01 PROCEDURE — 85004 AUTOMATED DIFF WBC COUNT: CPT | Performed by: INTERNAL MEDICINE

## 2025-07-01 PROCEDURE — 36591 DRAW BLOOD OFF VENOUS DEVICE: CPT | Performed by: INTERNAL MEDICINE

## 2025-07-01 PROCEDURE — 85014 HEMATOCRIT: CPT | Performed by: INTERNAL MEDICINE

## 2025-07-01 PROCEDURE — 82040 ASSAY OF SERUM ALBUMIN: CPT | Performed by: INTERNAL MEDICINE

## 2025-07-01 PROCEDURE — 250N000011 HC RX IP 250 OP 636: Performed by: INTERNAL MEDICINE

## 2025-07-01 RX ORDER — HEPARIN SODIUM (PORCINE) LOCK FLUSH IV SOLN 100 UNIT/ML 100 UNIT/ML
5 SOLUTION INTRAVENOUS
OUTPATIENT
Start: 2025-07-01

## 2025-07-01 RX ORDER — HEPARIN SODIUM,PORCINE 10 UNIT/ML
5-20 VIAL (ML) INTRAVENOUS DAILY PRN
OUTPATIENT
Start: 2025-07-01

## 2025-07-01 RX ORDER — HEPARIN SODIUM (PORCINE) LOCK FLUSH IV SOLN 100 UNIT/ML 100 UNIT/ML
5 SOLUTION INTRAVENOUS
Status: DISCONTINUED | OUTPATIENT
Start: 2025-07-01 | End: 2025-07-01 | Stop reason: HOSPADM

## 2025-07-01 RX ADMIN — Medication 5 ML: at 07:31

## 2025-07-01 NOTE — PROGRESS NOTES
[facility]    Hematology/Oncology Established Patient Note      Today's Date: 7/2/2025    Reason for visit: Left breast cancer.    HISTORY OF PRESENT ILLNESS: Cortney Willoughby is a 45 year old female status post hysterectomy for fibroids who presents with the following oncologic history:  1.  1/8/2025: Breast MRI showed 1.3 x 2 x 1 cm mass in left breast at 3:00-4:00, 8-9 cm from the nipple.  No lymphadenopathy.  Right breast negative.  2.  1/14/2025: Left breast ultrasound showed no definite abnormality corresponding to MRI abnormality as breast tissue was extremely dense with shadowing artifact scattered throughout glandular tissue related to breast density.  3.  1/27/2025: Left breast needle core biopsy showed grade 2 invasive ductal carcinoma, 8 mm in greatest size and associated grade 2 DCIS as well as PASH.  ER strongly positive at %, NV positive at 81-90%, HER2 IHC = 2+, FISH negative.  4. 3/05/2025: Bilateral mastectomies under care of Dr. Marisela Pena.  Pathology showed left breast with grade 2 invasive ductal carcinoma, 2.5 x 2 x 1.3 cm, associated grade 3 DCIS in 10 of 42 blocks, negative margins but DCIS focally 1 mm from anterior superior margin and 2 mm from posterior margin; no LVI; 1 of 2 left axillary SLNs positive for micrometastatic (1.5 mm) carcinoma. Right breast benign. Oncotype DX = 42, 5-year risk of distant recurrence of > 12% with endocrine therapy alone, adjuvant chemotherapy advised.  5. 4/9/2025: Started adjuvant chemo with dose-dense AC followed by weekly paclitaxel.    INTERVAL HISTORY:  Cortney reports feeling well on paclitaxel with no paresthesias. She reports hot flashes. She is waking twice per night to urinate.      REVIEW OF SYSTEMS:   14 point ROS was reviewed and is negative other than as noted above in HPI.       HOME MEDICATIONS:  Current Outpatient Medications   Medication Sig Dispense Refill    dexAMETHasone (DECADRON) 4 MG tablet Take 2 tablets  (8 mg) by mouth daily. Start on Day 2 of Cycles 1 through 4. 6 tablet 3    levothyroxine (SYNTHROID/LEVOTHROID) 75 MCG tablet Take 1 tablet (75 mcg) by mouth daily By her natural medicine doctor      lidocaine-prilocaine (EMLA) 2.5-2.5 % external cream Apply liberally to port site, 60-90 min prior to port access. Cover with plastic wrap 30 g 1    liothyronine (CYTOMEL) 25 MCG tablet Take 0.5 tablets (12.5 mcg) by mouth daily From natural medicine doctor      methocarbamol (ROBAXIN) 750 MG tablet Take 1 tablet (750 mg) by mouth 4 times daily as needed for muscle spasms (pain). 20 tablet 0    ondansetron (ZOFRAN) 8 MG tablet Take 1 tablet (8 mg) by mouth every 8 hours as needed for nausea (vomiting). 30 tablet 2    prochlorperazine (COMPAZINE) 10 MG tablet Take 1 tablet (10 mg) by mouth every 6 hours as needed for nausea or vomiting. 30 tablet 2    SENNA-docusate sodium (SENNA S) 8.6-50 MG tablet Take 1-2 tablets by mouth 2 times daily as needed (constipation). 15 tablet 0    Tirzepatide (MOUNJARO SC) Inject 0.4 mLs subcutaneously every 7 days. Bottle is 16.6 mg/4.5 mL (Patient not taking: Reported on 6/25/2025)      traZODone (DESYREL) 50 MG tablet Take 2 tablets (100 mg) by mouth every evening as needed for sleep. 180 tablet 1         ALLERGIES:  Allergies   Allergen Reactions    No Known Drug Allergy          PAST MEDICAL HISTORY:  Past Medical History:   Diagnosis Date    Anxiety     Closed fracture of unspecified part of ulna (alone) 2006    Depression     Family history of malignant neoplasm of breast     Fibroid uterus     Hyperlipidemia     Hypothyroidism, unspecified type 05/02/2019    Insomnia     Ovarian cyst     Scoliosis (and kyphoscoliosis), idiopathic          PAST SURGICAL HISTORY:  Past Surgical History:   Procedure Laterality Date    BIOPSY NODE SENTINEL Left 3/5/2025    Procedure: with left sentinel node biopsy;  Surgeon: Marisela Pena MD;  Location: [facility] OR    COSMETIC SURGERY  June 24 2018     Liposuction    CYSTOSCOPY N/A 10/20/2021    Procedure: diagnostic cystoscopy;  Surgeon: Tiffanie Oliveira MD;  Location: [facility] OR    INSERT PORT VASCULAR ACCESS N/A 4/2/2025    Procedure: INSERTION, VASCULAR ACCESS PORT;  Surgeon: Marisela Pena MD;  Location: [facility] OR    LAPAROSCOPIC HYSTERECTOMY SUPRACERVICAL N/A 10/20/2021    Procedure: laparascopic supracervical hysterectomy;  Surgeon: Tiffanie Oliveira MD;  Location: [facility] OR    LAPAROSCOPIC SALPINGECTOMY Bilateral 10/20/2021    Procedure: bilateral salpingectomy;  Surgeon: Tiffanie Oliveira MD;  Location: [facility] OR    LIPOSUCTION (LOCATION)      MASTECTOMY SIMPLE BILATERAL Bilateral 3/5/2025    Procedure: bilateral skin sparing mastectomies;  Surgeon: Marisela Pena MD;  Location: [facility] OR    RECONSTRUCT BREAST, INSERT TISSUE EXPANDER BILATERAL, COMBINED Bilateral 3/5/2025    Procedure: Bilateral breast reconstruction with tissue expander placement;  Surgeon: Jason Neil MD;  Location: [facility] OR         SOCIAL HISTORY:  Social History     Socioeconomic History    Marital status:      Spouse name: Not on file    Number of children: 0    Years of education: Not on file    Highest education level: Not on file   Occupational History    Occupation: Account Management leader     Employer: [employer]   Tobacco Use    Smoking status: Never    Smokeless tobacco: Never   Vaping Use    Vaping status: Never Used   Substance and Sexual Activity    Alcohol use: Yes     Comment: 2 drinks per week    Drug use: No    Sexual activity: Yes     Partners: Male     Birth control/protection: Female Surgical   Other Topics Concern     Service Not Asked    Blood Transfusions Not Asked    Caffeine Concern Not Asked    Occupational Exposure Not Asked    Hobby Hazards Not Asked    Sleep Concern Not Asked    Stress Concern Not Asked    Weight Concern Not Asked    Special Diet Not Asked    Back Care Not Asked    Exercise Yes    Bike Helmet Not Asked    Seat Belt Yes     Self-Exams Not Asked    Parent/sibling w/ CABG, MI or angioplasty before 65F 55M? No   Social History Narrative    Not on file     Social Drivers of Health     Financial Resource Strain: Low Risk  (5/25/2025)    Financial Resource Strain     Within the past 12 months, have you or your family members you live with been unable to get utilities (heat, electricity) when it was really needed?: No   Food Insecurity: Low Risk  (5/25/2025)    Food Insecurity     Within the past 12 months, did you worry that your food would run out before you got money to buy more?: No     Within the past 12 months, did the food you bought just not last and you didn t have money to get more?: No   Transportation Needs: Low Risk  (5/25/2025)    Transportation Needs     Within the past 12 months, has lack of transportation kept you from medical appointments, getting your medicines, non-medical meetings or appointments, work, or from getting things that you need?: No   Physical Activity: Sufficiently Active (5/25/2025)    Exercise Vital Sign     Days of Exercise per Week: 3 days     Minutes of Exercise per Session: 50 min   Stress: Stress Concern Present (5/25/2025)    [place] [employer] - Occupational Stress Questionnaire     Feeling of Stress : To some extent   Social Connections: Unknown (5/25/2025)    Social Connection and Isolation Panel [NHANES]     Frequency of Communication with Friends and Family: Not on file     Frequency of Social Gatherings with Friends and Family: Twice a week     Attends [employer] Services: Not on file     Active Member of Clubs or Organizations: Not on file     Attends Club or Organization Meetings: Not on file     Marital Status: Not on file   Interpersonal Safety: Low Risk  (5/30/2025)    Interpersonal Safety     Do you feel physically and emotionally safe where you currently live?: Yes     Within the past 12 months, have you been hit, slapped, kicked or otherwise physically hurt by  someone?: No     Within the past 12 months, have you been humiliated or emotionally abused in other ways by your partner or ex-partner?: No   Housing Stability: Low Risk  (2025)    Housing Stability     Do you have housing? : Yes     Are you worried about losing your housing?: No   Cortney is G0-P0.      FAMILY HISTORY:  Family History   Problem Relation Age of Onset    Substance Abuse Father         Alcohol and drugs    Breast Cancer Mother         44, negative for gene; was HER-2 positive    No Known Problems Sister     No Known Problems Brother     Diabetes Maternal Grandmother     Kidney Cancer Maternal Grandmother     Breast Cancer Maternal Grandmother         Kidney cancer    Thyroid Disease Maternal Grandmother         On medications    Other Cancer Maternal Grandmother         Kidney cancer    Prostate Cancer Maternal Grandfather     Diabetes Paternal Grandmother     Thyroid Disease Paternal Grandmother     Cancer Paternal Grandmother     Hypertension Paternal Grandfather     Lipids Paternal Grandfather     Esophageal Cancer Paternal Grandfather     Hyperlipidemia Paternal Grandfather     Other Cancer Paternal Grandfather         Esophageal cancer    Breast Cancer Maternal Aunt     Depression Maternal Uncle     Breast Cancer Other         Breast Cancer    Depression Other         PTSD maternal uncle    Depression Other         Maternal Uncle    Breast Cancer Maternal Great-Grandmother          PHYSICAL EXAM:  Vital signs:  LMP 07/10/2021 (Exact Date)    ECO  GENERAL: No acute distress but tearful at times today.  EYES: No scleral icterus. No overt erythema.  RESPIRATORY: No audible cough, wheezing, or labored breathing.  MUSCULOSKELETAL: Range of motion in the neck, shoulders, and arms appear normal.  SKIN: No overt rashes, discolorations, or lesions over the face and neck.  NEUROLOGIC: Alert.  No overt tremors.    LABS:  CBC RESULTS:   Recent Labs   Lab Test 25  0730   WBC 3.1*   RBC 3.61*    HGB 11.3*   HCT 33.0*   MCV 91   MCH 31.3   MCHC 34.2   RDW 14.6         Last Comprehensive Metabolic Panel:  Sodium   Date Value Ref Range Status   05/06/2025 139 135 - 145 mmol/L Final   06/18/2021 142 133 - 144 mmol/L Final     Potassium   Date Value Ref Range Status   05/06/2025 4.1 3.4 - 5.3 mmol/L Final   06/18/2021 4.2 3.4 - 5.3 mmol/L Final     Chloride   Date Value Ref Range Status   05/06/2025 107 98 - 107 mmol/L Final   06/18/2021 113 (H) 94 - 109 mmol/L Final     Carbon Dioxide   Date Value Ref Range Status   06/18/2021 26 20 - 32 mmol/L Final     Carbon Dioxide (CO2)   Date Value Ref Range Status   05/06/2025 22 22 - 29 mmol/L Final     Anion Gap   Date Value Ref Range Status   05/06/2025 10 7 - 15 mmol/L Final   06/18/2021 3 3 - 14 mmol/L Final     Glucose   Date Value Ref Range Status   05/06/2025 95 70 - 99 mg/dL Final   06/18/2021 90 70 - 99 mg/dL Final     Comment:     Fasting specimen     Urea Nitrogen   Date Value Ref Range Status   05/06/2025 9.0 6.0 - 20.0 mg/dL Final   06/18/2021 12 7 - 30 mg/dL Final     Creatinine   Date Value Ref Range Status   05/06/2025 0.71 0.51 - 0.95 mg/dL Final   06/18/2021 0.69 0.52 - 1.04 mg/dL Final     GFR Estimate   Date Value Ref Range Status   05/06/2025 >90 >60 mL/min/1.73m2 Final     Comment:     eGFR calculated using 2021 CKD-EPI equation.   06/18/2021 >90 >60 mL/min/[1.73_m2] Final     Comment:     Non  GFR Calc  Starting 12/18/2018, serum creatinine based estimated GFR (eGFR) will be   calculated using the Chronic Kidney Disease Epidemiology Collaboration   (CKD-EPI) equation.       Calcium   Date Value Ref Range Status   05/06/2025 8.5 (L) 8.8 - 10.4 mg/dL Final   06/18/2021 8.8 8.5 - 10.1 mg/dL Final     Bilirubin Total   Date Value Ref Range Status   07/01/2025 <0.2 <=1.2 mg/dL Final   06/18/2021 0.4 0.2 - 1.3 mg/dL Final     Alkaline Phosphatase   Date Value Ref Range Status   07/01/2025 62 40 - 150 U/L Final   06/18/2021 50 40  - 150 U/L Final     ALT   Date Value Ref Range Status   07/01/2025 38 0 - 50 U/L Final   06/18/2021 42 0 - 50 U/L Final     AST   Date Value Ref Range Status   07/01/2025 30 0 - 45 U/L Final   06/18/2021 33 0 - 45 U/L Final       PATHOLOGY:  Reviewed as per HPI.    IMAGING:  Reviewed as per HPI.    ASSESSMENT/PLAN:  Cortney Willoughby is a 45 year old female with the following issues:  1.  Pathologic prognostic stage IB, pT2-B3tl-N9, grade 2 invasive ductal carcinoma of the left breast, ER positive, DE positive, HER2 FISH negative, Oncotype DX = 42  2. Family history of breast cancer  3. BRCA-2 mutation  - Oncotype DX = 42, showing a predictive benefit of chemotherapy and high distant recurrence risk of > 12% if only endocrine therapy were utilized.  --Case discussed at breast tumor board -- adjuvant radiation therapy advised as well.  -- Reviewed labs today which show WBC 3.1, Hgb 11.3, platelets 238,000.  --Cortney may proceed with cycle 9 Taxol.  - 2/20/2025 DEXA scan showed mild osteopenia in her right hip femoral neck.  Advised adequate calcium, vitamin D, weight bearing exercise when able to resume full activity.  - Following completion of chemo, advised ovarian suppression therapy with Zoladex in combination with anastrozole. Will have her return to see me in August to discuss starting anastrozole.  --Fertility is not an issue as she is s/p hysterectomy (ovaries intact). However, I did recommend eventual removal of ovaries given her pathogenic BRCA-2 mutation. She will coordinate with Dr. Neil and Dr. Tiffanie Oliveira for BSO.    4. Anemia and leukopenia, chemo-induced  --Hemoglobin stable in 11-range.  WBCs stable in low 3-range. Continue to monitor CBC with each chemo.    5. Weight management  --Discussed with Cortney that she can resume tirzepatide after completion of chemo.    Georgia Acuna MD  [facility] Hematology/Oncology    Total time spent today: 40 minutes in chart review, patient evaluation,  counseling, documentation, test and/or medication/prescription orders, and coordination of care.      The longitudinal plan of care for the diagnosis(es)/condition(s) as documented were addressed during this visit. Due to the added complexity in care, I will continue to support Cortney in the subsequent management and with ongoing continuity of care.

## 2025-07-02 ENCOUNTER — ONCOLOGY VISIT (OUTPATIENT)
Dept: ONCOLOGY | Facility: CLINIC | Age: 45
End: 2025-07-02
Payer: COMMERCIAL

## 2025-07-02 ENCOUNTER — INFUSION THERAPY VISIT (OUTPATIENT)
Dept: INFUSION THERAPY | Facility: CLINIC | Age: 45
End: 2025-07-02
Attending: INTERNAL MEDICINE
Payer: COMMERCIAL

## 2025-07-02 VITALS
DIASTOLIC BLOOD PRESSURE: 76 MMHG | BODY MASS INDEX: 25.92 KG/M2 | RESPIRATION RATE: 16 BRPM | WEIGHT: 155.6 LBS | HEART RATE: 110 BPM | SYSTOLIC BLOOD PRESSURE: 113 MMHG | HEIGHT: 65 IN | TEMPERATURE: 98 F | OXYGEN SATURATION: 99 %

## 2025-07-02 DIAGNOSIS — T45.1X5A ANTINEOPLASTIC CHEMOTHERAPY INDUCED ANEMIA: ICD-10-CM

## 2025-07-02 DIAGNOSIS — D64.81 ANTINEOPLASTIC CHEMOTHERAPY INDUCED ANEMIA: ICD-10-CM

## 2025-07-02 DIAGNOSIS — C50.812 MALIGNANT NEOPLASM OF OVERLAPPING SITES OF LEFT BREAST IN FEMALE, ESTROGEN RECEPTOR POSITIVE (H): Primary | ICD-10-CM

## 2025-07-02 DIAGNOSIS — Z17.0 MALIGNANT NEOPLASM OF OVERLAPPING SITES OF LEFT BREAST IN FEMALE, ESTROGEN RECEPTOR POSITIVE (H): Primary | ICD-10-CM

## 2025-07-02 DIAGNOSIS — Z15.09 BRCA2 GENE MUTATION POSITIVE: ICD-10-CM

## 2025-07-02 DIAGNOSIS — Z15.01 BRCA2 GENE MUTATION POSITIVE: ICD-10-CM

## 2025-07-02 DIAGNOSIS — D70.1 LEUKOPENIA DUE TO ANTINEOPLASTIC CHEMOTHERAPY: ICD-10-CM

## 2025-07-02 DIAGNOSIS — T45.1X5A LEUKOPENIA DUE TO ANTINEOPLASTIC CHEMOTHERAPY: ICD-10-CM

## 2025-07-02 PROCEDURE — 96375 TX/PRO/DX INJ NEW DRUG ADDON: CPT

## 2025-07-02 PROCEDURE — 99215 OFFICE O/P EST HI 40 MIN: CPT | Performed by: INTERNAL MEDICINE

## 2025-07-02 PROCEDURE — 96413 CHEMO IV INFUSION 1 HR: CPT

## 2025-07-02 PROCEDURE — 250N000011 HC RX IP 250 OP 636: Performed by: INTERNAL MEDICINE

## 2025-07-02 PROCEDURE — 99213 OFFICE O/P EST LOW 20 MIN: CPT | Mod: 25 | Performed by: INTERNAL MEDICINE

## 2025-07-02 PROCEDURE — G2211 COMPLEX E/M VISIT ADD ON: HCPCS | Performed by: INTERNAL MEDICINE

## 2025-07-02 PROCEDURE — 258N000003 HC RX IP 258 OP 636: Performed by: INTERNAL MEDICINE

## 2025-07-02 RX ORDER — DIPHENHYDRAMINE HYDROCHLORIDE 50 MG/ML
25 INJECTION, SOLUTION INTRAMUSCULAR; INTRAVENOUS
Status: CANCELLED
Start: 2025-07-02

## 2025-07-02 RX ORDER — HEPARIN SODIUM,PORCINE 10 UNIT/ML
5-20 VIAL (ML) INTRAVENOUS DAILY PRN
Status: CANCELLED | OUTPATIENT
Start: 2025-07-02

## 2025-07-02 RX ORDER — DIPHENHYDRAMINE HYDROCHLORIDE 50 MG/ML
50 INJECTION, SOLUTION INTRAMUSCULAR; INTRAVENOUS
Status: CANCELLED
Start: 2025-07-02

## 2025-07-02 RX ORDER — HEPARIN SODIUM (PORCINE) LOCK FLUSH IV SOLN 100 UNIT/ML 100 UNIT/ML
5 SOLUTION INTRAVENOUS
Status: DISCONTINUED | OUTPATIENT
Start: 2025-07-02 | End: 2025-07-02 | Stop reason: HOSPADM

## 2025-07-02 RX ORDER — HEPARIN SODIUM (PORCINE) LOCK FLUSH IV SOLN 100 UNIT/ML 100 UNIT/ML
5 SOLUTION INTRAVENOUS
Status: CANCELLED | OUTPATIENT
Start: 2025-07-02

## 2025-07-02 RX ORDER — ALBUTEROL SULFATE 0.83 MG/ML
2.5 SOLUTION RESPIRATORY (INHALATION)
Status: CANCELLED | OUTPATIENT
Start: 2025-07-02

## 2025-07-02 RX ORDER — MEPERIDINE HYDROCHLORIDE 25 MG/ML
25 INJECTION INTRAMUSCULAR; INTRAVENOUS; SUBCUTANEOUS
Status: CANCELLED | OUTPATIENT
Start: 2025-07-02

## 2025-07-02 RX ORDER — DIPHENHYDRAMINE HCL 25 MG
50 CAPSULE ORAL
Status: CANCELLED
Start: 2025-07-02

## 2025-07-02 RX ORDER — EPINEPHRINE 1 MG/ML
0.3 INJECTION, SOLUTION INTRAMUSCULAR; SUBCUTANEOUS EVERY 5 MIN PRN
Status: CANCELLED | OUTPATIENT
Start: 2025-07-02

## 2025-07-02 RX ORDER — ONDANSETRON 2 MG/ML
8 INJECTION INTRAMUSCULAR; INTRAVENOUS ONCE
Status: CANCELLED | OUTPATIENT
Start: 2025-07-02

## 2025-07-02 RX ORDER — ONDANSETRON 2 MG/ML
8 INJECTION INTRAMUSCULAR; INTRAVENOUS ONCE
Status: COMPLETED | OUTPATIENT
Start: 2025-07-02 | End: 2025-07-02

## 2025-07-02 RX ORDER — METHYLPREDNISOLONE SODIUM SUCCINATE 40 MG/ML
40 INJECTION INTRAMUSCULAR; INTRAVENOUS
Status: CANCELLED
Start: 2025-07-02

## 2025-07-02 RX ORDER — ALBUTEROL SULFATE 90 UG/1
1-2 INHALANT RESPIRATORY (INHALATION)
Status: CANCELLED
Start: 2025-07-02

## 2025-07-02 RX ORDER — LORAZEPAM 2 MG/ML
0.5 INJECTION INTRAMUSCULAR EVERY 4 HOURS PRN
Status: CANCELLED | OUTPATIENT
Start: 2025-07-02

## 2025-07-02 RX ADMIN — PACLITAXEL 137 MG: 6 INJECTION, SOLUTION INTRAVENOUS at 13:00

## 2025-07-02 RX ADMIN — ONDANSETRON 8 MG: 2 INJECTION INTRAMUSCULAR; INTRAVENOUS at 12:45

## 2025-07-02 RX ADMIN — Medication 5 ML: at 14:03

## 2025-07-02 RX ADMIN — SODIUM CHLORIDE 250 ML: 0.9 INJECTION, SOLUTION INTRAVENOUS at 12:45

## 2025-07-02 ASSESSMENT — PAIN SCALES - GENERAL: PAINLEVEL_OUTOF10: NO PAIN (0)

## 2025-07-02 NOTE — PROGRESS NOTES
"Oncology Rooming Note    July 2, 2025 8:40 AM   Cortney Willoughby is a 45 year old female who presents for:    Chief Complaint   Patient presents with    Oncology Clinic Visit     Initial Vitals: /76   Pulse 110   Temp 98  F (36.7  C) (Oral)   Resp 16   Ht 1.638 m (5' 4.5\")   Wt 70.6 kg (155 lb 9.6 oz)   LMP 07/10/2021 (Exact Date)   SpO2 99%   BMI 26.30 kg/m   Estimated body mass index is 26.3 kg/m  as calculated from the following:    Height as of this encounter: 1.638 m (5' 4.5\").    Weight as of this encounter: 70.6 kg (155 lb 9.6 oz). Body surface area is 1.79 meters squared.  No Pain (0) Comment: Data Unavailable   Patient's last menstrual period was 07/10/2021 (exact date).  Allergies reviewed: Yes  Medications reviewed: Yes    Medications: Medication refills not needed today.  Pharmacy name entered into [employer]: [id number] [address] AT [facility]    Frailty Screening:   Is the patient here for a new oncology consult visit in cancer care? 2. No    PHQ9:  Did this patient require a PHQ9?: No        Inge Ruby MA            "

## 2025-07-02 NOTE — PROGRESS NOTES
Infusion Nursing Note:  Cortney Willoughby presents today for C9D1 Taxol.    Patient seen by provider today: Yes: Dr. Acuna   present during visit today: Not Applicable.    Note: N/A.      Intravenous Access:  Implanted Port.    Treatment Conditions:  Lab Results   Component Value Date    HGB 11.3 (L) 07/01/2025    WBC 3.1 (L) 07/01/2025    ANEU 2.0 07/01/2025     07/01/2025        Lab Results   Component Value Date     05/06/2025    POTASSIUM 4.1 05/06/2025    CR 0.71 05/06/2025    BAYLEE 8.5 (L) 05/06/2025    BILITOTAL <0.2 07/01/2025    ALBUMIN 3.8 07/01/2025    ALT 38 07/01/2025    AST 30 07/01/2025       Results reviewed, labs MET treatment parameters, ok to proceed with treatment.      Post Infusion Assessment:  Patient tolerated infusion without incident.  Blood return noted pre and post infusion.  Site patent and intact, free from redness, edema or discomfort.  No evidence of extravasations.  Access discontinued per protocol.       Discharge Plan:   Discharge instructions reviewed with: Patient and Family.  Patient and/or family verbalized understanding of discharge instructions and all questions answered.  AVS to patient via [employer].  Patient will return 7/9/25 for next appointment.   Patient discharged in stable condition accompanied by: self and .  Departure Mode: Ambulatory.      Leobardo Walker RN

## 2025-07-02 NOTE — LETTER
7/2/2025      Cortney Willoughby  [address] [place] [id number]      Dear Colleague,    Thank you for referring your patient, Cortney Willoughby, to the [facility]. Please see a copy of my visit note below.    [facility]    Hematology/Oncology Established Patient Note      Today's Date: 7/2/2025    Reason for visit: Left breast cancer.    HISTORY OF PRESENT ILLNESS: Cortney Willoughby is a 45 year old female status post hysterectomy for fibroids who presents with the following oncologic history:  1.  1/8/2025: Breast MRI showed 1.3 x 2 x 1 cm mass in left breast at 3:00-4:00, 8-9 cm from the nipple.  No lymphadenopathy.  Right breast negative.  2.  1/14/2025: Left breast ultrasound showed no definite abnormality corresponding to MRI abnormality as breast tissue was extremely dense with shadowing artifact scattered throughout glandular tissue related to breast density.  3.  1/27/2025: Left breast needle core biopsy showed grade 2 invasive ductal carcinoma, 8 mm in greatest size and associated grade 2 DCIS as well as PASH.  ER strongly positive at %, NV positive at 81-90%, HER2 IHC = 2+, FISH negative.  4. 3/05/2025: Bilateral mastectomies under care of Dr. Marisela Pena.  Pathology showed left breast with grade 2 invasive ductal carcinoma, 2.5 x 2 x 1.3 cm, associated grade 3 DCIS in 10 of 42 blocks, negative margins but DCIS focally 1 mm from anterior superior margin and 2 mm from posterior margin; no LVI; 1 of 2 left axillary SLNs positive for micrometastatic (1.5 mm) carcinoma. Right breast benign. Oncotype DX = 42, 5-year risk of distant recurrence of > 12% with endocrine therapy alone, adjuvant chemotherapy advised.  5. 4/9/2025: Started adjuvant chemo with dose-dense AC followed by weekly paclitaxel.    INTERVAL HISTORY:  Cortney reports feeling well on paclitaxel with no paresthesias. She reports hot flashes. She is waking twice per night to  urinate.      REVIEW OF SYSTEMS:   14 point ROS was reviewed and is negative other than as noted above in HPI.       HOME MEDICATIONS:  Current Outpatient Medications   Medication Sig Dispense Refill     dexAMETHasone (DECADRON) 4 MG tablet Take 2 tablets (8 mg) by mouth daily. Start on Day 2 of Cycles 1 through 4. 6 tablet 3     levothyroxine (SYNTHROID/LEVOTHROID) 75 MCG tablet Take 1 tablet (75 mcg) by mouth daily By her natural medicine doctor       lidocaine-prilocaine (EMLA) 2.5-2.5 % external cream Apply liberally to port site, 60-90 min prior to port access. Cover with plastic wrap 30 g 1     liothyronine (CYTOMEL) 25 MCG tablet Take 0.5 tablets (12.5 mcg) by mouth daily From natural medicine doctor       methocarbamol (ROBAXIN) 750 MG tablet Take 1 tablet (750 mg) by mouth 4 times daily as needed for muscle spasms (pain). 20 tablet 0     ondansetron (ZOFRAN) 8 MG tablet Take 1 tablet (8 mg) by mouth every 8 hours as needed for nausea (vomiting). 30 tablet 2     prochlorperazine (COMPAZINE) 10 MG tablet Take 1 tablet (10 mg) by mouth every 6 hours as needed for nausea or vomiting. 30 tablet 2     SENNA-docusate sodium (SENNA S) 8.6-50 MG tablet Take 1-2 tablets by mouth 2 times daily as needed (constipation). 15 tablet 0     Tirzepatide (MOUNJARO SC) Inject 0.4 mLs subcutaneously every 7 days. Bottle is 16.6 mg/4.5 mL (Patient not taking: Reported on 6/25/2025)       traZODone (DESYREL) 50 MG tablet Take 2 tablets (100 mg) by mouth every evening as needed for sleep. 180 tablet 1         ALLERGIES:  Allergies   Allergen Reactions     No Known Drug Allergy          PAST MEDICAL HISTORY:  Past Medical History:   Diagnosis Date     Anxiety      Closed fracture of unspecified part of ulna (alone) 2006     Depression      Family history of malignant neoplasm of breast      Fibroid uterus      Hyperlipidemia      Hypothyroidism, unspecified type 05/02/2019     Insomnia      Ovarian cyst      Scoliosis (and  kyphoscoliosis), idiopathic          PAST SURGICAL HISTORY:  Past Surgical History:   Procedure Laterality Date     BIOPSY NODE SENTINEL Left 3/5/2025    Procedure: with left sentinel node biopsy;  Surgeon: Marisela Pena MD;  Location:  OR     COSMETIC SURGERY  June 24 2018    Liposuction     CYSTOSCOPY N/A 10/20/2021    Procedure: diagnostic cystoscopy;  Surgeon: Tiffanie Oliveira MD;  Location:  OR     INSERT PORT VASCULAR ACCESS N/A 4/2/2025    Procedure: INSERTION, VASCULAR ACCESS PORT;  Surgeon: Marisela Pena MD;  Location:  OR     LAPAROSCOPIC HYSTERECTOMY SUPRACERVICAL N/A 10/20/2021    Procedure: laparascopic supracervical hysterectomy;  Surgeon: Tiffanie Oliveira MD;  Location:  OR     LAPAROSCOPIC SALPINGECTOMY Bilateral 10/20/2021    Procedure: bilateral salpingectomy;  Surgeon: Tiffanie Oliveira MD;  Location:  OR     LIPOSUCTION (LOCATION)       MASTECTOMY SIMPLE BILATERAL Bilateral 3/5/2025    Procedure: bilateral skin sparing mastectomies;  Surgeon: Marisela Pena MD;  Location:  OR     RECONSTRUCT BREAST, INSERT TISSUE EXPANDER BILATERAL, COMBINED Bilateral 3/5/2025    Procedure: Bilateral breast reconstruction with tissue expander placement;  Surgeon: Jason Neil MD;  Location:  OR         SOCIAL HISTORY:  Social History     Socioeconomic History     Marital status:      Spouse name: Not on file     Number of children: 0     Years of education: Not on file     Highest education level: Not on file   Occupational History     Occupation: Account Management leader     Employer: [employer]   Tobacco Use     Smoking status: Never     Smokeless tobacco: Never   Vaping Use     Vaping status: Never Used   Substance and Sexual Activity     Alcohol use: Yes     Comment: 2 drinks per week     Drug use: No     Sexual activity: Yes     Partners: Male     Birth control/protection: Female Surgical   Other Topics Concern      Service Not Asked     Blood  Transfusions Not Asked     Caffeine Concern Not Asked     Occupational Exposure Not Asked     Hobby Hazards Not Asked     Sleep Concern Not Asked     Stress Concern Not Asked     Weight Concern Not Asked     Special Diet Not Asked     Back Care Not Asked     Exercise Yes     Bike Helmet Not Asked     Seat Belt Yes     Self-Exams Not Asked     Parent/sibling w/ CABG, MI or angioplasty before 65F 55M? No   Social History Narrative     Not on file     Social Drivers of Health     Financial Resource Strain: Low Risk  (5/25/2025)    Financial Resource Strain      Within the past 12 months, have you or your family members you live with been unable to get utilities (heat, electricity) when it was really needed?: No   Food Insecurity: Low Risk  (5/25/2025)    Food Insecurity      Within the past 12 months, did you worry that your food would run out before you got money to buy more?: No      Within the past 12 months, did the food you bought just not last and you didn t have money to get more?: No   Transportation Needs: Low Risk  (5/25/2025)    Transportation Needs      Within the past 12 months, has lack of transportation kept you from medical appointments, getting your medicines, non-medical meetings or appointments, work, or from getting things that you need?: No   Physical Activity: Sufficiently Active (5/25/2025)    Exercise Vital Sign      Days of Exercise per Week: 3 days      Minutes of Exercise per Session: 50 min   Stress: Stress Concern Present (5/25/2025)    [employer] - Occupational Stress Questionnaire      Feeling of Stress : To some extent   Social Connections: Unknown (5/25/2025)    Social Connection and Isolation Panel [NHANES]      Frequency of Communication with Friends and Family: Not on file      Frequency of Social Gatherings with Friends and Family: Twice a week      Attends Temple Services: Not on file      Active Member of Clubs or Organizations: Not on file       Attends Club or Organization Meetings: Not on file      Marital Status: Not on file   Interpersonal Safety: Low Risk  (2025)    Interpersonal Safety      Do you feel physically and emotionally safe where you currently live?: Yes      Within the past 12 months, have you been hit, slapped, kicked or otherwise physically hurt by someone?: No      Within the past 12 months, have you been humiliated or emotionally abused in other ways by your partner or ex-partner?: No   Housing Stability: Low Risk  (2025)    Housing Stability      Do you have housing? : Yes      Are you worried about losing your housing?: No   Cortney is G0-P0.      FAMILY HISTORY:  Family History   Problem Relation Age of Onset     Substance Abuse Father         Alcohol and drugs     Breast Cancer Mother         44, negative for gene; was HER-2 positive     No Known Problems Sister      No Known Problems Brother      Diabetes Maternal Grandmother      Kidney Cancer Maternal Grandmother      Breast Cancer Maternal Grandmother         Kidney cancer     Thyroid Disease Maternal Grandmother         On medications     Other Cancer Maternal Grandmother         Kidney cancer     Prostate Cancer Maternal Grandfather      Diabetes Paternal Grandmother      Thyroid Disease Paternal Grandmother      Cancer Paternal Grandmother      Hypertension Paternal Grandfather      Lipids Paternal Grandfather      Esophageal Cancer Paternal Grandfather      Hyperlipidemia Paternal Grandfather      Other Cancer Paternal Grandfather         Esophageal cancer     Breast Cancer Maternal Aunt      Depression Maternal Uncle      Breast Cancer Other         Breast Cancer     Depression Other         PTSD maternal uncle     Depression Other         Maternal Uncle     Breast Cancer Maternal Great-Grandmother          PHYSICAL EXAM:  Vital signs:  LMP 07/10/2021 (Exact Date)    ECO  GENERAL: No acute distress but tearful at times today.  EYES: No scleral icterus. No  overt erythema.  RESPIRATORY: No audible cough, wheezing, or labored breathing.  MUSCULOSKELETAL: Range of motion in the neck, shoulders, and arms appear normal.  SKIN: No overt rashes, discolorations, or lesions over the face and neck.  NEUROLOGIC: Alert.  No overt tremors.    LABS:  CBC RESULTS:   Recent Labs   Lab Test 07/01/25  0730   WBC 3.1*   RBC 3.61*   HGB 11.3*   HCT 33.0*   MCV 91   MCH 31.3   MCHC 34.2   RDW 14.6         Last Comprehensive Metabolic Panel:  Sodium   Date Value Ref Range Status   05/06/2025 139 135 - 145 mmol/L Final   06/18/2021 142 133 - 144 mmol/L Final     Potassium   Date Value Ref Range Status   05/06/2025 4.1 3.4 - 5.3 mmol/L Final   06/18/2021 4.2 3.4 - 5.3 mmol/L Final     Chloride   Date Value Ref Range Status   05/06/2025 107 98 - 107 mmol/L Final   06/18/2021 113 (H) 94 - 109 mmol/L Final     Carbon Dioxide   Date Value Ref Range Status   06/18/2021 26 20 - 32 mmol/L Final     Carbon Dioxide (CO2)   Date Value Ref Range Status   05/06/2025 22 22 - 29 mmol/L Final     Anion Gap   Date Value Ref Range Status   05/06/2025 10 7 - 15 mmol/L Final   06/18/2021 3 3 - 14 mmol/L Final     Glucose   Date Value Ref Range Status   05/06/2025 95 70 - 99 mg/dL Final   06/18/2021 90 70 - 99 mg/dL Final     Comment:     Fasting specimen     Urea Nitrogen   Date Value Ref Range Status   05/06/2025 9.0 6.0 - 20.0 mg/dL Final   06/18/2021 12 7 - 30 mg/dL Final     Creatinine   Date Value Ref Range Status   05/06/2025 0.71 0.51 - 0.95 mg/dL Final   06/18/2021 0.69 0.52 - 1.04 mg/dL Final     GFR Estimate   Date Value Ref Range Status   05/06/2025 >90 >60 mL/min/1.73m2 Final     Comment:     eGFR calculated using 2021 CKD-EPI equation.   06/18/2021 >90 >60 mL/min/[1.73_m2] Final     Comment:     Non  GFR Calc  Starting 12/18/2018, serum creatinine based estimated GFR (eGFR) will be   calculated using the Chronic Kidney Disease Epidemiology Collaboration   (CKD-EPI)  equation.       Calcium   Date Value Ref Range Status   05/06/2025 8.5 (L) 8.8 - 10.4 mg/dL Final   06/18/2021 8.8 8.5 - 10.1 mg/dL Final     Bilirubin Total   Date Value Ref Range Status   07/01/2025 <0.2 <=1.2 mg/dL Final   06/18/2021 0.4 0.2 - 1.3 mg/dL Final     Alkaline Phosphatase   Date Value Ref Range Status   07/01/2025 62 40 - 150 U/L Final   06/18/2021 50 40 - 150 U/L Final     ALT   Date Value Ref Range Status   07/01/2025 38 0 - 50 U/L Final   06/18/2021 42 0 - 50 U/L Final     AST   Date Value Ref Range Status   07/01/2025 30 0 - 45 U/L Final   06/18/2021 33 0 - 45 U/L Final       PATHOLOGY:  Reviewed as per HPI.    IMAGING:  Reviewed as per HPI.    ASSESSMENT/PLAN:  Cortney Willoughby is a 45 year old female with the following issues:  1.  Pathologic prognostic stage IB, pT2-X9fv-W3, grade 2 invasive ductal carcinoma of the left breast, ER positive, NC positive, HER2 FISH negative, Oncotype DX = 42  2. Family history of breast cancer  3. BRCA-2 mutation  - Oncotype DX = 42, showing a predictive benefit of chemotherapy and high distant recurrence risk of > 12% if only endocrine therapy were utilized.  --Case discussed at breast tumor board -- adjuvant radiation therapy advised as well.  -- Reviewed labs today which show WBC 3.1, Hgb 11.3, platelets 238,000.  --Cortney may proceed with cycle 9 Taxol.  - 2/20/2025 DEXA scan showed mild osteopenia in her right hip femoral neck.  Advised adequate calcium, vitamin D, weight bearing exercise when able to resume full activity.  - Following completion of chemo, advised ovarian suppression therapy with Zoladex in combination with anastrozole. Will have her return to see me in August to discuss starting anastrozole.  --Fertility is not an issue as she is s/p hysterectomy (ovaries intact). However, I did recommend eventual removal of ovaries given her pathogenic BRCA-2 mutation. She will coordinate with Dr. Neil and Dr. Tiffanie Oliveira for BSO.    4. Anemia and  "leukopenia, chemo-induced  --Hemoglobin stable in 11-range.  WBCs stable in low 3-range. Continue to monitor CBC with each chemo.    5. Weight management  --Discussed with Cortney that she can resume tirzepatide after completion of chemo.    Georgia Acuna MD  [facility] Hematology/Oncology    Total time spent today: 40 minutes in chart review, patient evaluation, counseling, documentation, test and/or medication/prescription orders, and coordination of care.      The longitudinal plan of care for the diagnosis(es)/condition(s) as documented were addressed during this visit. Due to the added complexity in care, I will continue to support Cortney in the subsequent management and with ongoing continuity of care.    Oncology Rooming Note    July 2, 2025 8:40 AM   Cortney Willoughby is a 45 year old female who presents for:    Chief Complaint   Patient presents with     Oncology Clinic Visit     Initial Vitals: /76   Pulse 110   Temp 98  F (36.7  C) (Oral)   Resp 16   Ht 1.638 m (5' 4.5\")   Wt 70.6 kg (155 lb 9.6 oz)   LMP 07/10/2021 (Exact Date)   SpO2 99%   BMI 26.30 kg/m   Estimated body mass index is 26.3 kg/m  as calculated from the following:    Height as of this encounter: 1.638 m (5' 4.5\").    Weight as of this encounter: 70.6 kg (155 lb 9.6 oz). Body surface area is 1.79 meters squared.  No Pain (0) Comment: Data Unavailable   Patient's last menstrual period was 07/10/2021 (exact date).  Allergies reviewed: Yes  Medications reviewed: Yes    Medications: Medication refills not needed today.  Pharmacy name entered into [facility]: [id number] [place], [place] - [address] AT [facility]    Frailty Screening:   Is the patient here for a new oncology consult visit in cancer care? 2. No    PHQ9:  Did this patient require a PHQ9?: No        Inge Ruby MA              Again, thank you for allowing me to participate in the care of your patient.  "       Sincerely,        Georgia Acuna MD    Electronically signed

## 2025-07-07 RX ORDER — ALBUTEROL SULFATE 0.83 MG/ML
2.5 SOLUTION RESPIRATORY (INHALATION)
Status: CANCELLED | OUTPATIENT
Start: 2025-07-09

## 2025-07-07 RX ORDER — DIPHENHYDRAMINE HYDROCHLORIDE 50 MG/ML
25 INJECTION, SOLUTION INTRAMUSCULAR; INTRAVENOUS
Status: CANCELLED
Start: 2025-07-09

## 2025-07-07 RX ORDER — HEPARIN SODIUM,PORCINE 10 UNIT/ML
5-20 VIAL (ML) INTRAVENOUS DAILY PRN
Status: CANCELLED | OUTPATIENT
Start: 2025-07-09

## 2025-07-07 RX ORDER — ALBUTEROL SULFATE 90 UG/1
1-2 INHALANT RESPIRATORY (INHALATION)
Status: CANCELLED
Start: 2025-07-09

## 2025-07-07 RX ORDER — DIPHENHYDRAMINE HYDROCHLORIDE 50 MG/ML
50 INJECTION, SOLUTION INTRAMUSCULAR; INTRAVENOUS
Status: CANCELLED
Start: 2025-07-09

## 2025-07-07 RX ORDER — ONDANSETRON 2 MG/ML
8 INJECTION INTRAMUSCULAR; INTRAVENOUS ONCE
Status: CANCELLED | OUTPATIENT
Start: 2025-07-09

## 2025-07-07 RX ORDER — MEPERIDINE HYDROCHLORIDE 25 MG/ML
25 INJECTION INTRAMUSCULAR; INTRAVENOUS; SUBCUTANEOUS
Status: CANCELLED | OUTPATIENT
Start: 2025-07-09

## 2025-07-07 RX ORDER — DIPHENHYDRAMINE HCL 25 MG
50 CAPSULE ORAL
Status: CANCELLED
Start: 2025-07-09

## 2025-07-07 RX ORDER — LORAZEPAM 2 MG/ML
0.5 INJECTION INTRAMUSCULAR EVERY 4 HOURS PRN
Status: CANCELLED | OUTPATIENT
Start: 2025-07-09

## 2025-07-07 RX ORDER — EPINEPHRINE 1 MG/ML
0.3 INJECTION, SOLUTION, CONCENTRATE INTRAVENOUS EVERY 5 MIN PRN
Status: CANCELLED | OUTPATIENT
Start: 2025-07-09

## 2025-07-07 RX ORDER — HEPARIN SODIUM (PORCINE) LOCK FLUSH IV SOLN 100 UNIT/ML 100 UNIT/ML
5 SOLUTION INTRAVENOUS
Status: CANCELLED | OUTPATIENT
Start: 2025-07-09

## 2025-07-08 ENCOUNTER — INFUSION THERAPY VISIT (OUTPATIENT)
Dept: INFUSION THERAPY | Facility: CLINIC | Age: 45
End: 2025-07-08
Attending: NURSE PRACTITIONER
Payer: COMMERCIAL

## 2025-07-08 DIAGNOSIS — C50.812 MALIGNANT NEOPLASM OF OVERLAPPING SITES OF LEFT BREAST IN FEMALE, ESTROGEN RECEPTOR POSITIVE (H): Primary | ICD-10-CM

## 2025-07-08 DIAGNOSIS — Z17.0 MALIGNANT NEOPLASM OF OVERLAPPING SITES OF LEFT BREAST IN FEMALE, ESTROGEN RECEPTOR POSITIVE (H): Primary | ICD-10-CM

## 2025-07-08 LAB
BASOPHILS # BLD AUTO: 0 10E3/UL (ref 0–0.2)
BASOPHILS NFR BLD AUTO: 1 %
EOSINOPHIL # BLD AUTO: 0 10E3/UL (ref 0–0.7)
EOSINOPHIL NFR BLD AUTO: 0 %
ERYTHROCYTE [DISTWIDTH] IN BLOOD BY AUTOMATED COUNT: 14.4 % (ref 10–15)
GIANT PLATELETS BLD QL SMEAR: SLIGHT
HCT VFR BLD AUTO: 31.6 % (ref 35–47)
HGB BLD-MCNC: 10.8 G/DL (ref 11.7–15.7)
IMM GRANULOCYTES # BLD: 0 10E3/UL
IMM GRANULOCYTES NFR BLD: 0 %
LYMPHOCYTES # BLD AUTO: 0.9 10E3/UL (ref 0.8–5.3)
LYMPHOCYTES NFR BLD AUTO: 31 %
MCH RBC QN AUTO: 31.4 PG (ref 26.5–33)
MCHC RBC AUTO-ENTMCNC: 34.2 G/DL (ref 31.5–36.5)
MCV RBC AUTO: 92 FL (ref 78–100)
MONOCYTES # BLD AUTO: 0.2 10E3/UL (ref 0–1.3)
MONOCYTES NFR BLD AUTO: 8 %
NEUTROPHILS # BLD AUTO: 1.6 10E3/UL (ref 1.6–8.3)
NEUTROPHILS NFR BLD AUTO: 60 %
NRBC # BLD AUTO: 0 10E3/UL
NRBC BLD AUTO-RTO: 0 /100
PLAT MORPH BLD: ABNORMAL
PLATELET # BLD AUTO: 193 10E3/UL (ref 150–450)
RBC # BLD AUTO: 3.44 10E6/UL (ref 3.8–5.2)
RBC MORPH BLD: ABNORMAL
VARIANT LYMPHS BLD QL SMEAR: PRESENT
WBC # BLD AUTO: 2.7 10E3/UL (ref 4–11)

## 2025-07-08 PROCEDURE — 250N000011 HC RX IP 250 OP 636: Performed by: INTERNAL MEDICINE

## 2025-07-08 PROCEDURE — 85025 COMPLETE CBC W/AUTO DIFF WBC: CPT | Performed by: INTERNAL MEDICINE

## 2025-07-08 PROCEDURE — 36591 DRAW BLOOD OFF VENOUS DEVICE: CPT | Performed by: INTERNAL MEDICINE

## 2025-07-08 RX ORDER — HEPARIN SODIUM,PORCINE 10 UNIT/ML
5-20 VIAL (ML) INTRAVENOUS DAILY PRN
OUTPATIENT
Start: 2025-07-08

## 2025-07-08 RX ORDER — HEPARIN SODIUM (PORCINE) LOCK FLUSH IV SOLN 100 UNIT/ML 100 UNIT/ML
5 SOLUTION INTRAVENOUS
OUTPATIENT
Start: 2025-07-08

## 2025-07-08 RX ORDER — HEPARIN SODIUM (PORCINE) LOCK FLUSH IV SOLN 100 UNIT/ML 100 UNIT/ML
5 SOLUTION INTRAVENOUS
Status: DISCONTINUED | OUTPATIENT
Start: 2025-07-08 | End: 2025-07-08 | Stop reason: HOSPADM

## 2025-07-08 RX ADMIN — HEPARIN 5 ML: 100 SYRINGE at 08:18

## 2025-07-08 NOTE — PROGRESS NOTES
Nursing Note:  Cortney Willoughby presents today for labs.    Patient seen by provider today: No   present during visit today: Not Applicable.    Note: N/A.    Intravenous Access:  Labs drawn without difficulty.  Implanted Port.    Discharge Plan:   Patient was sent to home for tomorrow's appointment.    Ya Pelayo RN

## 2025-07-09 ENCOUNTER — INFUSION THERAPY VISIT (OUTPATIENT)
Dept: INFUSION THERAPY | Facility: CLINIC | Age: 45
End: 2025-07-09
Attending: INTERNAL MEDICINE
Payer: COMMERCIAL

## 2025-07-09 VITALS
BODY MASS INDEX: 26.42 KG/M2 | HEIGHT: 65 IN | TEMPERATURE: 97.7 F | HEART RATE: 96 BPM | RESPIRATION RATE: 16 BRPM | SYSTOLIC BLOOD PRESSURE: 104 MMHG | DIASTOLIC BLOOD PRESSURE: 70 MMHG | OXYGEN SATURATION: 97 % | WEIGHT: 158.6 LBS

## 2025-07-09 DIAGNOSIS — Z17.0 MALIGNANT NEOPLASM OF OVERLAPPING SITES OF LEFT BREAST IN FEMALE, ESTROGEN RECEPTOR POSITIVE (H): Primary | ICD-10-CM

## 2025-07-09 DIAGNOSIS — C50.812 MALIGNANT NEOPLASM OF OVERLAPPING SITES OF LEFT BREAST IN FEMALE, ESTROGEN RECEPTOR POSITIVE (H): Primary | ICD-10-CM

## 2025-07-09 PROCEDURE — 96375 TX/PRO/DX INJ NEW DRUG ADDON: CPT

## 2025-07-09 PROCEDURE — 250N000011 HC RX IP 250 OP 636: Performed by: INTERNAL MEDICINE

## 2025-07-09 PROCEDURE — 96413 CHEMO IV INFUSION 1 HR: CPT

## 2025-07-09 PROCEDURE — 258N000003 HC RX IP 258 OP 636: Performed by: INTERNAL MEDICINE

## 2025-07-09 RX ORDER — HEPARIN SODIUM (PORCINE) LOCK FLUSH IV SOLN 100 UNIT/ML 100 UNIT/ML
5 SOLUTION INTRAVENOUS
Status: DISCONTINUED | OUTPATIENT
Start: 2025-07-09 | End: 2025-07-09 | Stop reason: HOSPADM

## 2025-07-09 RX ORDER — ONDANSETRON 2 MG/ML
8 INJECTION INTRAMUSCULAR; INTRAVENOUS ONCE
Status: COMPLETED | OUTPATIENT
Start: 2025-07-09 | End: 2025-07-09

## 2025-07-09 RX ADMIN — Medication 5 ML: at 14:10

## 2025-07-09 RX ADMIN — ONDANSETRON 8 MG: 2 INJECTION INTRAMUSCULAR; INTRAVENOUS at 12:58

## 2025-07-09 RX ADMIN — SODIUM CHLORIDE 250 ML: 0.9 INJECTION, SOLUTION INTRAVENOUS at 12:56

## 2025-07-09 RX ADMIN — PACLITAXEL 137 MG: 6 INJECTION, SOLUTION INTRAVENOUS at 13:07

## 2025-07-09 ASSESSMENT — PAIN SCALES - GENERAL: PAINLEVEL_OUTOF10: NO PAIN (0)

## 2025-07-09 NOTE — PROGRESS NOTES
Infusion Nursing Note:  Cortney Willoughby presents today for C10D1 Taxol.    Patient seen by provider today: No   present during visit today: Not Applicable.    Note: Patient reports feeling well, denies any concerns today. Denies any changes since exam with Dr. Acuna on 7/2. Denies fevers/chills, cough/SOB, nausea/vomiting, bowel concerns, skin concerns or peripheral neuropathy. Uses own ice packs for hands and feet during taxol infusion.      Intravenous Access:  Implanted Port.    Treatment Conditions:  Lab Results   Component Value Date    HGB 10.8 (L) 07/08/2025    WBC 2.7 (L) 07/08/2025    ANEU 1.6 07/08/2025     07/08/2025     Results reviewed, labs MET treatment parameters, ok to proceed with treatment.      Post Infusion Assessment:  Patient tolerated infusion without incident.  Blood return noted pre and post infusion.  Site patent and intact, free from redness, edema or discomfort.  No evidence of extravasations.  Access discontinued per protocol.       Discharge Plan:   Discharge instructions reviewed with: Patient.  Patient and/or family verbalized understanding of discharge instructions and all questions answered.  AVS to patient via [employer].  Patient will return 7/15 & 7/16 for next appointment.   Patient discharged in stable condition accompanied by: friend.  Departure Mode: Ambulatory.      Angelita Dunlap RN

## 2025-07-13 RX ORDER — HEPARIN SODIUM (PORCINE) LOCK FLUSH IV SOLN 100 UNIT/ML 100 UNIT/ML
5 SOLUTION INTRAVENOUS
Status: CANCELLED | OUTPATIENT
Start: 2025-07-16

## 2025-07-13 RX ORDER — HEPARIN SODIUM,PORCINE 10 UNIT/ML
5-20 VIAL (ML) INTRAVENOUS DAILY PRN
Status: CANCELLED | OUTPATIENT
Start: 2025-07-16

## 2025-07-13 RX ORDER — MEPERIDINE HYDROCHLORIDE 25 MG/ML
25 INJECTION INTRAMUSCULAR; INTRAVENOUS; SUBCUTANEOUS
Status: CANCELLED | OUTPATIENT
Start: 2025-07-16

## 2025-07-13 RX ORDER — ALBUTEROL SULFATE 90 UG/1
1-2 INHALANT RESPIRATORY (INHALATION)
Status: CANCELLED
Start: 2025-07-16

## 2025-07-13 RX ORDER — DIPHENHYDRAMINE HYDROCHLORIDE 50 MG/ML
25 INJECTION, SOLUTION INTRAMUSCULAR; INTRAVENOUS
Status: CANCELLED
Start: 2025-07-16

## 2025-07-13 RX ORDER — LORAZEPAM 2 MG/ML
0.5 INJECTION INTRAMUSCULAR EVERY 4 HOURS PRN
Status: CANCELLED | OUTPATIENT
Start: 2025-07-16

## 2025-07-13 RX ORDER — DIPHENHYDRAMINE HYDROCHLORIDE 50 MG/ML
50 INJECTION, SOLUTION INTRAMUSCULAR; INTRAVENOUS
Status: CANCELLED
Start: 2025-07-16

## 2025-07-13 RX ORDER — EPINEPHRINE 1 MG/ML
0.3 INJECTION, SOLUTION, CONCENTRATE INTRAVENOUS EVERY 5 MIN PRN
Status: CANCELLED | OUTPATIENT
Start: 2025-07-16

## 2025-07-13 RX ORDER — ONDANSETRON 2 MG/ML
8 INJECTION INTRAMUSCULAR; INTRAVENOUS ONCE
Status: CANCELLED | OUTPATIENT
Start: 2025-07-16

## 2025-07-13 RX ORDER — ALBUTEROL SULFATE 0.83 MG/ML
2.5 SOLUTION RESPIRATORY (INHALATION)
Status: CANCELLED | OUTPATIENT
Start: 2025-07-16

## 2025-07-13 RX ORDER — DIPHENHYDRAMINE HCL 25 MG
50 CAPSULE ORAL
Status: CANCELLED
Start: 2025-07-16

## 2025-07-15 ENCOUNTER — INFUSION THERAPY VISIT (OUTPATIENT)
Dept: INFUSION THERAPY | Facility: CLINIC | Age: 45
End: 2025-07-15
Attending: NURSE PRACTITIONER
Payer: COMMERCIAL

## 2025-07-15 DIAGNOSIS — Z17.0 MALIGNANT NEOPLASM OF OVERLAPPING SITES OF LEFT BREAST IN FEMALE, ESTROGEN RECEPTOR POSITIVE (H): Primary | ICD-10-CM

## 2025-07-15 DIAGNOSIS — C50.812 MALIGNANT NEOPLASM OF OVERLAPPING SITES OF LEFT BREAST IN FEMALE, ESTROGEN RECEPTOR POSITIVE (H): Primary | ICD-10-CM

## 2025-07-15 LAB
BASOPHILS # BLD AUTO: 0 10E3/UL (ref 0–0.2)
BASOPHILS NFR BLD AUTO: 1 %
EOSINOPHIL # BLD AUTO: 0 10E3/UL (ref 0–0.7)
EOSINOPHIL NFR BLD AUTO: 0 %
ERYTHROCYTE [DISTWIDTH] IN BLOOD BY AUTOMATED COUNT: 13.9 % (ref 10–15)
HCT VFR BLD AUTO: 34.2 % (ref 35–47)
HGB BLD-MCNC: 11.5 G/DL (ref 11.7–15.7)
IMM GRANULOCYTES # BLD: 0 10E3/UL
IMM GRANULOCYTES NFR BLD: 0 %
LYMPHOCYTES # BLD AUTO: 0.9 10E3/UL (ref 0.8–5.3)
LYMPHOCYTES NFR BLD AUTO: 34 %
MCH RBC QN AUTO: 31.5 PG (ref 26.5–33)
MCHC RBC AUTO-ENTMCNC: 33.6 G/DL (ref 31.5–36.5)
MCV RBC AUTO: 94 FL (ref 78–100)
MONOCYTES # BLD AUTO: 0.2 10E3/UL (ref 0–1.3)
MONOCYTES NFR BLD AUTO: 8 %
NEUTROPHILS # BLD AUTO: 1.5 10E3/UL (ref 1.6–8.3)
NEUTROPHILS NFR BLD AUTO: 58 %
NRBC # BLD AUTO: 0 10E3/UL
NRBC BLD AUTO-RTO: 0 /100
PLATELET # BLD AUTO: 244 10E3/UL (ref 150–450)
RBC # BLD AUTO: 3.65 10E6/UL (ref 3.8–5.2)
WBC # BLD AUTO: 2.6 10E3/UL (ref 4–11)

## 2025-07-15 PROCEDURE — 85004 AUTOMATED DIFF WBC COUNT: CPT | Performed by: INTERNAL MEDICINE

## 2025-07-15 PROCEDURE — 250N000011 HC RX IP 250 OP 636: Performed by: INTERNAL MEDICINE

## 2025-07-15 PROCEDURE — 36591 DRAW BLOOD OFF VENOUS DEVICE: CPT | Performed by: INTERNAL MEDICINE

## 2025-07-15 RX ORDER — HEPARIN SODIUM (PORCINE) LOCK FLUSH IV SOLN 100 UNIT/ML 100 UNIT/ML
5 SOLUTION INTRAVENOUS
OUTPATIENT
Start: 2025-07-15

## 2025-07-15 RX ORDER — HEPARIN SODIUM (PORCINE) LOCK FLUSH IV SOLN 100 UNIT/ML 100 UNIT/ML
5 SOLUTION INTRAVENOUS
Status: DISCONTINUED | OUTPATIENT
Start: 2025-07-15 | End: 2025-07-15 | Stop reason: HOSPADM

## 2025-07-15 RX ORDER — HEPARIN SODIUM,PORCINE 10 UNIT/ML
5-20 VIAL (ML) INTRAVENOUS DAILY PRN
OUTPATIENT
Start: 2025-07-15

## 2025-07-15 RX ADMIN — Medication 5 ML: at 07:40

## 2025-07-15 NOTE — PROGRESS NOTES
Nursing Note:  Cortney Willoughby presents today for port labs.    Patient seen by provider today: No   present during visit today: Not Applicable.    Note: N/A.    Intravenous Access:  Implanted Port.    Discharge Plan:   Patient was sent home for 7/16 appointment.    Selina Garcia RN

## 2025-07-16 ENCOUNTER — INFUSION THERAPY VISIT (OUTPATIENT)
Dept: INFUSION THERAPY | Facility: CLINIC | Age: 45
End: 2025-07-16
Attending: INTERNAL MEDICINE
Payer: COMMERCIAL

## 2025-07-16 VITALS
TEMPERATURE: 98.1 F | HEART RATE: 98 BPM | WEIGHT: 158 LBS | SYSTOLIC BLOOD PRESSURE: 144 MMHG | DIASTOLIC BLOOD PRESSURE: 71 MMHG | OXYGEN SATURATION: 98 % | RESPIRATION RATE: 16 BRPM | HEIGHT: 64 IN | BODY MASS INDEX: 26.98 KG/M2

## 2025-07-16 DIAGNOSIS — Z17.0 MALIGNANT NEOPLASM OF OVERLAPPING SITES OF LEFT BREAST IN FEMALE, ESTROGEN RECEPTOR POSITIVE (H): Primary | ICD-10-CM

## 2025-07-16 DIAGNOSIS — C50.812 MALIGNANT NEOPLASM OF OVERLAPPING SITES OF LEFT BREAST IN FEMALE, ESTROGEN RECEPTOR POSITIVE (H): Primary | ICD-10-CM

## 2025-07-16 PROCEDURE — 250N000011 HC RX IP 250 OP 636: Performed by: INTERNAL MEDICINE

## 2025-07-16 PROCEDURE — 96413 CHEMO IV INFUSION 1 HR: CPT

## 2025-07-16 PROCEDURE — 258N000003 HC RX IP 258 OP 636: Performed by: INTERNAL MEDICINE

## 2025-07-16 PROCEDURE — 96375 TX/PRO/DX INJ NEW DRUG ADDON: CPT

## 2025-07-16 RX ORDER — ONDANSETRON 2 MG/ML
8 INJECTION INTRAMUSCULAR; INTRAVENOUS ONCE
Status: COMPLETED | OUTPATIENT
Start: 2025-07-16 | End: 2025-07-16

## 2025-07-16 RX ORDER — HEPARIN SODIUM (PORCINE) LOCK FLUSH IV SOLN 100 UNIT/ML 100 UNIT/ML
5 SOLUTION INTRAVENOUS
Status: DISCONTINUED | OUTPATIENT
Start: 2025-07-16 | End: 2025-07-16 | Stop reason: HOSPADM

## 2025-07-16 RX ADMIN — PACLITAXEL 145 MG: 6 INJECTION, SOLUTION INTRAVENOUS at 13:01

## 2025-07-16 RX ADMIN — ONDANSETRON 8 MG: 2 INJECTION INTRAMUSCULAR; INTRAVENOUS at 12:55

## 2025-07-16 RX ADMIN — SODIUM CHLORIDE 250 ML: 0.9 INJECTION, SOLUTION INTRAVENOUS at 12:55

## 2025-07-16 RX ADMIN — SODIUM CHLORIDE, PRESERVATIVE FREE 5 ML: 5 INJECTION INTRAVENOUS at 14:02

## 2025-07-16 NOTE — PROGRESS NOTES
Infusion Nursing Note:  Cortney Willoughby presents today for C11D1 Taxol.    Patient seen by provider today: No   present during visit today: Not Applicable.    Note: N/A.      Intravenous Access:  Implanted Port.    Treatment Conditions:  Lab Results   Component Value Date    HGB 11.5 (L) 07/15/2025    WBC 2.6 (L) 07/15/2025    ANEU 1.5 (L) 07/15/2025     07/15/2025        Lab Results   Component Value Date     05/06/2025    POTASSIUM 4.1 05/06/2025    CR 0.71 05/06/2025    BAYLEE 8.5 (L) 05/06/2025    BILITOTAL <0.2 07/01/2025    ALBUMIN 3.8 07/01/2025    ALT 38 07/01/2025    AST 30 07/01/2025       Results reviewed, labs MET treatment parameters, ok to proceed with treatment.      Post Infusion Assessment:  Patient tolerated infusion without incident.  Blood return noted pre and post infusion.  Site patent and intact, free from redness, edema or discomfort.  No evidence of extravasations.  Access discontinued per protocol.       Discharge Plan:   Discharge instructions reviewed with: Patient.  Patient and/or family verbalized understanding of discharge instructions and all questions answered.  AVS to patient via [employer].  Patient will return 7/23/25 for next appointment.   Patient discharged in stable condition accompanied by: friend.  Departure Mode: Ambulatory.      Arcadio Rider RN

## 2025-07-20 RX ORDER — ONDANSETRON 2 MG/ML
8 INJECTION INTRAMUSCULAR; INTRAVENOUS ONCE
Status: CANCELLED | OUTPATIENT
Start: 2025-07-23

## 2025-07-20 RX ORDER — HEPARIN SODIUM (PORCINE) LOCK FLUSH IV SOLN 100 UNIT/ML 100 UNIT/ML
5 SOLUTION INTRAVENOUS
Status: CANCELLED | OUTPATIENT
Start: 2025-07-23

## 2025-07-20 RX ORDER — EPINEPHRINE 1 MG/ML
0.3 INJECTION, SOLUTION, CONCENTRATE INTRAVENOUS EVERY 5 MIN PRN
Status: CANCELLED | OUTPATIENT
Start: 2025-07-23

## 2025-07-20 RX ORDER — HEPARIN SODIUM,PORCINE 10 UNIT/ML
5-20 VIAL (ML) INTRAVENOUS DAILY PRN
Status: CANCELLED | OUTPATIENT
Start: 2025-07-23

## 2025-07-20 RX ORDER — LORAZEPAM 2 MG/ML
0.5 INJECTION INTRAMUSCULAR EVERY 4 HOURS PRN
Status: CANCELLED | OUTPATIENT
Start: 2025-07-23

## 2025-07-20 RX ORDER — MEPERIDINE HYDROCHLORIDE 25 MG/ML
25 INJECTION INTRAMUSCULAR; INTRAVENOUS; SUBCUTANEOUS
Status: CANCELLED | OUTPATIENT
Start: 2025-07-23

## 2025-07-20 RX ORDER — DIPHENHYDRAMINE HYDROCHLORIDE 50 MG/ML
25 INJECTION, SOLUTION INTRAMUSCULAR; INTRAVENOUS
Status: CANCELLED
Start: 2025-07-23

## 2025-07-20 RX ORDER — ALBUTEROL SULFATE 0.83 MG/ML
2.5 SOLUTION RESPIRATORY (INHALATION)
Status: CANCELLED | OUTPATIENT
Start: 2025-07-23

## 2025-07-20 RX ORDER — DIPHENHYDRAMINE HYDROCHLORIDE 50 MG/ML
50 INJECTION, SOLUTION INTRAMUSCULAR; INTRAVENOUS
Status: CANCELLED
Start: 2025-07-23

## 2025-07-20 RX ORDER — ALBUTEROL SULFATE 90 UG/1
1-2 INHALANT RESPIRATORY (INHALATION)
Status: CANCELLED
Start: 2025-07-23

## 2025-07-20 RX ORDER — DIPHENHYDRAMINE HCL 25 MG
50 CAPSULE ORAL
Status: CANCELLED
Start: 2025-07-23

## 2025-07-22 ENCOUNTER — INFUSION THERAPY VISIT (OUTPATIENT)
Dept: INFUSION THERAPY | Facility: CLINIC | Age: 45
End: 2025-07-22
Attending: INTERNAL MEDICINE
Payer: COMMERCIAL

## 2025-07-22 DIAGNOSIS — C50.812 MALIGNANT NEOPLASM OF OVERLAPPING SITES OF LEFT BREAST IN FEMALE, ESTROGEN RECEPTOR POSITIVE (H): Primary | ICD-10-CM

## 2025-07-22 DIAGNOSIS — Z17.0 MALIGNANT NEOPLASM OF OVERLAPPING SITES OF LEFT BREAST IN FEMALE, ESTROGEN RECEPTOR POSITIVE (H): Primary | ICD-10-CM

## 2025-07-22 LAB
BASOPHILS # BLD AUTO: 0 10E3/UL (ref 0–0.2)
BASOPHILS NFR BLD AUTO: 1 %
EOSINOPHIL # BLD AUTO: 0 10E3/UL (ref 0–0.7)
EOSINOPHIL NFR BLD AUTO: 0 %
ERYTHROCYTE [DISTWIDTH] IN BLOOD BY AUTOMATED COUNT: 13.4 % (ref 10–15)
HCT VFR BLD AUTO: 35.7 % (ref 35–47)
HGB BLD-MCNC: 11.9 G/DL (ref 11.7–15.7)
IMM GRANULOCYTES # BLD: 0 10E3/UL
IMM GRANULOCYTES NFR BLD: 0 %
LYMPHOCYTES # BLD AUTO: 1 10E3/UL (ref 0.8–5.3)
LYMPHOCYTES NFR BLD AUTO: 37 %
MCH RBC QN AUTO: 31.4 PG (ref 26.5–33)
MCHC RBC AUTO-ENTMCNC: 33.3 G/DL (ref 31.5–36.5)
MCV RBC AUTO: 94 FL (ref 78–100)
MONOCYTES # BLD AUTO: 0.2 10E3/UL (ref 0–1.3)
MONOCYTES NFR BLD AUTO: 8 %
NEUTROPHILS # BLD AUTO: 1.5 10E3/UL (ref 1.6–8.3)
NEUTROPHILS NFR BLD AUTO: 55 %
NRBC # BLD AUTO: 0 10E3/UL
NRBC BLD AUTO-RTO: 0 /100
PLATELET # BLD AUTO: 238 10E3/UL (ref 150–450)
RBC # BLD AUTO: 3.79 10E6/UL (ref 3.8–5.2)
WBC # BLD AUTO: 2.7 10E3/UL (ref 4–11)

## 2025-07-22 PROCEDURE — 250N000011 HC RX IP 250 OP 636: Performed by: INTERNAL MEDICINE

## 2025-07-22 PROCEDURE — 36591 DRAW BLOOD OFF VENOUS DEVICE: CPT | Performed by: INTERNAL MEDICINE

## 2025-07-22 PROCEDURE — 85025 COMPLETE CBC W/AUTO DIFF WBC: CPT | Performed by: INTERNAL MEDICINE

## 2025-07-22 RX ORDER — HEPARIN SODIUM (PORCINE) LOCK FLUSH IV SOLN 100 UNIT/ML 100 UNIT/ML
5 SOLUTION INTRAVENOUS
OUTPATIENT
Start: 2025-07-22

## 2025-07-22 RX ORDER — HEPARIN SODIUM,PORCINE 10 UNIT/ML
5-20 VIAL (ML) INTRAVENOUS DAILY PRN
OUTPATIENT
Start: 2025-07-22

## 2025-07-22 RX ORDER — HEPARIN SODIUM (PORCINE) LOCK FLUSH IV SOLN 100 UNIT/ML 100 UNIT/ML
5 SOLUTION INTRAVENOUS
Status: DISCONTINUED | OUTPATIENT
Start: 2025-07-22 | End: 2025-07-22 | Stop reason: HOSPADM

## 2025-07-22 RX ADMIN — Medication 5 ML: at 07:31

## 2025-07-23 ENCOUNTER — INFUSION THERAPY VISIT (OUTPATIENT)
Dept: INFUSION THERAPY | Facility: CLINIC | Age: 45
End: 2025-07-23
Attending: INTERNAL MEDICINE
Payer: COMMERCIAL

## 2025-07-23 VITALS
HEIGHT: 64 IN | HEART RATE: 110 BPM | SYSTOLIC BLOOD PRESSURE: 112 MMHG | TEMPERATURE: 98 F | WEIGHT: 156 LBS | BODY MASS INDEX: 26.63 KG/M2 | DIASTOLIC BLOOD PRESSURE: 76 MMHG | RESPIRATION RATE: 16 BRPM | OXYGEN SATURATION: 95 %

## 2025-07-23 DIAGNOSIS — Z17.0 MALIGNANT NEOPLASM OF OVERLAPPING SITES OF LEFT BREAST IN FEMALE, ESTROGEN RECEPTOR POSITIVE (H): Primary | ICD-10-CM

## 2025-07-23 DIAGNOSIS — C50.812 MALIGNANT NEOPLASM OF OVERLAPPING SITES OF LEFT BREAST IN FEMALE, ESTROGEN RECEPTOR POSITIVE (H): Primary | ICD-10-CM

## 2025-07-23 PROCEDURE — 96375 TX/PRO/DX INJ NEW DRUG ADDON: CPT

## 2025-07-23 PROCEDURE — 96413 CHEMO IV INFUSION 1 HR: CPT

## 2025-07-23 PROCEDURE — 258N000003 HC RX IP 258 OP 636: Performed by: INTERNAL MEDICINE

## 2025-07-23 PROCEDURE — 250N000011 HC RX IP 250 OP 636: Performed by: INTERNAL MEDICINE

## 2025-07-23 RX ORDER — HEPARIN SODIUM (PORCINE) LOCK FLUSH IV SOLN 100 UNIT/ML 100 UNIT/ML
5 SOLUTION INTRAVENOUS
Status: DISCONTINUED | OUTPATIENT
Start: 2025-07-23 | End: 2025-07-23 | Stop reason: HOSPADM

## 2025-07-23 RX ORDER — ONDANSETRON 2 MG/ML
8 INJECTION INTRAMUSCULAR; INTRAVENOUS ONCE
Status: COMPLETED | OUTPATIENT
Start: 2025-07-23 | End: 2025-07-23

## 2025-07-23 RX ADMIN — PACLITAXEL 145 MG: 6 INJECTION, SOLUTION INTRAVENOUS at 12:52

## 2025-07-23 RX ADMIN — ONDANSETRON 8 MG: 2 INJECTION INTRAMUSCULAR; INTRAVENOUS at 12:44

## 2025-07-23 RX ADMIN — SODIUM CHLORIDE 250 ML: 0.9 INJECTION, SOLUTION INTRAVENOUS at 12:44

## 2025-07-23 RX ADMIN — Medication 5 ML: at 13:56

## 2025-07-23 ASSESSMENT — PAIN SCALES - GENERAL: PAINLEVEL_OUTOF10: NO PAIN (0)

## 2025-07-23 NOTE — PROGRESS NOTES
Infusion Nursing Note:  Cortney ILYA Willoughby presents today for Taxol.    Patient seen by provider today: No   present during visit today: Not Applicable.    Note: ice to hands and feet during Taxol.    Intravenous Access:  Implanted Port.    Treatment Conditions:  Lab Results   Component Value Date    HGB 11.9 07/22/2025    WBC 2.7 (L) 07/22/2025    ANEU 1.5 (L) 07/22/2025     07/22/2025        Results reviewed, labs MET treatment parameters, ok to proceed with treatment.    Post Infusion Assessment:  Patient tolerated infusion without incident.  Blood return noted pre and post infusion.  Site patent and intact, free from redness, edema or discomfort.  No evidence of extravasations.  Access discontinued per protocol.       Discharge Plan:   Patient declined prescription refills.  Patient and/or family verbalized understanding of discharge instructions and all questions answered.  AVS to patient via [employer].  Patient will return 7/29/25 for next appointment.   Patient discharged in stable condition accompanied by: self and friend  Departure Mode: Ambulatory.      Michelle Cotto RN

## 2025-07-29 ENCOUNTER — INFUSION THERAPY VISIT (OUTPATIENT)
Dept: INFUSION THERAPY | Facility: CLINIC | Age: 45
End: 2025-07-29
Attending: INTERNAL MEDICINE
Payer: COMMERCIAL

## 2025-07-29 ENCOUNTER — ONCOLOGY VISIT (OUTPATIENT)
Dept: ONCOLOGY | Facility: CLINIC | Age: 45
End: 2025-07-29
Attending: INTERNAL MEDICINE
Payer: COMMERCIAL

## 2025-07-29 VITALS
BODY MASS INDEX: 26.41 KG/M2 | RESPIRATION RATE: 16 BRPM | WEIGHT: 156.2 LBS | DIASTOLIC BLOOD PRESSURE: 78 MMHG | OXYGEN SATURATION: 98 % | TEMPERATURE: 98.1 F | SYSTOLIC BLOOD PRESSURE: 112 MMHG | HEART RATE: 94 BPM

## 2025-07-29 DIAGNOSIS — Z17.0 MALIGNANT NEOPLASM OF OVERLAPPING SITES OF LEFT BREAST IN FEMALE, ESTROGEN RECEPTOR POSITIVE (H): Primary | ICD-10-CM

## 2025-07-29 DIAGNOSIS — C50.812 MALIGNANT NEOPLASM OF OVERLAPPING SITES OF LEFT BREAST IN FEMALE, ESTROGEN RECEPTOR POSITIVE (H): ICD-10-CM

## 2025-07-29 DIAGNOSIS — C50.812 MALIGNANT NEOPLASM OF OVERLAPPING SITES OF LEFT BREAST IN FEMALE, ESTROGEN RECEPTOR POSITIVE (H): Primary | ICD-10-CM

## 2025-07-29 DIAGNOSIS — Z17.0 MALIGNANT NEOPLASM OF OVERLAPPING SITES OF LEFT BREAST IN FEMALE, ESTROGEN RECEPTOR POSITIVE (H): ICD-10-CM

## 2025-07-29 DIAGNOSIS — Z91.89 AT RISK FOR INJURY FROM CHEMOTHERAPY: Primary | ICD-10-CM

## 2025-07-29 LAB
ALBUMIN SERPL BCG-MCNC: 4.2 G/DL (ref 3.5–5.2)
ALP SERPL-CCNC: 54 U/L (ref 40–150)
ALT SERPL W P-5'-P-CCNC: 33 U/L (ref 0–50)
AST SERPL W P-5'-P-CCNC: 27 U/L (ref 0–45)
BASOPHILS # BLD AUTO: 0 10E3/UL (ref 0–0.2)
BASOPHILS NFR BLD AUTO: 1 %
BILIRUB DIRECT SERPL-MCNC: <0.08 MG/DL (ref 0–0.3)
BILIRUB SERPL-MCNC: 0.2 MG/DL
EOSINOPHIL # BLD AUTO: 0 10E3/UL (ref 0–0.7)
EOSINOPHIL NFR BLD AUTO: 0 %
ERYTHROCYTE [DISTWIDTH] IN BLOOD BY AUTOMATED COUNT: 12.8 % (ref 10–15)
HCT VFR BLD AUTO: 34.5 % (ref 35–47)
HGB BLD-MCNC: 11.7 G/DL (ref 11.7–15.7)
IMM GRANULOCYTES # BLD: 0 10E3/UL
IMM GRANULOCYTES NFR BLD: 0 %
LYMPHOCYTES # BLD AUTO: 0.9 10E3/UL (ref 0.8–5.3)
LYMPHOCYTES NFR BLD AUTO: 34 %
MCH RBC QN AUTO: 32.2 PG (ref 26.5–33)
MCHC RBC AUTO-ENTMCNC: 33.9 G/DL (ref 31.5–36.5)
MCV RBC AUTO: 95 FL (ref 78–100)
MONOCYTES # BLD AUTO: 0.2 10E3/UL (ref 0–1.3)
MONOCYTES NFR BLD AUTO: 8 %
NEUTROPHILS # BLD AUTO: 1.6 10E3/UL (ref 1.6–8.3)
NEUTROPHILS NFR BLD AUTO: 58 %
NRBC # BLD AUTO: 0 10E3/UL
NRBC BLD AUTO-RTO: 0 /100
PLATELET # BLD AUTO: 245 10E3/UL (ref 150–450)
PROT SERPL-MCNC: 6.4 G/DL (ref 6.4–8.3)
RBC # BLD AUTO: 3.63 10E6/UL (ref 3.8–5.2)
WBC # BLD AUTO: 2.7 10E3/UL (ref 4–11)

## 2025-07-29 PROCEDURE — 85014 HEMATOCRIT: CPT | Performed by: INTERNAL MEDICINE

## 2025-07-29 PROCEDURE — 36591 DRAW BLOOD OFF VENOUS DEVICE: CPT

## 2025-07-29 PROCEDURE — 85004 AUTOMATED DIFF WBC COUNT: CPT | Performed by: INTERNAL MEDICINE

## 2025-07-29 PROCEDURE — 36415 COLL VENOUS BLD VENIPUNCTURE: CPT | Performed by: INTERNAL MEDICINE

## 2025-07-29 PROCEDURE — 82040 ASSAY OF SERUM ALBUMIN: CPT | Performed by: INTERNAL MEDICINE

## 2025-07-29 PROCEDURE — G2211 COMPLEX E/M VISIT ADD ON: HCPCS

## 2025-07-29 PROCEDURE — 99214 OFFICE O/P EST MOD 30 MIN: CPT

## 2025-07-29 PROCEDURE — 250N000011 HC RX IP 250 OP 636: Performed by: INTERNAL MEDICINE

## 2025-07-29 PROCEDURE — 99213 OFFICE O/P EST LOW 20 MIN: CPT

## 2025-07-29 RX ORDER — HEPARIN SODIUM (PORCINE) LOCK FLUSH IV SOLN 100 UNIT/ML 100 UNIT/ML
5 SOLUTION INTRAVENOUS
Status: DISCONTINUED | OUTPATIENT
Start: 2025-07-29 | End: 2025-07-29 | Stop reason: HOSPADM

## 2025-07-29 RX ORDER — HEPARIN SODIUM,PORCINE 10 UNIT/ML
5-20 VIAL (ML) INTRAVENOUS DAILY PRN
OUTPATIENT
Start: 2025-07-29

## 2025-07-29 RX ORDER — HEPARIN SODIUM (PORCINE) LOCK FLUSH IV SOLN 100 UNIT/ML 100 UNIT/ML
5 SOLUTION INTRAVENOUS
OUTPATIENT
Start: 2025-07-29

## 2025-07-29 RX ADMIN — HEPARIN 5 ML: 100 SYRINGE at 07:47

## 2025-07-29 ASSESSMENT — PAIN SCALES - GENERAL: PAINLEVEL_OUTOF10: NO PAIN (0)

## 2025-07-29 NOTE — PROGRESS NOTES
Nursing Note:  Cortney Willoughby presents today for port labs.    Patient seen by provider today: Yes: HONG Vogt.    present during visit today: Not Applicable.    Note: N/A.    Intravenous Access:  Implanted Port.    Discharge Plan:   Patient was discharged to home.     Tenisha James RN           oral

## 2025-07-29 NOTE — PROGRESS NOTES
"Oncology Rooming Note    July 29, 2025 8:00 AM   Cortney Willoughby is a 45 year old female who presents for:    Chief Complaint   Patient presents with    Oncology Clinic Visit     Initial Vitals: LMP 07/10/2021 (Exact Date)  Estimated body mass index is 26.37 kg/m  as calculated from the following:    Height as of 7/23/25: 1.638 m (5' 4.49\").    Weight as of 7/23/25: 70.8 kg (156 lb). There is no height or weight on file to calculate BSA.  Data Unavailable Comment: Data Unavailable   Patient's last menstrual period was 07/10/2021 (exact date).  Allergies reviewed: Yes  Medications reviewed: Yes    Medications: Medication refills not needed today.  Pharmacy name entered into [facility]: [id number] [address] AT [facility]    PHQ9:  Did this patient require a PHQ9?: No      Clinical concerns:  pa was notified.      Janel Almazan CMA            "

## 2025-07-29 NOTE — LETTER
"7/29/2025      Cortney Willoughby  [address] [address]      Dear Colleague,    Thank you for referring your patient, Cortney Willoughby, to the [facility]. Please see a copy of my visit note below.    Oncology Rooming Note    July 29, 2025 8:00 AM   Cortney Willoughby is a 45 year old female who presents for:    Chief Complaint   Patient presents with     Oncology Clinic Visit     Initial Vitals: LMP 07/10/2021 (Exact Date)  Estimated body mass index is 26.37 kg/m  as calculated from the following:    Height as of 7/23/25: 1.638 m (5' 4.49\").    Weight as of 7/23/25: 70.8 kg (156 lb). There is no height or weight on file to calculate BSA.  Data Unavailable Comment: Data Unavailable   Patient's last menstrual period was 07/10/2021 (exact date).  Allergies reviewed: Yes  Medications reviewed: Yes    Medications: Medication refills not needed today.  Pharmacy name entered into [employer]: [id number] [address] [facility]    PHQ9:  Did this patient require a PHQ9?: No      Clinical concerns:  pa was notified.      Janel Almazan, [facility]     Hematology/Oncology Established Patient Note  6/3/2025      Primary Oncologist: Dr. Acuna    Reason for Visit: Follow-up Left breast cancer    Oncology HPI:   - 1/8/2025: Breast MRI showed 1.3 x 2 x 1 cm mass in left breast at 3:00-4:00, 8-9 cm from the nipple.  No lymphadenopathy.  Right breast negative.  -  1/14/2025: Left breast ultrasound showed no definite abnormality corresponding to MRI abnormality as breast tissue was extremely dense with shadowing artifact scattered throughout glandular tissue related to breast density.  -  1/27/2025: Left breast needle core biopsy showed grade 2 invasive ductal carcinoma, 8 mm in greatest size and associated grade 2 DCIS as well as PASH.  ER strongly positive at %, NH positive at 81-90%, HER2 IHC = " 2+, FISH negative.  - 3/05/2025: Bilateral mastectomies under care of Dr. Marisela Pena.  Pathology showed left breast with grade 2 invasive ductal carcinoma, 2.5 x 2 x 1.3 cm, associated grade 3 DCIS in 10 of 42 blocks, negative margins but DCIS focally 1 mm from anterior superior margin and 2 mm from posterior margin; no LVI; 1 of 2 left axillary SLNs positive for micrometastatic (1.5 mm) carcinoma. Right breast benign. Oncotype DX = 42, 5-year risk of distant recurrence of > 12% with endocrine therapy alone, adjuvant chemotherapy advised.  - 3/31/25: Echo LVEF 55-60%, LV strain -24%  - 4/9/25-5/21/25: adjuvant dose dense adriamycin cytoxan x 4 cycles  - 6/4/25: - 6/4/2025: started weekly paclitaxel (Taxol)    Current Treatment: Taxol weekly    Interval history:   Cortney presents to clinic today for lab check, scheduled for Taxol tomorrow. She reports that she is doing well. Denies neuropathy. Bowel movements are regular.     She recently lost her job.     She is taking Vitamin c and D daily, not calcium.       REVIEW OF SYSTEMS:   14 point ROS was reviewed and is negative other than as noted above in HPI.     Current Outpatient Medications   Medication Sig Dispense Refill     dexAMETHasone (DECADRON) 4 MG tablet Take 2 tablets (8 mg) by mouth daily. Start on Day 2 of Cycles 1 through 4. 6 tablet 3     levothyroxine (SYNTHROID/LEVOTHROID) 75 MCG tablet Take 1 tablet (75 mcg) by mouth daily By her natural medicine doctor       lidocaine-prilocaine (EMLA) 2.5-2.5 % external cream Apply liberally to port site, 60-90 min prior to port access. Cover with plastic wrap 30 g 1     liothyronine (CYTOMEL) 25 MCG tablet Take 0.5 tablets (12.5 mcg) by mouth daily From natural medicine doctor       methocarbamol (ROBAXIN) 750 MG tablet Take 1 tablet (750 mg) by mouth 4 times daily as needed for muscle spasms (pain). 20 tablet 0     ondansetron (ZOFRAN) 8 MG tablet Take 1 tablet (8 mg) by mouth every 8 hours as needed for  nausea (vomiting). 30 tablet 2     prochlorperazine (COMPAZINE) 10 MG tablet Take 1 tablet (10 mg) by mouth every 6 hours as needed for nausea or vomiting. 30 tablet 2     SENNA-docusate sodium (SENNA S) 8.6-50 MG tablet Take 1-2 tablets by mouth 2 times daily as needed (constipation). 15 tablet 0     Tirzepatide (MOUNJARO SC) Inject 0.4 mLs subcutaneously every 7 days. Bottle is 16.6 mg/4.5 mL       traZODone (DESYREL) 50 MG tablet Take 2 tablets (100 mg) by mouth every evening as needed for sleep. 180 tablet 1        Allergies   Allergen Reactions     No Known Drug Allergy        Exam: video visit  Constitutional: Pleasant female in no acute distress.  Eyes: No scleral icterus. No conjunctival erythema or discharge  Respiratory: speaking in full clear sentences without audible wheezes or hoarseness  Neuro: Cranial nerves II-XII intact  Skin: No visible lesions, bruising or rashes  Psych: appropriate mood and affect       Labs:    Latest Reference Range & Units 07/29/25 07:37   WBC 4.0 - 11.0 10e3/uL 2.7 (L)   Hemoglobin 11.7 - 15.7 g/dL 11.7   Hematocrit 35.0 - 47.0 % 34.5 (L)   Platelet Count 150 - 450 10e3/uL 245   RBC Count 3.80 - 5.20 10e6/uL 3.63 (L)   MCV 78 - 100 fL 95   MCH 26.5 - 33.0 pg 32.2   MCHC 31.5 - 36.5 g/dL 33.9   RDW 10.0 - 15.0 % 12.8   % Neutrophils % 58   % Lymphocytes % 34   % Monocytes % 8   % Eosinophils % 0   % Basophils % 1   % Immature Granulocytes % 0   NRBC/W <1 /100 0   Absolute Neutrophil 1.6 - 8.3 10e3/uL 1.6   Absolute Lymphocytes 0.8 - 5.3 10e3/uL 0.9   Absolute Monocytes 0.0 - 1.3 10e3/uL 0.2   Absolute Eosinophils 0.0 - 0.7 10e3/uL 0.0   Absolute Basophils 0.0 - 0.2 10e3/uL 0.0   Absolute Immature Granulocytes <=0.4 10e3/uL 0.0   Absolute NRBCs 10e3/uL 0.0       Imaging: imaging    Impression/plan: Cortney Willoughby is a 45 year old female status post hysterectomy for fibroids diagnosed with left breast cancer.     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   - Treatment Plan:  adjuvant chemotherapy with dose-dense Adriamycin + Cytoxan, followed by weekly paclitaxel to reduce risk of breast cancer recurrence.   - 3/31/25: Echo LVEF 55-60%, LV strain -24%  - 4/9/25-5/21/25: completed adjuvant dose dense adriamycin cytoxan x 4 cycles  - 6/4/2025: started weekly paclitaxel (Taxol)  - 7/29/2025: labs reviewed, ok to proceed with Taxol on 7/30/25. She is wearing compression wear for Taxol.  - schedule post A/C echo  - upon completion of chemotherpay, - adjuvant endocrine therapy with more aggressive approach of ovarian suppression therapy with Zoladex in combination with an aromatase inhibitor such as anastrozole.   - 8/12 is next scheduled follow up- sent message to Dr. Acuna about eliminating since she is scheduled to see her late Aug.     Anemia and leukopenia, chemo-induced  --Hemoglobin stable in 11-range.  WBCs stable in low 2-range. Continue to monitor CBC with each chemo.      Weight management  --Discussed with Cortney that she can resume tirzepatide after completion of chemo.    Genetics  BRCA-2 mutation  - family history of breast cancer  - Fertility is not an issue as she is s/p hysterectomy (ovaries intact).  Dr. Acuna recommend eventual removal of ovaries given her pathogenic BRCA-2 mutation.  - as above adjuvant endocrine therapy with more aggressive approach of ovarian suppression therapy with Zoladex in combination with an aromatase inhibitor such as anastrozole.     Bone Health  - 2/20/2025 DEXA scan showed mild osteopenia in her right hip femoral neck.    - Advised adequate calcium, vitamin D, weight bearing exercise when able to resume full activity.   - reports she is taking vitamin c and vitamin  D    Constipation  - continue Miralax and Mg as needed  - will monitor for worsening symptoms    Elevated Cholesterol  - mild elevations  - following with PC  - discussed the importance of working with PCP to reduce risk factors as it appears she may have a familial form of hypercholesterinemia and now undergoing chemotherapy and anti-estrogen therapy in the future    Maura Maldonado PA-C  The longitudinal plan of care for the diagnosis(es)/condition(s) as documented were addressed during this visit. Due to the added complexity in care, I will continue to support Cortney in the subsequent management and with ongoing continuity of care.      Again, thank you for allowing me to participate in the care of your patient.        Sincerely,        Maura Maldonado PA-C    Electronically signed

## 2025-07-30 ENCOUNTER — INFUSION THERAPY VISIT (OUTPATIENT)
Dept: INFUSION THERAPY | Facility: CLINIC | Age: 45
End: 2025-07-30
Attending: INTERNAL MEDICINE
Payer: COMMERCIAL

## 2025-07-30 VITALS
TEMPERATURE: 98.3 F | HEART RATE: 99 BPM | OXYGEN SATURATION: 94 % | SYSTOLIC BLOOD PRESSURE: 103 MMHG | RESPIRATION RATE: 18 BRPM | DIASTOLIC BLOOD PRESSURE: 66 MMHG

## 2025-07-30 DIAGNOSIS — Z17.0 MALIGNANT NEOPLASM OF OVERLAPPING SITES OF LEFT BREAST IN FEMALE, ESTROGEN RECEPTOR POSITIVE (H): Primary | ICD-10-CM

## 2025-07-30 DIAGNOSIS — C50.812 MALIGNANT NEOPLASM OF OVERLAPPING SITES OF LEFT BREAST IN FEMALE, ESTROGEN RECEPTOR POSITIVE (H): Primary | ICD-10-CM

## 2025-07-30 PROCEDURE — 96375 TX/PRO/DX INJ NEW DRUG ADDON: CPT

## 2025-07-30 PROCEDURE — 250N000011 HC RX IP 250 OP 636

## 2025-07-30 PROCEDURE — 258N000003 HC RX IP 258 OP 636

## 2025-07-30 PROCEDURE — 96413 CHEMO IV INFUSION 1 HR: CPT

## 2025-07-30 RX ORDER — DIPHENHYDRAMINE HCL 25 MG
50 CAPSULE ORAL
Status: CANCELLED
Start: 2025-07-30

## 2025-07-30 RX ORDER — MEPERIDINE HYDROCHLORIDE 25 MG/ML
25 INJECTION INTRAMUSCULAR; INTRAVENOUS; SUBCUTANEOUS
Status: CANCELLED | OUTPATIENT
Start: 2025-07-30

## 2025-07-30 RX ORDER — METHYLPREDNISOLONE SODIUM SUCCINATE 40 MG/ML
40 INJECTION INTRAMUSCULAR; INTRAVENOUS
Status: CANCELLED
Start: 2025-07-30

## 2025-07-30 RX ORDER — ALBUTEROL SULFATE 90 UG/1
1-2 INHALANT RESPIRATORY (INHALATION)
Status: CANCELLED
Start: 2025-07-30

## 2025-07-30 RX ORDER — HEPARIN SODIUM (PORCINE) LOCK FLUSH IV SOLN 100 UNIT/ML 100 UNIT/ML
5 SOLUTION INTRAVENOUS
Status: DISCONTINUED | OUTPATIENT
Start: 2025-07-30 | End: 2025-07-30 | Stop reason: HOSPADM

## 2025-07-30 RX ORDER — HEPARIN SODIUM (PORCINE) LOCK FLUSH IV SOLN 100 UNIT/ML 100 UNIT/ML
5 SOLUTION INTRAVENOUS
Status: CANCELLED | OUTPATIENT
Start: 2025-07-30

## 2025-07-30 RX ORDER — HEPARIN SODIUM,PORCINE 10 UNIT/ML
5-20 VIAL (ML) INTRAVENOUS DAILY PRN
Status: CANCELLED | OUTPATIENT
Start: 2025-07-30

## 2025-07-30 RX ORDER — ONDANSETRON 2 MG/ML
8 INJECTION INTRAMUSCULAR; INTRAVENOUS ONCE
Status: CANCELLED | OUTPATIENT
Start: 2025-07-30

## 2025-07-30 RX ORDER — LORAZEPAM 2 MG/ML
0.5 INJECTION INTRAMUSCULAR EVERY 4 HOURS PRN
Status: CANCELLED | OUTPATIENT
Start: 2025-07-30

## 2025-07-30 RX ORDER — ONDANSETRON 2 MG/ML
8 INJECTION INTRAMUSCULAR; INTRAVENOUS ONCE
Status: COMPLETED | OUTPATIENT
Start: 2025-07-30 | End: 2025-07-30

## 2025-07-30 RX ORDER — DIPHENHYDRAMINE HYDROCHLORIDE 50 MG/ML
50 INJECTION, SOLUTION INTRAMUSCULAR; INTRAVENOUS
Status: CANCELLED
Start: 2025-07-30

## 2025-07-30 RX ORDER — EPINEPHRINE 1 MG/ML
0.3 INJECTION, SOLUTION INTRAMUSCULAR; SUBCUTANEOUS EVERY 5 MIN PRN
Status: CANCELLED | OUTPATIENT
Start: 2025-07-30

## 2025-07-30 RX ORDER — DIPHENHYDRAMINE HYDROCHLORIDE 50 MG/ML
25 INJECTION, SOLUTION INTRAMUSCULAR; INTRAVENOUS
Status: CANCELLED
Start: 2025-07-30

## 2025-07-30 RX ORDER — ALBUTEROL SULFATE 0.83 MG/ML
2.5 SOLUTION RESPIRATORY (INHALATION)
Status: CANCELLED | OUTPATIENT
Start: 2025-07-30

## 2025-07-30 RX ADMIN — ONDANSETRON 8 MG: 2 INJECTION INTRAMUSCULAR; INTRAVENOUS at 12:42

## 2025-07-30 RX ADMIN — Medication 5 ML: at 13:51

## 2025-07-30 RX ADMIN — PACLITAXEL 145 MG: 6 INJECTION, SOLUTION INTRAVENOUS at 12:49

## 2025-07-30 RX ADMIN — SODIUM CHLORIDE 250 ML: 0.9 INJECTION, SOLUTION INTRAVENOUS at 12:42

## 2025-07-30 ASSESSMENT — PAIN SCALES - GENERAL: PAINLEVEL_OUTOF10: NO PAIN (0)

## 2025-07-30 NOTE — PROGRESS NOTES
Infusion Nursing Note:  Cortney Willoughby presents today for C13D1 Taxol.    Patient seen by provider today: No; seen by HONG Vogt yesterday   present during visit today: Not Applicable.    Note: Patient reports no new symptoms/complaints since seeing HONG Vogt, yesterday.      Intravenous Access:  Implanted Port.    Treatment Conditions:  Lab Results   Component Value Date    HGB 11.7 07/29/2025    WBC 2.7 (L) 07/29/2025    ANEU 1.6 07/29/2025     07/29/2025        Lab Results   Component Value Date     05/06/2025    POTASSIUM 4.1 05/06/2025    CR 0.71 05/06/2025    BAYLEE 8.5 (L) 05/06/2025    BILITOTAL 0.2 07/29/2025    ALBUMIN 4.2 07/29/2025    ALT 33 07/29/2025    AST 27 07/29/2025       Results reviewed, labs MET treatment parameters, ok to proceed with treatment.      Post Infusion Assessment:  Patient tolerated infusion without incident.  Blood return noted pre and post infusion.  Site patent and intact, free from redness, edema or discomfort.  No evidence of extravasations.  Access discontinued per protocol.       Discharge Plan:   Discharge instructions reviewed with: Patient.  Patient and/or family verbalized understanding of discharge instructions and all questions answered.  AVS to patient via [employer].  Patient will return 8/5 for next appointment.   Patient discharged in stable condition accompanied by: self and friend.  Departure Mode: Ambulatory.      Amelia Sorto RN

## 2025-08-05 ENCOUNTER — INFUSION THERAPY VISIT (OUTPATIENT)
Dept: INFUSION THERAPY | Facility: CLINIC | Age: 45
End: 2025-08-05
Attending: INTERNAL MEDICINE
Payer: COMMERCIAL

## 2025-08-05 DIAGNOSIS — C50.812 MALIGNANT NEOPLASM OF OVERLAPPING SITES OF LEFT BREAST IN FEMALE, ESTROGEN RECEPTOR POSITIVE (H): Primary | ICD-10-CM

## 2025-08-05 DIAGNOSIS — Z17.0 MALIGNANT NEOPLASM OF OVERLAPPING SITES OF LEFT BREAST IN FEMALE, ESTROGEN RECEPTOR POSITIVE (H): Primary | ICD-10-CM

## 2025-08-05 DIAGNOSIS — F51.01 PRIMARY INSOMNIA: ICD-10-CM

## 2025-08-05 LAB
BASOPHILS # BLD AUTO: 0 10E3/UL (ref 0–0.2)
BASOPHILS NFR BLD AUTO: 0 %
EOSINOPHIL # BLD AUTO: 0 10E3/UL (ref 0–0.7)
EOSINOPHIL NFR BLD AUTO: 1 %
ERYTHROCYTE [DISTWIDTH] IN BLOOD BY AUTOMATED COUNT: 12.5 % (ref 10–15)
HCT VFR BLD AUTO: 36.1 % (ref 35–47)
HGB BLD-MCNC: 12.3 G/DL (ref 11.7–15.7)
IMM GRANULOCYTES # BLD: 0 10E3/UL
IMM GRANULOCYTES NFR BLD: 0 %
LYMPHOCYTES # BLD AUTO: 0.9 10E3/UL (ref 0.8–5.3)
LYMPHOCYTES NFR BLD AUTO: 38 %
MCH RBC QN AUTO: 31.7 PG (ref 26.5–33)
MCHC RBC AUTO-ENTMCNC: 34.1 G/DL (ref 31.5–36.5)
MCV RBC AUTO: 93 FL (ref 78–100)
MONOCYTES # BLD AUTO: 0.2 10E3/UL (ref 0–1.3)
MONOCYTES NFR BLD AUTO: 9 %
NEUTROPHILS # BLD AUTO: 1.2 10E3/UL (ref 1.6–8.3)
NEUTROPHILS NFR BLD AUTO: 52 %
NRBC # BLD AUTO: 0 10E3/UL
NRBC BLD AUTO-RTO: 0 /100
PLATELET # BLD AUTO: 223 10E3/UL (ref 150–450)
RBC # BLD AUTO: 3.88 10E6/UL (ref 3.8–5.2)
WBC # BLD AUTO: 2.3 10E3/UL (ref 4–11)

## 2025-08-05 PROCEDURE — 250N000011 HC RX IP 250 OP 636: Performed by: INTERNAL MEDICINE

## 2025-08-05 PROCEDURE — 36591 DRAW BLOOD OFF VENOUS DEVICE: CPT

## 2025-08-05 PROCEDURE — 85025 COMPLETE CBC W/AUTO DIFF WBC: CPT | Performed by: INTERNAL MEDICINE

## 2025-08-05 RX ORDER — HEPARIN SODIUM (PORCINE) LOCK FLUSH IV SOLN 100 UNIT/ML 100 UNIT/ML
5 SOLUTION INTRAVENOUS
OUTPATIENT
Start: 2025-08-13

## 2025-08-05 RX ORDER — MEPERIDINE HYDROCHLORIDE 25 MG/ML
25 INJECTION INTRAMUSCULAR; INTRAVENOUS; SUBCUTANEOUS
Status: CANCELLED | OUTPATIENT
Start: 2025-08-06

## 2025-08-05 RX ORDER — ONDANSETRON 2 MG/ML
8 INJECTION INTRAMUSCULAR; INTRAVENOUS ONCE
Status: CANCELLED | OUTPATIENT
Start: 2025-08-06

## 2025-08-05 RX ORDER — HEPARIN SODIUM,PORCINE 10 UNIT/ML
5-20 VIAL (ML) INTRAVENOUS DAILY PRN
OUTPATIENT
Start: 2025-08-13

## 2025-08-05 RX ORDER — DIPHENHYDRAMINE HYDROCHLORIDE 50 MG/ML
25 INJECTION, SOLUTION INTRAMUSCULAR; INTRAVENOUS
Start: 2025-08-13

## 2025-08-05 RX ORDER — DIPHENHYDRAMINE HCL 25 MG
50 CAPSULE ORAL
Start: 2025-08-13

## 2025-08-05 RX ORDER — HEPARIN SODIUM (PORCINE) LOCK FLUSH IV SOLN 100 UNIT/ML 100 UNIT/ML
5 SOLUTION INTRAVENOUS
Status: DISCONTINUED | OUTPATIENT
Start: 2025-08-05 | End: 2025-08-05 | Stop reason: HOSPADM

## 2025-08-05 RX ORDER — MEPERIDINE HYDROCHLORIDE 25 MG/ML
25 INJECTION INTRAMUSCULAR; INTRAVENOUS; SUBCUTANEOUS
OUTPATIENT
Start: 2025-08-13

## 2025-08-05 RX ORDER — EPINEPHRINE 1 MG/ML
0.3 INJECTION, SOLUTION, CONCENTRATE INTRAVENOUS EVERY 5 MIN PRN
Status: CANCELLED | OUTPATIENT
Start: 2025-08-06

## 2025-08-05 RX ORDER — ALBUTEROL SULFATE 90 UG/1
1-2 INHALANT RESPIRATORY (INHALATION)
Status: CANCELLED
Start: 2025-08-06

## 2025-08-05 RX ORDER — DIPHENHYDRAMINE HYDROCHLORIDE 50 MG/ML
50 INJECTION, SOLUTION INTRAMUSCULAR; INTRAVENOUS
Start: 2025-08-13

## 2025-08-05 RX ORDER — HEPARIN SODIUM (PORCINE) LOCK FLUSH IV SOLN 100 UNIT/ML 100 UNIT/ML
5 SOLUTION INTRAVENOUS
OUTPATIENT
Start: 2025-08-05

## 2025-08-05 RX ORDER — ONDANSETRON 2 MG/ML
8 INJECTION INTRAMUSCULAR; INTRAVENOUS ONCE
OUTPATIENT
Start: 2025-08-13

## 2025-08-05 RX ORDER — DIPHENHYDRAMINE HYDROCHLORIDE 50 MG/ML
50 INJECTION, SOLUTION INTRAMUSCULAR; INTRAVENOUS
Status: CANCELLED
Start: 2025-08-06

## 2025-08-05 RX ORDER — HEPARIN SODIUM (PORCINE) LOCK FLUSH IV SOLN 100 UNIT/ML 100 UNIT/ML
5 SOLUTION INTRAVENOUS
Status: CANCELLED | OUTPATIENT
Start: 2025-08-06

## 2025-08-05 RX ORDER — LORAZEPAM 2 MG/ML
0.5 INJECTION INTRAMUSCULAR EVERY 4 HOURS PRN
OUTPATIENT
Start: 2025-08-13

## 2025-08-05 RX ORDER — LORAZEPAM 2 MG/ML
0.5 INJECTION INTRAMUSCULAR EVERY 4 HOURS PRN
Status: CANCELLED | OUTPATIENT
Start: 2025-08-06

## 2025-08-05 RX ORDER — ALBUTEROL SULFATE 90 UG/1
1-2 INHALANT RESPIRATORY (INHALATION)
Start: 2025-08-13

## 2025-08-05 RX ORDER — HEPARIN SODIUM,PORCINE 10 UNIT/ML
5-20 VIAL (ML) INTRAVENOUS DAILY PRN
OUTPATIENT
Start: 2025-08-05

## 2025-08-05 RX ORDER — DIPHENHYDRAMINE HYDROCHLORIDE 50 MG/ML
25 INJECTION, SOLUTION INTRAMUSCULAR; INTRAVENOUS
Status: CANCELLED
Start: 2025-08-06

## 2025-08-05 RX ORDER — HEPARIN SODIUM,PORCINE 10 UNIT/ML
5-20 VIAL (ML) INTRAVENOUS DAILY PRN
Status: CANCELLED | OUTPATIENT
Start: 2025-08-06

## 2025-08-05 RX ORDER — ALBUTEROL SULFATE 0.83 MG/ML
2.5 SOLUTION RESPIRATORY (INHALATION)
OUTPATIENT
Start: 2025-08-13

## 2025-08-05 RX ORDER — EPINEPHRINE 1 MG/ML
0.3 INJECTION, SOLUTION, CONCENTRATE INTRAVENOUS EVERY 5 MIN PRN
OUTPATIENT
Start: 2025-08-13

## 2025-08-05 RX ORDER — DIPHENHYDRAMINE HCL 25 MG
50 CAPSULE ORAL
Status: CANCELLED
Start: 2025-08-06

## 2025-08-05 RX ORDER — ALBUTEROL SULFATE 0.83 MG/ML
2.5 SOLUTION RESPIRATORY (INHALATION)
Status: CANCELLED | OUTPATIENT
Start: 2025-08-06

## 2025-08-05 RX ADMIN — Medication 5 ML: at 08:11

## 2025-08-06 ENCOUNTER — TELEPHONE (OUTPATIENT)
Dept: PHARMACY | Facility: CLINIC | Age: 45
End: 2025-08-06

## 2025-08-06 ENCOUNTER — INFUSION THERAPY VISIT (OUTPATIENT)
Dept: INFUSION THERAPY | Facility: CLINIC | Age: 45
End: 2025-08-06
Attending: INTERNAL MEDICINE
Payer: COMMERCIAL

## 2025-08-06 ENCOUNTER — VIRTUAL VISIT (OUTPATIENT)
Dept: ONCOLOGY | Facility: CLINIC | Age: 45
End: 2025-08-06
Attending: SURGERY
Payer: COMMERCIAL

## 2025-08-06 VITALS
OXYGEN SATURATION: 97 % | RESPIRATION RATE: 20 BRPM | BODY MASS INDEX: 27.08 KG/M2 | HEART RATE: 98 BPM | DIASTOLIC BLOOD PRESSURE: 83 MMHG | TEMPERATURE: 98.4 F | SYSTOLIC BLOOD PRESSURE: 128 MMHG | WEIGHT: 160.2 LBS

## 2025-08-06 DIAGNOSIS — Z15.02 BRCA2 GENE MUTATION POSITIVE IN FEMALE: ICD-10-CM

## 2025-08-06 DIAGNOSIS — C50.812 MALIGNANT NEOPLASM OF OVERLAPPING SITES OF LEFT BREAST IN FEMALE, ESTROGEN RECEPTOR POSITIVE (H): ICD-10-CM

## 2025-08-06 DIAGNOSIS — C50.812 MALIGNANT NEOPLASM OF OVERLAPPING SITES OF LEFT BREAST IN FEMALE, ESTROGEN RECEPTOR POSITIVE (H): Primary | ICD-10-CM

## 2025-08-06 DIAGNOSIS — Z15.01 BRCA2 GENE MUTATION POSITIVE IN FEMALE: ICD-10-CM

## 2025-08-06 DIAGNOSIS — Z17.0 MALIGNANT NEOPLASM OF OVERLAPPING SITES OF LEFT BREAST IN FEMALE, ESTROGEN RECEPTOR POSITIVE (H): Primary | ICD-10-CM

## 2025-08-06 DIAGNOSIS — Z17.0 MALIGNANT NEOPLASM OF OVERLAPPING SITES OF LEFT BREAST IN FEMALE, ESTROGEN RECEPTOR POSITIVE (H): ICD-10-CM

## 2025-08-06 DIAGNOSIS — Z15.09 BRCA2 GENE MUTATION POSITIVE IN FEMALE: ICD-10-CM

## 2025-08-06 DIAGNOSIS — D70.1 CHEMOTHERAPY-INDUCED NEUTROPENIA: Primary | ICD-10-CM

## 2025-08-06 DIAGNOSIS — T45.1X5A CHEMOTHERAPY-INDUCED NEUTROPENIA: Primary | ICD-10-CM

## 2025-08-06 PROCEDURE — 96413 CHEMO IV INFUSION 1 HR: CPT

## 2025-08-06 PROCEDURE — 250N000011 HC RX IP 250 OP 636: Performed by: INTERNAL MEDICINE

## 2025-08-06 PROCEDURE — 258N000003 HC RX IP 258 OP 636: Performed by: INTERNAL MEDICINE

## 2025-08-06 PROCEDURE — 96375 TX/PRO/DX INJ NEW DRUG ADDON: CPT

## 2025-08-06 RX ORDER — HEPARIN SODIUM (PORCINE) LOCK FLUSH IV SOLN 100 UNIT/ML 100 UNIT/ML
5 SOLUTION INTRAVENOUS
Status: DISCONTINUED | OUTPATIENT
Start: 2025-08-06 | End: 2025-08-06 | Stop reason: HOSPADM

## 2025-08-06 RX ORDER — ONDANSETRON 2 MG/ML
8 INJECTION INTRAMUSCULAR; INTRAVENOUS ONCE
Status: COMPLETED | OUTPATIENT
Start: 2025-08-06 | End: 2025-08-06

## 2025-08-06 RX ORDER — TRAZODONE HYDROCHLORIDE 50 MG/1
100 TABLET ORAL
Qty: 180 TABLET | Refills: 0 | Status: SHIPPED | OUTPATIENT
Start: 2025-08-06

## 2025-08-06 RX ADMIN — ONDANSETRON 8 MG: 2 INJECTION INTRAMUSCULAR; INTRAVENOUS at 12:45

## 2025-08-06 RX ADMIN — SODIUM CHLORIDE 250 ML: 0.9 INJECTION, SOLUTION INTRAVENOUS at 12:45

## 2025-08-06 RX ADMIN — Medication 5 ML: at 14:20

## 2025-08-06 RX ADMIN — PACLITAXEL 145 MG: 6 INJECTION, SOLUTION INTRAVENOUS at 13:18

## 2025-08-06 ASSESSMENT — PAIN SCALES - GENERAL: PAINLEVEL_OUTOF10: NO PAIN (0)

## 2025-08-12 ENCOUNTER — INFUSION THERAPY VISIT (OUTPATIENT)
Dept: INFUSION THERAPY | Facility: CLINIC | Age: 45
End: 2025-08-12
Attending: INTERNAL MEDICINE
Payer: COMMERCIAL

## 2025-08-12 DIAGNOSIS — Z17.0 MALIGNANT NEOPLASM OF OVERLAPPING SITES OF LEFT BREAST IN FEMALE, ESTROGEN RECEPTOR POSITIVE (H): Primary | ICD-10-CM

## 2025-08-12 DIAGNOSIS — C50.812 MALIGNANT NEOPLASM OF OVERLAPPING SITES OF LEFT BREAST IN FEMALE, ESTROGEN RECEPTOR POSITIVE (H): Primary | ICD-10-CM

## 2025-08-12 LAB
BASOPHILS # BLD MANUAL: 0.1 10E3/UL (ref 0–0.2)
BASOPHILS NFR BLD MANUAL: 1 %
EOSINOPHIL # BLD MANUAL: 0.1 10E3/UL (ref 0–0.7)
EOSINOPHIL NFR BLD MANUAL: 1 %
ERYTHROCYTE [DISTWIDTH] IN BLOOD BY AUTOMATED COUNT: 12.9 % (ref 10–15)
HCT VFR BLD AUTO: 37.4 % (ref 35–47)
HGB BLD-MCNC: 12.4 G/DL (ref 11.7–15.7)
LYMPHOCYTES # BLD MANUAL: 1.2 10E3/UL (ref 0.8–5.3)
LYMPHOCYTES NFR BLD MANUAL: 23 %
MCH RBC QN AUTO: 31.6 PG (ref 26.5–33)
MCHC RBC AUTO-ENTMCNC: 33.2 G/DL (ref 31.5–36.5)
MCV RBC AUTO: 95 FL (ref 78–100)
METAMYELOCYTES # BLD MANUAL: 0.1 10E3/UL
METAMYELOCYTES NFR BLD MANUAL: 1 %
MONOCYTES # BLD MANUAL: 0.6 10E3/UL (ref 0–1.3)
MONOCYTES NFR BLD MANUAL: 12 %
NEUTROPHILS # BLD MANUAL: 3.3 10E3/UL (ref 1.6–8.3)
NEUTROPHILS NFR BLD MANUAL: 62 %
PLAT MORPH BLD: ABNORMAL
PLATELET # BLD AUTO: 242 10E3/UL (ref 150–450)
RBC # BLD AUTO: 3.92 10E6/UL (ref 3.8–5.2)
RBC MORPH BLD: ABNORMAL
VARIANT LYMPHS BLD QL SMEAR: PRESENT
WBC # BLD AUTO: 5.3 10E3/UL (ref 4–11)

## 2025-08-12 PROCEDURE — 85027 COMPLETE CBC AUTOMATED: CPT | Performed by: INTERNAL MEDICINE

## 2025-08-12 PROCEDURE — 250N000011 HC RX IP 250 OP 636: Performed by: INTERNAL MEDICINE

## 2025-08-12 PROCEDURE — 85007 BL SMEAR W/DIFF WBC COUNT: CPT | Performed by: INTERNAL MEDICINE

## 2025-08-12 RX ORDER — HEPARIN SODIUM (PORCINE) LOCK FLUSH IV SOLN 100 UNIT/ML 100 UNIT/ML
5 SOLUTION INTRAVENOUS
Status: DISCONTINUED | OUTPATIENT
Start: 2025-08-12 | End: 2025-08-12 | Stop reason: HOSPADM

## 2025-08-12 RX ORDER — HEPARIN SODIUM (PORCINE) LOCK FLUSH IV SOLN 100 UNIT/ML 100 UNIT/ML
5 SOLUTION INTRAVENOUS
OUTPATIENT
Start: 2025-08-12

## 2025-08-12 RX ORDER — HEPARIN SODIUM,PORCINE 10 UNIT/ML
5-20 VIAL (ML) INTRAVENOUS DAILY PRN
OUTPATIENT
Start: 2025-08-12

## 2025-08-12 RX ADMIN — Medication 5 ML: at 07:34

## 2025-08-13 ENCOUNTER — INFUSION THERAPY VISIT (OUTPATIENT)
Dept: INFUSION THERAPY | Facility: CLINIC | Age: 45
End: 2025-08-13
Attending: INTERNAL MEDICINE
Payer: COMMERCIAL

## 2025-08-13 VITALS
OXYGEN SATURATION: 96 % | TEMPERATURE: 97.9 F | RESPIRATION RATE: 18 BRPM | BODY MASS INDEX: 27.31 KG/M2 | SYSTOLIC BLOOD PRESSURE: 105 MMHG | HEIGHT: 64 IN | WEIGHT: 160 LBS | DIASTOLIC BLOOD PRESSURE: 77 MMHG | HEART RATE: 110 BPM

## 2025-08-13 DIAGNOSIS — Z17.0 MALIGNANT NEOPLASM OF OVERLAPPING SITES OF LEFT BREAST IN FEMALE, ESTROGEN RECEPTOR POSITIVE (H): Primary | ICD-10-CM

## 2025-08-13 DIAGNOSIS — C50.812 MALIGNANT NEOPLASM OF OVERLAPPING SITES OF LEFT BREAST IN FEMALE, ESTROGEN RECEPTOR POSITIVE (H): Primary | ICD-10-CM

## 2025-08-13 PROCEDURE — 258N000003 HC RX IP 258 OP 636: Performed by: INTERNAL MEDICINE

## 2025-08-13 PROCEDURE — 250N000011 HC RX IP 250 OP 636: Performed by: INTERNAL MEDICINE

## 2025-08-13 RX ORDER — HEPARIN SODIUM (PORCINE) LOCK FLUSH IV SOLN 100 UNIT/ML 100 UNIT/ML
5 SOLUTION INTRAVENOUS
Status: DISCONTINUED | OUTPATIENT
Start: 2025-08-13 | End: 2025-08-13 | Stop reason: HOSPADM

## 2025-08-13 RX ORDER — ONDANSETRON 2 MG/ML
8 INJECTION INTRAMUSCULAR; INTRAVENOUS ONCE
Status: COMPLETED | OUTPATIENT
Start: 2025-08-13 | End: 2025-08-13

## 2025-08-13 RX ADMIN — Medication 5 ML: at 14:11

## 2025-08-13 RX ADMIN — PACLITAXEL 145 MG: 6 INJECTION, SOLUTION INTRAVENOUS at 13:04

## 2025-08-13 RX ADMIN — SODIUM CHLORIDE 250 ML: 0.9 INJECTION, SOLUTION INTRAVENOUS at 12:56

## 2025-08-13 RX ADMIN — ONDANSETRON 8 MG: 2 INJECTION INTRAMUSCULAR; INTRAVENOUS at 12:56

## 2025-08-14 DIAGNOSIS — H02.729 LOSS OF EYELASHES: Primary | ICD-10-CM

## 2025-08-14 RX ORDER — BIMATOPROST 3 UG/ML
1 SOLUTION TOPICAL AT BEDTIME
Qty: 3 ML | Refills: 3 | Status: SHIPPED | OUTPATIENT
Start: 2025-08-14

## 2025-08-19 ENCOUNTER — INFUSION THERAPY VISIT (OUTPATIENT)
Dept: INFUSION THERAPY | Facility: CLINIC | Age: 45
End: 2025-08-19
Attending: INTERNAL MEDICINE
Payer: COMMERCIAL

## 2025-08-19 DIAGNOSIS — C50.812 MALIGNANT NEOPLASM OF OVERLAPPING SITES OF LEFT BREAST IN FEMALE, ESTROGEN RECEPTOR POSITIVE (H): Primary | ICD-10-CM

## 2025-08-19 DIAGNOSIS — Z17.0 MALIGNANT NEOPLASM OF OVERLAPPING SITES OF LEFT BREAST IN FEMALE, ESTROGEN RECEPTOR POSITIVE (H): Primary | ICD-10-CM

## 2025-08-19 LAB
BASOPHILS # BLD AUTO: <0.03 10E3/UL (ref 0–0.2)
BASOPHILS NFR BLD AUTO: 0.7 %
EOSINOPHIL # BLD AUTO: <0.03 10E3/UL (ref 0–0.7)
EOSINOPHIL NFR BLD AUTO: 0 %
ERYTHROCYTE [DISTWIDTH] IN BLOOD BY AUTOMATED COUNT: 12.6 % (ref 10–15)
HCT VFR BLD AUTO: 37.9 % (ref 35–47)
HGB BLD-MCNC: 12.6 G/DL (ref 11.7–15.7)
IMM GRANULOCYTES # BLD: <0.03 10E3/UL
IMM GRANULOCYTES NFR BLD: 0 %
LYMPHOCYTES # BLD AUTO: 0.95 10E3/UL (ref 0.8–5.3)
LYMPHOCYTES NFR BLD AUTO: 33.5 %
MCH RBC QN AUTO: 31.3 PG (ref 26.5–33)
MCHC RBC AUTO-ENTMCNC: 33.2 G/DL (ref 31.5–36.5)
MCV RBC AUTO: 94.3 FL (ref 78–100)
MONOCYTES # BLD AUTO: 0.19 10E3/UL (ref 0–1.3)
MONOCYTES NFR BLD AUTO: 6.7 %
NEUTROPHILS # BLD AUTO: 1.68 10E3/UL (ref 1.6–8.3)
NEUTROPHILS NFR BLD AUTO: 59.1 %
NRBC # BLD AUTO: <0.03 10E3/UL
NRBC BLD AUTO-RTO: 0 /100
PLATELET # BLD AUTO: 208 10E3/UL (ref 150–450)
RBC # BLD AUTO: 4.02 10E6/UL (ref 3.8–5.2)
WBC # BLD AUTO: 2.84 10E3/UL (ref 4–11)

## 2025-08-19 PROCEDURE — 250N000011 HC RX IP 250 OP 636: Performed by: INTERNAL MEDICINE

## 2025-08-19 PROCEDURE — 36591 DRAW BLOOD OFF VENOUS DEVICE: CPT | Performed by: INTERNAL MEDICINE

## 2025-08-19 PROCEDURE — 85004 AUTOMATED DIFF WBC COUNT: CPT | Performed by: INTERNAL MEDICINE

## 2025-08-19 RX ORDER — DIPHENHYDRAMINE HCL 25 MG
50 CAPSULE ORAL
Status: CANCELLED
Start: 2025-08-20

## 2025-08-19 RX ORDER — LORAZEPAM 2 MG/ML
0.5 INJECTION INTRAMUSCULAR EVERY 4 HOURS PRN
Status: CANCELLED | OUTPATIENT
Start: 2025-08-20

## 2025-08-19 RX ORDER — ALBUTEROL SULFATE 0.83 MG/ML
2.5 SOLUTION RESPIRATORY (INHALATION)
Status: CANCELLED | OUTPATIENT
Start: 2025-08-20

## 2025-08-19 RX ORDER — MEPERIDINE HYDROCHLORIDE 25 MG/ML
25 INJECTION INTRAMUSCULAR; INTRAVENOUS; SUBCUTANEOUS
Status: CANCELLED | OUTPATIENT
Start: 2025-08-20

## 2025-08-19 RX ORDER — HEPARIN SODIUM (PORCINE) LOCK FLUSH IV SOLN 100 UNIT/ML 100 UNIT/ML
5 SOLUTION INTRAVENOUS
OUTPATIENT
Start: 2025-08-19

## 2025-08-19 RX ORDER — HEPARIN SODIUM (PORCINE) LOCK FLUSH IV SOLN 100 UNIT/ML 100 UNIT/ML
5 SOLUTION INTRAVENOUS
Status: CANCELLED | OUTPATIENT
Start: 2025-08-20

## 2025-08-19 RX ORDER — HEPARIN SODIUM,PORCINE 10 UNIT/ML
5-20 VIAL (ML) INTRAVENOUS DAILY PRN
OUTPATIENT
Start: 2025-08-19

## 2025-08-19 RX ORDER — EPINEPHRINE 1 MG/ML
0.3 INJECTION, SOLUTION INTRAMUSCULAR; SUBCUTANEOUS EVERY 5 MIN PRN
Status: CANCELLED | OUTPATIENT
Start: 2025-08-20

## 2025-08-19 RX ORDER — DIPHENHYDRAMINE HYDROCHLORIDE 50 MG/ML
50 INJECTION, SOLUTION INTRAMUSCULAR; INTRAVENOUS
Status: CANCELLED
Start: 2025-08-20

## 2025-08-19 RX ORDER — ALBUTEROL SULFATE 90 UG/1
1-2 INHALANT RESPIRATORY (INHALATION)
Status: CANCELLED
Start: 2025-08-20

## 2025-08-19 RX ORDER — DIPHENHYDRAMINE HYDROCHLORIDE 50 MG/ML
25 INJECTION, SOLUTION INTRAMUSCULAR; INTRAVENOUS
Status: CANCELLED
Start: 2025-08-20

## 2025-08-19 RX ORDER — HEPARIN SODIUM,PORCINE 10 UNIT/ML
5-20 VIAL (ML) INTRAVENOUS DAILY PRN
Status: CANCELLED | OUTPATIENT
Start: 2025-08-20

## 2025-08-19 RX ORDER — HEPARIN SODIUM,PORCINE 10 UNIT/ML
5-20 VIAL (ML) INTRAVENOUS DAILY PRN
Status: DISCONTINUED | OUTPATIENT
Start: 2025-08-19 | End: 2025-08-19 | Stop reason: HOSPADM

## 2025-08-19 RX ORDER — ONDANSETRON 2 MG/ML
8 INJECTION INTRAMUSCULAR; INTRAVENOUS ONCE
Status: CANCELLED | OUTPATIENT
Start: 2025-08-20

## 2025-08-19 RX ORDER — METHYLPREDNISOLONE SODIUM SUCCINATE 40 MG/ML
40 INJECTION INTRAMUSCULAR; INTRAVENOUS
Status: CANCELLED
Start: 2025-08-20

## 2025-08-19 RX ORDER — HEPARIN SODIUM (PORCINE) LOCK FLUSH IV SOLN 100 UNIT/ML 100 UNIT/ML
5 SOLUTION INTRAVENOUS
Status: DISCONTINUED | OUTPATIENT
Start: 2025-08-19 | End: 2025-08-19 | Stop reason: HOSPADM

## 2025-08-19 RX ADMIN — Medication 5 ML: at 08:14

## 2025-08-20 ENCOUNTER — INFUSION THERAPY VISIT (OUTPATIENT)
Dept: INFUSION THERAPY | Facility: CLINIC | Age: 45
End: 2025-08-20
Attending: INTERNAL MEDICINE
Payer: COMMERCIAL

## 2025-08-20 VITALS
OXYGEN SATURATION: 97 % | TEMPERATURE: 98.5 F | RESPIRATION RATE: 20 BRPM | SYSTOLIC BLOOD PRESSURE: 108 MMHG | BODY MASS INDEX: 27.22 KG/M2 | HEART RATE: 100 BPM | DIASTOLIC BLOOD PRESSURE: 53 MMHG | WEIGHT: 161 LBS

## 2025-08-20 DIAGNOSIS — Z17.0 MALIGNANT NEOPLASM OF OVERLAPPING SITES OF LEFT BREAST IN FEMALE, ESTROGEN RECEPTOR POSITIVE (H): Primary | ICD-10-CM

## 2025-08-20 DIAGNOSIS — C50.812 MALIGNANT NEOPLASM OF OVERLAPPING SITES OF LEFT BREAST IN FEMALE, ESTROGEN RECEPTOR POSITIVE (H): Primary | ICD-10-CM

## 2025-08-20 PROCEDURE — 96375 TX/PRO/DX INJ NEW DRUG ADDON: CPT

## 2025-08-20 PROCEDURE — 258N000003 HC RX IP 258 OP 636

## 2025-08-20 PROCEDURE — 250N000011 HC RX IP 250 OP 636

## 2025-08-20 PROCEDURE — 96413 CHEMO IV INFUSION 1 HR: CPT

## 2025-08-20 RX ORDER — ONDANSETRON 2 MG/ML
8 INJECTION INTRAMUSCULAR; INTRAVENOUS ONCE
Status: COMPLETED | OUTPATIENT
Start: 2025-08-20 | End: 2025-08-20

## 2025-08-20 RX ORDER — HEPARIN SODIUM (PORCINE) LOCK FLUSH IV SOLN 100 UNIT/ML 100 UNIT/ML
5 SOLUTION INTRAVENOUS
Status: DISCONTINUED | OUTPATIENT
Start: 2025-08-20 | End: 2025-08-20 | Stop reason: HOSPADM

## 2025-08-20 RX ADMIN — PACLITAXEL 145 MG: 6 INJECTION, SOLUTION INTRAVENOUS at 13:01

## 2025-08-20 RX ADMIN — Medication 5 ML: at 14:07

## 2025-08-20 RX ADMIN — ONDANSETRON 8 MG: 2 INJECTION INTRAMUSCULAR; INTRAVENOUS at 13:01

## 2025-08-20 RX ADMIN — SODIUM CHLORIDE 250 ML: 0.9 INJECTION, SOLUTION INTRAVENOUS at 13:01

## 2025-08-20 ASSESSMENT — PAIN SCALES - GENERAL: PAINLEVEL_OUTOF10: NO PAIN (0)

## 2025-08-26 DIAGNOSIS — C50.812 MALIGNANT NEOPLASM OF OVERLAPPING SITES OF LEFT BREAST IN FEMALE, ESTROGEN RECEPTOR POSITIVE (H): Primary | ICD-10-CM

## 2025-08-26 DIAGNOSIS — Z17.0 MALIGNANT NEOPLASM OF OVERLAPPING SITES OF LEFT BREAST IN FEMALE, ESTROGEN RECEPTOR POSITIVE (H): Primary | ICD-10-CM

## 2025-08-27 ENCOUNTER — ONCOLOGY VISIT (OUTPATIENT)
Dept: ONCOLOGY | Facility: CLINIC | Age: 45
End: 2025-08-27
Attending: NURSE PRACTITIONER
Payer: COMMERCIAL

## 2025-08-27 ENCOUNTER — PREP FOR PROCEDURE (OUTPATIENT)
Dept: SURGERY | Facility: CLINIC | Age: 45
End: 2025-08-27

## 2025-08-27 ENCOUNTER — ALLIED HEALTH/NURSE VISIT (OUTPATIENT)
Dept: INFUSION THERAPY | Facility: CLINIC | Age: 45
End: 2025-08-27
Attending: NURSE PRACTITIONER
Payer: COMMERCIAL

## 2025-08-27 VITALS
HEART RATE: 89 BPM | HEIGHT: 64 IN | SYSTOLIC BLOOD PRESSURE: 109 MMHG | OXYGEN SATURATION: 100 % | DIASTOLIC BLOOD PRESSURE: 76 MMHG | WEIGHT: 162.6 LBS | RESPIRATION RATE: 16 BRPM | BODY MASS INDEX: 27.76 KG/M2

## 2025-08-27 DIAGNOSIS — N95.1 MENOPAUSAL SYNDROME (HOT FLASHES): ICD-10-CM

## 2025-08-27 DIAGNOSIS — C50.812 MALIGNANT NEOPLASM OF OVERLAPPING SITES OF LEFT BREAST IN FEMALE, ESTROGEN RECEPTOR POSITIVE (H): Primary | ICD-10-CM

## 2025-08-27 DIAGNOSIS — Z17.0 MALIGNANT NEOPLASM OF OVERLAPPING SITES OF LEFT BREAST IN FEMALE, ESTROGEN RECEPTOR POSITIVE (H): Primary | ICD-10-CM

## 2025-08-27 DIAGNOSIS — C50.919 HORMONE RECEPTOR POSITIVE MALIGNANT NEOPLASM OF BREAST, UNSPECIFIED LATERALITY (H): Primary | ICD-10-CM

## 2025-08-27 PROCEDURE — G2211 COMPLEX E/M VISIT ADD ON: HCPCS | Performed by: INTERNAL MEDICINE

## 2025-08-27 PROCEDURE — 250N000011 HC RX IP 250 OP 636: Mod: JZ | Performed by: INTERNAL MEDICINE

## 2025-08-27 PROCEDURE — 99215 OFFICE O/P EST HI 40 MIN: CPT | Performed by: INTERNAL MEDICINE

## 2025-08-27 PROCEDURE — 96402 CHEMO HORMON ANTINEOPL SQ/IM: CPT

## 2025-08-27 PROCEDURE — 99213 OFFICE O/P EST LOW 20 MIN: CPT | Mod: 25 | Performed by: INTERNAL MEDICINE

## 2025-08-27 RX ORDER — OXYBUTYNIN CHLORIDE 5 MG/1
5 TABLET ORAL AT BEDTIME
Qty: 30 TABLET | Refills: 3 | Status: SHIPPED | OUTPATIENT
Start: 2025-08-27 | End: 2025-08-28

## 2025-08-27 RX ORDER — ANASTROZOLE 1 MG/1
1 TABLET ORAL DAILY
Qty: 90 TABLET | Refills: 3 | Status: SHIPPED | OUTPATIENT
Start: 2025-08-27

## 2025-08-27 RX ADMIN — GOSERELIN ACETATE 3.6 MG: 3.6 IMPLANT SUBCUTANEOUS at 10:35

## 2025-08-27 ASSESSMENT — PAIN SCALES - GENERAL: PAINLEVEL_OUTOF10: NO PAIN (0)

## 2025-08-28 ENCOUNTER — TELEPHONE (OUTPATIENT)
Dept: SURGERY | Facility: CLINIC | Age: 45
End: 2025-08-28
Payer: COMMERCIAL

## 2025-08-28 DIAGNOSIS — N95.1 MENOPAUSAL SYNDROME (HOT FLASHES): ICD-10-CM

## 2025-08-28 RX ORDER — OXYBUTYNIN CHLORIDE 5 MG/1
5 TABLET ORAL AT BEDTIME
Qty: 90 TABLET | Refills: 1 | Status: SHIPPED | OUTPATIENT
Start: 2025-08-28

## 2025-08-29 ENCOUNTER — ANCILLARY PROCEDURE (OUTPATIENT)
Dept: CARDIOLOGY | Facility: CLINIC | Age: 45
End: 2025-08-29
Payer: COMMERCIAL

## 2025-08-29 DIAGNOSIS — Z17.0 MALIGNANT NEOPLASM OF OVERLAPPING SITES OF LEFT BREAST IN FEMALE, ESTROGEN RECEPTOR POSITIVE (H): ICD-10-CM

## 2025-08-29 DIAGNOSIS — Z91.89 AT RISK FOR INJURY FROM CHEMOTHERAPY: ICD-10-CM

## 2025-08-29 DIAGNOSIS — C50.812 MALIGNANT NEOPLASM OF OVERLAPPING SITES OF LEFT BREAST IN FEMALE, ESTROGEN RECEPTOR POSITIVE (H): ICD-10-CM

## 2025-08-29 LAB — LVEF ECHO: NORMAL

## 2025-09-02 ENCOUNTER — TRANSFERRED RECORDS (OUTPATIENT)
Dept: HEALTH INFORMATION MANAGEMENT | Facility: CLINIC | Age: 45
End: 2025-09-02
Payer: COMMERCIAL

## (undated) DEVICE — SU SILK 2-0 SH 30" K833H

## (undated) DEVICE — NDL 19GA 1.5"

## (undated) DEVICE — GLOVE BIOGEL PI MICRO SZ 6.0 48560

## (undated) DEVICE — SYR 10ML FINGER CONTROL W/O NDL 309695

## (undated) DEVICE — VIAL DECANTER STERILE WHITE DYNJDEC06

## (undated) DEVICE — PREP CHLORAPREP 26ML TINTED HI-LITE ORANGE 930815

## (undated) DEVICE — COVER ULTRASOUND PROBE FLEXI-FEEL 6X48" LF 25-FF648

## (undated) DEVICE — MORCELLATOR LAPAROSCOPIC LINA XCISE MOR-1515-6

## (undated) DEVICE — SU MONOCRYL 4-0 PS-2 18" UND Y496G

## (undated) DEVICE — BAG DECANTER STERILE WHITE DYNJDEC09

## (undated) DEVICE — Device

## (undated) DEVICE — ENDO MORCELLATOR OLYMPUS PNEUMOLINER WA90500US

## (undated) DEVICE — GOWN IMPERVIOUS BREATHABLE SMART LG 89015

## (undated) DEVICE — EVAC SYSTEM CLEAR FLOW SC082500

## (undated) DEVICE — DRAIN JACKSON PRATT 15FR ROUND SIL LF JP-2229

## (undated) DEVICE — GLOVE PROTEXIS BLUE W/NEU-THERA 7.0  2D73EB70

## (undated) DEVICE — KIT PROCEDURE SPY-PHI SGL HH9006

## (undated) DEVICE — SOL NACL 0.9% INJ 250ML BAG 2B1322Q

## (undated) DEVICE — DRSG STERI STRIP 1/2X4" R1547

## (undated) DEVICE — DRSG GAUZE 4X4" 3033

## (undated) DEVICE — GLOVE PROTEXIS W/NEU-THERA 6.0  2D73TE60

## (undated) DEVICE — SU DERMABOND ADVANCED .7ML DNX12

## (undated) DEVICE — SUCTION IRR STRYKERFLOW II W/TIP 250-070-520

## (undated) DEVICE — SUTURE BOOTS 051003PBX

## (undated) DEVICE — SYSTEM CLEARIFY VISUALIZATION 21-345

## (undated) DEVICE — ESU GROUND PAD UNIVERSAL W/O CORD

## (undated) DEVICE — SU TIE VICRYL+ 3-0 12X18IN VIO VCP104G

## (undated) DEVICE — SU PROLENE 2-0 CT-2 30" 8411H

## (undated) DEVICE — PACK MAJOR SBA15MAFSI

## (undated) DEVICE — SOL WATER IRRIG 1000ML BOTTLE 2F7114

## (undated) DEVICE — SOL NACL 0.9% INJ 1000ML BAG 2B1324X

## (undated) DEVICE — TUBING C02 INSUFFLATION LAP FILTER HEATER 6198

## (undated) DEVICE — SYR 10ML SLIP TIP W/O NDL

## (undated) DEVICE — SOLUTION WOUND CLEANSING 3/4OZ 10% PVP EA-L3011FB-50

## (undated) DEVICE — ENDO TROCAR FIRST ENTRY KII FIOS ADV FIX 05X100MM CFF03

## (undated) DEVICE — SU VICRYL 4-0 PS-2 18" UND J496H

## (undated) DEVICE — SOL NACL 0.9% IRRIG 1000ML BOTTLE 2F7124

## (undated) DEVICE — GLOVE BIOGEL PI SZ 7.5 40875

## (undated) DEVICE — DRAPE IOBAN INCISE 23X17" 6650EZ

## (undated) DEVICE — SU VICRYL 0 UR-6 27" J603H

## (undated) DEVICE — DRSG KERLIX 4 1/2"X4YDS ROLL 6715

## (undated) DEVICE — PAD CHUX UNDERPAD 23X24" 7136

## (undated) DEVICE — DRSG TEGADERM 4X4 3/4" 1626

## (undated) DEVICE — GLOVE PROTEXIS W/NEU-THERA 6.5  2D73TE65

## (undated) DEVICE — GLOVE BIOGEL PI MICRO INDICATOR UNDERGLOVE SZ 6.5 48965

## (undated) DEVICE — LINEN TOWEL PACK X5 5464

## (undated) DEVICE — TRAY PREP DRY SKIN SCRUB 067

## (undated) DEVICE — NEEDLE HYPO MONOJECT STANDARD 22GA 1 1/2IN BLUE 1188822112

## (undated) DEVICE — TUBING IRRIG TUR Y TYPE 96" LF 6543-01

## (undated) DEVICE — GLOVE PROTEXIS MICRO 7.0  2D73PM70

## (undated) DEVICE — SYR 10ML LL W/O NDL

## (undated) DEVICE — ESU HOLDER LAP INST DISP PURPLE LONG 330MM H-PRO-330

## (undated) DEVICE — DRSG BIOPATCH GERMICIDAL SPLIT SPONGE 4MM MED 4150

## (undated) DEVICE — PACK MINOR SBA15MIFSE

## (undated) DEVICE — SYR 50ML LL W/O NDL 309653

## (undated) DEVICE — ESU HANDPIECE BIPOLAR THUNDERBEAT 5MMX35CM TB-0535PC

## (undated) DEVICE — BLADE KNIFE SURG 11 371111

## (undated) DEVICE — ESU ELEC BLADE 2.75" COATED/INSULATED E1455

## (undated) DEVICE — MANIFOLD NEPTUNE 4 PORT 700-20

## (undated) DEVICE — ESU PENCIL W/SMOKE EVAC NEPTUNE STRYKER 0703-046-000

## (undated) DEVICE — DRAPE BREAST/CHEST 29420

## (undated) DEVICE — SYR 03ML LL W/O NDL 309657

## (undated) DEVICE — LINEN GOWN OVERSIZE 5408

## (undated) DEVICE — SU VICRYL 3-0 PS-1 18" UND J683

## (undated) DEVICE — SU SILK 2-0 FSL 18" 677G

## (undated) DEVICE — DRAPE LAP TRANSVERSE 29421

## (undated) DEVICE — DRAIN JACKSON PRATT RESERVOIR 100ML SU130-1305

## (undated) DEVICE — SU VICRYL 2-0 CT-1 27" UND J259H

## (undated) DEVICE — DRAPE COVER C-ARM SEAMLESS SNAP-KAP 03-KP26 LATEX FREE

## (undated) DEVICE — ENDO TROCAR SLEEVE KII ADV FIXATION 05X100MM CFS02

## (undated) DEVICE — SU VICRYL 3-0 SH 27" J316H

## (undated) DEVICE — BNDG ELASTIC 6" DBL LENGTH UNSTERILE 6611-16

## (undated) DEVICE — BLADE KNIFE SURG 15 371115

## (undated) DEVICE — CATH TRAY FOLEY SURESTEP 16FR WDRAIN BAG STLK LATEX A300316A

## (undated) DEVICE — DECANTER BAG 2002S

## (undated) DEVICE — SUCTION MANIFOLD NEPTUNE 2 SYS 4 PORT 0702-020-000

## (undated) DEVICE — DRAIN JACKSON PRATT 15FR ROUND SU130-1323

## (undated) DEVICE — TUBING INFUSION INFILTRATION LIPOSUCTION 156" 24-6008

## (undated) RX ORDER — VASOPRESSIN 20 U/ML
INJECTION PARENTERAL
Status: DISPENSED
Start: 2021-10-20

## (undated) RX ORDER — HYDROMORPHONE HYDROCHLORIDE 1 MG/ML
INJECTION, SOLUTION INTRAMUSCULAR; INTRAVENOUS; SUBCUTANEOUS
Status: DISPENSED
Start: 2025-03-05

## (undated) RX ORDER — PROPOFOL 10 MG/ML
INJECTION, EMULSION INTRAVENOUS
Status: DISPENSED
Start: 2025-04-02

## (undated) RX ORDER — LIDOCAINE HYDROCHLORIDE 20 MG/ML
INJECTION, SOLUTION EPIDURAL; INFILTRATION; INTRACAUDAL; PERINEURAL
Status: DISPENSED
Start: 2021-10-20

## (undated) RX ORDER — HYDROMORPHONE HYDROCHLORIDE 1 MG/ML
INJECTION, SOLUTION INTRAMUSCULAR; INTRAVENOUS; SUBCUTANEOUS
Status: DISPENSED
Start: 2021-10-20

## (undated) RX ORDER — PROPOFOL 10 MG/ML
INJECTION, EMULSION INTRAVENOUS
Status: DISPENSED
Start: 2025-03-05

## (undated) RX ORDER — FENTANYL CITRATE 0.05 MG/ML
INJECTION, SOLUTION INTRAMUSCULAR; INTRAVENOUS
Status: DISPENSED
Start: 2021-10-20

## (undated) RX ORDER — PROPOFOL 10 MG/ML
INJECTION, EMULSION INTRAVENOUS
Status: DISPENSED
Start: 2021-10-20

## (undated) RX ORDER — KETOROLAC TROMETHAMINE 30 MG/ML
INJECTION, SOLUTION INTRAMUSCULAR; INTRAVENOUS
Status: DISPENSED
Start: 2021-10-20

## (undated) RX ORDER — ACETAMINOPHEN 325 MG/1
TABLET ORAL
Status: DISPENSED
Start: 2021-10-20

## (undated) RX ORDER — DEXAMETHASONE SODIUM PHOSPHATE 4 MG/ML
INJECTION, SOLUTION INTRA-ARTICULAR; INTRALESIONAL; INTRAMUSCULAR; INTRAVENOUS; SOFT TISSUE
Status: DISPENSED
Start: 2025-04-02

## (undated) RX ORDER — FENTANYL CITRATE 50 UG/ML
INJECTION, SOLUTION INTRAMUSCULAR; INTRAVENOUS
Status: DISPENSED
Start: 2025-04-02

## (undated) RX ORDER — NEOSTIGMINE METHYLSULFATE 1 MG/ML
VIAL (ML) INJECTION
Status: DISPENSED
Start: 2021-10-20

## (undated) RX ORDER — BUPIVACAINE HYDROCHLORIDE AND EPINEPHRINE 5; 5 MG/ML; UG/ML
INJECTION, SOLUTION EPIDURAL; INTRACAUDAL; PERINEURAL
Status: DISPENSED
Start: 2021-10-20

## (undated) RX ORDER — ONDANSETRON 2 MG/ML
INJECTION INTRAMUSCULAR; INTRAVENOUS
Status: DISPENSED
Start: 2025-03-05

## (undated) RX ORDER — PHENAZOPYRIDINE HYDROCHLORIDE 200 MG/1
TABLET, FILM COATED ORAL
Status: DISPENSED
Start: 2021-10-20

## (undated) RX ORDER — ONDANSETRON 2 MG/ML
INJECTION INTRAMUSCULAR; INTRAVENOUS
Status: DISPENSED
Start: 2025-04-02

## (undated) RX ORDER — SCOPOLAMINE 1 MG/3D
PATCH, EXTENDED RELEASE TRANSDERMAL
Status: DISPENSED
Start: 2025-03-05

## (undated) RX ORDER — HYDROMORPHONE HCL IN WATER/PF 6 MG/30 ML
PATIENT CONTROLLED ANALGESIA SYRINGE INTRAVENOUS
Status: DISPENSED
Start: 2025-03-05

## (undated) RX ORDER — FENTANYL CITRATE 0.05 MG/ML
INJECTION, SOLUTION INTRAMUSCULAR; INTRAVENOUS
Status: DISPENSED
Start: 2025-03-05

## (undated) RX ORDER — DEXAMETHASONE SODIUM PHOSPHATE 4 MG/ML
INJECTION, SOLUTION INTRA-ARTICULAR; INTRALESIONAL; INTRAMUSCULAR; INTRAVENOUS; SOFT TISSUE
Status: DISPENSED
Start: 2021-10-20

## (undated) RX ORDER — OXYCODONE HYDROCHLORIDE 5 MG/1
TABLET ORAL
Status: DISPENSED
Start: 2021-10-20

## (undated) RX ORDER — SCOLOPAMINE TRANSDERMAL SYSTEM 1 MG/1
PATCH, EXTENDED RELEASE TRANSDERMAL
Status: DISPENSED
Start: 2021-10-20

## (undated) RX ORDER — FENTANYL CITRATE 50 UG/ML
INJECTION, SOLUTION INTRAMUSCULAR; INTRAVENOUS
Status: DISPENSED
Start: 2021-10-20

## (undated) RX ORDER — APREPITANT 40 MG/1
CAPSULE ORAL
Status: DISPENSED
Start: 2025-03-05

## (undated) RX ORDER — FENTANYL CITRATE 50 UG/ML
INJECTION, SOLUTION INTRAMUSCULAR; INTRAVENOUS
Status: DISPENSED
Start: 2025-03-05

## (undated) RX ORDER — BUPIVACAINE HYDROCHLORIDE 5 MG/ML
INJECTION, SOLUTION EPIDURAL; INTRACAUDAL; PERINEURAL
Status: DISPENSED
Start: 2025-04-02

## (undated) RX ORDER — ONDANSETRON 2 MG/ML
INJECTION INTRAMUSCULAR; INTRAVENOUS
Status: DISPENSED
Start: 2021-10-20

## (undated) RX ORDER — GLYCOPYRROLATE 0.2 MG/ML
INJECTION, SOLUTION INTRAMUSCULAR; INTRAVENOUS
Status: DISPENSED
Start: 2021-10-20

## (undated) RX ORDER — DEXAMETHASONE SODIUM PHOSPHATE 4 MG/ML
INJECTION, SOLUTION INTRA-ARTICULAR; INTRALESIONAL; INTRAMUSCULAR; INTRAVENOUS; SOFT TISSUE
Status: DISPENSED
Start: 2025-03-05

## (undated) RX ORDER — HYDROXYZINE HYDROCHLORIDE 50 MG/ML
INJECTION, SOLUTION INTRAMUSCULAR
Status: DISPENSED
Start: 2025-03-05

## (undated) RX ORDER — CEFAZOLIN SODIUM/WATER 2 G/20 ML
SYRINGE (ML) INTRAVENOUS
Status: DISPENSED
Start: 2025-04-02